# Patient Record
Sex: MALE | Race: WHITE | HISPANIC OR LATINO | Employment: OTHER | URBAN - METROPOLITAN AREA
[De-identification: names, ages, dates, MRNs, and addresses within clinical notes are randomized per-mention and may not be internally consistent; named-entity substitution may affect disease eponyms.]

---

## 2017-01-04 RX ORDER — ACETAMINOPHEN 500 MG
500 TABLET ORAL EVERY 6 HOURS PRN
COMMUNITY
End: 2017-01-07 | Stop reason: ALTCHOICE

## 2017-01-05 ENCOUNTER — APPOINTMENT (EMERGENCY)
Dept: RADIOLOGY | Facility: HOSPITAL | Age: 47
End: 2017-01-05
Payer: COMMERCIAL

## 2017-01-05 ENCOUNTER — HOSPITAL ENCOUNTER (EMERGENCY)
Facility: HOSPITAL | Age: 47
Discharge: HOME | End: 2017-01-05
Attending: EMERGENCY MEDICINE
Payer: COMMERCIAL

## 2017-01-05 VITALS
WEIGHT: 245 LBS | HEART RATE: 80 BPM | RESPIRATION RATE: 18 BRPM | DIASTOLIC BLOOD PRESSURE: 96 MMHG | OXYGEN SATURATION: 98 % | TEMPERATURE: 97.9 F | SYSTOLIC BLOOD PRESSURE: 138 MMHG

## 2017-01-05 DIAGNOSIS — M79.652 LEFT THIGH PAIN: Primary | ICD-10-CM

## 2017-01-05 DIAGNOSIS — R20.0 NUMBNESS OF LEFT ANTERIOR THIGH: ICD-10-CM

## 2017-01-05 DIAGNOSIS — R79.89 RAISED TSH LEVEL: ICD-10-CM

## 2017-01-05 LAB
ALBUMIN SERPL BCP-MCNC: 4.4 G/DL (ref 3.5–5)
ALP SERPL-CCNC: 83 U/L (ref 46–116)
ALT SERPL W P-5'-P-CCNC: 34 U/L (ref 12–78)
ANION GAP SERPL CALCULATED.3IONS-SCNC: 9 MMOL/L (ref 4–13)
AST SERPL W P-5'-P-CCNC: 24 U/L (ref 5–45)
BASOPHILS # BLD AUTO: 0.03 THOUSANDS/ΜL (ref 0–0.1)
BASOPHILS NFR BLD AUTO: 0 % (ref 0–1)
BILIRUB SERPL-MCNC: 0.3 MG/DL (ref 0.2–1)
BUN SERPL-MCNC: 17 MG/DL (ref 5–25)
CALCIUM SERPL-MCNC: 9.1 MG/DL (ref 8.3–10.1)
CHLORIDE SERPL-SCNC: 105 MMOL/L (ref 100–108)
CK MB SERPL-MCNC: 2.2 NG/ML (ref 0–5)
CK MB SERPL-MCNC: <1 % (ref 0–2.5)
CK SERPL-CCNC: 327 U/L (ref 39–308)
CO2 SERPL-SCNC: 28 MMOL/L (ref 21–32)
CREAT SERPL-MCNC: 0.97 MG/DL (ref 0.6–1.3)
DEPRECATED D DIMER PPP: 404 NG/ML (FEU) (ref 0–424)
EOSINOPHIL # BLD AUTO: 0.28 THOUSAND/ΜL (ref 0–0.61)
EOSINOPHIL NFR BLD AUTO: 3 % (ref 0–6)
ERYTHROCYTE [DISTWIDTH] IN BLOOD BY AUTOMATED COUNT: 13.6 % (ref 11.6–15.1)
GFR SERPL CREATININE-BSD FRML MDRD: >60 ML/MIN/1.73SQ M
GLUCOSE SERPL-MCNC: 93 MG/DL (ref 65–140)
HCT VFR BLD AUTO: 42.4 % (ref 36.5–49.3)
HGB BLD-MCNC: 14.2 G/DL (ref 12–17)
LYMPHOCYTES # BLD AUTO: 3.2 THOUSANDS/ΜL (ref 0.6–4.47)
LYMPHOCYTES NFR BLD AUTO: 30 % (ref 14–44)
MCH RBC QN AUTO: 30.9 PG (ref 26.8–34.3)
MCHC RBC AUTO-ENTMCNC: 33.5 G/DL (ref 31.4–37.4)
MCV RBC AUTO: 92 FL (ref 82–98)
MONOCYTES # BLD AUTO: 0.95 THOUSAND/ΜL (ref 0.17–1.22)
MONOCYTES NFR BLD AUTO: 9 % (ref 4–12)
NEUTROPHILS # BLD AUTO: 6.34 THOUSANDS/ΜL (ref 1.85–7.62)
NEUTS SEG NFR BLD AUTO: 58 % (ref 43–75)
PLATELET # BLD AUTO: 267 THOUSANDS/UL (ref 149–390)
PMV BLD AUTO: 10.3 FL (ref 8.9–12.7)
POTASSIUM SERPL-SCNC: 3.3 MMOL/L (ref 3.5–5.3)
PROT SERPL-MCNC: 8.1 G/DL (ref 6.4–8.2)
RBC # BLD AUTO: 4.6 MILLION/UL (ref 3.88–5.62)
SODIUM SERPL-SCNC: 142 MMOL/L (ref 136–145)
TSH SERPL DL<=0.05 MIU/L-ACNC: 7.14 UIU/ML (ref 0.36–3.74)
WBC # BLD AUTO: 10.8 THOUSAND/UL (ref 4.31–10.16)

## 2017-01-05 PROCEDURE — 36415 COLL VENOUS BLD VENIPUNCTURE: CPT | Performed by: EMERGENCY MEDICINE

## 2017-01-05 PROCEDURE — 85379 FIBRIN DEGRADATION QUANT: CPT | Performed by: EMERGENCY MEDICINE

## 2017-01-05 PROCEDURE — 82550 ASSAY OF CK (CPK): CPT | Performed by: EMERGENCY MEDICINE

## 2017-01-05 PROCEDURE — 84443 ASSAY THYROID STIM HORMONE: CPT | Performed by: EMERGENCY MEDICINE

## 2017-01-05 PROCEDURE — 80053 COMPREHEN METABOLIC PANEL: CPT | Performed by: EMERGENCY MEDICINE

## 2017-01-05 PROCEDURE — 73552 X-RAY EXAM OF FEMUR 2/>: CPT

## 2017-01-05 PROCEDURE — 82553 CREATINE MB FRACTION: CPT | Performed by: EMERGENCY MEDICINE

## 2017-01-05 PROCEDURE — 85025 COMPLETE CBC W/AUTO DIFF WBC: CPT | Performed by: EMERGENCY MEDICINE

## 2017-01-05 PROCEDURE — 99283 EMERGENCY DEPT VISIT LOW MDM: CPT

## 2017-01-05 RX ORDER — OXYCODONE HYDROCHLORIDE AND ACETAMINOPHEN 5; 325 MG/1; MG/1
1 TABLET ORAL EVERY 4 HOURS PRN
Qty: 20 TABLET | Refills: 0 | Status: SHIPPED | OUTPATIENT
Start: 2017-01-05 | End: 2017-01-15

## 2017-01-05 RX ORDER — POTASSIUM CHLORIDE 20 MEQ/1
20 TABLET, EXTENDED RELEASE ORAL ONCE
Status: COMPLETED | OUTPATIENT
Start: 2017-01-05 | End: 2017-01-05

## 2017-01-05 RX ORDER — ONDANSETRON 4 MG/1
4 TABLET, ORALLY DISINTEGRATING ORAL ONCE
Status: COMPLETED | OUTPATIENT
Start: 2017-01-05 | End: 2017-01-05

## 2017-01-05 RX ORDER — OXYCODONE HYDROCHLORIDE AND ACETAMINOPHEN 5; 325 MG/1; MG/1
1 TABLET ORAL ONCE
Status: COMPLETED | OUTPATIENT
Start: 2017-01-05 | End: 2017-01-05

## 2017-01-05 RX ADMIN — ONDANSETRON 4 MG: 4 TABLET, ORALLY DISINTEGRATING ORAL at 00:35

## 2017-01-05 RX ADMIN — POTASSIUM CHLORIDE 20 MEQ: 1500 TABLET, EXTENDED RELEASE ORAL at 02:19

## 2017-01-05 RX ADMIN — OXYCODONE HYDROCHLORIDE AND ACETAMINOPHEN 1 TABLET: 5; 325 TABLET ORAL at 00:35

## 2017-01-07 ENCOUNTER — APPOINTMENT (EMERGENCY)
Dept: ULTRASOUND IMAGING | Facility: HOSPITAL | Age: 47
End: 2017-01-07
Payer: COMMERCIAL

## 2017-01-07 ENCOUNTER — HOSPITAL ENCOUNTER (EMERGENCY)
Facility: HOSPITAL | Age: 47
Discharge: HOME | End: 2017-01-07
Attending: EMERGENCY MEDICINE
Payer: COMMERCIAL

## 2017-01-07 VITALS
RESPIRATION RATE: 20 BRPM | OXYGEN SATURATION: 96 % | TEMPERATURE: 98 F | SYSTOLIC BLOOD PRESSURE: 170 MMHG | HEART RATE: 80 BPM | DIASTOLIC BLOOD PRESSURE: 88 MMHG | WEIGHT: 245 LBS

## 2017-01-07 DIAGNOSIS — M79.606 LEG PAIN: Primary | ICD-10-CM

## 2017-01-07 PROCEDURE — 99284 EMERGENCY DEPT VISIT MOD MDM: CPT

## 2017-01-07 PROCEDURE — 93971 EXTREMITY STUDY: CPT

## 2017-01-07 PROCEDURE — 96372 THER/PROPH/DIAG INJ SC/IM: CPT

## 2017-01-07 RX ORDER — NAPROXEN 500 MG/1
500 TABLET ORAL 2 TIMES DAILY WITH MEALS
Qty: 14 TABLET | Refills: 0 | Status: SHIPPED | OUTPATIENT
Start: 2017-01-07 | End: 2017-05-15

## 2017-01-07 RX ORDER — FUROSEMIDE 40 MG/1
40 TABLET ORAL DAILY
COMMUNITY
End: 2018-03-16 | Stop reason: ALTCHOICE

## 2017-01-07 RX ORDER — KETOROLAC TROMETHAMINE 30 MG/ML
15 INJECTION, SOLUTION INTRAMUSCULAR; INTRAVENOUS ONCE
Status: COMPLETED | OUTPATIENT
Start: 2017-01-07 | End: 2017-01-07

## 2017-01-07 RX ADMIN — KETOROLAC TROMETHAMINE 15 MG: 30 INJECTION, SOLUTION INTRAMUSCULAR at 16:47

## 2017-01-09 ENCOUNTER — ALLSCRIPTS OFFICE VISIT (OUTPATIENT)
Dept: OTHER | Facility: OTHER | Age: 47
End: 2017-01-09

## 2017-01-09 DIAGNOSIS — R14.0 ABDOMINAL DISTENSION (GASEOUS): ICD-10-CM

## 2017-01-09 DIAGNOSIS — E78.1 PURE HYPERGLYCERIDEMIA: ICD-10-CM

## 2017-01-16 ENCOUNTER — APPOINTMENT (OUTPATIENT)
Dept: LAB | Facility: CLINIC | Age: 47
End: 2017-01-16
Payer: COMMERCIAL

## 2017-01-16 ENCOUNTER — HOSPITAL ENCOUNTER (OUTPATIENT)
Dept: ULTRASOUND IMAGING | Facility: HOSPITAL | Age: 47
Discharge: HOME | End: 2017-01-16
Payer: COMMERCIAL

## 2017-01-16 DIAGNOSIS — E78.1 PURE HYPERGLYCERIDEMIA: ICD-10-CM

## 2017-01-16 DIAGNOSIS — R14.0 ABDOMINAL DISTENSION (GASEOUS): ICD-10-CM

## 2017-01-16 LAB
CHOLEST SERPL-MCNC: 202 MG/DL (ref 50–200)
HDLC SERPL-MCNC: 47 MG/DL (ref 40–60)
LDLC SERPL CALC-MCNC: 135 MG/DL (ref 0–100)
TRIGL SERPL-MCNC: 99 MG/DL

## 2017-01-16 PROCEDURE — 36415 COLL VENOUS BLD VENIPUNCTURE: CPT

## 2017-01-16 PROCEDURE — 76700 US EXAM ABDOM COMPLETE: CPT

## 2017-01-16 PROCEDURE — 80061 LIPID PANEL: CPT

## 2017-02-20 ENCOUNTER — HOSPITAL ENCOUNTER (EMERGENCY)
Facility: HOSPITAL | Age: 47
Discharge: HOME | End: 2017-02-20
Attending: EMERGENCY MEDICINE | Admitting: EMERGENCY MEDICINE
Payer: COMMERCIAL

## 2017-02-20 VITALS
DIASTOLIC BLOOD PRESSURE: 85 MMHG | SYSTOLIC BLOOD PRESSURE: 136 MMHG | TEMPERATURE: 97.8 F | HEART RATE: 88 BPM | RESPIRATION RATE: 18 BRPM | WEIGHT: 245 LBS | OXYGEN SATURATION: 95 %

## 2017-02-20 DIAGNOSIS — J06.9 UPPER RESPIRATORY INFECTION: ICD-10-CM

## 2017-02-20 DIAGNOSIS — H10.9 CONJUNCTIVITIS OF BOTH EYES: Primary | ICD-10-CM

## 2017-02-20 PROCEDURE — 99282 EMERGENCY DEPT VISIT SF MDM: CPT

## 2017-02-20 RX ORDER — FLUTICASONE PROPIONATE 50 MCG
1 SPRAY, SUSPENSION (ML) NASAL DAILY
Qty: 1 BOTTLE | Refills: 0 | Status: SHIPPED | OUTPATIENT
Start: 2017-02-20 | End: 2017-05-15

## 2017-02-20 RX ORDER — GENTAMICIN SULFATE 3 MG/ML
1 SOLUTION/ DROPS OPHTHALMIC 4 TIMES DAILY
Qty: 5 ML | Refills: 0 | Status: SHIPPED | OUTPATIENT
Start: 2017-02-20 | End: 2017-02-25

## 2017-03-01 ENCOUNTER — ALLSCRIPTS OFFICE VISIT (OUTPATIENT)
Dept: OTHER | Facility: OTHER | Age: 47
End: 2017-03-01

## 2017-03-01 DIAGNOSIS — E29.1 TESTICULAR HYPOFUNCTION: ICD-10-CM

## 2017-03-01 DIAGNOSIS — N52.9 MALE ERECTILE DYSFUNCTION: ICD-10-CM

## 2017-03-01 DIAGNOSIS — E03.9 HYPOTHYROIDISM: ICD-10-CM

## 2017-03-01 DIAGNOSIS — F31.9 BIPOLAR DISORDER (HCC): ICD-10-CM

## 2017-03-02 ENCOUNTER — APPOINTMENT (OUTPATIENT)
Dept: LAB | Facility: CLINIC | Age: 47
End: 2017-03-02
Payer: COMMERCIAL

## 2017-03-02 ENCOUNTER — TRANSCRIBE ORDERS (OUTPATIENT)
Dept: ADMINISTRATIVE | Facility: HOSPITAL | Age: 47
End: 2017-03-02

## 2017-03-02 DIAGNOSIS — F31.9 BIPOLAR DISORDER (HCC): ICD-10-CM

## 2017-03-02 DIAGNOSIS — E03.9 HYPOTHYROIDISM: ICD-10-CM

## 2017-03-02 DIAGNOSIS — I20.8 ANGINAL CHEST PAIN AT REST (HCC): Primary | ICD-10-CM

## 2017-03-02 DIAGNOSIS — N52.9 MALE ERECTILE DYSFUNCTION: ICD-10-CM

## 2017-03-02 LAB
LITHIUM SERPL-SCNC: 0.3 MMOL/L (ref 0.5–1)
T4 FREE SERPL-MCNC: 0.85 NG/DL (ref 0.76–1.46)
TESTOST SERPL-MCNC: 141.2 NG/DL (ref 241–827)
TSH SERPL DL<=0.05 MIU/L-ACNC: 2.3 UIU/ML (ref 0.36–3.74)

## 2017-03-02 PROCEDURE — 84403 ASSAY OF TOTAL TESTOSTERONE: CPT

## 2017-03-02 PROCEDURE — 84439 ASSAY OF FREE THYROXINE: CPT

## 2017-03-02 PROCEDURE — 80178 ASSAY OF LITHIUM: CPT

## 2017-03-02 PROCEDURE — 84443 ASSAY THYROID STIM HORMONE: CPT

## 2017-03-02 PROCEDURE — 36415 COLL VENOUS BLD VENIPUNCTURE: CPT

## 2017-03-07 ENCOUNTER — GENERIC CONVERSION - ENCOUNTER (OUTPATIENT)
Dept: OTHER | Facility: OTHER | Age: 47
End: 2017-03-07

## 2017-03-08 ENCOUNTER — GENERIC CONVERSION - ENCOUNTER (OUTPATIENT)
Dept: OTHER | Facility: OTHER | Age: 47
End: 2017-03-08

## 2017-03-08 ENCOUNTER — APPOINTMENT (OUTPATIENT)
Dept: LAB | Facility: CLINIC | Age: 47
End: 2017-03-08
Payer: COMMERCIAL

## 2017-03-08 DIAGNOSIS — N52.9 MALE ERECTILE DYSFUNCTION: ICD-10-CM

## 2017-03-08 LAB
FSH SERPL-ACNC: 2.9 MIU/ML (ref 0.7–10.8)
LH SERPL-ACNC: 3.5 MIU/ML (ref 1.2–10.6)
PROLACTIN SERPL-MCNC: 11.6 NG/ML (ref 2.5–17.4)
TESTOST SERPL-MCNC: 222 NG/DL (ref 241–827)

## 2017-03-08 PROCEDURE — 83001 ASSAY OF GONADOTROPIN (FSH): CPT

## 2017-03-08 PROCEDURE — 83002 ASSAY OF GONADOTROPIN (LH): CPT

## 2017-03-08 PROCEDURE — 84146 ASSAY OF PROLACTIN: CPT

## 2017-03-08 PROCEDURE — 84403 ASSAY OF TOTAL TESTOSTERONE: CPT

## 2017-03-08 PROCEDURE — 36415 COLL VENOUS BLD VENIPUNCTURE: CPT

## 2017-03-15 ENCOUNTER — GENERIC CONVERSION - ENCOUNTER (OUTPATIENT)
Dept: OTHER | Facility: OTHER | Age: 47
End: 2017-03-15

## 2017-03-16 ENCOUNTER — APPOINTMENT (OUTPATIENT)
Dept: LAB | Facility: CLINIC | Age: 47
End: 2017-03-16
Payer: COMMERCIAL

## 2017-03-16 ENCOUNTER — HOSPITAL ENCOUNTER (OUTPATIENT)
Dept: NON INVASIVE DIAGNOSTICS | Facility: CLINIC | Age: 47
Discharge: HOME | End: 2017-03-16
Payer: COMMERCIAL

## 2017-03-16 ENCOUNTER — TRANSCRIBE ORDERS (OUTPATIENT)
Dept: ADMINISTRATIVE | Facility: HOSPITAL | Age: 47
End: 2017-03-16

## 2017-03-16 DIAGNOSIS — E29.1 TESTICULAR HYPOFUNCTION: ICD-10-CM

## 2017-03-16 DIAGNOSIS — E29.1 3-OXO-5 ALPHA-STEROID DELTA 4-DEHYDROGENASE DEFICIENCY: Primary | ICD-10-CM

## 2017-03-16 DIAGNOSIS — I20.8 ANGINAL CHEST PAIN AT REST (HCC): ICD-10-CM

## 2017-03-16 LAB
FERRITIN SERPL-MCNC: 38 NG/ML (ref 8–388)
IRON SATN MFR SERPL: 21 %
IRON SERPL-MCNC: 90 UG/DL (ref 65–175)
TIBC SERPL-MCNC: 430 UG/DL (ref 250–450)
TRANSFERRIN SERPL-MCNC: 354 MG/DL (ref 200–400)

## 2017-03-16 PROCEDURE — 82728 ASSAY OF FERRITIN: CPT

## 2017-03-16 PROCEDURE — 83540 ASSAY OF IRON: CPT

## 2017-03-16 PROCEDURE — 83550 IRON BINDING TEST: CPT

## 2017-03-16 PROCEDURE — 84466 ASSAY OF TRANSFERRIN: CPT

## 2017-03-16 PROCEDURE — 93306 TTE W/DOPPLER COMPLETE: CPT

## 2017-03-16 PROCEDURE — 84402 ASSAY OF FREE TESTOSTERONE: CPT

## 2017-03-16 PROCEDURE — 36415 COLL VENOUS BLD VENIPUNCTURE: CPT

## 2017-03-16 PROCEDURE — 82533 TOTAL CORTISOL: CPT

## 2017-03-16 PROCEDURE — 84403 ASSAY OF TOTAL TESTOSTERONE: CPT

## 2017-03-17 ENCOUNTER — GENERIC CONVERSION - ENCOUNTER (OUTPATIENT)
Dept: OTHER | Facility: OTHER | Age: 47
End: 2017-03-17

## 2017-03-17 LAB
CORTIS AM PEAK SERPL-MCNC: 11.6 UG/ML (ref 4.2–22.4)
TESTOST FREE SERPL-MCNC: 6.4 PG/ML (ref 6.8–21.5)
TESTOST SERPL-MCNC: 161 NG/DL (ref 348–1197)
TESTOSTERONE COMMENT: ABNORMAL

## 2017-04-24 ENCOUNTER — ALLSCRIPTS OFFICE VISIT (OUTPATIENT)
Dept: OTHER | Facility: OTHER | Age: 47
End: 2017-04-24

## 2017-04-24 ENCOUNTER — LAB (OUTPATIENT)
Dept: LAB | Facility: CLINIC | Age: 47
End: 2017-04-24
Payer: COMMERCIAL

## 2017-04-24 DIAGNOSIS — F31.9 BIPOLAR DISORDER, UNSPECIFIED (HCC): Primary | ICD-10-CM

## 2017-04-24 DIAGNOSIS — E23.0 HYPOPITUITARISM (HCC): ICD-10-CM

## 2017-04-24 DIAGNOSIS — R18.8 OTHER ASCITES: ICD-10-CM

## 2017-04-24 LAB
ALBUMIN SERPL BCP-MCNC: 3.7 G/DL (ref 3.5–5)
ALP SERPL-CCNC: 96 U/L (ref 46–116)
ALT SERPL W P-5'-P-CCNC: 27 U/L (ref 12–78)
AST SERPL W P-5'-P-CCNC: 18 U/L (ref 5–45)
BASOPHILS # BLD AUTO: 0.02 THOUSANDS/ΜL (ref 0–0.1)
BASOPHILS NFR BLD AUTO: 0 % (ref 0–1)
BILIRUB DIRECT SERPL-MCNC: 0.15 MG/DL (ref 0–0.2)
BILIRUB SERPL-MCNC: 0.65 MG/DL (ref 0.2–1)
CREAT SERPL-MCNC: 0.94 MG/DL (ref 0.6–1.3)
EOSINOPHIL # BLD AUTO: 0.18 THOUSAND/ΜL (ref 0–0.61)
EOSINOPHIL NFR BLD AUTO: 2 % (ref 0–6)
ERYTHROCYTE [DISTWIDTH] IN BLOOD BY AUTOMATED COUNT: 13.4 % (ref 11.6–15.1)
GFR SERPL CREATININE-BSD FRML MDRD: >60 ML/MIN/1.73SQ M
HCT VFR BLD AUTO: 48 % (ref 36.5–49.3)
HGB BLD-MCNC: 15.9 G/DL (ref 12–17)
LITHIUM SERPL-SCNC: <0.2 MMOL/L (ref 0.5–1)
LYMPHOCYTES # BLD AUTO: 2 THOUSANDS/ΜL (ref 0.6–4.47)
LYMPHOCYTES NFR BLD AUTO: 26 % (ref 14–44)
MCH RBC QN AUTO: 31.2 PG (ref 26.8–34.3)
MCHC RBC AUTO-ENTMCNC: 33.1 G/DL (ref 31.4–37.4)
MCV RBC AUTO: 94 FL (ref 82–98)
MONOCYTES # BLD AUTO: 0.49 THOUSAND/ΜL (ref 0.17–1.22)
MONOCYTES NFR BLD AUTO: 6 % (ref 4–12)
NEUTROPHILS # BLD AUTO: 5.13 THOUSANDS/ΜL (ref 1.85–7.62)
NEUTS SEG NFR BLD AUTO: 66 % (ref 43–75)
NRBC BLD AUTO-RTO: 0 /100 WBCS
PLATELET # BLD AUTO: 220 THOUSANDS/UL (ref 149–390)
PMV BLD AUTO: 11.4 FL (ref 8.9–12.7)
PROT SERPL-MCNC: 7.3 G/DL (ref 6.4–8.2)
RBC # BLD AUTO: 5.09 MILLION/UL (ref 3.88–5.62)
T3 SERPL-MCNC: 1 NG/ML (ref 0.6–1.8)
T4 SERPL-MCNC: 9 UG/DL (ref 4.7–13.3)
TSH SERPL DL<=0.05 MIU/L-ACNC: 1.26 UIU/ML (ref 0.36–3.74)
WBC # BLD AUTO: 7.84 THOUSAND/UL (ref 4.31–10.16)

## 2017-04-24 PROCEDURE — 80178 ASSAY OF LITHIUM: CPT

## 2017-04-24 PROCEDURE — 36415 COLL VENOUS BLD VENIPUNCTURE: CPT

## 2017-04-24 PROCEDURE — 85025 COMPLETE CBC W/AUTO DIFF WBC: CPT

## 2017-04-24 PROCEDURE — 84443 ASSAY THYROID STIM HORMONE: CPT

## 2017-04-24 PROCEDURE — 84480 ASSAY TRIIODOTHYRONINE (T3): CPT

## 2017-04-24 PROCEDURE — 84436 ASSAY OF TOTAL THYROXINE: CPT

## 2017-04-24 PROCEDURE — 82565 ASSAY OF CREATININE: CPT

## 2017-04-24 PROCEDURE — 80076 HEPATIC FUNCTION PANEL: CPT

## 2017-04-28 ENCOUNTER — APPOINTMENT (OUTPATIENT)
Dept: LAB | Facility: CLINIC | Age: 47
End: 2017-04-28
Payer: COMMERCIAL

## 2017-04-28 DIAGNOSIS — R18.8 OTHER ASCITES: ICD-10-CM

## 2017-04-28 LAB
ALBUMIN SERPL BCP-MCNC: 4 G/DL (ref 3.5–5)
ALP SERPL-CCNC: 108 U/L (ref 46–116)
ALT SERPL W P-5'-P-CCNC: 28 U/L (ref 12–78)
ANION GAP SERPL CALCULATED.3IONS-SCNC: 7 MMOL/L (ref 4–13)
AST SERPL W P-5'-P-CCNC: 17 U/L (ref 5–45)
BASOPHILS # BLD AUTO: 0.04 THOUSANDS/ΜL (ref 0–0.1)
BASOPHILS NFR BLD AUTO: 0 % (ref 0–1)
BILIRUB SERPL-MCNC: 0.67 MG/DL (ref 0.2–1)
BUN SERPL-MCNC: 12 MG/DL (ref 5–25)
CALCIUM SERPL-MCNC: 9.5 MG/DL (ref 8.3–10.1)
CHLORIDE SERPL-SCNC: 105 MMOL/L (ref 100–108)
CO2 SERPL-SCNC: 29 MMOL/L (ref 21–32)
CREAT SERPL-MCNC: 1.11 MG/DL (ref 0.6–1.3)
EOSINOPHIL # BLD AUTO: 0.25 THOUSAND/ΜL (ref 0–0.61)
EOSINOPHIL NFR BLD AUTO: 2 % (ref 0–6)
ERYTHROCYTE [DISTWIDTH] IN BLOOD BY AUTOMATED COUNT: 13.2 % (ref 11.6–15.1)
GFR SERPL CREATININE-BSD FRML MDRD: >60 ML/MIN/1.73SQ M
GLUCOSE P FAST SERPL-MCNC: 113 MG/DL (ref 65–99)
HCT VFR BLD AUTO: 50 % (ref 36.5–49.3)
HGB BLD-MCNC: 16.7 G/DL (ref 12–17)
LYMPHOCYTES # BLD AUTO: 3.19 THOUSANDS/ΜL (ref 0.6–4.47)
LYMPHOCYTES NFR BLD AUTO: 26 % (ref 14–44)
MCH RBC QN AUTO: 31.2 PG (ref 26.8–34.3)
MCHC RBC AUTO-ENTMCNC: 33.4 G/DL (ref 31.4–37.4)
MCV RBC AUTO: 94 FL (ref 82–98)
MONOCYTES # BLD AUTO: 0.99 THOUSAND/ΜL (ref 0.17–1.22)
MONOCYTES NFR BLD AUTO: 8 % (ref 4–12)
NEUTROPHILS # BLD AUTO: 7.89 THOUSANDS/ΜL (ref 1.85–7.62)
NEUTS SEG NFR BLD AUTO: 64 % (ref 43–75)
NRBC BLD AUTO-RTO: 0 /100 WBCS
PLATELET # BLD AUTO: 258 THOUSANDS/UL (ref 149–390)
PMV BLD AUTO: 11.6 FL (ref 8.9–12.7)
POTASSIUM SERPL-SCNC: 4 MMOL/L (ref 3.5–5.3)
PROT SERPL-MCNC: 8.1 G/DL (ref 6.4–8.2)
RBC # BLD AUTO: 5.35 MILLION/UL (ref 3.88–5.62)
SODIUM SERPL-SCNC: 141 MMOL/L (ref 136–145)
WBC # BLD AUTO: 12.41 THOUSAND/UL (ref 4.31–10.16)

## 2017-04-28 PROCEDURE — 36415 COLL VENOUS BLD VENIPUNCTURE: CPT

## 2017-04-28 PROCEDURE — 85025 COMPLETE CBC W/AUTO DIFF WBC: CPT

## 2017-04-28 PROCEDURE — 80053 COMPREHEN METABOLIC PANEL: CPT

## 2017-05-15 ENCOUNTER — HOSPITAL ENCOUNTER (EMERGENCY)
Facility: HOSPITAL | Age: 47
Discharge: HOME | End: 2017-05-15
Admitting: EMERGENCY MEDICINE
Payer: COMMERCIAL

## 2017-05-15 VITALS
HEART RATE: 66 BPM | OXYGEN SATURATION: 93 % | TEMPERATURE: 98.1 F | RESPIRATION RATE: 18 BRPM | SYSTOLIC BLOOD PRESSURE: 130 MMHG | DIASTOLIC BLOOD PRESSURE: 74 MMHG

## 2017-05-15 DIAGNOSIS — S61.219A LACERATION OF FINGER, INITIAL ENCOUNTER: Primary | ICD-10-CM

## 2017-05-15 PROCEDURE — 90715 TDAP VACCINE 7 YRS/> IM: CPT | Performed by: PHYSICIAN ASSISTANT

## 2017-05-15 PROCEDURE — 90471 IMMUNIZATION ADMIN: CPT

## 2017-05-15 PROCEDURE — 99282 EMERGENCY DEPT VISIT SF MDM: CPT

## 2017-05-15 RX ORDER — GINSENG 100 MG
CAPSULE ORAL
Status: COMPLETED
Start: 2017-05-15 | End: 2017-05-15

## 2017-05-15 RX ORDER — LIDOCAINE HYDROCHLORIDE 20 MG/ML
5 INJECTION, SOLUTION EPIDURAL; INFILTRATION; INTRACAUDAL; PERINEURAL ONCE
Status: COMPLETED | OUTPATIENT
Start: 2017-05-15 | End: 2017-05-15

## 2017-05-15 RX ADMIN — TETANUS TOXOID, REDUCED DIPHTHERIA TOXOID AND ACELLULAR PERTUSSIS VACCINE, ADSORBED 0.5 ML: 5; 2.5; 8; 8; 2.5 SUSPENSION INTRAMUSCULAR at 13:20

## 2017-05-15 RX ADMIN — BACITRACIN ZINC 1 APPLICATION: 500 OINTMENT TOPICAL at 14:22

## 2017-05-15 RX ADMIN — LIDOCAINE HYDROCHLORIDE 5 ML: 20 INJECTION, SOLUTION EPIDURAL; INFILTRATION; INTRACAUDAL; PERINEURAL at 13:20

## 2017-05-16 ENCOUNTER — HOSPITAL ENCOUNTER (OUTPATIENT)
Dept: ULTRASOUND IMAGING | Facility: HOSPITAL | Age: 47
Discharge: HOME | End: 2017-05-16
Payer: COMMERCIAL

## 2017-05-16 DIAGNOSIS — R18.8 OTHER ASCITES: ICD-10-CM

## 2017-05-16 PROCEDURE — 76700 US EXAM ABDOM COMPLETE: CPT

## 2017-05-17 ENCOUNTER — HOSPITAL ENCOUNTER (OUTPATIENT)
Dept: MRI IMAGING | Facility: HOSPITAL | Age: 47
Discharge: HOME | End: 2017-05-17
Payer: COMMERCIAL

## 2017-05-17 ENCOUNTER — HOSPITAL ENCOUNTER (OUTPATIENT)
Dept: RADIOLOGY | Age: 47
Discharge: HOME | End: 2017-05-17
Payer: COMMERCIAL

## 2017-05-17 DIAGNOSIS — E23.0 HYPOPITUITARISM (HCC): ICD-10-CM

## 2017-05-17 DIAGNOSIS — E29.1 TESTICULAR HYPOFUNCTION: ICD-10-CM

## 2017-05-17 PROCEDURE — A9585 GADOBUTROL INJECTION: HCPCS | Performed by: INTERNAL MEDICINE

## 2017-05-17 PROCEDURE — 77080 DXA BONE DENSITY AXIAL: CPT

## 2017-05-17 PROCEDURE — 70553 MRI BRAIN STEM W/O & W/DYE: CPT

## 2017-05-17 RX ADMIN — GADOBUTROL 11 ML: 604.72 INJECTION INTRAVENOUS at 14:26

## 2017-05-28 ENCOUNTER — HOSPITAL ENCOUNTER (EMERGENCY)
Facility: HOSPITAL | Age: 47
Discharge: HOME | End: 2017-05-28
Attending: EMERGENCY MEDICINE | Admitting: EMERGENCY MEDICINE
Payer: COMMERCIAL

## 2017-05-28 VITALS
WEIGHT: 247.5 LBS | RESPIRATION RATE: 18 BRPM | HEART RATE: 77 BPM | DIASTOLIC BLOOD PRESSURE: 69 MMHG | BODY MASS INDEX: 31.76 KG/M2 | OXYGEN SATURATION: 95 % | SYSTOLIC BLOOD PRESSURE: 117 MMHG | TEMPERATURE: 98 F | HEIGHT: 74 IN

## 2017-05-28 DIAGNOSIS — Z48.02 VISIT FOR SUTURE REMOVAL: Primary | ICD-10-CM

## 2017-05-28 PROCEDURE — 99281 EMR DPT VST MAYX REQ PHY/QHP: CPT

## 2017-05-28 RX ORDER — LITHIUM CARBONATE 300 MG/1
300 CAPSULE ORAL 2 TIMES DAILY
Status: ON HOLD | COMMUNITY
Start: 2017-01-09 | End: 2017-07-27

## 2017-05-28 RX ORDER — ASPIRIN 81 MG
1 TABLET,CHEWABLE ORAL DAILY
Status: ON HOLD | COMMUNITY
Start: 2017-01-09 | End: 2017-07-27

## 2017-05-28 RX ORDER — ASPIRIN 81 MG/1
81 TABLET ORAL DAILY
COMMUNITY
Start: 2017-03-01 | End: 2018-03-03 | Stop reason: SDUPTHER

## 2017-05-28 RX ORDER — NICOTINE POLACRILEX 4 MG/1
20 GUM, CHEWING ORAL DAILY
COMMUNITY
Start: 2017-01-09 | End: 2018-07-11

## 2017-05-28 RX ORDER — ATENOLOL 25 MG/1
25 TABLET ORAL DAILY
COMMUNITY
Start: 2017-03-28 | End: 2018-07-11

## 2017-05-28 RX ORDER — GABAPENTIN 100 MG/1
400 CAPSULE ORAL DAILY
COMMUNITY
Start: 2017-01-09 | End: 2018-07-11

## 2017-05-28 RX ORDER — ATORVASTATIN CALCIUM 10 MG/1
10 TABLET, FILM COATED ORAL
COMMUNITY
Start: 2017-03-01 | End: 2018-02-25 | Stop reason: SDUPTHER

## 2017-05-28 RX ORDER — SERTRALINE HYDROCHLORIDE 100 MG/1
100 TABLET, FILM COATED ORAL DAILY
COMMUNITY
Start: 2017-01-09 | End: 2018-07-11

## 2017-05-28 RX ORDER — OLANZAPINE 10 MG/1
12.5 TABLET ORAL
COMMUNITY
Start: 2017-01-09 | End: 2018-09-05

## 2017-05-28 RX ORDER — POLYETHYLENE GLYCOL 3350 17 G/17G
3350 POWDER, FOR SOLUTION ORAL DAILY PRN
Status: ON HOLD | COMMUNITY
Start: 2017-05-22 | End: 2017-08-29

## 2017-05-28 RX ORDER — CITALOPRAM 20 MG/1
80 TABLET ORAL
COMMUNITY
Start: 2017-01-09 | End: 2018-07-11

## 2017-05-28 RX ORDER — TADALAFIL 5 MG/1
5 TABLET ORAL DAILY PRN
COMMUNITY
Start: 2017-03-08 | End: 2018-06-01 | Stop reason: SDUPTHER

## 2017-07-10 ENCOUNTER — ALLSCRIPTS OFFICE VISIT (OUTPATIENT)
Dept: OTHER | Facility: OTHER | Age: 47
End: 2017-07-10

## 2017-07-27 ENCOUNTER — ANESTHESIA EVENT (OUTPATIENT)
Dept: GASTROENTEROLOGY | Facility: HOSPITAL | Age: 47
End: 2017-07-27
Payer: COMMERCIAL

## 2017-07-27 ENCOUNTER — ANESTHESIA (OUTPATIENT)
Dept: GASTROENTEROLOGY | Facility: HOSPITAL | Age: 47
End: 2017-07-27
Payer: COMMERCIAL

## 2017-07-27 ENCOUNTER — HOSPITAL ENCOUNTER (OUTPATIENT)
Facility: HOSPITAL | Age: 47
Setting detail: OUTPATIENT SURGERY
Discharge: HOME | End: 2017-07-27
Attending: INTERNAL MEDICINE | Admitting: INTERNAL MEDICINE
Payer: COMMERCIAL

## 2017-07-27 ENCOUNTER — GENERIC CONVERSION - ENCOUNTER (OUTPATIENT)
Dept: OTHER | Facility: OTHER | Age: 47
End: 2017-07-27

## 2017-07-27 VITALS
HEART RATE: 62 BPM | TEMPERATURE: 97.6 F | DIASTOLIC BLOOD PRESSURE: 80 MMHG | OXYGEN SATURATION: 100 % | RESPIRATION RATE: 16 BRPM | HEIGHT: 76 IN | WEIGHT: 245 LBS | SYSTOLIC BLOOD PRESSURE: 115 MMHG | BODY MASS INDEX: 29.83 KG/M2

## 2017-07-27 DIAGNOSIS — R14.0 ABDOMINAL DISTENSION (GASEOUS): ICD-10-CM

## 2017-07-27 PROCEDURE — 88305 TISSUE EXAM BY PATHOLOGIST: CPT | Performed by: INTERNAL MEDICINE

## 2017-07-27 RX ORDER — SODIUM CHLORIDE 9 MG/ML
INJECTION, SOLUTION INTRAVENOUS CONTINUOUS PRN
Status: DISCONTINUED | OUTPATIENT
Start: 2017-07-27 | End: 2017-07-27 | Stop reason: SURG

## 2017-07-27 RX ORDER — SODIUM CHLORIDE 9 MG/ML
125 INJECTION, SOLUTION INTRAVENOUS CONTINUOUS
Status: DISCONTINUED | OUTPATIENT
Start: 2017-07-27 | End: 2017-07-27 | Stop reason: HOSPADM

## 2017-07-27 RX ORDER — PROPOFOL 10 MG/ML
INJECTION, EMULSION INTRAVENOUS CONTINUOUS PRN
Status: DISCONTINUED | OUTPATIENT
Start: 2017-07-27 | End: 2017-07-27 | Stop reason: SURG

## 2017-07-27 RX ORDER — PROPOFOL 10 MG/ML
INJECTION, EMULSION INTRAVENOUS AS NEEDED
Status: DISCONTINUED | OUTPATIENT
Start: 2017-07-27 | End: 2017-07-27 | Stop reason: SURG

## 2017-07-27 RX ADMIN — PROPOFOL 30 MG: 10 INJECTION, EMULSION INTRAVENOUS at 12:51

## 2017-07-27 RX ADMIN — PROPOFOL 120 MG: 10 INJECTION, EMULSION INTRAVENOUS at 12:40

## 2017-07-27 RX ADMIN — PROPOFOL 20 MG: 10 INJECTION, EMULSION INTRAVENOUS at 12:45

## 2017-07-27 RX ADMIN — PROPOFOL 60 MG: 10 INJECTION, EMULSION INTRAVENOUS at 12:48

## 2017-07-27 RX ADMIN — PROPOFOL 30 MG: 10 INJECTION, EMULSION INTRAVENOUS at 12:55

## 2017-07-27 RX ADMIN — SODIUM CHLORIDE: 0.9 INJECTION, SOLUTION INTRAVENOUS at 12:36

## 2017-07-27 RX ADMIN — PROPOFOL 100 MCG/KG/MIN: 10 INJECTION, EMULSION INTRAVENOUS at 12:40

## 2017-08-07 ENCOUNTER — GENERIC CONVERSION - ENCOUNTER (OUTPATIENT)
Dept: OTHER | Facility: OTHER | Age: 47
End: 2017-08-07

## 2017-08-07 ENCOUNTER — ALLSCRIPTS OFFICE VISIT (OUTPATIENT)
Dept: OTHER | Facility: OTHER | Age: 47
End: 2017-08-07

## 2017-08-07 DIAGNOSIS — E23.0 HYPOPITUITARISM (HCC): ICD-10-CM

## 2017-08-07 DIAGNOSIS — Z12.5 ENCOUNTER FOR SCREENING FOR MALIGNANT NEOPLASM OF PROSTATE: ICD-10-CM

## 2017-08-10 ENCOUNTER — GENERIC CONVERSION - ENCOUNTER (OUTPATIENT)
Dept: OTHER | Facility: OTHER | Age: 47
End: 2017-08-10

## 2017-08-23 ENCOUNTER — APPOINTMENT (OUTPATIENT)
Dept: LAB | Facility: CLINIC | Age: 47
End: 2017-08-23
Payer: COMMERCIAL

## 2017-08-23 DIAGNOSIS — Z12.5 ENCOUNTER FOR SCREENING FOR MALIGNANT NEOPLASM OF PROSTATE: ICD-10-CM

## 2017-08-23 DIAGNOSIS — E23.0 HYPOPITUITARISM (HCC): ICD-10-CM

## 2017-08-23 LAB
ERYTHROCYTE [DISTWIDTH] IN BLOOD BY AUTOMATED COUNT: 14 % (ref 11.6–15.1)
HCT VFR BLD AUTO: 47.9 % (ref 36.5–49.3)
HGB BLD-MCNC: 15.4 G/DL (ref 12–17)
MCH RBC QN AUTO: 31 PG (ref 26.8–34.3)
MCHC RBC AUTO-ENTMCNC: 32.2 G/DL (ref 31.4–37.4)
MCV RBC AUTO: 97 FL (ref 82–98)
PLATELET # BLD AUTO: 235 THOUSANDS/UL (ref 149–390)
PMV BLD AUTO: 12.3 FL (ref 8.9–12.7)
PSA SERPL-MCNC: 0.3 NG/ML (ref 0–4)
RBC # BLD AUTO: 4.96 MILLION/UL (ref 3.88–5.62)
WBC # BLD AUTO: 11.49 THOUSAND/UL (ref 4.31–10.16)

## 2017-08-23 PROCEDURE — G0103 PSA SCREENING: HCPCS

## 2017-08-23 PROCEDURE — 85027 COMPLETE CBC AUTOMATED: CPT

## 2017-08-23 PROCEDURE — 36415 COLL VENOUS BLD VENIPUNCTURE: CPT

## 2017-08-28 ENCOUNTER — ANESTHESIA EVENT (OUTPATIENT)
Dept: GASTROENTEROLOGY | Facility: HOSPITAL | Age: 47
End: 2017-08-28
Payer: COMMERCIAL

## 2017-08-29 ENCOUNTER — GENERIC CONVERSION - ENCOUNTER (OUTPATIENT)
Dept: OTHER | Facility: OTHER | Age: 47
End: 2017-08-29

## 2017-08-29 ENCOUNTER — HOSPITAL ENCOUNTER (OUTPATIENT)
Facility: HOSPITAL | Age: 47
Setting detail: OUTPATIENT SURGERY
Discharge: HOME | End: 2017-08-29
Attending: INTERNAL MEDICINE | Admitting: INTERNAL MEDICINE
Payer: COMMERCIAL

## 2017-08-29 ENCOUNTER — ANESTHESIA (OUTPATIENT)
Dept: GASTROENTEROLOGY | Facility: HOSPITAL | Age: 47
End: 2017-08-29
Payer: COMMERCIAL

## 2017-08-29 VITALS
WEIGHT: 245 LBS | RESPIRATION RATE: 16 BRPM | HEIGHT: 76 IN | TEMPERATURE: 97.6 F | OXYGEN SATURATION: 95 % | DIASTOLIC BLOOD PRESSURE: 72 MMHG | HEART RATE: 64 BPM | SYSTOLIC BLOOD PRESSURE: 123 MMHG | BODY MASS INDEX: 29.83 KG/M2

## 2017-08-29 DIAGNOSIS — D12.4 ADENOMATOUS POLYP OF DESCENDING COLON: Primary | ICD-10-CM

## 2017-08-29 RX ORDER — LIDOCAINE HYDROCHLORIDE 10 MG/ML
INJECTION, SOLUTION INFILTRATION; PERINEURAL AS NEEDED
Status: DISCONTINUED | OUTPATIENT
Start: 2017-08-29 | End: 2017-08-29 | Stop reason: SURG

## 2017-08-29 RX ORDER — SODIUM CHLORIDE 9 MG/ML
50 INJECTION, SOLUTION INTRAVENOUS CONTINUOUS
Status: DISCONTINUED | OUTPATIENT
Start: 2017-08-29 | End: 2017-08-29 | Stop reason: HOSPADM

## 2017-08-29 RX ORDER — PROPOFOL 10 MG/ML
INJECTION, EMULSION INTRAVENOUS CONTINUOUS PRN
Status: DISCONTINUED | OUTPATIENT
Start: 2017-08-29 | End: 2017-08-29 | Stop reason: SURG

## 2017-08-29 RX ORDER — PROPOFOL 10 MG/ML
INJECTION, EMULSION INTRAVENOUS AS NEEDED
Status: DISCONTINUED | OUTPATIENT
Start: 2017-08-29 | End: 2017-08-29 | Stop reason: SURG

## 2017-08-29 RX ADMIN — PROPOFOL 130 MCG/KG/MIN: 10 INJECTION, EMULSION INTRAVENOUS at 15:19

## 2017-08-29 RX ADMIN — LIDOCAINE HYDROCHLORIDE 50 MG: 10 INJECTION, SOLUTION INFILTRATION; PERINEURAL at 15:19

## 2017-08-29 RX ADMIN — PROPOFOL 150 MG: 10 INJECTION, EMULSION INTRAVENOUS at 15:19

## 2017-08-29 RX ADMIN — PROPOFOL 50 MG: 10 INJECTION, EMULSION INTRAVENOUS at 15:20

## 2017-08-29 RX ADMIN — SODIUM CHLORIDE: 0.9 INJECTION, SOLUTION INTRAVENOUS at 15:09

## 2017-08-29 NOTE — OP NOTE
**** GI/ENDOSCOPY REPORT ****     PATIENT NAME: Livier Hardy ------ VISIT ID:  Patient ID:   [id number] YOB: 1970     INTRODUCTION: Colonoscopy - A 52 male patient presents for an outpatient   Colonoscopy at [address] [place] [address]  PREVIOUS COLONOSCOPY:     INDICATIONS: Surveillance  CONSENT:  The benefits, risks, and alternatives to the procedure were   discussed and informed consent was obtained from the patient  PREPARATION: EKG, pulse, pulse oximetry and blood pressure were monitored   throughout the procedure  The patient was identified by myself both   verbally and by visual inspection of ID band  Airway Assessment   Classification: Airway class 2 - Visualization of the soft palate, fauces   and uvula  ASA Classification: Class 2 - Patient has mild to moderate   systemic disturbance that may or may not be related to the disorder   requiring surgery  MEDICATIONS: Anesthesia-check records MAC anesthesia  PROCEDURE:  The endoscope was passed with difficulty through the anus   under direct visualization to ascending colon  The procedure was aborted   due to poor prep  The quality of the preparation was poor  Cecal   Intubation Time: Minute(s) Scope Withdrawal Time: Minute(s)     RECTAL EXAM: Normal rectal exam      FINDINGS:  Large amount of solid stool in the colon  Extremely poor prep  Previous pedunculated polyp stump seen  no residual adenomatous tissue  COMPLICATIONS: There were no complications  IMPRESSIONS:     RECOMMENDATIONS: Colonoscopy recommended in 3 months  ESTIMATED BLOOD LOSS:     PATHOLOGY SPECIMENS:     PROCEDURE CODES:     ICD-9 Codes:     ICD-10 Codes:     PERFORMED BY: MOSHE Hoover  on 08/29/2017  Version 1, electronically signed by MOSHE Irizarry  on 08/29/2017 at   15:36

## 2017-08-31 ENCOUNTER — APPOINTMENT (OUTPATIENT)
Dept: LAB | Facility: CLINIC | Age: 47
End: 2017-08-31
Payer: COMMERCIAL

## 2017-08-31 DIAGNOSIS — F25.0 SCHIZOAFFECTIVE DISORDER, BIPOLAR TYPE (HCC): ICD-10-CM

## 2017-08-31 DIAGNOSIS — E78.5 OTHER AND UNSPECIFIED HYPERLIPIDEMIA: ICD-10-CM

## 2017-08-31 DIAGNOSIS — E34.9 TESTOSTERONE DEFICIENCY: Primary | ICD-10-CM

## 2017-08-31 LAB
ALBUMIN SERPL BCP-MCNC: 3.9 G/DL (ref 3.5–5)
ALP SERPL-CCNC: 88 U/L (ref 46–116)
ALT SERPL W P-5'-P-CCNC: 20 U/L (ref 12–78)
ANION GAP SERPL CALCULATED.3IONS-SCNC: 8 MMOL/L (ref 4–13)
AST SERPL W P-5'-P-CCNC: 18 U/L (ref 5–45)
BASOPHILS # BLD AUTO: 0.02 THOUSANDS/ΜL (ref 0–0.1)
BASOPHILS NFR BLD AUTO: 0 % (ref 0–1)
BILIRUB SERPL-MCNC: 0.51 MG/DL (ref 0.2–1)
BUN SERPL-MCNC: 12 MG/DL (ref 5–25)
CALCIUM SERPL-MCNC: 9.3 MG/DL (ref 8.3–10.1)
CHLORIDE SERPL-SCNC: 104 MMOL/L (ref 100–108)
CHOLEST SERPL-MCNC: 164 MG/DL (ref 50–200)
CO2 SERPL-SCNC: 27 MMOL/L (ref 21–32)
CREAT SERPL-MCNC: 1.01 MG/DL (ref 0.6–1.3)
EOSINOPHIL # BLD AUTO: 0.2 THOUSAND/ΜL (ref 0–0.61)
EOSINOPHIL NFR BLD AUTO: 2 % (ref 0–6)
ERYTHROCYTE [DISTWIDTH] IN BLOOD BY AUTOMATED COUNT: 13.9 % (ref 11.6–15.1)
GFR SERPL CREATININE-BSD FRML MDRD: 88 ML/MIN/1.73SQ M
GLUCOSE P FAST SERPL-MCNC: 75 MG/DL (ref 65–99)
HCT VFR BLD AUTO: 48.2 % (ref 36.5–49.3)
HDLC SERPL-MCNC: 32 MG/DL (ref 40–60)
HGB BLD-MCNC: 15.9 G/DL (ref 12–17)
LDLC SERPL CALC-MCNC: 96 MG/DL (ref 0–100)
LITHIUM SERPL-SCNC: 0.3 MMOL/L (ref 0.5–1)
LYMPHOCYTES # BLD AUTO: 2.27 THOUSANDS/ΜL (ref 0.6–4.47)
LYMPHOCYTES NFR BLD AUTO: 24 % (ref 14–44)
MCH RBC QN AUTO: 31.2 PG (ref 26.8–34.3)
MCHC RBC AUTO-ENTMCNC: 33 G/DL (ref 31.4–37.4)
MCV RBC AUTO: 95 FL (ref 82–98)
MONOCYTES # BLD AUTO: 0.68 THOUSAND/ΜL (ref 0.17–1.22)
MONOCYTES NFR BLD AUTO: 7 % (ref 4–12)
NEUTROPHILS # BLD AUTO: 6.12 THOUSANDS/ΜL (ref 1.85–7.62)
NEUTS SEG NFR BLD AUTO: 67 % (ref 43–75)
NRBC BLD AUTO-RTO: 0 /100 WBCS
PLATELET # BLD AUTO: 221 THOUSANDS/UL (ref 149–390)
PMV BLD AUTO: 11.7 FL (ref 8.9–12.7)
POTASSIUM SERPL-SCNC: 3.9 MMOL/L (ref 3.5–5.3)
PROT SERPL-MCNC: 7.4 G/DL (ref 6.4–8.2)
RBC # BLD AUTO: 5.1 MILLION/UL (ref 3.88–5.62)
SODIUM SERPL-SCNC: 139 MMOL/L (ref 136–145)
TRIGL SERPL-MCNC: 178 MG/DL
TSH SERPL DL<=0.05 MIU/L-ACNC: 1.57 UIU/ML (ref 0.36–3.74)
WBC # BLD AUTO: 9.31 THOUSAND/UL (ref 4.31–10.16)

## 2017-08-31 PROCEDURE — 85025 COMPLETE CBC W/AUTO DIFF WBC: CPT

## 2017-08-31 PROCEDURE — 80061 LIPID PANEL: CPT

## 2017-08-31 PROCEDURE — 84403 ASSAY OF TOTAL TESTOSTERONE: CPT

## 2017-08-31 PROCEDURE — 84443 ASSAY THYROID STIM HORMONE: CPT

## 2017-08-31 PROCEDURE — 36415 COLL VENOUS BLD VENIPUNCTURE: CPT

## 2017-08-31 PROCEDURE — 80053 COMPREHEN METABOLIC PANEL: CPT

## 2017-08-31 PROCEDURE — 84402 ASSAY OF FREE TESTOSTERONE: CPT

## 2017-08-31 PROCEDURE — 80178 ASSAY OF LITHIUM: CPT

## 2017-09-01 LAB
TESTOST FREE SERPL-MCNC: 7.3 PG/ML (ref 6.8–21.5)
TESTOST SERPL-MCNC: 234 NG/DL (ref 264–916)

## 2017-09-28 ENCOUNTER — APPOINTMENT (OUTPATIENT)
Dept: LAB | Facility: CLINIC | Age: 47
End: 2017-09-28
Payer: COMMERCIAL

## 2017-09-28 DIAGNOSIS — E23.0 HYPOPITUITARISM (HCC): ICD-10-CM

## 2017-09-28 PROCEDURE — 84403 ASSAY OF TOTAL TESTOSTERONE: CPT

## 2017-09-28 PROCEDURE — 36415 COLL VENOUS BLD VENIPUNCTURE: CPT

## 2017-09-28 PROCEDURE — 84402 ASSAY OF FREE TESTOSTERONE: CPT

## 2017-09-29 ENCOUNTER — GENERIC CONVERSION - ENCOUNTER (OUTPATIENT)
Dept: OTHER | Facility: OTHER | Age: 47
End: 2017-09-29

## 2017-09-29 LAB
TESTOST FREE SERPL-MCNC: 8 PG/ML (ref 6.8–21.5)
TESTOST SERPL-MCNC: 250 NG/DL (ref 264–916)

## 2017-10-02 ENCOUNTER — ALLSCRIPTS OFFICE VISIT (OUTPATIENT)
Dept: OTHER | Facility: OTHER | Age: 47
End: 2017-10-02

## 2017-10-03 NOTE — PROGRESS NOTES
Assessment  1  Wheezing (786 07) (R06 2)   2  Chest congestion (786 9) (R09 89)   3  Allergic sinusitis (477 9) (J30 9)   4  Cough (786 2) (R05)    Plan  Allergic sinusitis    · Fluticasone Propionate 50 MCG/ACT Nasal Suspension; use 1 spray in each  nostril twice daily   · Loratadine 10 MG Oral Tablet; Take one tablet daily as needed  Chest congestion    · Mucinex Fast-Max Cold & Sinus 10-5-325 MG Oral Capsule; Take 1 capsule twice  daily  Cough    · Benzonatate 100 MG Oral Capsule (Tessalon Perles); TAKE 1 CAPSULE 3 TIMES  DAILY  Need for immunization against influenza    · Flulaval Quadrivalent Intramuscular Suspension; INJECT 0 5  ML  Intramuscular; To Be Done: [id number]  Wheezing    · Ventolin  (90 Base) MCG/ACT Inhalation Aerosol Solution; inhale 1-2 puffs  every 4-6 hours only for wheezing and severe shortness of breath not for daily use    Discussion/Summary    *URI (Cough, chest congestion, allergic rhinitis and wheezing)likely having bronchitis with an allergy component  He is given meds to help with his symptoms  Should improve with time  If other symptoms like fever and chest pain develop, please RTC for further evaluation or go to the ED  The patient was counseled regarding diagnostic results,-instructions for management,-risk factor reductions,-risks and benefits of treatment options,-importance of compliance with treatment  Possible side effects of new medications were reviewed with the patient/guardian today  The treatment plan was reviewed with the patient/guardian  The patient/guardian understands and agrees with the treatment plan   Educational resources provided:      Chief Complaint  Presents to the clinic for chest congestion and nose congestion x 1 week      History of Present Illness  HPI: as Mimi Record has been coughing and sneezing for the past week  Soon after coughing, he started wheezing, coughing up mucus, with some sneezing and congested nose   Had diarrhea several days ago, one time  Denies fever, abd pain, C P , back pain, current N/V/D, sore throat, change in vision or headaches  Says his problem is more so the feeling of being tired from coughing so much  No previous Hx of asthma or bronchitis  Smoker, about 1/2 pack a day  Hospital Based Practices Required Assessment:   Pain Assessment   the patient states they do not have pain  (on a scale of 0 to 10, the patient rates the pain at 0 )    Prefered Language is  [place]  Primary Language is  English  Education Completed: disease/condition,-medications-and-treatment/procedure   Teaching Method: verbal   Person Taught: patient   Evaluation Of Learning: verbalized/demonstrated understanding      Review of Systems    Constitutional: no fever or chills, feels well, no tiredness, no recent weight loss or weight gain  ENT: nasal discharge, but-as noted in HPI,-no earache,-no nosebleeds,-no sore throat-and-no hearing loss  Cardiovascular: no complaints of slow or fast heart rate, no chest pain, no palpitations, no leg claudication or lower extremity edema  Respiratory: cough-and-wheezing, but-as noted in HPI,-no shortness of breath-and-no shortness of breath during exertion  Gastrointestinal: no complaints of abdominal pain, no constipation, no nausea or vomiting, no diarrhea or bloody stools  ROS reviewed  Active Problems  1  Abdominal bloating (787 3) (R14 0)   2  Ascites (789 59) (R18 8)   3  Bipolar disorder (296 80) (F31 9)   4  Central obesity (278 1) (E65)   5  Chronic stable angina (413 9) (I20 8)   6  Colon polyps (211 3) (K63 5)   7  Encounter for completion of form with patient (V68 89) (Z02 89)   8  Encounter for prostate cancer screening (V76 44) (Z12 5)   9  Encounter for smoking cessation counseling (V65 42,305 1) (Z71 6,Z72 0)   10  Erectile dysfunction (607 84) (N52 9)   11  Essential hypertriglyceridemia (272 1) (E78 1)   12  GERD (gastroesophageal reflux disease) (530 81) (K21 9)   13   History of palpitations (V12 59) (Z87 898)   14  Hyperlipidemia (272 4) (E78 5)   15  Hypertension (401 9) (I10)   16  Hypothalamic hypogonadism (253 4) (E23 0)   17  Meralgia paraesthetica, left (355 1) (G57 12)   18  Need for prophylactic vaccination and inoculation against influenza (V04 81) (Z23)   19  Nicotine dependence (305 1) (F17 200)   20  Non-alcoholic fatty liver disease (571 8) (K76 0)   21  Subclinical hypothyroidism (244 8) (E03 9)    Past Medical History  Active Problems And Past Medical History Reviewed: The active problems and past medical history were reviewed and updated today  Surgical History  Surgical History Reviewed: The surgical history was reviewed and updated today  Social History   · Current smoker (305 1) (F17 200)  The social history was reviewed and updated today  The social history was reviewed and is unchanged  Family History  Family History Reviewed: The family history was reviewed and updated today  Current Meds   1  Aspirin EC 81 MG Oral Tablet Delayed Release; TAKE 1 TABLET DAILY; Therapy: 08UCT6642 to (Jerica Lehman)  Requested for: 31Alr3728; Last   Rx:07Sep2017 Ordered   2  Atenolol 25 MG Oral Tablet; TAKE 1 TABLET DAILY AS DIRECTED; Therapy: 98VRN7394 to (Evaluate:91Zec0132)  Requested for: 98Fsx0190; Last   Rx:07Sep2017 Ordered   3  Atorvastatin Calcium 10 MG Oral Tablet; TAKE 1 TABLET DAILY AS DIRECTED; Therapy: 83SEQ6100 to (Jerica Lehman)  Requested for: 90Lov1469; Last   Rx:07Sep2017 Ordered   4  Capsaicin 0 1 % External Cream; APPLY GENTLY TO AFFECTED AREA 3-4 TIMES DAILY; Therapy: 67PFN1249 to (Last Rx:01Mar2017)  Requested for: 94RYL1816 Ordered   5  Cialis 5 MG Oral Tablet; TAKE ONE TABLET ONE HOUR BEFORE NEEDED; Therapy: 62CQU6968 to (Last Rx:08Mar2017)  Requested for: 34OQE7581 Ordered   6  Citalopram Hydrobromide 20 MG Oral Tablet; TAKE 1 TABLET DAILY; Therapy: 74GOA3114 to (Jerica Lehman) Recorded   7   Dulcolax 5 MG Oral Tablet Delayed Release; Take 2 tablets; Therapy: 09KZG1620 to (Last Rx:36Ylw8019)  Requested for: 42FCO5884 Ordered   8  Dulcolax 5 MG Oral Tablet Delayed Release; take two 5 mg dulcolax tablets at 5 pm on   the day prior to colonoscopy MDD:2;   Therapy: 65QNZ5620 to (Evaluate:33Xnd1953)  Requested for: 67Yer9886; Last   Rx:03Jjk1112 Ordered   9  Gabapentin 100 MG Oral Capsule; TAKE 1 CAPSULE 3 TIMES DAILY; Therapy: 35QRP8014 to (Siobhan Madrigal)  Requested for: 30Fib0641; Last   Rx:42Kgt7208 Ordered   10  Golytely 227 1 GM Oral Solution Reconstituted; TAKE AS DIRECTED  1 gallon golytely    split prep for colonoscopy; Therapy: 65SBB6455 to (Evaluate:71Gob1096)  Requested for: 89Ztf8294; Last    Rx:04Uyw1419 Ordered   11  Lithium Carbonate 300 MG Oral Capsule; 1 tid; Therapy: 57TTI5620 to Recorded   12  Nicotine 14 MG/24HR Transdermal Patch 24 Hour; APPLY 1 PATCH DAILY AS    DIRECTED; Therapy: 51Vcu7151 to (Evaluate:01Krh6257)  Requested for: 63Fhv8004; Last    Rx:89Amf7243 Ordered   13  Nicotine 21 MG/24HR Transdermal Patch 24 Hour; APPLY 1 PATCH DAILY AS    DIRECTED; Therapy: 32Npx2327 to (Evaluate:94Wpg5431)  Requested for: 83Rac7338; Last    Rx:40Iij2440 Ordered   14  Nicotine 7 MG/24HR Transdermal Patch 24 Hour; APPLY 1 PATCH DAILY AS DIRECTED; Therapy: 11Xzk5225 to (Evaluate:90Gvk1615)  Requested for: 77Nfo8421; Last    Rx:28Afn0821 Ordered   15  Nicotine Polacrilex 2 MG Mouth/Throat Gum; CHEW 1 PIECE SLOWLY AND    INTERMITTENTLY FOR 30 MINUTES  REPEAT EVERY 1-2 HOURS; MAXIMUM OF 24    PIECES/DAY; Therapy: 76Bqm5304 to (Evaluate:33Jet3194)  Requested for: 62Yxu0979; Last    Rx:51Nno7818 Ordered   16  OLANZapine 10 MG Oral Tablet; TAKE 1 TABLET AT BEDTIME in addition to a 2 5mg    tablet; Therapy: 40WEI8178 to Recorded   17  Omeprazole 20 MG Oral Tablet Delayed Release; Take 1 tablet daily;     Therapy: 07XWZ9916 to (Siobhan Madrigal)  Requested for: 32Sfc5431; Last Rx: 31NWH5838 Ordered   18  Polyethylene Glycol 3350 Oral Powder; Use as directed for colonoscopy prep; Therapy: 79HYY7601 to (Last Rx:30Lok4748)  Requested for: 30IGA4108 Ordered   19  Testosterone 25 MG/2 5GM (1%) Transdermal Gel; Apply 4  packets once a day; Therapy: 81Lkv3493 to Recorded   20  Zoloft 100 MG Oral Tablet; TAKE 1 TABLET DAILY AS DIRECTED; Therapy: 28WNM9466 to (Evaluate:58Vbt2185) Recorded    The medication list was reviewed and updated today  Allergies  1  morphine   2  Penicillins    Vitals   Recorded: 92UDC8812 12:43PM   Temperature 98 1 F   Heart Rate 72   Systolic 483   Diastolic 90   Height 6 ft 2 in   Weight 240 lb 11 86 oz   BMI Calculated 30 91   BSA Calculated 2 35     Physical Exam    Constitutional   General appearance: Abnormal   well developed,-acutely ill,-normal body odor,-does not smell of feces,-does not smell of urine,-well nourished,-obese,-clothing appropriate-and-well groomed  Eyes   Conjunctiva and lids: No swelling, erythema, or discharge  Pupils and irises: Equal, round and reactive to light  Ears, Nose, Mouth, and Throat   External inspection of ears and nose: Normal     Otoscopic examination: Tympanic membrance translucent with normal light reflex  Canals patent without erythema  Nasal mucosa, septum, and turbinates: Abnormal  -left nares has a 3+ turbinate with moderate to large amount of nasal discharge  Right nares has a 2+ edema with a mild amount of nasal discharge  Oropharynx: Normal with no erythema, edema, exudate or lesions  -no post nasal drip  Pulmonary   Respiratory effort: No increased work of breathing or signs of respiratory distress  Auscultation of lungs: Abnormal  -mild congestion in the B/L lower lobes  Cardiovascular   Auscultation of heart: Normal rate and rhythm, normal S1 and S2, without murmurs      Psychiatric   Orientation to person, place and time: Normal     Mood and affect: Normal          Attending Note  Collaborating Physician Note: Collaborating Physician: I supervised the Advanced Practitioner-and-I agree with the Advanced Practitioner note        Future Appointments    Date/Time Provider Specialty Site   12/18/2017 12:50 PM Sandeep Maza DO Internal Medicine [address] PCP   12/01/2017 09:00 AM Aminata Jasso MD Gastroenterology Adult [address] [facility] [place]   Electronically signed by : TRACIE Armstrong; Oct  2 2017  1:26PM EST                       (Author)    Electronically signed by : Keegan Quispe DO; Oct  2 2017  3:34PM EST                       (Review)

## 2017-10-20 ENCOUNTER — HOSPITAL ENCOUNTER (EMERGENCY)
Facility: HOSPITAL | Age: 47
Discharge: HOME | End: 2017-10-20
Attending: EMERGENCY MEDICINE | Admitting: EMERGENCY MEDICINE
Payer: COMMERCIAL

## 2017-10-20 VITALS
WEIGHT: 241 LBS | DIASTOLIC BLOOD PRESSURE: 86 MMHG | OXYGEN SATURATION: 97 % | HEART RATE: 95 BPM | SYSTOLIC BLOOD PRESSURE: 138 MMHG | BODY MASS INDEX: 29.34 KG/M2 | RESPIRATION RATE: 18 BRPM | TEMPERATURE: 97.9 F

## 2017-10-20 DIAGNOSIS — T78.40XA ALLERGIC REACTION, INITIAL ENCOUNTER: ICD-10-CM

## 2017-10-20 DIAGNOSIS — T63.441A BEE STING: Primary | ICD-10-CM

## 2017-10-20 PROCEDURE — 99283 EMERGENCY DEPT VISIT LOW MDM: CPT

## 2017-10-20 PROCEDURE — 96372 THER/PROPH/DIAG INJ SC/IM: CPT

## 2017-10-20 RX ORDER — DEXAMETHASONE SODIUM PHOSPHATE 10 MG/ML
10 INJECTION, SOLUTION INTRAMUSCULAR; INTRAVENOUS ONCE
Status: COMPLETED | OUTPATIENT
Start: 2017-10-20 | End: 2017-10-20

## 2017-10-20 RX ORDER — FAMOTIDINE 20 MG/1
20 TABLET, FILM COATED ORAL ONCE
Status: DISCONTINUED | OUTPATIENT
Start: 2017-10-20 | End: 2017-10-20

## 2017-10-20 RX ORDER — HYDROXYZINE HYDROCHLORIDE 25 MG/1
50 TABLET, FILM COATED ORAL ONCE
Status: COMPLETED | OUTPATIENT
Start: 2017-10-20 | End: 2017-10-20

## 2017-10-20 RX ORDER — FAMOTIDINE 20 MG/1
20 TABLET, FILM COATED ORAL 2 TIMES DAILY
Qty: 30 TABLET | Refills: 0 | Status: SHIPPED | OUTPATIENT
Start: 2017-10-20 | End: 2018-07-11

## 2017-10-20 RX ORDER — HYDROXYZINE HYDROCHLORIDE 25 MG/1
25 TABLET, FILM COATED ORAL EVERY 6 HOURS
Qty: 12 TABLET | Refills: 0 | Status: SHIPPED | OUTPATIENT
Start: 2017-10-20 | End: 2018-07-11

## 2017-10-20 RX ORDER — EPINEPHRINE 0.3 MG/.3ML
0.3 INJECTION SUBCUTANEOUS ONCE
Qty: 1 EACH | Refills: 0 | Status: SHIPPED | OUTPATIENT
Start: 2017-10-20 | End: 2018-07-11

## 2017-10-20 RX ORDER — FAMOTIDINE 20 MG/1
20 TABLET, FILM COATED ORAL ONCE
Status: COMPLETED | OUTPATIENT
Start: 2017-10-20 | End: 2017-10-20

## 2017-10-20 RX ADMIN — HYDROXYZINE HYDROCHLORIDE 50 MG: 25 TABLET, FILM COATED ORAL at 20:30

## 2017-10-20 RX ADMIN — FAMOTIDINE 20 MG: 20 TABLET, FILM COATED ORAL at 20:30

## 2017-10-20 RX ADMIN — DEXAMETHASONE SODIUM PHOSPHATE 10 MG: 10 INJECTION, SOLUTION INTRAMUSCULAR; INTRAVENOUS at 20:30

## 2017-10-20 NOTE — ED NOTES
Pt changed to allergic reaction as now c/o some SOB and wheezing     Alicia Dumont, ADDISON  10/20/17 6633

## 2017-10-21 NOTE — DISCHARGE INSTRUCTIONS
General Allergic Reaction   WHAT YOU NEED TO KNOW:   An allergic reaction is your body's response to an allergen  Allergens include medicines, food, insect stings, animal dander, mold, latex, chemicals, and dust mites  Pollen from trees, grass, and weeds can also cause an allergic reaction  DISCHARGE INSTRUCTIONS:   Return to the emergency department if:   · You have a skin rash, hives, swelling, or itching that gets worse  · You have trouble breathing, shortness of breath, wheezing, or coughing  · Your throat tightens, or your lips or tongue swell  · You have trouble swallowing or speaking  · You have dizziness, lightheadedness, fainting, or confusion  · You have nausea, vomiting, diarrhea, or abdominal cramps  · You have chest pain or tightness  Contact your healthcare provider if:   · You have questions or concerns about your condition or care  Medicines:   · Medicines  may be given to relieve certain allergy symptoms such as itching, sneezing, and swelling  You may take them as a pill or use drops in your nose or eyes  Topical treatments may be given to put directly on your skin to help decrease itching or swelling  · Take your medicine as directed  Contact your healthcare provider if you think your medicine is not helping or if you have side effects  Tell him of her if you are allergic to any medicine  Keep a list of the medicines, vitamins, and herbs you take  Include the amounts, and when and why you take them  Bring the list or the pill bottles to follow-up visits  Carry your medicine list with you in case of an emergency  Follow up with your healthcare provider as directed:  Write down your questions so you remember to ask them during your visits  Self-care:   · Avoid the allergen  that you think may have caused your allergic reaction  · Use cold compresses  on your skin or eyes if they were affected by the allergic reaction   Cold compresses may help to soothe your skin or eyes     · Rinse your nasal passages  with a saline solution  Daily rinsing may help clear your nose of allergens  · Do not smoke  Your allergy symptoms may decrease if you are not around smoke  Nicotine and other chemicals in cigarettes and cigars can also cause lung damage  Ask your healthcare provider for information if you currently smoke and need help to quit  E-cigarettes or smokeless tobacco still contain nicotine  Talk to your healthcare provider before you use these products  © 2017 [facility] Information is for End User's use only and may not be sold, redistributed or otherwise used for commercial purposes  All illustrations and images included in CareNotes® are the copyrighted property of A D A M , Inc  or Eleazar Patterson  The above information is an  only  It is not intended as medical advice for individual conditions or treatments  Talk to your doctor, nurse or pharmacist before following any medical regimen to see if it is safe and effective for you

## 2017-10-21 NOTE — ED NOTES
Pt stable, no distress noted, pt amb from ER without difficulty     Rashid Cutler, RN  10/20/17 9372

## 2017-10-21 NOTE — ED PROVIDER NOTES
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 but not as an adult  Patient has no signs or symptoms of anaphylaxis today Brendalyn Ek area of swelling and redness around the actual sting site  History provided by:  Patient   used: No    Allergic Reaction   Presenting symptoms: itching, rash and swelling    Presenting symptoms: no difficulty breathing and no wheezing    Itching:     Location:  Hand and arm    Severity:  Moderate    Onset quality:  Sudden    Duration:  4 hours    Timing:  Constant    Progression:  Worsening  Rash:     Location:  Hand and arm    Quality: itchiness and redness      Onset quality:  Sudden    Duration:  4 hours    Timing:  Constant    Progression:  Worsening  Swelling:     Location:  Hand    Onset quality:  Gradual    Duration:  4 hours    Timing:  Constant    Progression:  Worsening    Chronicity:  Recurrent  Severity:  Moderate  Duration:  4 hours  Prior allergic episodes:  Insect allergies  Context: insect bite/sting    Ineffective treatments:  None tried      Prior to Admission Medications   Prescriptions Last Dose Informant Patient Reported? Taking?    OLANZapine (ZyPREXA) 10 mg tablet   Yes No   Sig: Take 12 5 mg by mouth daily at bedtime   Omeprazole 20 MG TBEC   Yes No   Sig: Take 20 mg by mouth daily   aspirin (ECOTRIN LOW STRENGTH) 81 mg EC tablet   Yes No   Sig: Take 81 mg by mouth daily   atenolol (TENORMIN) 25 mg tablet   Yes No   Sig: Take 25 mg by mouth daily   atorvastatin (LIPITOR) 10 mg tablet   Yes No   Sig: Take 10 mg by mouth daily at bedtime   bisacodyl (DULCOLAX) 5 mg EC tablet   Yes No   Sig: Take 5 mg by mouth daily   citalopram (CeleXA) 20 mg tablet   No No   Sig: Take 1 tablet by mouth daily for 30 days Q am   citalopram (CeleXA) 20 mg tablet   Yes No   Sig: Take 80 mg by mouth daily at bedtime     furosemide (LASIX) 40 mg tablet   Yes No   Sig: Take 40 mg by mouth daily   gabapentin (NEURONTIN) 100 mg capsule   Yes No   Sig: Take 400 mg by mouth daily     lithium carbonate 300 mg capsule   No No   Si tab PO BID   Patient taking differently: Take 600 mg by mouth daily 1 tab PO BID    sertraline (ZOLOFT) 100 mg tablet   No No   Sig: Take 1 tablet by mouth daily for 30 days   sertraline (ZOLOFT) 100 mg tablet   Yes No   Sig: Take 100 mg by mouth daily   tadalafil (CIALIS) 5 MG tablet   Yes No   Sig: Take 5 mg by mouth daily as needed      Facility-Administered Medications: None       Past Medical History:   Diagnosis Date    Angina pectoris (HCC)     stable    Ascites     told he had a "wave stomach"    B[facility] 1 disorder ([facility] [address] [facility]     [facility]congestive heart failure) ([facility] )     Coronary artery disease     Depression     Family history of colon cancer requiring screening colonoscopy     brother    Fatty liver     History of colon polyps     Hyperlipidemia     Hypertension     Hypothalamic hypogonadism ([facility] [address] )     Liver disease     fatty liver    MI (myocardial infarction)         [facility]omyelitis [facility])        Past Surgical History:   Procedure Laterality Date    COLONOSCOPY      FRACTURE SURGERY      jaw    [facility] COLONOSCOPY FLX DX W/COLLJ SPEC WHEN PFRMD N/A 2017    Procedure: COLONOSCOPY;  Surgeon: Javier Schaefer MD;  Location: [facility]; Service: Gastroenterology    [facility] COLONOSCOPY FLX DX W/COLLJ [facility] WHEN PFRMD N/A 2017    Procedure: COLONOSCOPY;  Surgeon: Javier Schaefer MD;  Location: [facility]; Service: Gastroenterology       No family history on file  I have reviewed and agree with the history as documented      Social History   Substance Use Topics    Smoking status: Current Every Day Smoker     Packs/day: 0 50     Types: Cigarettes    Smokeless tobacco: Never Used    Alcohol use No        Review of Systems   Constitutional: Negative for activity change and appetite change  HENT: Negative for congestion and facial swelling  Eyes: Negative for discharge and redness  Respiratory: Negative for cough and wheezing  Cardiovascular: Negative for chest pain and leg swelling  Gastrointestinal: Negative for abdominal distention, abdominal pain and blood in stool  Endocrine: Negative for cold intolerance and polydipsia  Genitourinary: Negative for difficulty urinating and hematuria  Musculoskeletal: Negative for arthralgias and gait problem  Skin: Positive for itching and rash  Negative for color change  Allergic/Immunologic: Negative for food allergies and immunocompromised state  Neurological: Negative for dizziness, seizures and headaches  Hematological: Negative for adenopathy  Does not bruise/bleed easily  Psychiatric/Behavioral: Negative for agitation, confusion and decreased concentration  All other systems reviewed and are negative  Physical Exam  ED Triage Vitals [10/20/17 1908]   Temperature Pulse Respirations Blood Pressure SpO2   97 9 °F (36 6 °C) 95 18 138/86 97 %      Temp Source Heart Rate Source Patient Position - Orthostatic VS BP Location FiO2 (%)   Oral Monitor Sitting Right arm --      Pain Score       8           Physical Exam   Constitutional: He is oriented to person, place, and time  He appears well-developed and well-nourished  Non-toxic appearance  HENT:   Head: Normocephalic and atraumatic  Right Ear: Tympanic membrane normal    Left Ear: Tympanic membrane normal    Nose: Nose normal    Mouth/Throat: Oropharynx is clear and moist    Eyes: Conjunctivae, EOM and lids are normal  Pupils are equal, round, and reactive to light  Neck: Trachea normal and normal range of motion  Neck supple  No JVD present  Carotid bruit is not present  Cardiovascular: Normal rate, regular rhythm, normal heart sounds and intact distal pulses  No extrasystoles are present  Pulmonary/Chest: Effort normal  He has no decreased breath sounds  He has no wheezes  He has no rhonchi  He has no rales  He exhibits no tenderness and no deformity  Abdominal: Soft  Bowel sounds are normal  There is no tenderness  There is no rebound, no guarding and no CVA tenderness  Musculoskeletal:        Right shoulder: He exhibits normal range of motion, no tenderness, no swelling and no deformity  Cervical back: Normal  He exhibits normal range of motion, no tenderness, no bony tenderness and no deformity  Hands:  Lymphadenopathy:     He has no cervical adenopathy  He has no axillary adenopathy  Neurological: He is alert and oriented to person, place, and time  He has normal strength and normal reflexes  No cranial nerve deficit or sensory deficit  He displays a negative Romberg sign  Skin: Skin is warm and dry  Psychiatric: He has a normal mood and affect  His speech is normal and behavior is normal  Judgment and thought content normal  Cognition and memory are normal    Nursing note and vitals reviewed        ED Medications  Medications   hydrOXYzine HCL (ATARAX) tablet 50 mg (50 mg Oral Given 10/20/17 2030)   famotidine (PEPCID) tablet 20 mg (20 mg Oral Given 10/20/17 2030)   dexamethasone (PF) (DECADRON) injection 10 mg (10 mg Intramuscular Given 10/20/17 2030)       Diagnostic Studies  Labs Reviewed - No data to display    No orders to display       Procedures  Procedures      Phone Contacts  ED Phone Contact    ED Course  ED Course                                MDM  Number of Diagnoses or Management Options  Allergic reaction, initial encounter: new and requires workup  Bee sting: new and requires workup     Amount and/or Complexity of Data Reviewed  Clinical lab tests: ordered and reviewed  Tests in the radiology section of CPT®: ordered and reviewed  Tests in the medicine section of CPT®: ordered and reviewed    Patient Progress  Patient progress: improved    CritCare Time    Disposition  Final diagnoses:   Bee sting - local reaction left hand   Allergic reaction, initial encounter     ED Disposition     ED Disposition Condition Comment    Discharge  Modesto Lott discharge to home/self care      Condition at discharge: Good        Follow-up Information     Follow up With Specialties Details Why Contact Info    your doctor  Schedule an appointment as soon as possible for a visit          Discharge Medication List as of 10/20/2017  8:19 PM      START taking these medications    Details   EPINEPHrine (EPIPEN) 0 3 mg/0 3 mL SOAJ Inject 0 3 mL into the shoulder, thigh, or buttocks once for 1 dose For severe allergic reaction, Starting Fri 10/20/2017, Print      famotidine (PEPCID) 20 mg tablet Take 1 tablet by mouth 2 (two) times a day, Starting Fri 10/20/2017, Print      hydrOXYzine HCL (ATARAX) 25 mg tablet Take 1 tablet by mouth every 6 (six) hours, Starting Fri 10/20/2017, Print         CONTINUE these medications which have NOT CHANGED    Details   aspirin (ECOTRIN LOW STRENGTH) 81 mg EC tablet Take 81 mg by mouth daily, Starting 3/1/2017, Until Discontinued, Historical Med      atenolol (TENORMIN) 25 mg tablet Take 25 mg by mouth daily, Starting 3/28/2017, Until Discontinued, Historical Med      atorvastatin (LIPITOR) 10 mg tablet Take 10 mg by mouth daily at bedtime, Starting 3/1/2017, Until Discontinued, Historical Med      bisacodyl (DULCOLAX) 5 mg EC tablet Take 5 mg by mouth daily, Starting 5/22/2017, Until Discontinued, Historical Med      citalopram (CeleXA) 20 mg tablet Take 80 mg by mouth daily at bedtime  , Starting Mon 1/9/2017, Historical Med      furosemide (LASIX) 40 mg tablet Take 40 mg by mouth daily, Until Discontinued, Historical Med      gabapentin (NEURONTIN) 100 mg capsule Take 400 mg by mouth daily  , Starting Mon 1/9/2017, Historical Med      lithium carbonate 300 mg capsule 1 tab PO BID, Print      OLANZapine (ZyPREXA) 10 mg tablet Take 12 5 mg by mouth daily at bedtime, Starting 1/9/2017, Until Discontinued, Historical Med      Omeprazole 20 MG TBEC Take 20 mg by mouth daily, Starting 1/9/2017, Until Discontinued, Historical Med      sertraline (ZOLOFT) 100 mg tablet Take 100 mg by mouth daily, Starting 1/9/2017, Until Discontinued, Historical Med      tadalafil (CIALIS) 5 MG tablet Take 5 mg by mouth daily as needed, Starting 3/8/2017, Until Discontinued, Historical Med           No discharge procedures on file      ED Provider  Electronically Signed by       Jyothi Nunez DO  10/21/17 0805

## 2017-10-31 DIAGNOSIS — Z51.81 ENCOUNTER FOR THERAPEUTIC DRUG LEVEL MONITORING: ICD-10-CM

## 2017-11-08 ENCOUNTER — GENERIC CONVERSION - ENCOUNTER (OUTPATIENT)
Dept: OTHER | Facility: OTHER | Age: 47
End: 2017-11-08

## 2017-11-30 ENCOUNTER — ANESTHESIA EVENT (OUTPATIENT)
Dept: GASTROENTEROLOGY | Facility: HOSPITAL | Age: 47
End: 2017-11-30
Payer: COMMERCIAL

## 2017-12-01 ENCOUNTER — GENERIC CONVERSION - ENCOUNTER (OUTPATIENT)
Dept: OTHER | Facility: OTHER | Age: 47
End: 2017-12-01

## 2017-12-01 ENCOUNTER — HOSPITAL ENCOUNTER (OUTPATIENT)
Facility: HOSPITAL | Age: 47
Setting detail: OUTPATIENT SURGERY
Discharge: HOME | End: 2017-12-01
Attending: INTERNAL MEDICINE | Admitting: INTERNAL MEDICINE
Payer: COMMERCIAL

## 2017-12-01 ENCOUNTER — ANESTHESIA (OUTPATIENT)
Dept: GASTROENTEROLOGY | Facility: HOSPITAL | Age: 47
End: 2017-12-01
Payer: COMMERCIAL

## 2017-12-01 VITALS
RESPIRATION RATE: 20 BRPM | WEIGHT: 240 LBS | HEIGHT: 76 IN | SYSTOLIC BLOOD PRESSURE: 111 MMHG | HEART RATE: 76 BPM | BODY MASS INDEX: 29.22 KG/M2 | TEMPERATURE: 97 F | DIASTOLIC BLOOD PRESSURE: 71 MMHG | OXYGEN SATURATION: 99 %

## 2017-12-01 DIAGNOSIS — R14.0 ABDOMINAL DISTENSION (GASEOUS): ICD-10-CM

## 2017-12-01 PROCEDURE — 88305 TISSUE EXAM BY PATHOLOGIST: CPT | Performed by: INTERNAL MEDICINE

## 2017-12-01 RX ORDER — PROPOFOL 10 MG/ML
INJECTION, EMULSION INTRAVENOUS CONTINUOUS PRN
Status: DISCONTINUED | OUTPATIENT
Start: 2017-12-01 | End: 2017-12-01 | Stop reason: SURG

## 2017-12-01 RX ORDER — PROPOFOL 10 MG/ML
INJECTION, EMULSION INTRAVENOUS AS NEEDED
Status: DISCONTINUED | OUTPATIENT
Start: 2017-12-01 | End: 2017-12-01 | Stop reason: SURG

## 2017-12-01 RX ORDER — SODIUM CHLORIDE 9 MG/ML
125 INJECTION, SOLUTION INTRAVENOUS CONTINUOUS
Status: DISCONTINUED | OUTPATIENT
Start: 2017-12-01 | End: 2017-12-01 | Stop reason: HOSPADM

## 2017-12-01 RX ADMIN — PROPOFOL 100 MCG/KG/MIN: 10 INJECTION, EMULSION INTRAVENOUS at 08:58

## 2017-12-01 RX ADMIN — PROPOFOL 80 MG: 10 INJECTION, EMULSION INTRAVENOUS at 08:58

## 2017-12-01 RX ADMIN — SODIUM CHLORIDE 125 ML/HR: 0.9 INJECTION, SOLUTION INTRAVENOUS at 08:54

## 2017-12-01 NOTE — ANESTHESIA PREPROCEDURE EVALUATION
Review of Systems/Medical History  Patient summary reviewed  Chart reviewed  No history of anesthetic complications     Cardiovascular  Hyperlipidemia, Hypertension , Past MI > 6 months, CAD, , Angina , CHF ,   Comment: Echo 55% EF, mild MR,  Pulmonary  Smoker ex-smoker , ,   Comment: Just quit smoking x5 days     GI/Hepatic    Liver disease, , Bowel prep  Comment: Fatty liver     Negative  ROS        Endo/Other  Negative endo/other ROS      GYN       Hematology  Negative hematology ROS      Musculoskeletal  Obesity ,        Neurology  Negative neurology ROS      Psychology   Psychiatric history, Depression ,   Comment: Bipolar         Physical Exam    Airway    Mallampati score: II  TM Distance: >3 FB  Neck ROM: full     Dental   No notable dental hx     Cardiovascular      Pulmonary      Other Findings        Anesthesia Plan  ASA Score- 3       Anesthesia Type- IV sedation with anesthesia with ASA Monitors  Additional Monitors:   Airway Plan:           Induction- intravenous  Informed Consent- Anesthetic plan and risks discussed with patient  I personally reviewed this patient with the CRNA  Discussed and agreed on the Anesthesia Plan with the CRNA  Deedee Jean

## 2017-12-01 NOTE — OP NOTE
**** GI/ENDOSCOPY REPORT ****     PATIENT NAME: Giuliana Keith ------ VISIT ID:  Patient ID:   [id number] YOB: 1970     INTRODUCTION: Colonoscopy - A 52 male patient presents for an outpatient   Colonoscopy at [address] [place] [address]  PREVIOUS COLONOSCOPY:     INDICATIONS: Surveillance  CONSENT:  The benefits, risks, and alternatives to the procedure were   discussed and informed consent was obtained from the patient  PREPARATION: EKG, pulse, pulse oximetry and blood pressure were monitored   throughout the procedure  The patient was identified by myself both   verbally and by visual inspection of ID band  Airway Assessment   Classification: Airway class 2 - Visualization of the soft palate, fauces   and uvula  ASA Classification: Class 2 - Patient has mild to moderate   systemic disturbance that may or may not be related to the disorder   requiring surgery  MEDICATIONS: Anesthesia-check records MAC anesthesia  PROCEDURE:  The endoscope was passed with difficulty through the anus   under direct visualization and advanced to the cecum, confirmed by   appendiceal orifice and ileocecal valve  The scope was withdrawn and the   mucosa was carefully examined  The quality of the preparation was good  Cecal Intubation Time: 13 Minute(s) Scope Withdrawal Time: 15 Minute(s)     RECTAL EXAM: Normal rectal exam      FINDINGS:  A single diminutive polyp was found in the sigmoid colon  The   polyp was removed by cold biopsy polypectomy  Otherwise, the colon   appeared to be normal      COMPLICATIONS: There were no complications  IMPRESSIONS: A single diminutive polyp found in the sigmoid colon; removed   by cold biopsy polypectomy  RECOMMENDATIONS: Colonoscopy recommended in 3 years  ESTIMATED BLOOD LOSS: Insignificant  PATHOLOGY SPECIMENS: Removed by cold biopsy polypectomy       PROCEDURE CODES:     ICD-9 Codes: 211 3 Benign neoplasm of colon ICD-10 Codes: K63 5 Polyp of colon     PERFORMED BY: MOSHE Almodovar  on 12/01/2017  Version 1, electronically signed by MOSHE Vargas  on 12/01/2017 at   09:37

## 2017-12-01 NOTE — ANESTHESIA POSTPROCEDURE EVALUATION
Post-Op Assessment Note      CV Status:  Stable    Mental Status:  Somnolent    Hydration Status:  Euvolemic    PONV Controlled:  Controlled    Airway Patency:  Patent    Post Op Vitals Reviewed: Yes          Staff: Anesthesiologist, CRNA           /71 (12/01/17 0937)    Temp     Pulse 77 (12/01/17 0937)   Resp 20 (12/01/17 0937)    SpO2 94 % (12/01/17 0937)

## 2017-12-18 ENCOUNTER — ALLSCRIPTS OFFICE VISIT (OUTPATIENT)
Dept: OTHER | Facility: OTHER | Age: 47
End: 2017-12-18

## 2017-12-18 ENCOUNTER — GENERIC CONVERSION - ENCOUNTER (OUTPATIENT)
Dept: OTHER | Facility: OTHER | Age: 47
End: 2017-12-18

## 2017-12-18 DIAGNOSIS — R06.2 WHEEZING: ICD-10-CM

## 2017-12-19 ENCOUNTER — GENERIC CONVERSION - ENCOUNTER (OUTPATIENT)
Dept: OTHER | Facility: OTHER | Age: 47
End: 2017-12-19

## 2017-12-19 NOTE — PROGRESS NOTES
Assessment  1  Hypothalamic hypogonadism (253 4) (E23 0)   2  Deviated septum (470) (J34 2)   3  Insomnia (780 52) (G47 00)   4  Wheezing (786 07) (R06 2)    Plan  Deviated septum    · RA Saline Nasal Spray 0 65 % Nasal Solution; USE 2 SPRAYS IN EACH NOSTRIL TWICE DAILY   · *1 - [facility]  *  Status: Hold For - Scheduling  Requestedfor: 91QRK1776  Care Summary provided  : Yes  Hypertension    · Atenolol 25 MG Oral Tablet; TAKE 1 TABLET DAILY AS DIRECTED  Hypothalamic hypogonadism    · Testosterone Cypionate 200 MG/ML Intramuscular Solution; inject 0 5ML IM every 2 weeks  Insomnia    · Melatonin 5 MG Oral Tablet; Take one tablet before bedtime  Meralgia paraesthetica, left    · Gabapentin 100 MG Oral Capsule; TAKE 1 CAPSULE 3 TIMES DAILY  Wheezing    · SPIROMETRY W/ BRONCHO- POC; Status:Active - Perform Order; Requested for:96Aib2984;     Discussion/Summary  Discussion Summary:   Hypogonadism - patient has had difficulty using the packets of gel  he would like to try the injection- will order injection of Testosterone Cypionate 200mg/mL for 0 5mL injected every 2 weeks- until this is approved will continue with current testosterone replacement therapy- check testosterone- check CBCDeviated Septum- ENT referral- continue with flonase- nasal saline spray PRNInsomnia- melatonin 5mg qHS PRNWheezing and cough- check POC spirometry  Counseling Documentation With Imm: The patient was counseled regarding diagnostic results,-- instructions for management,-- impressions  Medication SE Review and Pt Understands Tx: Possible side effects of new medications were reviewed with the patient/guardian today  The treatment plan was reviewed with the patient/guardian  The patient/guardian understands and agrees with the treatment plan      Chief Complaint  Chief Complaint Chronic Condition  Yumiko Heartrra: Patient is here today for follow up of chronic conditions described in HPI        History of Present Illness  HPI: Here for follow up of his chronic conditions  He has been having difficulty applying the 4 packets of testosterone gel daily  he is wondering if there is a different formulary  He also notes difficulty breathing due to multiple nasal fractures in the past  He has been trying the flonase spray with minimal relief  He also notes wheezing and cough that started when he had cut down on smoking  He also notes difficulty sleeping at night  He works late and then feels wired when he returns home and is unable to get to sleep  Hospital Based Practices Required Assessment:  Pain Assessment  the patient states they do not have pain  (on a scale of 0 to 10, the patient rates the pain at 0 )   Prefered Language is  english  Primary Language is  english  Review of Systems  Complete-Male:  Constitutional: no fever-- and-- no chills  Cardiovascular: no chest pain  Respiratory: cough,-- wheezing-- and-- shortness of breath during exertion  Gastrointestinal: no abdominal pain  Integumentary: no rashes  Endocrine: erectile dysfunction  ROS Reviewed:   ROS reviewed  Active Problems  1  Abdominal bloating (787 3) (R14 0)   2  Acute maxillary sinusitis (461 0) (J01 00)   3  Allergic sinusitis (477 9) (J30 9)   4  Ascites (789 59) (R18 8)   5  Bipolar disorder (296 80) (F31 9)   6  Central obesity (278 1) (E65)   7  Chest congestion (786 9) (R09 89)   8  Chronic stable angina (413 9) (I20 8)   9  Colon polyps (211 3) (K63 5)   10  Cough (786 2) (R05)   11  Encounter for completion of form with patient (V68 89) (Z02 89)   12  Encounter for monitoring testosterone replacement therapy (V58 83,V58 69) (Z51 81,Z79 899)   13  Encounter for prostate cancer screening (V76 44) (Z12 5)   14  Encounter for smoking cessation counseling (V65 42,305 1) (Z71 6,Z72 0)   15  Erectile dysfunction (607 84) (N52 9)   16  Essential hypertriglyceridemia (272 1) (E78 1)   17   GERD (gastroesophageal reflux disease) (530 81) (K21 9)   18  History of palpitations (V12 59) (Z87 898)   19  Hyperlipidemia (272 4) (E78 5)   20  Hypertension (401 9) (I10)   21  Hypothalamic hypogonadism (253 4) (E23 0)   22  Meralgia paraesthetica, left (355 1) (G57 12)   23  Need for immunization against influenza (V04 81) (Z23)   24  Need for prophylactic vaccination and inoculation against influenza (V04 81) (Z23)   25  Nicotine dependence (305 1) (F17 200)   26  Non-alcoholic fatty liver disease (571 8) (K76 0)   27  Subclinical hypothyroidism (244 8) (E03 9)   28  Wheezing (786 07) (R06 2)    Past Medical History  1  History of Bipolar disorder (296 80) (F31 9)  Active Problems And Past Medical History Reviewed: The active problems and past medical history were reviewed and updated today  Surgical History  Surgical History Reviewed: The surgical history was reviewed and updated today  Family History  Mother    1  Family history of malignant neoplasm of breast (V16 3) (Z80 3)   2  Family history of thyroid nodule (V18 19) (Z83 49)  Father    3  Family history of Colon cancer   4  Family history of congestive heart failure (V17 49) (Z82 49)  Brother    5  Family history of Colon cancer  Family History Reviewed: The family history was reviewed and updated today  Social History   · Current smoker (305 1) (F17 200)  Social History Reviewed: The social history was reviewed and updated today  The social history was reviewed and is unchanged  Current Meds   1  Aspirin EC 81 MG Oral Tablet Delayed Release; TAKE 1 TABLET DAILY; Therapy: 61MBZ3565 to (Batsheva Fleming)  Requested for: 92Vdo8875; Last Rx:50Ggh1537 Ordered   2  Atenolol 25 MG Oral Tablet; TAKE 1 TABLET DAILY AS DIRECTED; Therapy: 32EFK7240 to (Evaluate:24Xlz8397)  Requested for: 70Ryd3495; Last Rx:15Obl1840 Ordered   3  Atorvastatin Calcium 10 MG Oral Tablet; TAKE 1 TABLET DAILY AS DIRECTED;  Therapy: 12KAT3160 to (Batsheva Fleming)  Requested for: 63Slm0016; Last Rx: 68UXZ0413 Ordered   4  Benzonatate 100 MG Oral Capsule; TAKE 1 CAPSULE 3 TIMES DAILY; Therapy: 64FOS7930 to (Evaluate:12Oct2017)  Requested for: 02Oct2017; Last Rx:02Oct2017 Ordered   5  Benztropine Mesylate 0 5 MG Oral Tablet; Therapy: 18AUE8825 to Recorded   6  Capsaicin 0 1 % External Cream; APPLY GENTLY TO AFFECTED AREA 3-4 TIMES DAILY; Therapy: 47DQM9249 to (Last Rx:01Mar2017)  Requested for: 27CDP7384 Ordered   7  Cialis 5 MG Oral Tablet; TAKE ONE TABLET ONE HOUR BEFORE NEEDED; Therapy: 63PAV0918 to (Last Rx:08Mar2017)  Requested for: 89VSN6989 Ordered   8  Citalopram Hydrobromide 20 MG Oral Tablet; TAKE 1 TABLET DAILY; Therapy: 84YNY7671 to (Conchetta Pop) Recorded   9  Dulcolax 5 MG Oral Tablet Delayed Release; Take 2 tablets; Therapy: 82BVF4550 to (Last Rx:92Zxi0909)  Requested for: 94URF5514 Ordered   10  Dulcolax 5 MG Oral Tablet Delayed Release; take two 5 mg dulcolax tablets at 5 pm on the day prior  to colonoscopy MDD:2;  Therapy: 83XMR7711 to (Evaluate:08Sep2017)  Requested for: 66Ceq1111; Last Rx:07Sep2017  Ordered   11  Fluticasone Propionate 50 MCG/ACT Nasal Suspension; use 1 spray in each nostril twice daily; Therapy: 86FZG9216 to (Last Rx:02Oct2017)  Requested for: 31UEK5111 Ordered   12  Fluticasone Propionate 50 MCG/ACT Nasal Suspension; USE 1 SPRAY IN EACH NOSTRIL TWICE  DAILY; Therapy: 17ZBT0150 to (Last Rx:08Nov2017)  Requested for: 13PVI0019 Ordered   13  Gabapentin 100 MG Oral Capsule; TAKE 1 CAPSULE 3 TIMES DAILY; Therapy: 28XYH8351 to (Deimer Juneau)  Requested for: 26Jrw4818; Last Rx:77Lth4946  Ordered   14  Golytely 227 1 GM Oral Solution Reconstituted; TAKE AS DIRECTED  1 gallon golytely split prep for  colonoscopy; Therapy: 19UFR6286 to (Evaluate:28Ddt3677)  Requested for: 69Hao3727; Last Rx:96Uoz1120 Ordered   15  HydrOXYzine Pamoate 25 MG Oral Capsule; Therapy: 32UOQ2712 to Recorded   16  Lithium Carbonate 300 MG Oral Capsule; 1 tid;   Therapy: 09OYC6022 to Recorded   17  Loratadine 10 MG Oral Tablet; Take one tablet daily as needed; Therapy: 51TBW7469 to (Last Rx:02Oct2017)  Requested for: 03RAG8909; Status: ACTIVE - Renewal  Denied Ordered   18  Mucinex Fast-Max Cold & Sinus 10-5-325 MG Oral Capsule; Take 1 capsule twice daily; Therapy: 96HEY1772 to (Evaluate:10Oct2017)  Requested for: 02Oct2017; Last Rx:03Ppb9071  Ordered   19  Nicotine 14 MG/24HR Transdermal Patch 24 Hour; APPLY 1 PATCH DAILY AS DIRECTED; Therapy: 49Heq7809 to (Evaluate:72Zcv3291)  Requested for: 57Bbv5603; Last Rx:42Qzf4325  Ordered   20  Nicotine 21 MG/24HR Transdermal Patch 24 Hour; APPLY 1 PATCH DAILY AS DIRECTED; Therapy: 57Vct4206 to (Evaluate:82Hqd5307)  Requested for: 61Ffr6009; Last Rx:12Vdh3158  Ordered   21  Nicotine 7 MG/24HR Transdermal Patch 24 Hour; APPLY 1 PATCH DAILY AS DIRECTED; Therapy: 92Flf3103 to (Evaluate:92Owv7762)  Requested for: 47Htt7145; Last Rx:82Eso3026  Ordered   22  Nicotine Polacrilex 2 MG Mouth/Throat Gum; CHEW 1 PIECE SLOWLY AND INTERMITTENTLY FOR 30  MINUTES  REPEAT EVERY 1-2 HOURS; MAXIMUM OF 24 PIECES/DAY; Therapy: 63Xrx6651 to (Evaluate:17Psh9311)  Requested for: 32Liq9196; Last Rx:64Lqv2279  Ordered   23  OLANZapine 10 MG Oral Tablet; TAKE 1 TABLET AT BEDTIME in addition to a 2 5mg tablet; Therapy: 10AUA2338 to Recorded   24  Omeprazole 20 MG Oral Tablet Delayed Release; Take 1 tablet daily; Therapy: 33JJM5915 to (Bo Amin)  Requested for: 86Krl0213; Last Rx:00Nqt2162  Ordered   25  Polyethylene Glycol 3350 Oral Powder; Use as directed for colonoscopy prep; Therapy: 93MNU3133 to (Last Rx:29Hmd3813)  Requested for: 93HMK9802 Ordered   26  Sulfamethoxazole-Trimethoprim 800-160 MG Oral Tablet; TAKE 1 TABLET TWICE DAILY; Therapy: 18KKZ9160 to (Evaluate:18Nov2017)  Requested for: 64AJC4965; Last Rx:08Nov2017  Ordered   27  Testosterone 25 MG/2 5GM (1%) Transdermal Gel; Apply 4  packets once a day;   Therapy: 07Aug2017 to (Evaluate:30Dec2017); Last Rx:31Oct2017 Ordered   28  Ventolin  (90 Base) MCG/ACT Inhalation Aerosol Solution; inhale 1-2 puffs every 4-6 hours  only for wheezing and severe shortness of breath not for daily use; Therapy: 64IOE6024 to (Lee Ann Terrazas)  Requested for: 02Oct2017; Last Rx:02Oct2017  Ordered   34  Ziprasidone HCl - 40 MG Oral Capsule; Therapy: 46OPD6810 to Recorded   30  Ziprasidone HCl - 80 MG Oral Capsule; Therapy: 56RLC1138 to Recorded   31  Zoloft 100 MG Oral Tablet; TAKE 1 TABLET DAILY AS DIRECTED; Therapy: 58BCV8318 to (Evaluate:25Ozw0952) Recorded  Medication List Reviewed: The medication list was reviewed and updated today  Allergies  1  morphine   2  Penicillins  3  No Known Environmental Allergies   4  No Known Food Allergies    Vitals  Vital Signs    Recorded: 88QIP3087 12:58PM   Temperature 98 1 F   Heart Rate 76   Systolic 218   Diastolic 90   Height 6 ft 2 in   Weight 249 lb 8 94 oz   BMI Calculated 32 04   BSA Calculated 2 39     Physical Exam   Constitutional  General appearance: No acute distress, well appearing and well nourished  Eyes  Conjunctiva and lids: No swelling, erythema, or discharge  Ears, Nose, Mouth, and Throat  External inspection of ears and nose: Normal    Otoscopic examination: Tympanic membrance translucent with normal light reflex  Canals patent without erythema  Nasal mucosa, septum, and turbinates: Abnormal   examination of the septum showed deviation to the left  Pulmonary  Respiratory effort: No increased work of breathing or signs of respiratory distress  Auscultation of lungs: Clear to auscultation, equal breath sounds bilaterally, no wheezes, no rales, no rhonci  Cardiovascular  Auscultation of heart: Normal rate and rhythm, normal S1 and S2, without murmurs  Skin  Skin and subcutaneous tissue: Normal without rashes or lesions     Psychiatric  Orientation to person, place and time: Normal    Mood and affect: Normal          Health Management  Colon polyps   COLONOSCOPY (GI, SURG); every 2 months; Last 29Aug2017; Next Due: 97WMF0642; Overdue    Attending Note  Attending Note: Attending Note: I discussed the case with the Resident and reviewed the Resident's note,-- I supervised the Resident-- and-- I agree with the Resident management plan as it was presented to me  Level of Participation: I was present in clinic, but did not examine the patient  Comments/Additional Findings: finds the topical testosterone too cumbersome to use, reasonable to try injectable form  I agree with the Resident's note        Signatures   Electronically signed by : Jered Flynn DO; Dec 18 2017  1:52PM EST                       (Author)    Electronically signed by : Farideh Johnson DO; Dec 18 2017  2:06PM EST                       (Co-author)

## 2017-12-27 ENCOUNTER — ALLSCRIPTS OFFICE VISIT (OUTPATIENT)
Dept: OTHER | Facility: OTHER | Age: 47
End: 2017-12-27

## 2017-12-28 ENCOUNTER — HOSPITAL ENCOUNTER (OUTPATIENT)
Dept: RADIOLOGY | Facility: HOSPITAL | Age: 47
Discharge: HOME | End: 2017-12-28
Payer: COMMERCIAL

## 2017-12-28 ENCOUNTER — TRANSCRIBE ORDERS (OUTPATIENT)
Dept: RADIOLOGY | Facility: HOSPITAL | Age: 47
End: 2017-12-28

## 2017-12-28 ENCOUNTER — TRANSCRIBE ORDERS (OUTPATIENT)
Dept: ADMINISTRATIVE | Facility: HOSPITAL | Age: 47
End: 2017-12-28

## 2017-12-28 DIAGNOSIS — R06.2 WHEEZING: ICD-10-CM

## 2017-12-28 PROCEDURE — 71020 HB CHEST X-RAY 2VW FRONTAL&LATL: CPT

## 2018-01-12 VITALS
DIASTOLIC BLOOD PRESSURE: 90 MMHG | SYSTOLIC BLOOD PRESSURE: 122 MMHG | WEIGHT: 240.74 LBS | HEART RATE: 72 BPM | HEIGHT: 74 IN | BODY MASS INDEX: 30.9 KG/M2 | TEMPERATURE: 98.1 F

## 2018-01-12 VITALS
TEMPERATURE: 97.5 F | HEART RATE: 68 BPM | SYSTOLIC BLOOD PRESSURE: 118 MMHG | DIASTOLIC BLOOD PRESSURE: 68 MMHG | WEIGHT: 240.3 LBS | HEIGHT: 74 IN | BODY MASS INDEX: 30.84 KG/M2

## 2018-01-12 NOTE — MISCELLANEOUS
Message  Pt was called to discuss test results  He was informed that his Lithium level is low at 0 3 and 8AM testosterone was low  He was instructed to follow up with his psychiatrist for the low lithium level  He was informed that the testosterone should be rechecked  An order was put in for repeat Testosterone to be checked at 8AM  The patient is aware and will come to the clinic to  the lab slip  All questions were answered  Will call patient with any additional test results that come back        Signatures   Electronically signed by : Mckay Aguila DO; Mar  7 2017  2:37PM EST                       (Author)

## 2018-01-13 VITALS
HEIGHT: 74 IN | WEIGHT: 253.09 LBS | DIASTOLIC BLOOD PRESSURE: 78 MMHG | HEART RATE: 68 BPM | SYSTOLIC BLOOD PRESSURE: 104 MMHG | TEMPERATURE: 97.6 F | BODY MASS INDEX: 32.48 KG/M2

## 2018-01-13 VITALS
SYSTOLIC BLOOD PRESSURE: 130 MMHG | BODY MASS INDEX: 33.05 KG/M2 | HEIGHT: 74 IN | HEART RATE: 60 BPM | TEMPERATURE: 98.2 F | WEIGHT: 257.5 LBS | DIASTOLIC BLOOD PRESSURE: 88 MMHG

## 2018-01-13 NOTE — MISCELLANEOUS
Message  Pt was called to inform him of his most recent blood tests revealing low repeat testosterone and inappropriately low LH and FSH  A message was left on the patient's voicemail to inform him that more testing needs to be completed  This includes: Iron studies, 8AM cortisol, Free testosterone, MRI of the pituitary  I will call patient back to discuss with him and answer his questions  Plan  Low testosterone level in male    · (1) CORTISOL AM SPECIMEN; Status:Active; Requested for:15Mar2017;    · (1) FERRITIN; Status:Active; Requested for:15Mar2017;    · (1) IRON SATURATION %, TIBC; Status:Active; Requested for:15Mar2017;    · (1) TESTOSTERONE, FREE (DIRECT) AND TOTAL; Status:Active; Requested  for:15Mar2017;    · (1) TRANSFERRIN; Status:Active; Requested for:15Mar2017;    · * MRI BRAIN PITUITARY WO AND W CONTRAST; Status:Need Information - Financial  Authorization;  Requested for:15Mar2017;     Signatures   Electronically signed by : Dawood Ruffin DO; Mar 15 2017 12:37PM EST                       (Author)

## 2018-01-14 VITALS
WEIGHT: 241.62 LBS | HEART RATE: 76 BPM | HEIGHT: 74 IN | BODY MASS INDEX: 31.01 KG/M2 | SYSTOLIC BLOOD PRESSURE: 112 MMHG | DIASTOLIC BLOOD PRESSURE: 64 MMHG | TEMPERATURE: 98.4 F

## 2018-01-14 NOTE — RESULT NOTES
Verified Results  ECHO COMPLETE WITH CONTRAST IF INDICATED 06WUL2541 09:06AM Lynn Wiley     Test Name Result Flag Reference   ECHO COMPLETE WITH CONTRAST IF INDICATED (Report)     [facility], [address] [place] [address]   [phone]     Transthoracic Echocardiogram   2D, M-mode, Doppler, and Color Doppler     Study date: 16-Mar-2017     Patient: Eulalio Jones   MR number: [id number]   Account number: [de-identified]   : 1970   Age: 55 years   Gender: Male   Status: Outpatient   Location: [address] [place] [address]   Height: 74 in   Weight: 244 4 lb   BP: 102/ 64 mmHg     Indications: Chest pain     Diagnoses: R07 9 - Chest pain, unspecified     Sonographer: STIVEN Leo   Primary Physician: Mindy Liang   Referring Physician: Dejon Mullins: [facility]   Interpreting Physician: Omar Blair MD     SUMMARY     LEFT VENTRICLE:   Size was normal    Systolic function was normal  Ejection fraction was estimated to be 55 %  Wall thickness was normal    Left ventricular diastolic function parameters were normal      RIGHT VENTRICLE:   The size was normal    Systolic function was normal      MITRAL VALVE:   There was mild regurgitation  HISTORY: PRIOR HISTORY: Hypertension, hyperlipidemia, smoker, GERD  PROCEDURE: The study was performed in the [address] [place] [address]  This was a routine study  The transthoracic approach was used  The study included complete 2D imaging, M-mode, complete spectral Doppler, and color Doppler  The   heart rate was 68 bpm, at the start of the study  Images were obtained from the parasternal, apical, subcostal, and suprasternal notch acoustic windows  Image quality was adequate  LEFT VENTRICLE: Size was normal  Systolic function was normal  Ejection fraction was estimated to be 55 %  There were no regional wall motion abnormalities   Wall thickness was normal  DOPPLER: Left ventricular diastolic function parameters   were normal      RIGHT VENTRICLE: The size was normal  Systolic function was normal  Wall thickness was normal      LEFT ATRIUM: Size was normal      RIGHT ATRIUM: Size was normal      MITRAL VALVE: Valve structure was normal  There was normal leaflet separation  DOPPLER: The transmitral velocity was within the normal range  There was no evidence for stenosis  There was mild regurgitation  AORTIC VALVE: The valve was trileaflet  Leaflets exhibited normal thickness and normal cuspal separation  DOPPLER: Transaortic velocity was within the normal range  There was no evidence for stenosis  There was no regurgitation  TRICUSPID VALVE: The valve structure was normal  There was normal leaflet separation  DOPPLER: The transtricuspid velocity was within the normal range  There was no evidence for stenosis  There was no regurgitation  PULMONIC VALVE: Leaflets exhibited normal thickness, no calcification, and normal cuspal separation  DOPPLER: The transpulmonic velocity was within the normal range  There was trace regurgitation  PERICARDIUM: There was no pericardial effusion  The pericardium was normal in appearance  AORTA: The root exhibited normal size  SYSTEMIC VEINS: IVC: The inferior vena cava was not well visualized       SYSTEM MEASUREMENT TABLES     2D   %FS: 44 58 %   AV Diam: 3 05 cm   EDV(Teich): 114 41 ml   EF(Cube): 82 97 %   EF(Teich): 75 68 %   ESV(Cube): 20 4 ml   ESV(Teich): 27 82 ml   IVSd: 1 15 cm   LA Area: 18 49 cm2   LA Diam: 4 28 cm   LVEDV MOD A4C: 67 2 ml   LVEF MOD A4C: 61 46 %   LVESV MOD A4C: 25 9 ml   LVIDd: 4 93 cm   LVIDs: 2 73 cm   LVLd A4C: 7 41 cm   LVLs A4C: 5 83 cm   LVPWd: 1 05 cm   RA Area: 17 85 cm2   RV Diam: 3 26 cm   SI(Cube): 41 94 ml/m2   SI(Teich): 36 54 ml/m2   SV MOD A4C: 41 31 ml   SV(Cube): 99 41 ml   SV(Teich): 86 59 ml     MM   TAPSE: 2 1 cm     PW   MV A Artie: 0 43 m/s   MV Dec Door: 3 25 m/s2   MV DecT: 225 43 ms   MV E Artie: 0 73 m/s   MV E/A Ratio: 1 72     [facility]     Prepared and electronically signed by     Barbi Yepez MD   Signed 16-Mar-2017 17:19:24     (1) TRANSFERRIN 55FPE1017 08:11AM Karen Phillips    Order Number: [id number]     Test Name Result Flag Reference   TRANSFERRIN 354 mg/dL  200-400     (1) IRON SATURATION %, TIBC 84EFZ2612 08:11AM [facility]    Order Number: [id number]     Test Name Result Flag Reference   IRON SATURATION 21 %     TOTAL IRON BINDING CAPACITY 430 ug/dL  250-450   IRON 90 ug/dL     Patients treated with metal-binding drugs (ie  Deferoxamine) may have depressed iron values  (1) FERRITIN 33LNC4614 08:11AM Karen Phillips    Order Number: [id number]     Test Name Result Flag Reference   FERRITIN 38 ng/mL  8-388     (1) CORTISOL AM SPECIMEN 11MED7161 08:11AM blanca Jacqueline    Order Number: [id number]     Test Name Result Flag Reference   CORTISO AM SPEC 11 6 ug/mL  4 2-22 4   Reference ranges established for specimens drawn between 7 and 9 am  Results may be inaccurate if timing is not correct

## 2018-01-15 ENCOUNTER — ALLSCRIPTS OFFICE VISIT (OUTPATIENT)
Dept: OTHER | Facility: OTHER | Age: 48
End: 2018-01-15

## 2018-01-15 VITALS
HEART RATE: 76 BPM | WEIGHT: 263.67 LBS | BODY MASS INDEX: 33.84 KG/M2 | DIASTOLIC BLOOD PRESSURE: 64 MMHG | HEIGHT: 74 IN | SYSTOLIC BLOOD PRESSURE: 102 MMHG | TEMPERATURE: 97.7 F

## 2018-01-15 NOTE — MISCELLANEOUS
Message  I spoke with patient and discussed the results of his blood work as mentioned in previous telephone call note  He understands and will come to the clinic to  the slips for further blood work and MRI of the brain        Signatures   Electronically signed by : Deana Gayle DO; Mar 15 2017  1:14PM EST                       (Author)

## 2018-01-16 NOTE — MISCELLANEOUS
Message  GI Reminder Recall Nimco Kiran:   Date: 08/10/2017   Dear Chiquita Vogel:     Review of our records shows you are due for the following: Procedure results  Please call the following office to schedule your appointment:   [address], [address], [place], [place], [address] [facility] [phone]  We look forward to hearing from you!      Sincerely,     [facility]      Signatures   Electronically signed by : Santosh Kumar, ; Aug 10 2017  9:31AM EST                       (Author)

## 2018-01-17 NOTE — RESULT NOTES
Verified Results  (1) LITHI[facility] 88UXN4593 08:12AM [facility] Number: [id number]     Test Name Result Flag Reference   LITHIUM 0 3 mmol/L L 0 5-1 0     (1) TSH 23CHL1979 08:12AM [facility] Order Number: [id number]     Test Name Result Flag Reference   TSH 2 300 uIU/mL  0 358-3 740   - Patient Instructions: This bloodwork is non-fasting  Please drink two glasses of water morning of bloodwork  - Patient Instructions: This bloodwork is non-fasting  Please drink two glasses of water morning of bloodwork  Patients undergoing fluorescein dye angiography may retain small amounts of fluorescein in the body for 48-72 hours post procedure  Samples containing fluorescein can produce falsely depressed TSH values  If the patient had this procedure,a specimen should be resubmitted post fluorescein clearance  (1) T4, FREE 02Mar2017 08:12AM [facility] Order Number: [id number]     Test Name Result Flag Reference   T4,FREE 0 85 ng/dL  0 76-1 46   - Patient Instructions: This bloodwork is non-fasting  Please drink two glasses of water morning of bloodwork  (1) TESTOSTERONE 53DGN6753 08:12AM [facility] Order Number: [id number]     Test Name Result Flag Reference   TESTOSTERONE 141 2 ng/dL L 136-330   - Patient Instructions: Fasting preferred  Collections for men not undergoing treatment must be completed between 7am-9am ONLY  Collection time restrictions are not applicable to women or men already undergoing treatment  - Patient Instructions: Fasting preferred  Collections for men not undergoing treatment must be completed between 7am-9am ONLY  Collection time restrictions are not applicable to women or men already undergoing treatment

## 2018-01-17 NOTE — MISCELLANEOUS
Message  I spoke with patient over the telephone to inform him we will be adding more labs tests to investigate the low testosterone level that he should  the slip for including: repeat 8AM testosterone, LH, FSH and prolactin  I also spoke with the patient about an alternative therapy to viagra that would be less cost prohibitive  I placed an order for Cialis which may be more affordable  The patient will call the pharmacy to see how much the co-pay will be  The patient understood the alternative and agrees with the plan  Plan  Erectile dysfunction    · Viagra 25 MG Oral Tablet   · Cialis 5 MG Oral Tablet; TAKE ONE TABLET ONE HOUR BEFORE NEEDED   · (1) FSH; Status:Active; Requested for:08Mar2017;    · (1) LH (LEUTINIZING HORMONE); Status:Active; Requested for:08Mar2017;    · (1) PROLACTIN; Status:Active;  Requested UOB:12DGP0680;     Signatures   Electronically signed by : Su Huggins DO; Mar  8 2017  9:03AM EST                       (Author)

## 2018-01-17 NOTE — RESULT NOTES
Discussion/Summary   both of the polyps were precancerous  please make sure patient has appointment to repeat colonoscopy in 1-2 months  Verified Results  (1) TISSUE EXAM [id number] 12:43PM Hui Guadarrama     Test Name Result Flag Reference   LAB AP CASE REPORT (Report)     Surgical Pathology Report             Case: [id number]                   Authorizing Provider: Doc Sarah MD       Collected:      07/27/2017 1243        Ordering Location:   [address] [place] [address]   Received:      07/28/2017 [facility]                               Pathologist:      Matti Jernigan MD                             Specimens:  A) - Polyp, Colorectal, Hepatic flexure polyp cold biopsy                       B) - Large Intestine, Sigmoid Colon, Sigmoid polyp hot snare   LAB AP FINAL DIAGNOSIS (Report)     A  Colon, hepatic flexure polyp, biopsy:  - Tubular adenoma, negative for high-grade dysplasia  B  Colon, sigmoid polyp, biopsy:  - Tubulovillous adenoma, negative for high-grade dysplasia  Electronically signed by Matti Jernigan MD on 8/1/2017 at 12:31 PM   LAB AP SURGICAL ADDITIONAL INFORMATION (Report)     All controls performed with the immunohistochemical stains reported above   reacted appropriately  These tests were developed and their performance   characteristics determined by [facility] or   [facility]  They may not be cleared or approved by the [place]  Food and Drug Administration  The FDA has determined that such clearance   or approval is not necessary  These tests are used for clinical purposes  They should not be regarded as investigational or for research  This   laboratory has been approved by CLIA 88, designated as a high-complexity   laboratory and is qualified to perform these tests  Interpretation performed at [facility]   LAB AP GROSS DESCRIPTION (Report)     A   The specimen is received in formalin, labeled with the patient's name   and hospital number, and is designated hepatic flexure polyp  The   specimen consists of 2 tan-pink soft tissue fragments each measuring 0 2   cm in greatest dimension  Entirely submitted  One cassette  B  The specimen is received in formalin, labeled with the patient's name   and hospital number, and is designated Sigmoid colon polyp  The   specimen consists of one tan-pink polypoid soft tissue mass which measures   1 3 x 1 2 x 0 8 cm  The apparent margin of resection is inked blue  The   specimen is trisected revealing pink-red dense to friable cut surfaces  Also submitted in the container is an additional tan pink to red polypoid   soft tissue fragment measuring 0 9 x 0 8 x 0 5 cm  The margin of resection   is inked blue  The specimen is bisected revealing pink-red dense to   friable cut surfaces  Also submitted in the container are 2 additional   tan-pink soft tissue fragments each measuring 0 2 cm in greatest   dimension  Entirely submitted  Two cassettes, with largest polypoid mass   cassette B1  Note: The estimated total formalin fixation time based upon information   provided by the submitting clinician and the standard processing schedule   is 25 0 hours     [facility]

## 2018-01-22 VITALS
HEIGHT: 74 IN | WEIGHT: 240.74 LBS | HEART RATE: 70 BPM | BODY MASS INDEX: 30.9 KG/M2 | SYSTOLIC BLOOD PRESSURE: 130 MMHG | DIASTOLIC BLOOD PRESSURE: 92 MMHG | TEMPERATURE: 97.8 F

## 2018-01-23 VITALS
TEMPERATURE: 98.1 F | HEIGHT: 74 IN | DIASTOLIC BLOOD PRESSURE: 90 MMHG | HEART RATE: 76 BPM | SYSTOLIC BLOOD PRESSURE: 132 MMHG | BODY MASS INDEX: 32.03 KG/M2 | WEIGHT: 249.56 LBS

## 2018-01-23 NOTE — RESULT NOTES
Discussion/Summary   patient aware of the biospy result  enter reminder to repeat colonscopy in 3 years  thanks  Verified Results  (1) TISSUE EXAM [id number] 09:26AM Julita Can     Test Name Result Flag Reference   LAB AP CASE REPORT (Report)     Surgical Pathology Report             Case: [id number]                   Authorizing Provider: Jelly Valencia MD       Collected:      12/01/2017 1413        Ordering Location:   [address] [place] [address]   Received:      12/01/2017 [address] [facility]                               Pathologist:      Kenia Banda MD                              Specimens:  A) - Polyp, Colorectal, Sigmoid colon polyp - cold bx                         B) - Polyp, Colorectal, recto-sigmoid colon polyp - cold bx   LAB AP FINAL DIAGNOSIS (Report)     A  Colon, sigmoid, polypectomy:        - Hyperplastic polyp         - No dysplasia or malignancy is identified  B  Colon, rectosigmoid, polypectomy:        - Tubular adenoma  - No high-grade dysplasia or malignancy is identified  Interpretation performed at , [address]   Electronically signed by Kenia Banda MD on 12/5/2017 at 11:19 AM   LAB AP SURGICAL ADDITIONAL INFORMATION (Report)     All controls performed with the immunohistochemical stains reported above   reacted appropriately  These tests were developed and their performance   characteristics determined by [facility] or   [facility]  They may not be cleared or approved by the [place]  Food and Drug Administration  The FDA has determined that such clearance   or approval is not necessary  These tests are used for clinical purposes  They should not be regarded as investigational or for research  This   laboratory has been approved by CLIA 88, designated as a high-complexity   laboratory and is qualified to perform these tests  LAB AP GROSS DESCRIPTION (Report)     A   The specimen is received in formalin, labeled with the patient's name   and hospital number, and is designated Sigmoid colon polyp  The   specimen consists of 3 tan soft tissue fragments each measuring 0 3 cm  Entirely submitted  One cassette  B  The specimen is received in formalin, labeled with the patient's name   and hospital number, and is designated Rectosigmoid colon polyp  The   specimen consists of a tan soft tissue fragment measuring 0 2 cm  Entirely   submitted  One cassette  Note: The estimated total formalin fixation time based upon information   provided by the submitting clinician and the standard processing schedule   is less than 72 hours      [facility]

## 2018-01-23 NOTE — PROGRESS NOTES
Chief Complaint  PT  CAME INTO THE OFFICE TODAY FOR A NURSE VISIT FOR A TESTOSTERONE INJECTION ORDERED BY DR Windy Solano ON 12-18-17  PT  BROUGHT HIS OWN SERUM AND HE INJECTED HIMSELF WITH 0 5 ML IN THE RIGHT THIGH  PT  WOULD LIKE TO DO THIS AT HOME DUE TO A BUSY WORK SCHEDULE  I REVIEWED WITH HIM HOW TO DRAW UP, CHANGE NEEDLE AND ADMINISTER THE SHOT  I HAD PT  DO ALL OF THAT IN FRONT OF ME AND THEN HE INJECTED HIMSELF HERE IN THE OFFICE  HE UNDERSTANDS ALL DIRECTIONS AND SAID HE FEELS COMFORTABLE ADMINISTERING IT ON HIS OWN AT HOME  I ADVISED HIM OF THE IMPORTANCE OF HIM DOING IT ON THE SAME DAY EVERY TWO WEEKS  (PT  CANCELLED HIS APPT  LAST WEEK SO HE IS OFF BY A WEEK)  I GAVE PT  HIS VIAL OF TESTOSTERONE AND ADVISED THAT I WILL HAVE DOCTOR SEND ALL NEEDED SUPPLIES TO THE PHARMACY      Active Problems    1  Abdominal bloating (787 3) (R14 0)   2  Acute maxillary sinusitis (461 0) (J01 00)   3  Allergic sinusitis (477 9) (J30 9)   4  Ascites (789 59) (R18 8)   5  Bipolar disorder (296 80) (F31 9)   6  Central obesity (278 1) (E65)   7  Chest congestion (786 9) (R09 89)   8  Chronic stable angina (413 9) (I20 8)   9  Colon polyps (211 3) (K63 5)   10  Cough (786 2) (R05)   11  Deviated septum (470) (J34 2)   12  Encounter for completion of form with patient (V68 89) (Z02 89)   13  Encounter for monitoring testosterone replacement therapy (V58 83,V58 69)    (Z51 81,Z79 899)   14  Encounter for prostate cancer screening (V76 44) (Z12 5)   15  Encounter for smoking cessation counseling (V65 42,305 1) (Z71 6,Z72 0)   16  Erectile dysfunction (607 84) (N52 9)   17  Essential hypertriglyceridemia (272 1) (E78 1)   18  GERD (gastroesophageal reflux disease) (530 81) (K21 9)   19  History of palpitations (V12 59) (Z87 898)   20  Hyperlipidemia (272 4) (E78 5)   21  Hypertension (401 9) (I10)   22  Hypothalamic hypogonadism (253 4) (E23 0)   23  Insomnia (780 52) (G47 00)   24   Meralgia paraesthetica, left (355 1) (P32 12) 25  Need for immunization against influenza (V04 81) (Z23)   26  Need for prophylactic vaccination and inoculation against influenza (V04 81) (Z23)   27  Nicotine dependence (305 1) (F17 200)   28  Non-alcoholic fatty liver disease (571 8) (K76 0)   29  Subclinical hypothyroidism (244 8) (E03 9)   30  Wheezing (786 07) (R06 2)   31  Wheezing (786 07) (R06 2)    Current Meds   1  Aspirin EC 81 MG Oral Tablet Delayed Release; TAKE 1 TABLET DAILY; Therapy: 68WUD1861 to ([facility])  Requested for: 90Qop2487; Last   Rx:86Fgc8372 Ordered   2  Atenolol 25 MG Oral Tablet; TAKE 1 TABLET DAILY AS DIRECTED; Therapy: 24UQB9996 to (Evaluate:18Mar2018)  Requested for: 48KAD5783; Last   Rx:78Yzk6520 Ordered   3  Atorvastatin Calcium 10 MG Oral Tablet; TAKE 1 TABLET DAILY AS DIRECTED; Therapy: 78EFT1608 to ([facility])  Requested for: 80Klc3971; Last   Rx:07Sep2017 Ordered   4  Benzonatate 100 MG Oral Capsule; TAKE 1 CAPSULE 3 TIMES DAILY; Therapy: 48GIK0511 to (Evaluate:12Oct2017)  Requested for: 02Oct2017; Last   Rx:02Oct2017 Ordered   5  Benztropine Mesylate 0 5 MG Oral Tablet; Therapy: 66HCQ1869 to Recorded   6  Capsaicin 0 1 % External Cream; APPLY GENTLY TO AFFECTED AREA 3-4 TIMES   DAILY; Therapy: 55ULS2463 to (Last Rx:01Mar2017)  Requested for: 03PPD3060 Ordered   7  Citalopram Hydrobromide 20 MG Oral Tablet; TAKE 1 TABLET DAILY; Therapy: 98OKM1630 to ([facility]) Recorded   8  Dulcolax 5 MG Oral Tablet Delayed Release; Take 2 tablets; Therapy: 64QJU7198 to (Last Rx:19Ebd4621)  Requested for: 11PUW3237 Ordered   9  Dulcolax 5 MG Oral Tablet Delayed Release; take two 5 mg dulcolax tablets at 5 pm on   the day prior to colonoscopy MDD:2;   Therapy: 89PNL4618 to (Evaluate:08Sep2017)  Requested for: 07Sep2017; Last   Rx:07Sep2017 Ordered   10  Fluticasone Propionate 50 MCG/ACT Nasal Suspension; USE 1 SPRAY IN EACH    NOSTRIL TWICE DAILY;     Therapy: 62RMA1634 to (Last CF:81ZDJ6315) Requested for: 42ARE9993 Ordered   11  Gabapentin 100 MG Oral Capsule; TAKE 1 CAPSULE 3 TIMES DAILY; Therapy: 11FIF0610 to (Evaluate:33Ian8108)  Requested for: 03YUJ5545; Last    Rx:29Ohl4760 Ordered   12  Golytely 227 1 GM Oral Solution Reconstituted; TAKE AS DIRECTED  1 gallon golytely    split prep for colonoscopy; Therapy: 56JJZ0455 to (Evaluate:32Tqk2700)  Requested for: 82Ekr2841; Last    Rx:60Qkq1864 Ordered   13  HydrOXYzine Pamoate 25 MG Oral Capsule; Therapy: 12GPN2734 to Recorded   14  Lithium Carbonate 300 MG Oral Capsule; 1 tid; Therapy: 88EGI8640 to Recorded   15  Loratadine 10 MG Oral Tablet; Take one tablet daily as needed; Therapy: 32RWT9560 to (Last Rx:02Oct2017)  Requested for: 94KCM6427; Status: ACTIVE    - Renewal Denied Ordered   16  OLANZapine 10 MG Oral Tablet; TAKE 1 TABLET AT BEDTIME in addition to a 2 5mg    tablet; Therapy: 79AZA9261 to Recorded   17  Omeprazole 20 MG Oral Tablet Delayed Release; Take 1 tablet daily; Therapy: 50SKI0703 to (Hayes Gutierres)  Requested for: 58Sld5201; Last    Rx:29Gdg6195 Ordered   18  Polyethylene Glycol 3350 Oral Powder; Use as directed for colonoscopy prep; Therapy: 54SJQ2293 to (Last Rx:73Mcs9295)  Requested for: 23ODK8524 Ordered   19  RA Saline Nasal Spray 0 65 % Nasal Solution; USE 2 SPRAYS IN EACH NOSTRIL    TWICE DAILY; Therapy: 16ZXJ5301 to (Last Rx:23Vyl5515)  Requested for: 66OKT0151 Ordered   20  Rozerem 8 MG Oral Tablet; TAKE 1/2 TO 1 TABLET AT BEDTIME AS NEEDED; Therapy: 60Wrl8330 to (Evaluate:19Jan2018)  Requested for: 72Ruy5745; Last    Rx:78Ilf0109 Ordered   21  Testosterone 25 MG/2 5GM (1%) Transdermal Gel; Apply 4  packets once a day; Therapy: 61Exf7845 to (Evaluate:95Tpf8969); Last Rx:31Oct2017 Ordered   22  Testosterone Cypionate 200 MG/ML Intramuscular Solution; inject 0 5ML IM every 2    weeks; Therapy: 97ROS3957 to (Last Rx:89Glw2256) Ordered   23   Ventolin  (90 Base) MCG/ACT Inhalation Aerosol Solution; inhale 1-2 puffs every    4-6 hours only for wheezing and severe shortness of breath not for daily use; Therapy: 75XSW1767 to (Yesenia Coats)  Requested for: 02Oct2017; Last    Rx:02Oct2017 Ordered   24  Ziprasidone HCl - 40 MG Oral Capsule; Therapy: 56OSI5845 to Recorded   25  Ziprasidone HCl - 80 MG Oral Capsule; Therapy: 15CQC9059 to Recorded    Allergies    1  morphine   2  Penicillins    3  No Known Environmental Allergies   4   No Known Food Allergies    Plan  Hypothalamic hypogonadism    · Testosterone Cypionate 200 MG/ML Intramuscular Solution    Signatures   Electronically signed by : Gibson Bartholomew, ; Jones 15 2018  3:15PM EST                       (Author)    Electronically signed by : Alana Burton DO; Jones 15 2018  3:17PM EST                       (Review)    Electronically signed by : Garrett Salgado DO; Jones 15 2018  3:42PM EST                       (Review)

## 2018-01-23 NOTE — PROGRESS NOTES
Chief Complaint  PT  Andreas Nunn INTO THE OFFICE TODAY FOR A NURSE VISIT FOR HIS 1ST TESTOSTERONE INJECTION ORDERED BY DR Sajan Adrian ON 12-18-17  PT  BROUGHT HIS OWN SERUM AND 0 5 ML WAS GIVEN IN LEFT DELTOID PER PTS  REQUEST  PT  ADVISED TO RETURN IN 2 WEEKS FOR NEXT INJECTION      Active Problems    1  Abdominal bloating (787 3) (R14 0)   2  Acute maxillary sinusitis (461 0) (J01 00)   3  Allergic sinusitis (477 9) (J30 9)   4  Ascites (789 59) (R18 8)   5  Bipolar disorder (296 80) (F31 9)   6  Central obesity (278 1) (E65)   7  Chest congestion (786 9) (R09 89)   8  Chronic stable angina (413 9) (I20 8)   9  Colon polyps (211 3) (K63 5)   10  Cough (786 2) (R05)   11  Deviated septum (470) (J34 2)   12  Encounter for completion of form with patient (V68 89) (Z02 89)   13  Encounter for monitoring testosterone replacement therapy (V58 83,V58 69)    (Z51 81,Z79 899)   14  Encounter for prostate cancer screening (V76 44) (Z12 5)   15  Encounter for smoking cessation counseling (V65 42,305 1) (Z71 6,Z72 0)   16  Erectile dysfunction (607 84) (N52 9)   17  Essential hypertriglyceridemia (272 1) (E78 1)   18  GERD (gastroesophageal reflux disease) (530 81) (K21 9)   19  History of palpitations (V12 59) (Z87 898)   20  Hyperlipidemia (272 4) (E78 5)   21  Hypertension (401 9) (I10)   22  Hypothalamic hypogonadism (253 4) (E23 0)   23  Insomnia (780 52) (G47 00)   24  Meralgia paraesthetica, left (355 1) (G57 12)   25  Need for immunization against influenza (V04 81) (Z23)   26  Need for prophylactic vaccination and inoculation against influenza (V04 81) (Z23)   27  Nicotine dependence (305 1) (F17 200)   28  Non-alcoholic fatty liver disease (571 8) (K76 0)   29  Subclinical hypothyroidism (244 8) (E03 9)   30  Wheezing (786 07) (R06 2)   31  Wheezing (786 07) (R06 2)    Current Meds   1  Aspirin EC 81 MG Oral Tablet Delayed Release; TAKE 1 TABLET DAILY;    Therapy: 34VMY9158 to (Deya Olivarez)  Requested for: 51Wru6002; Last   Rx:07Sep2017 Ordered   2  Atenolol 25 MG Oral Tablet; TAKE 1 TABLET DAILY AS DIRECTED; Therapy: 34DKG1723 to (Evaluate:18Mar2018)  Requested for: 18ZJW3488; Last   Rx:69Wso4776 Ordered   3  Atorvastatin Calcium 10 MG Oral Tablet; TAKE 1 TABLET DAILY AS DIRECTED; Therapy: 09EXJ9623 to (Gio Munguia)  Requested for: 07Sep2017; Last   Rx:07Sep2017 Ordered   4  Benzonatate 100 MG Oral Capsule; TAKE 1 CAPSULE 3 TIMES DAILY; Therapy: 76DSW3105 to (Evaluate:12Oct2017)  Requested for: 02Oct2017; Last   Rx:02Oct2017 Ordered   5  Benztropine Mesylate 0 5 MG Oral Tablet; Therapy: 75MGA0443 to Recorded   6  Capsaicin 0 1 % External Cream; APPLY GENTLY TO AFFECTED AREA 3-4 TIMES   DAILY; Therapy: 77OXD3246 to (Last Rx:01Mar2017)  Requested for: 33WOH0222 Ordered   7  Citalopram Hydrobromide 20 MG Oral Tablet; TAKE 1 TABLET DAILY; Therapy: 17BMI6301 to (Feliciano Rodríguez) Recorded   8  Dulcolax 5 MG Oral Tablet Delayed Release; Take 2 tablets; Therapy: 84JVP3922 to (Last Rx:22May2017)  Requested for: 05LUU6695 Ordered   9  Dulcolax 5 MG Oral Tablet Delayed Release; take two 5 mg dulcolax tablets at 5 pm on   the day prior to colonoscopy MDD:2;   Therapy: 85QVJ2582 to (Evaluate:08Sep2017)  Requested for: 07Sep2017; Last   Rx:07Sep2017 Ordered   10  Fluticasone Propionate 50 MCG/ACT Nasal Suspension; USE 1 SPRAY IN EACH    NOSTRIL TWICE DAILY; Therapy: 64EHJ2027 to (Last Rx:08Nov2017)  Requested for: 64JMI4199 Ordered   11  Gabapentin 100 MG Oral Capsule; TAKE 1 CAPSULE 3 TIMES DAILY; Therapy: 20ZSR6537 to (Evaluate:50Mjv6189)  Requested for: 74HQZ5131; Last    Rx:60Xyv7993 Ordered   12  Golytely 227 1 GM Oral Solution Reconstituted; TAKE AS DIRECTED  1 gallon golytely    split prep for colonoscopy; Therapy: 21SAH4482 to (Evaluate:97Vum6680)  Requested for: 27Jul2017; Last    Rx:27Jul2017 Ordered   13  HydrOXYzine Pamoate 25 MG Oral Capsule; Therapy: 57QYB9573 to Recorded   14   Lithium Carbonate 300 MG Oral Capsule; 1 tid; Therapy: 99NCP3097 to Recorded   15  Loratadine 10 MG Oral Tablet; Take one tablet daily as needed; Therapy: 50LFY4508 to (Last Rx:02Oct2017)  Requested for: 12CRT6047; Status: ACTIVE    - Renewal Denied Ordered   16  OLANZapine 10 MG Oral Tablet; TAKE 1 TABLET AT BEDTIME in addition to a 2 5mg    tablet; Therapy: 50YWU5999 to Recorded   17  Omeprazole 20 MG Oral Tablet Delayed Release; Take 1 tablet daily; Therapy: 22IHH4086 to (Alma Merl)  Requested for: 83Obq9647; Last    Rx:64Aqe8823 Ordered   18  Polyethylene Glycol 3350 Oral Powder; Use as directed for colonoscopy prep; Therapy: 34OTF5958 to (Last Rx:19Mzg0863)  Requested for: 98JIP7764 Ordered   19  RA Saline Nasal Spray 0 65 % Nasal Solution; USE 2 SPRAYS IN EACH NOSTRIL    TWICE DAILY; Therapy: 14ASK3038 to (Last Rx:24Gju9519)  Requested for: 00ZSE6865 Ordered   20  Rozerem 8 MG Oral Tablet; TAKE 1/2 TO 1 TABLET AT BEDTIME AS NEEDED; Therapy: 48Frb8773 to (Evaluate:19Jan2018)  Requested for: 31Noe8952; Last    Rx:19Lfc3640 Ordered   21  Testosterone 25 MG/2 5GM (1%) Transdermal Gel; Apply 4  packets once a day; Therapy: 09Hwn0725 to (Evaluate:10Uzt7677); Last Rx:31Oct2017 Ordered   22  Testosterone Cypionate 200 MG/ML Intramuscular Solution; inject 0 5ML IM every 2    weeks; Therapy: 85DHR5371 to (Last Rx:72Fxw5627) Ordered   23  Ventolin  (90 Base) MCG/ACT Inhalation Aerosol Solution; inhale 1-2 puffs every    4-6 hours only for wheezing and severe shortness of breath not for daily use; Therapy: 78URQ6285 to (Ketty Montano)  Requested for: 02Oct2017; Last    Rx:02Oct2017 Ordered   24  Ziprasidone HCl - 40 MG Oral Capsule; Therapy: 69WGB6216 to Recorded   25  Ziprasidone HCl - 80 MG Oral Capsule; Therapy: 43VFE4131 to Recorded    Allergies    1  morphine   2  Penicillins    3  No Known Environmental Allergies   4   No Known Food Allergies    Plan  Erectile dysfunction, Hypothalamic hypogonadism    · Testosterone Cypionate 200 MG/ML Intramuscular Solution    Future Appointments    Date/Time Provider Specialty Site   01/04/2018 02:00 PM [place], PCP Nurse Schedule   [address] PCP   01/10/2018 02:00 PM [place], PCP Nurse Schedule   [address] PCP     Signatures   Electronically signed by : Ranjit Green, ; Dec 27 2017  2:38PM EST                       (Author)    Electronically signed by : Sherly Khalil DO; Dec 27 2017  3:01PM EST                       (Review)    Electronically signed by : Rose Mccollum DO; Dec 27 2017  3:17PM EST                       (Review)

## 2018-01-24 ENCOUNTER — TELEPHONE (OUTPATIENT)
Dept: INTERNAL MEDICINE CLINIC | Facility: CLINIC | Age: 48
End: 2018-01-24

## 2018-01-29 ENCOUNTER — APPOINTMENT (OUTPATIENT)
Dept: LAB | Facility: CLINIC | Age: 48
End: 2018-01-29
Payer: COMMERCIAL

## 2018-01-29 DIAGNOSIS — Z51.81 ENCOUNTER FOR THERAPEUTIC DRUG LEVEL MONITORING: ICD-10-CM

## 2018-01-29 LAB
ERYTHROCYTE [DISTWIDTH] IN BLOOD BY AUTOMATED COUNT: 12.9 % (ref 11.6–15.1)
HCT VFR BLD AUTO: 48.8 % (ref 36.5–49.3)
HGB BLD-MCNC: 16.3 G/DL (ref 12–17)
MCH RBC QN AUTO: 31.8 PG (ref 26.8–34.3)
MCHC RBC AUTO-ENTMCNC: 33.4 G/DL (ref 31.4–37.4)
MCV RBC AUTO: 95 FL (ref 82–98)
PLATELET # BLD AUTO: 225 THOUSANDS/UL (ref 149–390)
PMV BLD AUTO: 11.5 FL (ref 8.9–12.7)
RBC # BLD AUTO: 5.12 MILLION/UL (ref 3.88–5.62)
WBC # BLD AUTO: 7.69 THOUSAND/UL (ref 4.31–10.16)

## 2018-01-29 PROCEDURE — 84402 ASSAY OF FREE TESTOSTERONE: CPT

## 2018-01-29 PROCEDURE — 84403 ASSAY OF TOTAL TESTOSTERONE: CPT

## 2018-01-29 PROCEDURE — 36415 COLL VENOUS BLD VENIPUNCTURE: CPT

## 2018-01-29 PROCEDURE — 85027 COMPLETE CBC AUTOMATED: CPT

## 2018-01-29 NOTE — TELEPHONE ENCOUNTER
Pt came into the office today  He still has not heard back from you about the results of his chest xray  He states he has been coughing up blood  Please call him back ASAP  It has been 5 days since the original message has been sent

## 2018-01-30 LAB
TESTOST FREE SERPL-MCNC: 8.5 PG/ML (ref 6.8–21.5)
TESTOST SERPL-MCNC: 282 NG/DL (ref 264–916)

## 2018-01-31 ENCOUNTER — HOSPITAL ENCOUNTER (EMERGENCY)
Facility: HOSPITAL | Age: 48
Discharge: HOME | End: 2018-01-31
Attending: EMERGENCY MEDICINE | Admitting: EMERGENCY MEDICINE
Payer: COMMERCIAL

## 2018-01-31 ENCOUNTER — APPOINTMENT (EMERGENCY)
Dept: RADIOLOGY | Facility: HOSPITAL | Age: 48
End: 2018-01-31
Payer: COMMERCIAL

## 2018-01-31 ENCOUNTER — APPOINTMENT (EMERGENCY)
Dept: CT IMAGING | Facility: HOSPITAL | Age: 48
End: 2018-01-31
Payer: COMMERCIAL

## 2018-01-31 VITALS
TEMPERATURE: 98.1 F | DIASTOLIC BLOOD PRESSURE: 102 MMHG | HEART RATE: 84 BPM | RESPIRATION RATE: 18 BRPM | SYSTOLIC BLOOD PRESSURE: 159 MMHG | WEIGHT: 250 LBS | BODY MASS INDEX: 32.1 KG/M2 | OXYGEN SATURATION: 95 %

## 2018-01-31 DIAGNOSIS — S20.219A CHEST WALL CONTUSION: ICD-10-CM

## 2018-01-31 DIAGNOSIS — S16.1XXA ACUTE STRAIN OF NECK MUSCLE, INITIAL ENCOUNTER: ICD-10-CM
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
## 2018-01-31 DIAGNOSIS — S63.501A SPRAIN OF RIGHT WRIST, INITIAL ENCOUNTER: ICD-10-CM

## 2018-01-31 DIAGNOSIS — S29.012A STRAIN OF MID-BACK, INITIAL ENCOUNTER: ICD-10-CM

## 2018-01-31 PROCEDURE — 73110 X-RAY EXAM OF WRIST: CPT

## 2018-01-31 PROCEDURE — 99284 EMERGENCY DEPT VISIT MOD MDM: CPT

## 2018-01-31 PROCEDURE — 71046 X-RAY EXAM CHEST 2 VIEWS: CPT

## 2018-01-31 PROCEDURE — 70450 CT HEAD/BRAIN W/O DYE: CPT

## 2018-01-31 PROCEDURE — 72125 CT NECK SPINE W/O DYE: CPT

## 2018-01-31 PROCEDURE — 72070 X-RAY EXAM THORAC SPINE 2VWS: CPT

## 2018-01-31 RX ORDER — HYDROCODONE BITARTRATE AND ACETAMINOPHEN 5; 325 MG/1; MG/1
1 TABLET ORAL EVERY 6 HOURS PRN
Qty: 5 TABLET | Refills: 0 | Status: SHIPPED | OUTPATIENT
Start: 2018-01-31 | End: 2018-07-11

## 2018-01-31 RX ORDER — HYDROCODONE BITARTRATE AND ACETAMINOPHEN 5; 325 MG/1; MG/1
1 TABLET ORAL EVERY 6 HOURS PRN
Status: DISCONTINUED | OUTPATIENT
Start: 2018-01-31 | End: 2018-02-01 | Stop reason: HOSPADM

## 2018-01-31 RX ADMIN — HYDROCODONE BITARTRATE AND ACETAMINOPHEN 1 TABLET: 5; 325 TABLET ORAL at 20:12
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
## 2018-02-01 NOTE — ED NOTES
Pt left prior to D/C instructions  Pt understands he may not drive  Spouse at bedside to drive pt home       Jose Guerra RN  01/31/18 1874

## 2018-02-07 ENCOUNTER — TRANSCRIBE ORDERS (OUTPATIENT)
Dept: INTERNAL MEDICINE CLINIC | Facility: CLINIC | Age: 48
End: 2018-02-07

## 2018-02-07 ENCOUNTER — OFFICE VISIT (OUTPATIENT)
Dept: MULTI SPECIALTY CLINIC | Facility: CLINIC | Age: 48
End: 2018-02-07
Payer: COMMERCIAL

## 2018-02-07 VITALS
TEMPERATURE: 97.9 F | HEART RATE: 88 BPM | HEIGHT: 74 IN | BODY MASS INDEX: 32.93 KG/M2 | SYSTOLIC BLOOD PRESSURE: 134 MMHG | WEIGHT: 256.62 LBS | DIASTOLIC BLOOD PRESSURE: 80 MMHG

## 2018-02-07 DIAGNOSIS — E23.0 HYPOTHALAMIC HYPOGONADISM (HCC): ICD-10-CM

## 2018-02-07 DIAGNOSIS — E23.0 PANHYPOPITUITARISM (HCC): Primary | ICD-10-CM

## 2018-02-07 DIAGNOSIS — J34.2 DEVIATED NASAL SEPTUM: ICD-10-CM

## 2018-02-07 DIAGNOSIS — I10 ESSENTIAL HYPERTENSION: ICD-10-CM

## 2018-02-07 DIAGNOSIS — E78.5 HYPERLIPIDEMIA, UNSPECIFIED HYPERLIPIDEMIA TYPE: ICD-10-CM

## 2018-02-07 DIAGNOSIS — K76.0 FATTY METAMORPHOSIS OF LIVER: ICD-10-CM

## 2018-02-07 DIAGNOSIS — E29.1 HYPOGONADISM IN MALE: Primary | ICD-10-CM

## 2018-02-07 PROCEDURE — 99213 OFFICE O/P EST LOW 20 MIN: CPT | Performed by: INTERNAL MEDICINE

## 2018-02-07 RX ORDER — TESTOSTERONE CYPIONATE 200 MG/ML
100 INJECTION INTRAMUSCULAR WEEKLY
Qty: 10 ML | Refills: 1 | Status: SHIPPED | OUTPATIENT
Start: 2018-02-07 | End: 2018-09-05

## 2018-02-07 RX ORDER — TESTOSTERONE CYPIONATE 200 MG/ML
0.5 INJECTION INTRAMUSCULAR WEEKLY
COMMUNITY
Start: 2017-12-18 | End: 2018-02-07 | Stop reason: SDUPTHER

## 2018-02-07 NOTE — PROGRESS NOTES
Follow Up - Scotty Rubin 52 y o  male MRN: [id number] @ Encounter: [id number]      Assessment/Plan     Assessment: This is a 52y o -year-old male with hypogonadism  Plan:  1  Hypogonadism:  Discussed the benefits, risks and side effects of testosterone replacement  I have asked him to increase the frequency of his 0 5 mL testosterone cypionate injections from biweekly to weekly  He will inject on Sundays  Check testosterone free and total, CBC and SH BG in 6 weeks on Wednesday for a midcycle sample  I have also asked him to obtain a sleep study  2   Hypertension:  He is normotensive in the office today  Continue atenolol  3   Hyperlipidemia:  Continue atorvastatin  CC:  Hypogonadism Follow Up    History of Present Illness     HPI: 52 y o  male presents in the office today for follow up of hypogonadism  His most recent free testosterone level from January 29, 2018 was  8 5 with a total testosterone of 282  He was previously treated with Androgel by his primary care provider prior to being switched to injections  Currently, injecting 0 5ml of Testosterone cypionate on Sundays bi-weekly  He reports resolution of his symptoms and feeling better after his injection but symptoms resume after 2 weeks without an injection  He denies dysuria, claudication, hypersexuality or anger issues  His most recent hemoglobin and hematocrit from January 29, 2018 were normal     For his hypertension, he is taking atenolol 25 mg daily  His hyperlipidemia is treated with atorvastatin 10 mg daily  Consults    Review of Systems   Constitutional: Positive for appetite change and fatigue (Generalized)  HENT: Negative  Respiratory: Negative for cough and shortness of breath  Cardiovascular: Negative for chest pain and palpitations  Gastrointestinal: Positive for abdominal distention (acities) and nausea (at times)  Negative for abdominal pain, constipation, diarrhea and vomiting     Endocrine: Positive for polyuria (at times)  Negative for cold intolerance, heat intolerance, polydipsia and polyphagia  Genitourinary: Negative  Difficulty initiating and maintaining erections  Musculoskeletal: Negative  Skin: Positive for rash (dry skin)  Allergic/Immunologic: Negative  Neurological: Negative for syncope, light-headedness and headaches  Hematological: Negative  Psychiatric/Behavioral: Negative  Low libido   All other systems reviewed and are negative  Historical Information   Past Medical History:   Diagnosis Date    Angina pectoris ([place] [address] )     stable    Ascites     told he had a "wave stomach"    Bipolar 1 disorder ([place] [address] )     CHF (congestive heart failure) (HCC)     Coronary artery disease     Depression     Family history of colon cancer requiring screening colonoscopy     brother    Fatty liver     History of colon polyps     Hyperlipidemia     Hypertension     Hypothalamic hypogonadism ([place] [address] )     Liver disease     fatty liver    MI (myocardial infarction)     2012    Osteomyelitis [facility])      Past Surgical History:   Procedure Laterality Date    COLONOSCOPY      FRACTURE SURGERY      jaw    CT COLONOSCOPY FLX DX W/COLLJ SPEC WHEN PFRMD N/A 7/27/2017    Procedure: COLONOSCOPY;  Surgeon: Lilia Barber MD;  [facility]; Service: Gastroenterology    CT COLONOSCOPY FLX DX W/[facility] INPATIENT REHABILITATION WHEN PFRMD N/A 8/29/2017    Procedure: COLONOSCOPY;  Surgeon: Lilia Barber MD;  [facility]; Service: Gastroenterology    CT COLONOSCOPY FLX DX W/[facility] INPATIENT REHABILITATION WHEN PFRMD N/A 12/1/2017    Procedure: COLONOSCOPY;  Surgeon: Lilia Barber MD;  [facility];   Service: Gastroenterology     Social History   History   Alcohol Use No     History   Drug Use No     Comment: stopped in 2007     History   Smoking Status    Former Smoker    Packs/day: 0 50    Types: Cigarettes   Smokeless Tobacco    Never Used     Comment: quit a few days ago      Family History:   Family History   Problem Relation Age of Onset    Breast cancer Mother     Thyroid nodules Mother     Colon cancer Father      around age 45    Heart failure Father      congestive    No Known Problems Sister     Colon cancer Brother      around age 45    No Known Problems Maternal Grandmother     No Known Problems Maternal Grandfather     No Known Problems Paternal Grandmother     No Known Problems Paternal Grandfather        Meds/Allergies   Current Outpatient Prescriptions   Medication Sig Dispense Refill    aspirin (ECOTRIN LOW STRENGTH) 81 mg EC tablet Take 81 mg by mouth daily      atenolol (TENORMIN) 25 mg tablet Take 25 mg by mouth daily      atorvastatin (LIPITOR) 10 mg tablet Take 10 mg by mouth daily at bedtime      bisacodyl (DULCOLAX) 5 mg EC tablet Take 5 mg by mouth daily      citalopram (CeleXA) 20 mg tablet Take 1 tablet by mouth daily for 30 days Q am 30 tablet 1    citalopram (CeleXA) 20 mg tablet Take 80 mg by mouth daily at bedtime        EPINEPHrine (EPIPEN) 0 3 mg/0 3 mL SOAJ Inject 0 3 mL into the shoulder, thigh, or buttocks once for 1 dose For severe allergic reaction 1 each 0    famotidine (PEPCID) 20 mg tablet Take 1 tablet by mouth 2 (two) times a day 30 tablet 0    furosemide (LASIX) 40 mg tablet Take 40 mg by mouth daily      gabapentin (NEURONTIN) 100 mg capsule Take 400 mg by mouth daily        HYDROcodone-acetaminophen (NORCO) 5-325 mg per tablet Take 1 tablet by mouth every 6 (six) hours as needed for pain for up to 5 doses Max Daily Amount: 4 tablets 5 tablet 0    hydrOXYzine HCL (ATARAX) 25 mg tablet Take 1 tablet by mouth every 6 (six) hours 12 tablet 0    lithium carbonate 300 mg capsule 1 tab PO BID (Patient taking differently: Take 600 mg by mouth daily 1 tab PO BID ) 60 capsule 1    Misc Natural Products (NF FORMULAS TESTOSTERONE PO) Take by mouth      OLANZapine (ZyPREXA) 10 mg tablet Take 12 5 mg by mouth daily at bedtime      Omeprazole 20 MG TBEC Take 20 mg by mouth daily      sertraline (ZOLOFT) 100 mg tablet Take 1 tablet by mouth daily for 30 days 30 tablet 1    sertraline (ZOLOFT) 100 mg tablet Take 100 mg by mouth daily      tadalafil (CIALIS) 5 MG tablet Take 5 mg by mouth daily as needed       No current facility-administered medications for this visit  Allergies   Allergen Reactions    Clarithromycin     Morphine     Penicillins        Objective   Vitals: Blood pressure 134/80, pulse 88, temperature 97 9 °F (36 6 °C), temperature source Oral, height 6' 2" (1 88 m), weight 116 kg (256 lb 9 9 oz)  Physical Exam   Constitutional: He is oriented to person, place, and time  He appears well-developed and well-nourished  HENT:   Head: Normocephalic and atraumatic  Nose: Nose normal    Mouth/Throat: Oropharynx is clear and moist    Eyes: Conjunctivae and EOM are normal  Pupils are equal, round, and reactive to light  Neck: Normal range of motion  Neck supple  Cardiovascular: Normal rate, regular rhythm, normal heart sounds and intact distal pulses  Pulmonary/Chest: Effort normal and breath sounds normal    Abdominal: Soft  Bowel sounds are normal  He exhibits distension (acities)  Musculoskeletal: Normal range of motion  Neurological: He is alert and oriented to person, place, and time  Skin: Skin is warm and dry  Dry skin   Psychiatric: He has a normal mood and affect  His behavior is normal  Judgment and thought content normal    Vitals reviewed      Lab Results:       Lab Results   Component Value Date    WBC 7 69 01/29/2018    HGB 16 3 01/29/2018    HCT 48 8 01/29/2018    MCV 95 01/29/2018     01/29/2018     Lab Results   Component Value Date/Time    BUN 12 08/31/2017 08:12 AM     08/31/2017 08:12 AM    K 3 9 08/31/2017 08:12 AM     08/31/2017 08:12 AM    CO2 27 08/31/2017 08:12 AM    CREATININE 1 01 08/31/2017 08:12 AM    AST 18 08/31/2017 08:12 AM    ALT 20 08/31/2017 08:12 AM    ALB 3 9 08/31/2017 08:12 AM No results for input(s): CHOL, HDL, LDL, TRIG, VLDL in the last 72 hours  No results found for: Svitlana Blinks  No results found for: POCGLU        Portions of the record may have been created with voice recognition software

## 2018-02-07 NOTE — PATIENT INSTRUCTIONS
Start Testosterone Cypionate 0 5ml injected IM weekly on Sundays  Please completed lab work in 6 weeks midcycle (wednesday)  Obtain sleep study as directed

## 2018-02-07 NOTE — PROGRESS NOTES
I have reviewed the notes, assessments, and/or procedures performed by NP, I concur with her/his documentation of Modesto Lott

## 2018-02-16 DIAGNOSIS — J30.89 CHRONIC NON-SEASONAL ALLERGIC RHINITIS, UNSPECIFIED TRIGGER: Primary | ICD-10-CM

## 2018-02-17 RX ORDER — FLUTICASONE PROPIONATE 50 MCG
SPRAY, SUSPENSION (ML) NASAL
Qty: 1 BOTTLE | Refills: 2 | Status: SHIPPED | OUTPATIENT
Start: 2018-02-17 | End: 2018-05-23 | Stop reason: SDUPTHER

## 2018-02-25 DIAGNOSIS — E78.5 DYSLIPIDEMIA: Primary | ICD-10-CM

## 2018-02-26 RX ORDER — ATORVASTATIN CALCIUM 10 MG/1
TABLET, FILM COATED ORAL
Qty: 90 TABLET | Refills: 1 | Status: SHIPPED | OUTPATIENT
Start: 2018-02-26 | End: 2018-07-11

## 2018-03-03 DIAGNOSIS — I10 ESSENTIAL HYPERTENSION: Primary | ICD-10-CM

## 2018-03-03 DIAGNOSIS — E78.5 HYPERLIPIDEMIA, UNSPECIFIED HYPERLIPIDEMIA TYPE: ICD-10-CM

## 2018-03-04 RX ORDER — ASPIRIN 81 MG/1
TABLET ORAL
Qty: 90 TABLET | Refills: 1 | Status: SHIPPED | OUTPATIENT
Start: 2018-03-04 | End: 2018-08-30 | Stop reason: SDUPTHER

## 2018-03-12 ENCOUNTER — TELEPHONE (OUTPATIENT)
Dept: INTERNAL MEDICINE CLINIC | Facility: CLINIC | Age: 48
End: 2018-03-12

## 2018-03-12 NOTE — TELEPHONE ENCOUNTER
Form Physical Residual Functional Capacity Questionnaire place in [facility] [employer] to be completed ,when form is complete place the form in [id number] [employer] [employer] to be fax and scan  Thank you

## 2018-03-13 NOTE — TELEPHONE ENCOUNTER
The form has very specific questions that I am unable to complete without a face to face with the patient  Please schedule an appointment for the patient to have this paperwork filled out

## 2018-03-16 RX ORDER — FUROSEMIDE 40 MG/1
TABLET ORAL
Qty: 30 TABLET | Refills: 1 | OUTPATIENT
Start: 2018-03-16

## 2018-03-16 NOTE — TELEPHONE ENCOUNTER
Please ask pt if he has any leg swelling  If not, we should probably stop this   Thanks    Dr John Rivas

## 2018-03-16 NOTE — TELEPHONE ENCOUNTER
Spoke with patient and made him aware , also advised him if he does begin to have any swelling he can contact us

## 2018-03-16 NOTE — TELEPHONE ENCOUNTER
If he is having leg edema rarely, then we can go ahead and stop the furosemide at this time  We never actually showed that he ever had ascites (fluid in his abdomen)  Thanks      Dr Leticia Chan

## 2018-03-16 NOTE — TELEPHONE ENCOUNTER
Patient returned phone call and informed me that he has leg swelling "sometimes" but rarely does it occur     What would you suggest ?

## 2018-04-02 NOTE — PROGRESS NOTES
RCV'D OVERDUE RESULT REMINDER FOR LABS ORDERED IN FEB  BY JONAS MARTIN FOR Pt  TO HAVE LABS DONE BEFORE NEXT OFFICE VISIT

## 2018-04-17 ENCOUNTER — OFFICE VISIT (OUTPATIENT)
Dept: SLEEP CENTER | Facility: CLINIC | Age: 48
End: 2018-04-17
Payer: COMMERCIAL

## 2018-04-17 VITALS
SYSTOLIC BLOOD PRESSURE: 122 MMHG | BODY MASS INDEX: 29.83 KG/M2 | WEIGHT: 245 LBS | HEIGHT: 76 IN | DIASTOLIC BLOOD PRESSURE: 84 MMHG | HEART RATE: 80 BPM

## 2018-04-17 DIAGNOSIS — G47.33 OSA (OBSTRUCTIVE SLEEP APNEA): Primary | ICD-10-CM

## 2018-04-17 DIAGNOSIS — E29.1 HYPOGONADISM IN MALE: ICD-10-CM

## 2018-04-17 DIAGNOSIS — F51.01 PRIMARY INSOMNIA: ICD-10-CM

## 2018-04-17 DIAGNOSIS — G25.81 RESTLESS LEG SYNDROME: ICD-10-CM

## 2018-04-17 PROCEDURE — 99244 OFF/OP CNSLTJ NEW/EST MOD 40: CPT | Performed by: INTERNAL MEDICINE

## 2018-04-17 RX ORDER — ZOLPIDEM TARTRATE 10 MG/1
10 TABLET ORAL
Qty: 30 TABLET | Refills: 0 | Status: SHIPPED | OUTPATIENT
Start: 2018-04-17 | End: 2018-07-11

## 2018-04-17 NOTE — PROGRESS NOTES
Consultation - [address] [place] [address] : 1970  MRN: [id number]      Assessment:  The patient has sleep initiation and sleep maintenance insomnia which may be multifactorial   He also has significant daytime sleepiness  There exists a possibility that he has obstructive sleep apnea or periodic limb movement syndrome, which are contributing to his symptoms  Plan:  Diagnostic polysomnography  Follow up: After testing    History of Present Illness:   52 y o male with a history of psychiatric illness, including vivid hallucinations, who complains of excessive daytime sleepiness  He denies symptoms consistent with cataplexy  He reports daytime sleepiness and poor sleep quality since childhood  He has loud snoring and witnessed apneas  He is currently taking Atarax for insomnia  His sleep schedule is irregular  He reports restlessness, including symptoms consistent with restless legs  He reports frequent nocturnal urination  He reports occasional lightheadedness and palpitations  He reports gastroesophageal reflux symptoms  He complains of headaches, poor concentration and forgetfulness  He has been treated for bipolar disorder with possible schizophrenia  The patient has history of prior alcohol consumption, but denies illicit drugs  Patient awakens with a choking sensation and feels short of breath during the night  He has occasional chest tightness and wheezing  He complains of chronic muscle tenderness an aching  He has chronic back pain      Review of Systems:      Genitourinary frequent urination and frequent urination at night   Cardiology palpitations/fluttering feeling in your chest and lightheadedness   Gastrointestinal heartburn/acid reflux   Neurology headaches, muscle weakness, forgetfulness and decreased concentration   Constitutional weight change of 10 or more pounds in the past year gain of 20 pounds   Integumentary none   Psychiatry anxiety, depression, irritability, aggressiveness and personality change   Musculoskeletal muscle tenderness/aching and back pain   Pulmonary awakening with choking sensation, shortness of breath, chest tightness and wheezing   ENT nasal or sinus congestion, throat clearing, ringing in ears and trouble swallowing   Endocrine excessive thirst   Hematological none           Historical Information    Past Medical History:  Hypogonadism, depression/bipolar disorder, insomnia    Past Surgical History:  None reported    Social History  History   Alcohol use: Not on file     History   Drug Use Not on file     History   Smoking Status    Not on file   Smokeless Tobacco    Not on file     Family History: non-contributory     Sleep History: See above     Sleep Schedule: The patient has varied hours of sleep  He is unemployed and his day is unstructured  Snoring/Apnea:  Yes to both    Medications/Allergies:    Current Outpatient Prescriptions:     albuterol (VENTOLIN HFA) 90 mcg/act inhaler, Inhale 1-2 puffs, Disp: , Rfl:     Alcohol Swabs ([employer] ALCOHOL PREP PADS) 70 % PADS, Use as directed, Disp: , Rfl: 0    Alcohol Swabs (SURE COMFORT ALCOHOL PREP) 70 % PADS, by Does not apply route, Disp: , Rfl:     aspirin (ECOTRIN LOW STRENGTH) 81 mg EC tablet, TAKE 1 TABLET DAILY  , Disp: 90 tablet, Rfl: 1    aspirin (ECOTRIN LOW STRENGTH) 81 mg EC tablet, Take 1 tablet by mouth daily, Disp: , Rfl:     atenolol (TENORMIN) 25 mg tablet, Take 25 mg by mouth daily, Disp: , Rfl:     atorvastatin (LIPITOR) 10 mg tablet, TAKE 1 TABLET DAILY AS DIRECTED , Disp: 90 tablet, Rfl: 1    B-D 3CC LUER-FARTUN SYR 33KX2-5/2 18G X 1-1/2" 3 ML MISC, Use as directed, Disp: , Rfl: 0    benzonatate (TESSALON PERLES) 100 mg capsule, Take 1 capsule by mouth 3 (three) times a day, Disp: , Rfl:     benztropine (COGENTIN) 0 5 mg tablet, Take by mouth, Disp: , Rfl:     bisacodyl (DULCOLAX) 5 mg EC tablet, Take 5 mg by mouth daily, Disp: , Rfl:     Capsaicin 0 1 % CREA, Apply topically, Disp: , Rfl:     citalopram (CeleXA) 20 mg tablet, Take 80 mg by mouth daily at bedtime  , Disp: , Rfl:     [employer]S SALINE NOSE SPRAY 0 65 % nasal spray, 2 sprays 2 (two) times a day, Disp: , Rfl: 5    DM-Phenylephrine-Acetaminophen 10-5-325 MG CAPS, Take 1 capsule by mouth 2 (two) times a day, Disp: , Rfl:     famotidine (PEPCID) 20 mg tablet, Take 1 tablet by mouth 2 (two) times a day, Disp: 30 tablet, Rfl: 0    fluticasone (FLONASE) 50 mcg/act nasal spray, USE 1 SPRAY IN EACH NOSTRIL TWICE DAILY  , Disp: 1 Bottle, Rfl: 2    gabapentin (NEURONTIN) 100 mg capsule, Take 400 mg by mouth daily  , Disp: , Rfl:     gabapentin (NEURONTIN) 300 mg capsule, Take 300 mg by mouth 2 (two) times a day, Disp: , Rfl: 0    HYDROcodone-acetaminophen (NORCO) 5-325 mg per tablet, Take 1 tablet by mouth every 6 (six) hours as needed for pain for up to 5 doses Max Daily Amount: 4 tablets, Disp: 5 tablet, Rfl: 0    hydrOXYzine HCL (ATARAX) 25 mg tablet, Take 1 tablet by mouth every 6 (six) hours, Disp: 12 tablet, Rfl: 0    hydrOXYzine pamoate (VISTARIL) 25 mg capsule, Take by mouth, Disp: , Rfl:     hydrOXYzine pamoate (VISTARIL) 50 mg capsule, Take by mouth, Disp: , Rfl:     lithium carbonate 300 mg capsule, 1 tab PO BID (Patient taking differently: Take 600 mg by mouth daily 1 tab PO BID ), Disp: 60 capsule, Rfl: 1    loratadine (CLARITIN) 10 mg tablet, Take 1 tablet by mouth daily as needed, Disp: , Rfl:     Misc Natural Products (NF FORMULAS TESTOSTERONE PO), Take by mouth, Disp: , Rfl:     NEEDLE, DISP, 18 G (BD DISP NEEDLES) 18G X 1-1/2" MISC, by Does not apply route, Disp: , Rfl:     nicotine (NICODERM CQ) 14 mg/24hr TD 24 hr patch, Place 1 patch on the skin daily, Disp: , Rfl:     nicotine (NICODERM CQ) 21 mg/24 hr TD 24 hr patch, Place 1 patch on the skin daily, Disp: , Rfl:     nicotine (NICODERM CQ) 7 mg/24hr TD 24 hr patch, Place 1 patch on the skin daily, Disp: , Rfl:     nicotine polacrilex (NICORETTE) 2 mg gum, Chew, Disp: , Rfl:     OLANZapine (ZyPREXA) 10 mg tablet, Take 12 5 mg by mouth daily at bedtime, Disp: , Rfl:     omeprazole (PriLOSEC) 20 mg delayed release capsule, Take 20 mg by mouth daily, Disp: , Rfl: 1    Omeprazole 20 MG TBEC, Take 20 mg by mouth daily, Disp: , Rfl:     PEG 3350-KCl-NaBcb-NaCl-NaSulf (GOLYTELY) 227 1 g PACK, Take by mouth, Disp: , Rfl:     polyethylene glycol (GLYCOLAX) powder, Take by mouth, Disp: , Rfl:     ramelteon (ROZEREM) 8 mg tablet, Take 0 5-1 tablets by mouth, Disp: , Rfl:     sertraline (ZOLOFT) 100 mg tablet, Take 100 mg by mouth daily, Disp: , Rfl:     sodium chloride (RA SALINE NASAL SPRAY) 0 65 % nasal spray, 2 sprays into each nostril 2 (two) times a day, Disp: , Rfl:     sulfamethoxazole-trimethoprim (BACTRIM DS) 800-160 mg per tablet, Take 1 tablet by mouth 2 (two) times a day, Disp: , Rfl:     Syringe, Disposable, (2-3CC SYRINGE) 3 ML MISC, by Does not apply route, Disp: , Rfl:     tadalafil (CIALIS) 5 MG tablet, Take 5 mg by mouth daily as needed, Disp: , Rfl:     testosterone (ANDROGEL) 25 MG/2 5GM (1%) GEL, Place on the skin, Disp: , Rfl:     testosterone cypionate (DEPO-TESTOSTERONE) 200 mg/mL SOLN, Inject 0 5 mL (100 mg total) into the shoulder, thigh, or buttocks once a week, Disp: 10 mL, Rfl: 1    ziprasidone (GEODON) 40 mg capsule, TAKE 1 CAPSULE AT 3 PM WITH FOOD, Disp: , Rfl: 0    ziprasidone (GEODON) 80 mg capsule, 80 mg 2 (two) times a day With food, Disp: , Rfl: 0    citalopram (CeleXA) 20 mg tablet, Take 1 tablet by mouth daily for 30 days Q am, Disp: 30 tablet, Rfl: 1    EPINEPHrine (EPIPEN) 0 3 mg/0 3 mL SOAJ, Inject 0 3 mL into the shoulder, thigh, or buttocks once for 1 dose For severe allergic reaction, Disp: 1 each, Rfl: 0    sertraline (ZOLOFT) 100 mg tablet, Take 1 tablet by mouth daily for 30 days, Disp: 30 tablet, Rfl: 1    zolpidem (AMBIEN) 10 mg tablet, Take 1 tablet (10 mg total) by mouth daily at bedtime as needed for sleep, Disp: 30 tablet, Rfl: 0        No notes on file                  Objective:    Vital Signs:   Vitals:    04/17/18 1000   BP: 122/84   Pulse: 80   Weight: 111 kg (245 lb)   Height: 6' 4" (1 93 m)     Neck Circumference: 40      [place] Sleepiness Scale: Total score: 16    Physical Exam:    General: Alert, appropriate, cooperative, normal build    Head: NC/AT, mild retrognathia    Nose: No septal deviation, nares partially obstructed, mucosa normal    Throat: Airway lumen narrowed, tongue base thickened, no tonsils visualized    Neck: Normal, no thyromegaly or lymphadenopathy, no JVD    Heart: RR, normal S1 and S2, no murmurs    Chest: Clear bilaterally    Extremity: No clubbing, cyanosis, no edema    Skin: Warm, dry    Neuro: No motor abnormalities, cranial nerves appear intact      Counseling / Coordination of Care  Total clinic time spent today 35 minutes  Greater than 50% of total time was spent with the patient and / or family counseling and / or coordination of care  A description of the counseling / coordination of care: We discussed the pathophysiology of obstructive sleep apnea as well as the possible treatment options  We also discussed the rationale for positive airway pressure therapy  MOSHE Medina    Board Certified Sleep Specialist

## 2018-05-11 ENCOUNTER — HOSPITAL ENCOUNTER (EMERGENCY)
Facility: HOSPITAL | Age: 48
Discharge: HOME | End: 2018-05-11
Attending: EMERGENCY MEDICINE
Payer: COMMERCIAL

## 2018-05-11 VITALS
HEART RATE: 102 BPM | DIASTOLIC BLOOD PRESSURE: 67 MMHG | SYSTOLIC BLOOD PRESSURE: 138 MMHG | TEMPERATURE: 98.2 F | RESPIRATION RATE: 18 BRPM | OXYGEN SATURATION: 97 %

## 2018-05-11 DIAGNOSIS — L02.91 ABSCESS: Primary | ICD-10-CM

## 2018-05-11 PROCEDURE — 99282 EMERGENCY DEPT VISIT SF MDM: CPT

## 2018-05-11 RX ORDER — CLINDAMYCIN HYDROCHLORIDE 150 MG/1
300 CAPSULE ORAL EVERY 6 HOURS SCHEDULED
Qty: 56 CAPSULE | Refills: 0 | Status: SHIPPED | OUTPATIENT
Start: 2018-05-11 | End: 2018-05-18

## 2018-05-11 RX ORDER — CLINDAMYCIN HYDROCHLORIDE 150 MG/1
300 CAPSULE ORAL ONCE
Status: COMPLETED | OUTPATIENT
Start: 2018-05-11 | End: 2018-05-11

## 2018-05-11 RX ADMIN — CLINDAMYCIN HYDROCHLORIDE 300 MG: 150 CAPSULE ORAL at 18:16

## 2018-05-11 NOTE — ED PROVIDER NOTES
History  Chief Complaint   Patient presents with    Abscess     Pt presents to the ED with abscess formation x 3 days over right upper cheek  27-year-old male comes in for abscess over his right cheek bone  Patient states approximately 2-3 days ago for he had a pimple that he popped  Since then it has gotten enlarged heart and painful  States that he took a sewing needle and poked it and got pus drainage from it however he now thinks the inflammation is worse so came in for evaluation  States that tetanus is up-to-date  History provided by:  Patient   used: No    Abscess   Location:  Face  Facial abscess location:  R cheek  Size:  3  Abscess quality: draining, painful, redness and warmth    Red streaking: no    Duration:  3 days  Progression:  Worsening  Pain details:     Quality:  Pressure and throbbing    Severity:  Moderate    Duration:  3 days    Timing:  Constant    Progression:  Worsening  Chronicity:  New  Context: skin injury    Ineffective treatments:  Draining/squeezing, warm compresses and cold compresses  Associated symptoms: no anorexia, no fever and no headaches    Risk factors: no prior abscess        Prior to Admission Medications   Prescriptions Last Dose Informant Patient Reported? Taking?    Alcohol Swabs (CVS ALCOHOL PREP PADS) 70 % PADS   Yes No   Sig: Use as directed   Alcohol Swabs (SURE COMFORT ALCOHOL PREP) 70 % PADS   Yes No   Sig: by Does not apply route   B-D 3CC LUER-FARTUN SYR 42LC7-4/2 18G X 1-1/2" 3 ML MISC   Yes No   Sig: Use as directed   CVS SALINE NOSE SPRAY 0 65 % nasal spray   Yes No   Si sprays 2 (two) times a day   Capsaicin 0 1 % CREA   Yes No   Sig: Apply topically   DM-Phenylephrine-Acetaminophen 10-5-325 MG CAPS   Yes No   Sig: Take 1 capsule by mouth 2 (two) times a day   EPINEPHrine (EPIPEN) 0 3 mg/0 3 mL SOAJ   No No   Sig: Inject 0 3 mL into the shoulder, thigh, or buttocks once for 1 dose For severe allergic reaction HYDROcodone-acetaminophen (NORCO) 5-325 mg per tablet   No No   Sig: Take 1 tablet by mouth every 6 (six) hours as needed for pain for up to 5 doses Max Daily Amount: 4 tablets   Misc Natural Products (NF FORMULAS TESTOSTERONE PO)   Yes No   Sig: Take by mouth   NEEDLE, DISP, 18 G (BD DISP NEEDLES) 18G X 1-1/2" MISC   Yes No   Sig: by Does not apply route   OLANZapine (ZyPREXA) 10 mg tablet   Yes No   Sig: Take 12 5 mg by mouth daily at bedtime   Omeprazole 20 MG TBEC   Yes No   Sig: Take 20 mg by mouth daily   PEG 3350-KCl-NaBcb-NaCl-NaSulf (GOLYTELY) 227 1 g PACK   Yes No   Sig: Take by mouth   Syringe, Disposable, (2-3CC SYRINGE) 3 ML MISC   Yes No   Sig: by Does not apply route   albuterol (VENTOLIN HFA) 90 mcg/act inhaler   Yes No   Sig: Inhale 1-2 puffs   aspirin (ECOTRIN LOW STRENGTH) 81 mg EC tablet   No No   Sig: TAKE 1 TABLET DAILY  aspirin (ECOTRIN LOW STRENGTH) 81 mg EC tablet   Yes No   Sig: Take 1 tablet by mouth daily   atenolol (TENORMIN) 25 mg tablet   Yes No   Sig: Take 25 mg by mouth daily   atorvastatin (LIPITOR) 10 mg tablet   No No   Sig: TAKE 1 TABLET DAILY AS DIRECTED  benzonatate (TESSALON PERLES) 100 mg capsule   Yes No   Sig: Take 1 capsule by mouth 3 (three) times a day   benztropine (COGENTIN) 0 5 mg tablet   Yes No   Sig: Take by mouth   bisacodyl (DULCOLAX) 5 mg EC tablet   Yes No   Sig: Take 5 mg by mouth daily   citalopram (CeleXA) 20 mg tablet   No No   Sig: Take 1 tablet by mouth daily for 30 days Q am   citalopram (CeleXA) 20 mg tablet   Yes No   Sig: Take 80 mg by mouth daily at bedtime     famotidine (PEPCID) 20 mg tablet   No No   Sig: Take 1 tablet by mouth 2 (two) times a day   fluticasone (FLONASE) 50 mcg/act nasal spray   No No   Sig: USE 1 SPRAY IN EACH NOSTRIL TWICE DAILY     gabapentin (NEURONTIN) 100 mg capsule   Yes No   Sig: Take 400 mg by mouth daily     gabapentin (NEURONTIN) 300 mg capsule   Yes No   Sig: Take 300 mg by mouth 2 (two) times a day   hydrOXYzine HCL (ATARAX) 25 mg tablet   No No   Sig: Take 1 tablet by mouth every 6 (six) hours   hydrOXYzine pamoate (VISTARIL) 25 mg capsule   Yes No   Sig: Take by mouth   hydrOXYzine pamoate (VISTARIL) 50 mg capsule   Yes No   Sig: Take by mouth   lithium carbonate 300 mg capsule   No No   Si tab PO BID   Patient taking differently: Take 600 mg by mouth daily 1 tab PO BID    loratadine (CLARITIN) 10 mg tablet   Yes No   Sig: Take 1 tablet by mouth daily as needed   nicotine (NICODERM CQ) 14 mg/24hr TD 24 hr patch   Yes No   Sig: Place 1 patch on the skin daily   nicotine (NICODERM CQ) 21 mg/24 hr TD 24 hr patch   Yes No   Sig: Place 1 patch on the skin daily   nicotine (NICODERM CQ) 7 mg/24hr TD 24 hr patch   Yes No   Sig: Place 1 patch on the skin daily   nicotine polacrilex (NICORETTE) 2 mg gum   Yes No   Sig: Chew   omeprazole (PriLOSEC) 20 mg delayed release capsule   Yes No   Sig: Take 20 mg by mouth daily   polyethylene glycol (GLYCOLAX) powder   Yes No   Sig: Take by mouth   ramelteon (ROZEREM) 8 mg tablet   Yes No   Sig: Take 0 5-1 tablets by mouth   sertraline (ZOLOFT) 100 mg tablet   No No   Sig: Take 1 tablet by mouth daily for 30 days   sertraline (ZOLOFT) 100 mg tablet   Yes No   Sig: Take 100 mg by mouth daily   sodium chloride (RA SALINE NASAL SPRAY) 0 65 % nasal spray   Yes No   Si sprays into each nostril 2 (two) times a day   sulfamethoxazole-trimethoprim (BACTRIM DS) 800-160 mg per tablet   Yes No   Sig: Take 1 tablet by mouth 2 (two) times a day   tadalafil (CIALIS) 5 MG tablet   Yes No   Sig: Take 5 mg by mouth daily as needed   testosterone (ANDROGEL) 25 MG/2 5GM (1%) GEL   Yes No   Sig: Place on the skin   testosterone cypionate (DEPO-TESTOSTERONE) 200 mg/mL SOLN   No No   Sig: Inject 0 5 mL (100 mg total) into the shoulder, thigh, or buttocks once a week   ziprasidone (GEODON) 40 mg capsule   Yes No   Sig: TAKE 1 CAPSULE AT 3 PM WITH FOOD   ziprasidone (GEODON) 80 mg capsule   Yes No   Sig: 80 mg 2 (two) times a day With food   zolpidem (AMBIEN) 10 mg tablet   No No   Sig: Take 1 tablet (10 mg total) by mouth daily at bedtime as needed for sleep      Facility-Administered Medications: None       Past Medical History:   Diagnosis Date    Angina pectoris (HCC)     stable    Ascites     told he had a "wave stomach"    Bipolar 1 disorder ([place] [address] )     CHF (congestive heart failure) ([place] [address] )     Coronary artery disease     Depression     Family history of colon cancer requiring screening colonoscopy     brother    Fatty liver     History of colon polyps     Hyperlipidemia     Hypertension     Hypothalamic hypogonadism ([place] [address] )     Liver disease     fatty liver    MI (myocardial infarction) ([place] [address] )     2012    Osteomyelitis ([place] [address] )        Past Surgical History:   Procedure Laterality Date    COLONOSCOPY      FRACTURE SURGERY      jaw    [place] COLONOSCOPY FLX DX W/COLLJ SPEC WHEN PFRMD N/A 7/27/2017    Procedure: COLONOSCOPY;  Surgeon: Amy Hodgson MD;  Location: [facility]; Service: Gastroenterology    [place] COLONOSCOPY FLX DX W/[facility] REHABILITATION WHEN PFRMD N/A 8/29/2017    Procedure: COLONOSCOPY;  Surgeon: Amy Hodgson MD;  [facility]; Service: Gastroenterology    [place] COLONOSCOPY FLX DX W/[facility] INPATIENT REHABILITATION WHEN PFRMD N/A 12/1/2017    Procedure: COLONOSCOPY;  Surgeon: Amy Hodgson MD;  [facility]; Service: Gastroenterology       Family History   Problem Relation Age of Onset    Breast cancer Mother     Thyroid nodules Mother     Colon cancer Father      around age 45    Heart failure Father      congestive    No Known Problems Sister     Colon cancer Brother      around age 45    No Known Problems Maternal Grandmother     No Known Problems Maternal Grandfather     No Known Problems Paternal Grandmother     No Known Problems Paternal Grandfather      I have reviewed and agree with the history as documented      Social History   Substance Use Topics    Smoking status: Current Every Day Smoker Packs/day: 0 50     Types: Cigarettes    Smokeless tobacco: Never Used      Comment: quit a few days ago     Alcohol use Yes      Comment: rare        Review of Systems   Constitutional: Negative for activity change, appetite change and fever  HENT: Negative for congestion and facial swelling  Eyes: Negative for discharge and redness  Respiratory: Negative for cough and wheezing  Cardiovascular: Negative for chest pain and leg swelling  Gastrointestinal: Negative for abdominal distention, abdominal pain, anorexia and blood in stool  Endocrine: Negative for cold intolerance and polydipsia  Genitourinary: Negative for difficulty urinating and hematuria  Musculoskeletal: Negative for arthralgias and gait problem  Skin: Negative for color change and rash  Allergic/Immunologic: Negative for food allergies and immunocompromised state  Neurological: Negative for dizziness, seizures and headaches  Hematological: Negative for adenopathy  Does not bruise/bleed easily  Psychiatric/Behavioral: Negative for agitation, confusion and decreased concentration  All other systems reviewed and are negative  Physical Exam  ED Triage Vitals [05/11/18 1646]   Temperature Pulse Respirations Blood Pressure SpO2   98 2 °F (36 8 °C) 102 18 138/67 97 %      Temp Source Heart Rate Source Patient Position - Orthostatic VS BP Location FiO2 (%)   Oral Monitor Sitting Right arm --      Pain Score       6           Orthostatic Vital Signs  Vitals:    05/11/18 1646   BP: 138/67   Pulse: 102   Patient Position - Orthostatic VS: Sitting       Physical Exam   Constitutional: He is oriented to person, place, and time  He appears well-developed and well-nourished  Non-toxic appearance  HENT:   Head: Normocephalic and atraumatic         Right Ear: Tympanic membrane normal    Left Ear: Tympanic membrane normal    Nose: Nose normal    Mouth/Throat: Oropharynx is clear and moist    Eyes: Conjunctivae, EOM and lids are normal  Pupils are equal, round, and reactive to light  Neck: Trachea normal and normal range of motion  Neck supple  No JVD present  Carotid bruit is not present  Cardiovascular: Normal rate, regular rhythm, normal heart sounds and intact distal pulses  No extrasystoles are present  Pulmonary/Chest: Effort normal  He has no decreased breath sounds  He has no wheezes  He has no rhonchi  He has no rales  He exhibits no tenderness and no deformity  Abdominal: Soft  Bowel sounds are normal  There is no tenderness  There is no rebound, no guarding and no CVA tenderness  Musculoskeletal:        Right shoulder: He exhibits normal range of motion, no tenderness, no swelling and no deformity  Cervical back: Normal  He exhibits normal range of motion, no tenderness, no bony tenderness and no deformity  Lymphadenopathy:     He has no cervical adenopathy  He has no axillary adenopathy  Neurological: He is alert and oriented to person, place, and time  He has normal strength and normal reflexes  No cranial nerve deficit or sensory deficit  He displays a negative Romberg sign  Skin: Skin is warm and dry  Psychiatric: He has a normal mood and affect  His speech is normal and behavior is normal  Judgment and thought content normal  Cognition and memory are normal    Nursing note and vitals reviewed        ED Medications  Medications   clindamycin (CLEOCIN) capsule 300 mg (300 mg Oral Given 5/11/18 1816)       Diagnostic Studies  Results Reviewed     None                 No orders to display              Procedures  Procedures       Phone Contacts  ED Phone Contact    ED Course                               MDM  Number of Diagnoses or Management Options  Abscess: new and does not require workup  Patient Progress  Patient progress: stable    CritCare Time    Disposition  Final diagnoses:   Abscess     Time reflects when diagnosis was documented in both MDM as applicable and the Disposition within this note     Time User Action Codes Description Comment    5/11/2018  6:00 PM Nereida Paige PAUL Add [L02 91] Abscess       ED Disposition     ED Disposition Condition Comment    Discharge  [place] Lott discharge to home/self care  Condition at discharge: Good        Follow-up Information     Follow up With Specialties Details Why Contact Info    your doctor  Schedule an appointment as soon as possible for a visit          Discharge Medication List as of 5/11/2018  6:01 PM      START taking these medications    Details   clindamycin (CLEOCIN) 150 mg capsule Take 2 capsules (300 mg total) by mouth every 6 (six) hours for 7 days, Starting Fri 5/11/2018, Until Fri 5/18/2018, Print         CONTINUE these medications which have NOT CHANGED    Details   albuterol (VENTOLIN HFA) 90 mcg/act inhaler Inhale 1-2 puffs, Starting Mon 10/2/2017, Historical Med      !! Alcohol Swabs (CVS ALCOHOL PREP PADS) 70 % PADS Use as directed, Starting Wed 1/17/2018, Historical Med      !!  Alcohol Swabs (SURE COMFORT ALCOHOL PREP) 70 % PADS by Does not apply route, Starting Wed 1/17/2018, Historical Med      !! aspirin (ECOTRIN LOW STRENGTH) 81 mg EC tablet TAKE 1 TABLET DAILY , Normal      !! aspirin (ECOTRIN LOW STRENGTH) 81 mg EC tablet Take 1 tablet by mouth daily, Starting Wed 3/1/2017, Historical Med      atenolol (TENORMIN) 25 mg tablet Take 25 mg by mouth daily, Starting 3/28/2017, Until Discontinued, Historical Med      atorvastatin (LIPITOR) 10 mg tablet TAKE 1 TABLET DAILY AS DIRECTED , Normal      B-D 3CC LUER-FARTUN SYR 09TO2-3/2 18G X 1-1/2" 3 ML MISC Use as directed, Starting Wed 1/17/2018, Historical Med      benzonatate (TESSALON PERLES) 100 mg capsule Take 1 capsule by mouth 3 (three) times a day, Starting Mon 10/2/2017, Historical Med      benztropine (COGENTIN) 0 5 mg tablet Take by mouth, Starting Thu 9/21/2017, Historical Med      bisacodyl (DULCOLAX) 5 mg EC tablet Take 5 mg by mouth daily, Starting 5/22/2017, Until Discontinued, Historical Med      Capsaicin 0 1 % CREA Apply topically, Starting Mon 1/9/2017, Historical Med      citalopram (CeleXA) 20 mg tablet Take 80 mg by mouth daily at bedtime  , Starting Mon 1/9/2017, Historical Med      !! CVS SALINE NOSE SPRAY 0 65 % nasal spray 2 sprays 2 (two) times a day, Starting Sun 3/11/2018, Historical Med      DM-Phenylephrine-Acetaminophen 10-5-325 MG CAPS Take 1 capsule by mouth 2 (two) times a day, Starting Mon 10/2/2017, Historical Med      EPINEPHrine (EPIPEN) 0 3 mg/0 3 mL SOAJ Inject 0 3 mL into the shoulder, thigh, or buttocks once for 1 dose For severe allergic reaction, Starting Fri 10/20/2017, Print      famotidine (PEPCID) 20 mg tablet Take 1 tablet by mouth 2 (two) times a day, Starting Fri 10/20/2017, Print      fluticasone (FLONASE) 50 mcg/act nasal spray USE 1 SPRAY IN EACH NOSTRIL TWICE DAILY , Normal      !! gabapentin (NEURONTIN) 100 mg capsule Take 400 mg by mouth daily  , Starting Mon 1/9/2017, Historical Med      !! gabapentin (NEURONTIN) 300 mg capsule Take 300 mg by mouth 2 (two) times a day, Starting Thu 2/1/2018, Historical Med      HYDROcodone-acetaminophen ([address]) 5-325 mg per tablet Take 1 tablet by mouth every 6 (six) hours as needed for pain for up to 5 doses Max Daily Amount: 4 tablets, Starting Wed 1/31/2018, Print      hydrOXYzine HCL (ATARAX) 25 mg tablet Take 1 tablet by mouth every 6 (six) hours, Starting Fri 10/20/2017, Print      !! hydrOXYzine pamoate (VISTARIL) 25 mg capsule Take by mouth, Starting Thu 9/21/2017, Historical Med      !! hydrOXYzine pamoate (VISTARIL) 50 mg capsule Take by mouth, Starting Tue 11/28/2017, Historical Med      lithium carbonate 300 mg capsule 1 tab PO BID, Print      loratadine (CLARITIN) 10 mg tablet Take 1 tablet by mouth daily as needed, Starting Mon 10/2/2017, Historical Med      Misc Natural Products (NF FORMULAS TESTOSTERONE PO) Take by mouth, Historical Med      NEEDLE, DISP, 18 G (BD DISP NEEDLES) 18G X 1-1/2" MISC by Does not apply route, Starting Wed 1/17/2018, Historical Med      nicotine (NICODERM CQ) 14 mg/24hr TD 24 hr patch Place 1 patch on the skin daily, Starting Mon 8/7/2017, Historical Med      nicotine (NICODERM CQ) 21 mg/24 hr TD 24 hr patch Place 1 patch on the skin daily, Starting Mon 8/7/2017, Historical Med      nicotine (NICODERM CQ) 7 mg/24hr TD 24 hr patch Place 1 patch on the skin daily, Starting Mon 8/7/2017, Historical Med      nicotine polacrilex (NICORETTE) 2 mg gum Chew, Starting Mon 8/7/2017, Historical Med      OLANZapine (ZyPREXA) 10 mg tablet Take 12 5 mg by mouth daily at bedtime, Starting 1/9/2017, Until Discontinued, Historical Med      omeprazole (PriLOSEC) 20 mg delayed release capsule Take 20 mg by mouth daily, Starting Fri 1/26/2018, Historical Med      Omeprazole 20 MG TBEC Take 20 mg by mouth daily, Starting 1/9/2017, Until Discontinued, Historical Med      PEG 3350-KCl-NaBcb-NaCl-NaSulf (GOLYTELY) 227 1 g PACK Take by mouth, Starting Thu 7/27/2017, Historical Med      polyethylene glycol (GLYCOLAX) powder Take by mouth, Starting Mon 5/22/2017, Historical Med      ramelteon (ROZEREM) 8 mg tablet Take 0 5-1 tablets by mouth, Starting Wed 12/20/2017, Historical Med      sertraline (ZOLOFT) 100 mg tablet Take 100 mg by mouth daily, Starting 1/9/2017, Until Discontinued, Historical Med      !! sodium chloride (RA SALINE NASAL SPRAY) 0 65 % nasal spray 2 sprays into each nostril 2 (two) times a day, Starting Mon 12/18/2017, Historical Med      sulfamethoxazole-trimethoprim (BACTRIM DS) 800-160 mg per tablet Take 1 tablet by mouth 2 (two) times a day, Starting Wed 11/8/2017, Historical Med      Syringe, Disposable, (2-3CC SYRINGE) 3 ML MISC by Does not apply route, Starting Wed 1/17/2018, Historical Med      tadalafil (CIALIS) 5 MG tablet Take 5 mg by mouth daily as needed, Starting 3/8/2017, Until Discontinued, Historical Med      testosterone (ANDROGEL) 25 MG/2 5GM (1%) GEL Place on the skin, Starting Mon 8/7/2017, Historical Med      testosterone cypionate (DEPO-TESTOSTERONE) 200 mg/mL SOLN Inject 0 5 mL (100 mg total) into the shoulder, thigh, or buttocks once a week, Starting Wed 2/7/2018, Print      !! ziprasidone (GEODON) 40 mg capsule TAKE 1 CAPSULE AT 3 PM WITH FOOD, Historical Med      !! ziprasidone (GEODON) 80 mg capsule 80 mg 2 (two) times a day With food, Starting Tue 2/13/2018, Historical Med      zolpidem (AMBIEN) 10 mg tablet Take 1 tablet (10 mg total) by mouth daily at bedtime as needed for sleep, Starting Tue 4/17/2018, Normal       !! - Potential duplicate medications found  Please discuss with provider  No discharge procedures on file      ED Provider  Electronically Signed by           Deana Jeffrey DO  05/12/18 9037

## 2018-05-11 NOTE — DISCHARGE INSTRUCTIONS
Abscess   WHAT YOU NEED TO KNOW:   A warm compress may help your abscess drain  Your healthcare provider may make a cut in the abscess so it can drain  You may need surgery to remove an abscess that is on your hands or buttocks  DISCHARGE INSTRUCTIONS:   Return to the emergency department if:   · The area around your abscess becomes very painful, warm, or has red streaks  · You have a fever and chills  · Your heart is beating faster than usual      · You feel faint or confused  Contact your healthcare provider if:   · Your abscess gets bigger or does not get better  · Your abscess returns  · You have questions or concerns about your condition or care  Medicines: You may  need any of the following:  · Antibiotics  help treat a bacterial infection  · Acetaminophen  decreases pain and fever  It is available without a doctor's order  Ask how much to take and how often to take it  Follow directions  Acetaminophen can cause liver damage if not taken correctly  · NSAIDs , such as ibuprofen, help decrease swelling, pain, and fever  This medicine is available with or without a doctor's order  NSAIDs can cause stomach bleeding or kidney problems in certain people  If you take blood thinner medicine, always ask your healthcare provider if NSAIDs are safe for you  Always read the medicine label and follow directions  · Take your medicine as directed  Contact your healthcare provider if you think your medicine is not helping or if you have side effects  Tell him or her if you are allergic to any medicine  Keep a list of the medicines, vitamins, and herbs you take  Include the amounts, and when and why you take them  Bring the list or the pill bottles to follow-up visits  Carry your medicine list with you in case of an emergency  Self-care:   · Apply a warm compress to your abscess  This will help it open and drain  Wet a washcloth in warm, but not hot, water  Apply the compress for 10 minutes  Repeat this 4 times each day  Do not  press on an abscess or try to open it with a needle  You may push the bacteria deeper or into your blood  · Do not share your clothes, towels, or sheets with anyone  This can spread the infection to others  · Wash your hands often  This can help prevent the spread of germs  Use soap and water or an alcohol-based hand rub  Care for your wound after it is drained:   · Care for your wound as directed  If your healthcare provider says it is okay, carefully remove the bandage and gauze packing  You may need to soak the gauze to get it out of your wound  Clean your wound and the area around it as directed  Dry the area and put on new, clean bandages  Change your bandages when they get wet or dirty  · Ask your healthcare provider how to change the gauze in your wound  Keep track of how many pieces of gauze are placed inside the wound  Do not put too much packing in the wound  Do not pack the gauze too tightly in your wound  Follow up with your healthcare provider in 1 to 3 days: You may need to have your packing removed or your bandage changed  Write down your questions so you remember to ask them during your visits  © 2017 [address]  Information is for End User's use only and may not be sold, redistributed or otherwise used for commercial purposes  All illustrations and images included in CareNotes® are the copyrighted property of [employer]  or Eleazar Patterson  The above information is an  only  It is not intended as medical advice for individual conditions or treatments  Talk to your doctor, nurse or pharmacist before following any medical regimen to see if it is safe and effective for you

## 2018-05-16 ENCOUNTER — TELEPHONE (OUTPATIENT)
Dept: SLEEP CENTER | Facility: CLINIC | Age: 48
End: 2018-05-16

## 2018-05-16 DIAGNOSIS — F51.01 PRIMARY INSOMNIA: Primary | ICD-10-CM

## 2018-05-16 RX ORDER — ZOLPIDEM TARTRATE 10 MG/1
TABLET ORAL
Qty: 2 TABLET | Refills: 0 | Status: SHIPPED | OUTPATIENT
Start: 2018-05-16 | End: 2018-07-11

## 2018-05-23 DIAGNOSIS — J30.89 CHRONIC NON-SEASONAL ALLERGIC RHINITIS: ICD-10-CM

## 2018-05-23 RX ORDER — FLUTICASONE PROPIONATE 50 MCG
SPRAY, SUSPENSION (ML) NASAL
Qty: 1 BOTTLE | Refills: 2 | Status: SHIPPED | OUTPATIENT
Start: 2018-05-23 | End: 2018-07-11

## 2018-06-01 ENCOUNTER — TELEPHONE (OUTPATIENT)
Dept: INTERNAL MEDICINE CLINIC | Facility: CLINIC | Age: 48
End: 2018-06-01

## 2018-06-01 DIAGNOSIS — N52.9 ERECTILE DYSFUNCTION, UNSPECIFIED ERECTILE DYSFUNCTION TYPE: Primary | ICD-10-CM

## 2018-06-01 PROBLEM — E78.1 ESSENTIAL HYPERTRIGLYCERIDEMIA: Status: ACTIVE | Noted: 2017-01-09

## 2018-06-01 PROBLEM — K63.5 COLON POLYPS: Status: ACTIVE | Noted: 2017-07-27

## 2018-06-01 PROBLEM — F17.200 NICOTINE DEPENDENCE: Status: ACTIVE | Noted: 2017-03-01

## 2018-06-01 PROBLEM — K21.9 GERD (GASTROESOPHAGEAL REFLUX DISEASE): Status: ACTIVE | Noted: 2017-01-09

## 2018-06-01 PROBLEM — E65 CENTRAL OBESITY: Status: ACTIVE | Noted: 2017-08-07

## 2018-06-01 PROBLEM — E03.8 SUBCLINICAL HYPOTHYROIDISM: Status: ACTIVE | Noted: 2017-03-01

## 2018-06-01 PROBLEM — G47.00 INSOMNIA: Status: ACTIVE | Noted: 2017-12-18

## 2018-06-01 PROBLEM — K76.0 NON-ALCOHOLIC FATTY LIVER DISEASE: Status: ACTIVE | Noted: 2017-03-01

## 2018-06-01 PROBLEM — I20.89 CHRONIC STABLE ANGINA: Status: ACTIVE | Noted: 2017-03-01

## 2018-06-01 PROBLEM — G57.12 MERALGIA PARAESTHETICA, LEFT: Status: ACTIVE | Noted: 2017-01-09

## 2018-06-01 PROBLEM — I20.8 CHRONIC STABLE ANGINA (HCC): Status: ACTIVE | Noted: 2017-03-01

## 2018-06-01 PROBLEM — F31.9 BIPOLAR DISORDER (HCC): Status: ACTIVE | Noted: 2017-01-09

## 2018-06-01 PROBLEM — J34.2 DEVIATED SEPTUM: Status: ACTIVE | Noted: 2017-12-18

## 2018-06-01 RX ORDER — TADALAFIL 5 MG/1
5 TABLET ORAL DAILY PRN
Qty: 30 TABLET | Refills: 1 | Status: SHIPPED | OUTPATIENT
Start: 2018-06-01 | End: 2018-07-11

## 2018-06-01 NOTE — TELEPHONE ENCOUNTER
AS PER PATIENT YOU PREVIOUSLY ORDERED CIALIS BUT AT THE MOMENT HE COULD NOT AFFORD IT , HE NOW IS ABLE TO PURCHASE IT BUT THE SCRIPT WAS  AT THE PHARMACY, CAN YOU PLEASE REORDER MED ? THANK YOU

## 2018-06-05 ENCOUNTER — OFFICE VISIT (OUTPATIENT)
Dept: INTERNAL MEDICINE CLINIC | Facility: CLINIC | Age: 48
End: 2018-06-05
Payer: COMMERCIAL

## 2018-06-05 VITALS
HEART RATE: 80 BPM | SYSTOLIC BLOOD PRESSURE: 110 MMHG | DIASTOLIC BLOOD PRESSURE: 80 MMHG | TEMPERATURE: 98 F | BODY MASS INDEX: 28.35 KG/M2 | HEIGHT: 76 IN | WEIGHT: 232.81 LBS

## 2018-06-05 DIAGNOSIS — J34.89 NOSE PAIN: Primary | ICD-10-CM

## 2018-06-05 PROCEDURE — 99213 OFFICE O/P EST LOW 20 MIN: CPT | Performed by: INTERNAL MEDICINE

## 2018-06-05 RX ORDER — LIDOCAINE 50 MG/G
OINTMENT TOPICAL AS NEEDED
Qty: 35.44 G | Refills: 0 | Status: SHIPPED | OUTPATIENT
Start: 2018-06-05 | End: 2018-09-05

## 2018-06-05 RX ORDER — SENNOSIDES 8.6 MG
650 CAPSULE ORAL EVERY 8 HOURS PRN
Qty: 30 TABLET | Refills: 2 | Status: SHIPPED | OUTPATIENT
Start: 2018-06-05 | End: 2018-08-21 | Stop reason: ALTCHOICE

## 2018-06-05 NOTE — ASSESSMENT & PLAN NOTE
Afebrile, stable, non toxic appearing  Cavitary like lesions in the right nasal cavity  No abscesses noted  No Vesicular lesion or other rash  Will refer for ENT for further evaluation  Topical Lidocaine and tylenol for pain relief

## 2018-06-05 NOTE — PROGRESS NOTES
ASSESSMENT/PLAN:  Problem List Items Addressed This Visit        Other    Nose pain - Primary     Afebrile, stable, non toxic appearing  Cavitary like lesions in the right nasal cavity  No abscesses noted  No Vesicular lesion or other rash  Will refer for ENT for further evaluation  Topical Lidocaine and tylenol for pain relief  Relevant Medications    lidocaine (XYLOCAINE) 5 % ointment    acetaminophen (TYLENOL) 650 mg CR tablet    Other Relevant Orders    Ambulatory Referral to Otolaryngology            CHIEF COMPLAINT: Nose pain    HISTORY OF PRESENT ILLNESS:  52year old male with pmhx of HTN, deviated sputum, presented for evaluation of nose pain  Nose pain started 3 days ago, abrupt in onset  Its located inside the right nare  Patient states that there's no abnormal discharge however he feels something bit him inside  He denies any URI symptoms, fatigue, myalgia, fevers or chills  Patient also denies any ear pain but does have left ear fullness  He denies any headache or sinus pressure  Of note, patient had I&D of the abscess located on the right face last month, however that has since resolved  Hes up to date on tetanus shot and symptoms improved with antibiotics  Currently patient does not have any bleeding or nasal discharge just severe pain in the interior of the right nare, excluding the nasal septum  Patient does admit to 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 but no current use  All other ROS was negative  Review of Systems   Constitutional: Negative for activity change, chills and fever  HENT: Positive for congestion and facial swelling  Negative for dental problem, ear discharge, hearing loss, mouth sores, nosebleeds, postnasal drip, rhinorrhea, sinus pain, sinus pressure, sneezing, sore throat, tinnitus and trouble swallowing  Nose pain  Eyes: Negative for photophobia, pain, discharge, itching and visual disturbance     Respiratory: Negative for cough, chest tightness and shortness of breath  Cardiovascular: Negative for chest pain and leg swelling  Gastrointestinal: Negative for abdominal pain, constipation, diarrhea, nausea and vomiting  Endocrine: Negative for cold intolerance and heat intolerance  Genitourinary: Negative for difficulty urinating  Musculoskeletal: Negative for gait problem  Skin: Negative for rash  Allergic/Immunologic: Negative  Neurological: Negative  Negative for dizziness, weakness, light-headedness and headaches  Hematological: Negative  Psychiatric/Behavioral: Negative  All other systems reviewed and are negative  OBJECTIVE:  Vitals:    06/05/18 1627   BP: 110/80   BP Location: Right arm   Patient Position: Sitting   Cuff Size: Standard   Pulse: 80   Temp: 98 °F (36 7 °C)   TempSrc: Oral   Weight: 106 kg (232 lb 12 9 oz)   Height: 6' 4" (1 93 m)     Physical Exam   Constitutional: He appears well-nourished  No distress  HENT:   Head: Normocephalic and atraumatic  Right Ear: External ear normal    Mouth/Throat: Oropharynx is clear and moist  No oropharyngeal exudate  Small left ear effusion  Nasal septum deviated  Right nasal cavity seemed to have a cavitary lesion, with granulating tissue and scant amount of blood  Left nasal turbinates erythematous  No sinus tenderness  No vesicular or papular lesions noted anywhere in ENT exam    Eyes: Conjunctivae and EOM are normal  Pupils are equal, round, and reactive to light  Right eye exhibits no discharge  Neck: Normal range of motion  Lymphadenopathy:     He has cervical adenopathy (anterior cervical)           Current Outpatient Prescriptions:     acetaminophen (TYLENOL) 650 mg CR tablet, Take 1 tablet (650 mg total) by mouth every 8 (eight) hours as needed for mild pain, Disp: 30 tablet, Rfl: 2    albuterol (VENTOLIN HFA) 90 mcg/act inhaler, Inhale 1-2 puffs, Disp: , Rfl:     Alcohol Swabs (CVS ALCOHOL PREP PADS) 70 % PADS, Use as directed, Disp: , Rfl: 0    Alcohol Swabs (SURE COMFORT ALCOHOL PREP) 70 % PADS, by Does not apply route, Disp: , Rfl:     aspirin (ECOTRIN LOW STRENGTH) 81 mg EC tablet, TAKE 1 TABLET DAILY  , Disp: 90 tablet, Rfl: 1    aspirin (ECOTRIN LOW STRENGTH) 81 mg EC tablet, Take 1 tablet by mouth daily, Disp: , Rfl:     atenolol (TENORMIN) 25 mg tablet, Take 25 mg by mouth daily, Disp: , Rfl:     atorvastatin (LIPITOR) 10 mg tablet, TAKE 1 TABLET DAILY AS DIRECTED , Disp: 90 tablet, Rfl: 1    B-D 3CC LUER-FARTUN SYR 52HR4-5/2 18G X 1-1/2" 3 ML MISC, Use as directed, Disp: , Rfl: 0    benzonatate (TESSALON PERLES) 100 mg capsule, Take 1 capsule by mouth 3 (three) times a day, Disp: , Rfl:     benztropine (COGENTIN) 0 5 mg tablet, Take by mouth, Disp: , Rfl:     bisacodyl (DULCOLAX) 5 mg EC tablet, Take 5 mg by mouth daily, Disp: , Rfl:     Capsaicin 0 1 % CREA, Apply topically, Disp: , Rfl:     citalopram (CeleXA) 20 mg tablet, Take 1 tablet by mouth daily for 30 days Q am, Disp: 30 tablet, Rfl: 1    citalopram (CeleXA) 20 mg tablet, Take 80 mg by mouth daily at bedtime  , Disp: , Rfl:     CVS SALINE NOSE SPRAY 0 65 % nasal spray, 2 sprays 2 (two) times a day, Disp: , Rfl: 5    DM-Phenylephrine-Acetaminophen 10-5-325 MG CAPS, Take 1 capsule by mouth 2 (two) times a day, Disp: , Rfl:     EPINEPHrine (EPIPEN) 0 3 mg/0 3 mL SOAJ, Inject 0 3 mL into the shoulder, thigh, or buttocks once for 1 dose For severe allergic reaction, Disp: 1 each, Rfl: 0    famotidine (PEPCID) 20 mg tablet, Take 1 tablet by mouth 2 (two) times a day, Disp: 30 tablet, Rfl: 0    fluticasone (FLONASE) 50 mcg/act nasal spray, USE 1 SPRAY IN EACH NOSTRIL TWICE DAILY  , Disp: 1 Bottle, Rfl: 2    gabapentin (NEURONTIN) 100 mg capsule, Take 400 mg by mouth daily  , Disp: , Rfl:     gabapentin (NEURONTIN) 300 mg capsule, Take 300 mg by mouth 2 (two) times a day, Disp: , Rfl: 0    HYDROcodone-acetaminophen (NORCO) 5-325 mg per tablet, Take 1 tablet by mouth every 6 (six) hours as needed for pain for up to 5 doses Max Daily Amount: 4 tablets, Disp: 5 tablet, Rfl: 0    hydrOXYzine HCL (ATARAX) 25 mg tablet, Take 1 tablet by mouth every 6 (six) hours, Disp: 12 tablet, Rfl: 0    hydrOXYzine pamoate (VISTARIL) 25 mg capsule, Take by mouth, Disp: , Rfl:     hydrOXYzine pamoate (VISTARIL) 50 mg capsule, Take by mouth, Disp: , Rfl:     lidocaine (XYLOCAINE) 5 % ointment, Apply topically as needed for mild pain, Disp: 35 44 g, Rfl: 0    lithium carbonate 300 mg capsule, 1 tab PO BID (Patient taking differently: Take 600 mg by mouth daily 1 tab PO BID ), Disp: 60 capsule, Rfl: 1    loratadine (CLARITIN) 10 mg tablet, Take 1 tablet by mouth daily as needed, Disp: , Rfl:     Misc Natural Products (NF FORMULAS TESTOSTERONE PO), Take by mouth, Disp: , Rfl:     NEEDLE, DISP, 18 G (BD DISP NEEDLES) 18G X 1-1/2" MISC, by Does not apply route, Disp: , Rfl:     nicotine (NICODERM CQ) 14 mg/24hr TD 24 hr patch, Place 1 patch on the skin daily, Disp: , Rfl:     nicotine (NICODERM CQ) 21 mg/24 hr TD 24 hr patch, Place 1 patch on the skin daily, Disp: , Rfl:     nicotine (NICODERM CQ) 7 mg/24hr TD 24 hr patch, Place 1 patch on the skin daily, Disp: , Rfl:     nicotine polacrilex (NICORETTE) 2 mg gum, Chew, Disp: , Rfl:     OLANZapine (ZyPREXA) 10 mg tablet, Take 12 5 mg by mouth daily at bedtime, Disp: , Rfl:     omeprazole (PriLOSEC) 20 mg delayed release capsule, Take 20 mg by mouth daily, Disp: , Rfl: 1    Omeprazole 20 MG TBEC, Take 20 mg by mouth daily, Disp: , Rfl:     PEG 3350-KCl-NaBcb-NaCl-NaSulf (GOLYTELY) 227 1 g PACK, Take by mouth, Disp: , Rfl:     polyethylene glycol (GLYCOLAX) powder, Take by mouth, Disp: , Rfl:     ramelteon (ROZEREM) 8 mg tablet, Take 0 5-1 tablets by mouth, Disp: , Rfl:     sertraline (ZOLOFT) 100 mg tablet, Take 100 mg by mouth daily, Disp: , Rfl:     sodium chloride (RA SALINE NASAL SPRAY) 0 65 % nasal spray, 2 sprays into each nostril 2 (two) times a day, Disp: , Rfl:     sulfamethoxazole-trimethoprim (BACTRIM DS) 800-160 mg per tablet, Take 1 tablet by mouth 2 (two) times a day, Disp: , Rfl:     Syringe, Disposable, (2-3CC SYRINGE) 3 ML MISC, by Does not apply route, Disp: , Rfl:     tadalafil (CIALIS) 5 MG tablet, Take 1 tablet (5 mg total) by mouth daily as needed for erectile dysfunction, Disp: 30 tablet, Rfl: 1    testosterone (ANDROGEL) 25 MG/2 5GM (1%) GEL, Place on the skin, Disp: , Rfl:     testosterone cypionate (DEPO-TESTOSTERONE) 200 mg/mL SOLN, Inject 0 5 mL (100 mg total) into the shoulder, thigh, or buttocks once a week, Disp: 10 mL, Rfl: 1    ziprasidone (GEODON) 40 mg capsule, TAKE 1 CAPSULE AT 3 PM WITH FOOD, Disp: , Rfl: 0    ziprasidone (GEODON) 80 mg capsule, 80 mg 2 (two) times a day With food, Disp: , Rfl: 0    zolpidem (AMBIEN) 10 mg tablet, Take 1 tablet (10 mg total) by mouth daily at bedtime as needed for sleep, Disp: 30 tablet, Rfl: 0    zolpidem (AMBIEN) 10 mg tablet, Take 1/2 to 1 tablet at bedtime for sleep study  Try 1 tablet weekend prior to study to make sure it is tolerated  , Disp: 2 tablet, Rfl: 0    Past Medical History:   Diagnosis Date    Angina pectoris ([facility] [address] )     stable    Ascites     told he had a "wave stomach"    Bipolar 1 disorder ([facility] [address] )     CHF (congestive heart failure) (HCC)     Coronary artery disease     Depression     Family history of colon cancer requiring screening colonoscopy     brother    Fatty liver     History of colon polyps     Hyperlipidemia     Hypertension     Hypothalamic hypogonadism ([facility] [address] )     Liver disease     fatty liver    MI (myocardial infarction) ([facility] [address] )     2012    Osteomyelitis ([facility] [address] )      Past Surgical History:   Procedure Laterality Date    COLONOSCOPY      FRACTURE SURGERY      jaw    OH COLONOSCOPY FLX DX W/COLLJ SPEC WHEN PFRMD N/A 7/27/2017    Procedure: COLONOSCOPY;  Surgeon: Doc Sarah MD;  [facility];   Service: Gastroenterology    OH COLONOSCOPY FLX DX W/DAWN SPEC WHEN PFRMD N/A 8/29/2017    Procedure: COLONOSCOPY;  Surgeon: Caren Nicole MD;  [facility]; Service: Gastroenterology    OH COLONOSCOPY FLX DX W/DAWN [facility] WHEN PFRMD N/A 12/1/2017    Procedure: COLONOSCOPY;  Surgeon: Caren Nicole MD;  [facility]; Service: Gastroenterology     Social History     Social History    Marital status:      Spouse name: N/A    Number of children: N/A    Years of education: N/A     Occupational History    Not on file  Social History Main Topics    Smoking status: Current Every Day Smoker     Packs/day: 0 50     Types: Cigarettes    Smokeless tobacco: Never Used      Comment: quit a few days ago     Alcohol use Yes      Comment: rare    Drug use: No      Comment: stopped in 2007    Sexual activity: Not on file     Other Topics Concern    Not on file     Social History Narrative    No narrative on file     Family History   Problem Relation Age of Onset    Breast cancer Mother     Thyroid nodules Mother     Colon cancer Father      around age 45    Heart failure Father      congestive    No Known Problems Sister     Colon cancer Brother      around age 45    No Known Problems Maternal Grandmother     No Known Problems Maternal Grandfather     No Known Problems Paternal Grandmother     No Known Problems Paternal MOSHE Chinchilla [address] Internal Medicine PGY-1  [facility]  [address] [facility]  [address] [facility],[address]  [address]

## 2018-06-06 ENCOUNTER — TELEPHONE (OUTPATIENT)
Dept: INTERNAL MEDICINE CLINIC | Facility: CLINIC | Age: 48
End: 2018-06-06

## 2018-06-06 NOTE — TELEPHONE ENCOUNTER
Ok thanks for letting me know  Patient has tylenol he can take for pain and he has an appointment with ENT for this week for further evaluation

## 2018-06-07 ENCOUNTER — TELEPHONE (OUTPATIENT)
Dept: INTERNAL MEDICINE CLINIC | Facility: CLINIC | Age: 48
End: 2018-06-07

## 2018-06-07 NOTE — TELEPHONE ENCOUNTER
Patient was ordered Cialis 5 mg by Dr Etelvina Abad and it need prior auth   Please fill out prior auth form in your orange bin and give to Zaid once completed thank you

## 2018-07-03 ENCOUNTER — HOSPITAL ENCOUNTER (EMERGENCY)
Facility: HOSPITAL | Age: 48
Discharge: HOME | End: 2018-07-03
Attending: EMERGENCY MEDICINE
Payer: COMMERCIAL

## 2018-07-03 VITALS
OXYGEN SATURATION: 96 % | DIASTOLIC BLOOD PRESSURE: 97 MMHG | HEART RATE: 90 BPM | TEMPERATURE: 98.3 F | BODY MASS INDEX: 28.02 KG/M2 | WEIGHT: 230.16 LBS | SYSTOLIC BLOOD PRESSURE: 144 MMHG | RESPIRATION RATE: 17 BRPM

## 2018-07-03 DIAGNOSIS — H66.91 RIGHT OTITIS MEDIA: Primary | ICD-10-CM

## 2018-07-03 DIAGNOSIS — J34.0 CELLULITIS OF MUCOUS MEMBRANE OF NOSE: ICD-10-CM

## 2018-07-03 PROCEDURE — 99283 EMERGENCY DEPT VISIT LOW MDM: CPT

## 2018-07-03 PROCEDURE — 90471 IMMUNIZATION ADMIN: CPT

## 2018-07-03 PROCEDURE — 90715 TDAP VACCINE 7 YRS/> IM: CPT | Performed by: EMERGENCY MEDICINE

## 2018-07-03 RX ORDER — CLINDAMYCIN HYDROCHLORIDE 150 MG/1
300 CAPSULE ORAL ONCE
Status: COMPLETED | OUTPATIENT
Start: 2018-07-03 | End: 2018-07-03

## 2018-07-03 RX ORDER — CLINDAMYCIN HYDROCHLORIDE 150 MG/1
300 CAPSULE ORAL 4 TIMES DAILY
Qty: 56 CAPSULE | Refills: 0 | Status: SHIPPED | OUTPATIENT
Start: 2018-07-03 | End: 2018-07-11

## 2018-07-03 RX ORDER — HYDROCODONE BITARTRATE AND ACETAMINOPHEN 5; 325 MG/1; MG/1
1 TABLET ORAL EVERY 6 HOURS PRN
Qty: 20 TABLET | Refills: 0 | Status: SHIPPED | OUTPATIENT
Start: 2018-07-03 | End: 2018-07-08

## 2018-07-03 RX ADMIN — TETANUS TOXOID, REDUCED DIPHTHERIA TOXOID AND ACELLULAR PERTUSSIS VACCINE, ADSORBED 0.5 ML: 5; 2.5; 8; 8; 2.5 SUSPENSION INTRAMUSCULAR at 03:54

## 2018-07-03 RX ADMIN — CLINDAMYCIN HYDROCHLORIDE 300 MG: 150 CAPSULE ORAL at 03:53

## 2018-07-03 NOTE — DISCHARGE INSTRUCTIONS
Otitis Media   WHAT YOU NEED TO KNOW:   Otitis media is an ear infection  DISCHARGE INSTRUCTIONS:   Medicines:  · Ibuprofen or acetaminophen  helps decrease your pain and fever  They are available without a doctor's order  Ask your healthcare provider which medicine is right for you  Ask how much to take and how often to take it  These medicines can cause stomach bleeding if not taken correctly  Ibuprofen can cause kidney damage  Do not take ibuprofen if you have kidney disease, an ulcer, or allergies to aspirin  Acetaminophen can cause liver damage  Do not drink alcohol if you take acetaminophen  · Ear drops  help treat your ear pain  · Antibiotics  help treat a bacterial infection that caused your ear infection  · Take your medicine as directed  Contact your healthcare provider if you think your medicine is not helping or if you have side effects  Tell him or her if you are allergic to any medicine  Keep a list of the medicines, vitamins, and herbs you take  Include the amounts, and when and why you take them  Bring the list or the pill bottles to follow-up visits  Carry your medicine list with you in case of an emergency  Heat or ice:   · Heat  may be used to decrease your pain  Place a warm, moist washcloth on your ear  Apply for 15 to 20 minutes, 3 to 4 times a day    · Ice  helps decrease swelling and pain  Use an ice pack or put crushed ice in a plastic bag  Cover the ice pack with a towel and place it on your ear for 15 to 20 minutes, 3 to 4 times a day for 2 days  Prevent otitis media:   · Wash your hands often  Use soap and water  Wash your hands after you use the bathroom, change a child's diapers, or sneeze  Wash your hands before you prepare or eat food  · Stay away from people who are ill  Some germs are easily and quickly spread through contact  Return to work or school: You may return to work or school when your fever is gone     Follow up with your healthcare provider as directed:  Write down your questions so you remember to ask them during your visits  Contact your healthcare provider if:   · Your ear pain gets worse or does not go away, even after treatment  · The outside of your ear is red or swollen  · You have vomiting or diarrhea  · You have fluid coming from your ear  · You have questions or concerns about your condition or care  Return to the emergency department if:   · You have a seizure  · You have a fever and a stiff neck  © 2017 [address]  Information is for End User's use only and may not be sold, redistributed or otherwise used for commercial purposes  All illustrations and images included in CareNotes® are the copyrighted property of [employer]  or Ele[employer] Patterson  The above information is an  only  It is not intended as medical advice for individual conditions or treatments  Talk to your doctor, nurse or pharmacist before following any medical regimen to see if it is safe and effective for you  Cellulitis   WHAT YOU NEED TO KNOW:   Cellulitis is a skin infection caused by bacteria  Cellulitis may go away on its own or you may need treatment  Your healthcare provider may draw a [place] around the outside edges of your cellulitis  If your cellulitis spreads, your healthcare provider will see it outside of the [place]  DISCHARGE INSTRUCTIONS:   Call 911 if:   · You have sudden trouble breathing or chest pain  Return to the emergency department if:   · Your wound gets larger and more painful  · You feel a crackling under your skin when you touch it  · You have purple dots or bumps on your skin, or you see bleeding under your skin  · You have new swelling and pain in your legs  · The red, warm, swollen area gets larger  · You see red streaks coming from the infected area  Contact your healthcare provider if:   · You have a fever      · Your fever or pain does not go away or gets worse     · The area does not get smaller after 2 days of antibiotics  · Your skin is flaking or peeling off  · You have questions or concerns about your condition or care  Medicines:   · Antibiotics  help treat the bacterial infection  · NSAIDs , such as ibuprofen, help decrease swelling, pain, and fever  NSAIDs can cause stomach bleeding or kidney problems in certain people  If you take blood thinner medicine, always ask if NSAIDs are safe for you  Always read the medicine label and follow directions  Do not give these medicines to children under 10months of age without direction from your child's healthcare provider  · Acetaminophen  decreases pain and fever  It is available without a doctor's order  Ask how much to take and how often to take it  Follow directions  Read the labels of all other medicines you are using to see if they also contain acetaminophen, or ask your doctor or pharmacist  Acetaminophen can cause liver damage if not taken correctly  Do not use more than 4 grams (4,000 milligrams) total of acetaminophen in one day  · Take your medicine as directed  Contact your healthcare provider if you think your medicine is not helping or if you have side effects  Tell him or her if you are allergic to any medicine  Keep a list of the medicines, vitamins, and herbs you take  Include the amounts, and when and why you take them  Bring the list or the pill bottles to follow-up visits  Carry your medicine list with you in case of an emergency  Self-care:   · Elevate the area above the level of your heart  as often as you can  This will help decrease swelling and pain  Prop the area on pillows or blankets to keep it elevated comfortably  · Clean the area daily until the wound scabs over  Gently wash the area with soap and water  Pat dry  Use dressings as directed  · Place cool or warm, wet cloths on the area as directed  Use clean cloths and clean water   Leave it on the area until the cloth is room temperature  Pat the area dry with a clean, dry cloth  The cloths may help decrease pain  Prevent cellulitis:   · Do not scratch bug bites or areas of injury  You increase your risk for cellulitis by scratching these areas  · Do not share personal items, such as towels, clothing, and razors  · Clean exercise equipment  with germ-killing  before and after you use it  · Wash your hands often  Use soap and water  Wash your hands after you use the bathroom, change a child's diapers, or sneeze  Wash your hands before you prepare or eat food  Use lotion to prevent dry, cracked skin  · Wear pressure stockings as directed  You may be told to wear the stockings if you have peripheral edema  The stockings improve blood flow and decrease swelling  · Treat athlete's foot  This can help prevent the spread of a bacterial skin infection  Follow up with your healthcare provider within 3 days, or as directed: Your healthcare provider will check if your cellulitis is getting better  You may need different medicine  Write down your questions so you remember to ask them during your visits  © 2017 [facility] [facility] Information is for End User's use only and may not be sold, redistributed or otherwise used for commercial purposes  All illustrations and images included in CareNotes® are the copyrighted property of [employer]  or Eleazar Patterson  The above information is an  only  It is not intended as medical advice for individual conditions or treatments  Talk to your doctor, nurse or pharmacist before following any medical regimen to see if it is safe and effective for you

## 2018-07-03 NOTE — ED PROVIDER NOTES
History  Chief Complaint   Patient presents with    Nasal Swelling     Patient seen by ENT 2 weeks ago for infection of right nare  States, "I think the cartilge is broken in there  Or it was a spider bite "  Prescribed ibuprofen, mupriocin, and lidocaine topical ointment with no relief  No fever  C/O headache and earache  Denies injury  Patient is a 52year old male with increased pain of R nose and R earache and headache as well  ?last Td  No fever  No N/V  No sob  Saw ENT 2 weeks ago and has been on topical lidocaine and bactroban without relief  Has a h/o MRSA  States he drove here  Was last seen in this ED on 18 for abscess  [place] [facility] website checked on this patient and last Rx filled was on 18 for ambien for 2 day supply  History provided by:  Patient   used: No        Prior to Admission Medications   Prescriptions Last Dose Informant Patient Reported? Taking?    Alcohol Swabs ([employer] ALCOHOL PREP PADS) 70 % PADS   Yes No   Sig: Use as directed   Alcohol Swabs (SURE COMFORT ALCOHOL PREP) 70 % PADS   Yes No   Sig: by Does not apply route   B-D 3CC LUER-FARTUN SYR 52KT0-9/2 18G X 1-1/2" 3 ML MISC   Yes No   Sig: Use as directed   [employer]S SALINE NOSE SPRAY 0 65 % nasal spray   Yes No   Si sprays 2 (two) times a day   Capsaicin 0 1 % CREA   Yes No   Sig: Apply topically   DM-Phenylephrine-Acetaminophen 10-5-325 MG CAPS   Yes No   Sig: Take 1 capsule by mouth 2 (two) times a day   EPINEPHrine (EPIPEN) 0 3 mg/0 3 mL SOAJ   No No   Sig: Inject 0 3 mL into the shoulder, thigh, or buttocks once for 1 dose For severe allergic reaction   HYDROcodone-acetaminophen (NORCO) 5-325 mg per tablet   No No   Sig: Take 1 tablet by mouth every 6 (six) hours as needed for pain for up to 5 doses Max Daily Amount: 4 tablets   Misc Natural Products (NF FORMULAS TESTOSTERONE PO)   Yes No   Sig: Take by mouth   NEEDLE, DISP, 18 G (BD DISP NEEDLES) 18G X 1-1/2" MISC   Yes No   Sig: by Does not apply route   OLANZapine (ZyPREXA) 10 mg tablet   Yes No   Sig: Take 12 5 mg by mouth daily at bedtime   Omeprazole 20 MG TBEC   Yes No   Sig: Take 20 mg by mouth daily   PEG 3350-KCl-NaBcb-NaCl-NaSulf (GOLYTELY) 227 1 g PACK   Yes No   Sig: Take by mouth   Syringe, Disposable, (2-3CC SYRINGE) 3 ML MISC   Yes No   Sig: by Does not apply route   acetaminophen (TYLENOL) 650 mg CR tablet   No No   Sig: Take 1 tablet (650 mg total) by mouth every 8 (eight) hours as needed for mild pain   albuterol (VENTOLIN HFA) 90 mcg/act inhaler   Yes No   Sig: Inhale 1-2 puffs   aspirin (ECOTRIN LOW STRENGTH) 81 mg EC tablet   No No   Sig: TAKE 1 TABLET DAILY  aspirin (ECOTRIN LOW STRENGTH) 81 mg EC tablet   Yes No   Sig: Take 1 tablet by mouth daily   atenolol (TENORMIN) 25 mg tablet   Yes No   Sig: Take 25 mg by mouth daily   atorvastatin (LIPITOR) 10 mg tablet   No No   Sig: TAKE 1 TABLET DAILY AS DIRECTED  benzonatate (TESSALON PERLES) 100 mg capsule   Yes No   Sig: Take 1 capsule by mouth 3 (three) times a day   benztropine (COGENTIN) 0 5 mg tablet   Yes No   Sig: Take by mouth   bisacodyl (DULCOLAX) 5 mg EC tablet   Yes No   Sig: Take 5 mg by mouth daily   citalopram (CeleXA) 20 mg tablet   No No   Sig: Take 1 tablet by mouth daily for 30 days Q am   citalopram (CeleXA) 20 mg tablet   Yes No   Sig: Take 80 mg by mouth daily at bedtime     famotidine (PEPCID) 20 mg tablet   No No   Sig: Take 1 tablet by mouth 2 (two) times a day   fluticasone (FLONASE) 50 mcg/act nasal spray   No No   Sig: USE 1 SPRAY IN EACH NOSTRIL TWICE DAILY     gabapentin (NEURONTIN) 100 mg capsule   Yes No   Sig: Take 400 mg by mouth daily     gabapentin (NEURONTIN) 300 mg capsule   Yes No   Sig: Take 300 mg by mouth 2 (two) times a day   hydrOXYzine HCL (ATARAX) 25 mg tablet   No No   Sig: Take 1 tablet by mouth every 6 (six) hours   hydrOXYzine pamoate (VISTARIL) 25 mg capsule   Yes No   Sig: Take by mouth   hydrOXYzine pamoate (VISTARIL) 50 mg capsule   Yes No   Sig: Take by mouth   lidocaine (XYLOCAINE) 5 % ointment   No No   Sig: Apply topically as needed for mild pain   lithium carbonate 300 mg capsule   No No   Si tab PO BID   Patient taking differently: Take 600 mg by mouth daily 1 tab PO BID    loratadine (CLARITIN) 10 mg tablet   Yes No   Sig: Take 1 tablet by mouth daily as needed   nicotine (NICODERM CQ) 14 mg/24hr TD 24 hr patch   Yes No   Sig: Place 1 patch on the skin daily   nicotine (NICODERM CQ) 21 mg/24 hr TD 24 hr patch   Yes No   Sig: Place 1 patch on the skin daily   nicotine (NICODERM CQ) 7 mg/24hr TD 24 hr patch   Yes No   Sig: Place 1 patch on the skin daily   nicotine polacrilex (NICORETTE) 2 mg gum   Yes No   Sig: Chew   omeprazole (PriLOSEC) 20 mg delayed release capsule   Yes No   Sig: Take 20 mg by mouth daily   polyethylene glycol (GLYCOLAX) powder   Yes No   Sig: Take by mouth   ramelteon (ROZEREM) 8 mg tablet   Yes No   Sig: Take 0 5-1 tablets by mouth   sertraline (ZOLOFT) 100 mg tablet   Yes No   Sig: Take 100 mg by mouth daily   sodium chloride (RA SALINE NASAL SPRAY) 0 65 % nasal spray   Yes No   Si sprays into each nostril 2 (two) times a day   sulfamethoxazole-trimethoprim (BACTRIM DS) 800-160 mg per tablet   Yes No   Sig: Take 1 tablet by mouth 2 (two) times a day   tadalafil (CIALIS) 5 MG tablet   No No   Sig: Take 1 tablet (5 mg total) by mouth daily as needed for erectile dysfunction   testosterone (ANDROGEL) 25 MG/2 5GM (1%) GEL   Yes No   Sig: Place on the skin   testosterone cypionate (DEPO-TESTOSTERONE) 200 mg/mL SOLN   No No   Sig: Inject 0 5 mL (100 mg total) into the shoulder, thigh, or buttocks once a week   ziprasidone (GEODON) 40 mg capsule   Yes No   Sig: TAKE 1 CAPSULE AT 3 PM WITH FOOD   ziprasidone (GEODON) 80 mg capsule   Yes No   Si mg 2 (two) times a day With food   zolpidem (AMBIEN) 10 mg tablet   No No   Sig: Take 1 tablet (10 mg total) by mouth daily at bedtime as needed for sleep zolpidem (AMBIEN) 10 mg tablet   No No   Sig: Take 1/2 to 1 tablet at bedtime for sleep study  Try 1 tablet weekend prior to study to make sure it is tolerated  Facility-Administered Medications: None       Past Medical History:   Diagnosis Date    Angina pectoris ([place] [address] )     stable    Ascites     told he had a "wave stomach"    Bipolar 1 disorder ([place] [address] )     CHF (congestive heart failure) (HCC)     Coronary artery disease     Depression     Family history of colon cancer requiring screening colonoscopy     brother    Fatty liver     History of colon polyps     Hyperlipidemia     Hypertension     Hypothalamic hypogonadism ([place] [address] )     Liver disease     fatty liver    MI (myocardial infarction) ([place] [address] )     2012    Osteomyelitis ([place] [address] )        Past Surgical History:   Procedure Laterality Date    COLONOSCOPY      FRACTURE SURGERY      jaw    MA COLONOSCOPY FLX DX W/COLLJ SPEC WHEN PFRMD N/A 7/27/2017    Procedure: COLONOSCOPY;  Surgeon: Sylvester Sorenson MD;  [facility]; Service: Gastroenterology    MA COLONOSCOPY FLX DX W/[facility] INPATIENT REHABILITATION WHEN PFRMD N/A 8/29/2017    Procedure: COLONOSCOPY;  Surgeon: Sylvester Sorenson MD;  [facility]; Service: Gastroenterology    MA COLONOSCOPY FLX DX W/[facility] INPATIENT REHABILITATION WHEN PFRMD N/A 12/1/2017    Procedure: COLONOSCOPY;  Surgeon: Sylvester Sorenson MD;  [facility]; Service: Gastroenterology       Family History   Problem Relation Age of Onset    Breast cancer Mother     Thyroid nodules Mother     Colon cancer Father         around age 45    Heart failure Father         congestive    No Known Problems Sister     Colon cancer Brother         around age 45    No Known Problems Maternal Grandmother     No Known Problems Maternal Grandfather     No Known Problems Paternal Grandmother     No Known Problems Paternal Grandfather      I have reviewed and agree with the history as documented      Social History   Substance Use Topics    Smoking status: Current Every Day Smoker     Packs/day: 0 50     Types: Cigarettes    Smokeless tobacco: Never Used      Comment: quit a few days ago     Alcohol use Yes      Comment: rare        Review of Systems   Constitutional: Negative for fever  HENT: Positive for ear pain  Nasal pain, swelling     Respiratory: Negative for shortness of breath  Gastrointestinal: Negative for nausea and vomiting  Neurological: Positive for headaches  Physical Exam  Physical Exam   Constitutional: He appears well-developed and well-nourished  He appears distressed (mild)  HENT:   Head: Normocephalic and atraumatic  Left Ear: External ear normal    Mouth/Throat: Oropharynx is clear and moist  No oropharyngeal exudate  R TM erythematous  No epistaxis noted  (+) erythematous R nares mucous membranes  Eyes: No scleral icterus  Neck: Normal range of motion  Neck supple  Pulmonary/Chest: Effort normal  No stridor  No respiratory distress  Neurological: He is alert  Skin: Skin is warm and dry  No rash noted  No erythema  Nursing note and vitals reviewed        Vital Signs  ED Triage Vitals [07/03/18 0331]   Temperature Pulse Respirations Blood Pressure SpO2   98 3 °F (36 8 °C) 90 17 144/97 96 %      Temp Source Heart Rate Source Patient Position - Orthostatic VS BP Location FiO2 (%)   Oral Monitor Lying Right arm --      Pain Score       --           Vitals:    07/03/18 0331   BP: 144/97   Pulse: 90   Patient Position - Orthostatic VS: Lying       Visual Acuity      ED Medications  Medications   clindamycin (CLEOCIN) capsule 300 mg (not administered)   tetanus-diphtheria-acellular pertussis (BOOSTRIX) IM injection 0 5 mL (not administered)       Diagnostic Studies  Results Reviewed     None                 No orders to display              Procedures  Procedures       Phone Contacts  ED Phone Contact    ED Course                               MDM  Number of Diagnoses or Management Options  Cellulitis of mucous membrane of nose:   Right otitis media:   Diagnosis management comments: DDx including but not limited to: cellulitis, OM, MRSA, OE, doubt meningitis or abscess  Amount and/or Complexity of Data Reviewed  Decide to obtain previous medical records or to obtain history from someone other than the patient: yes  Review and summarize past medical records: yes      CritCare Time    Disposition  Final diagnoses:   Right otitis media   Cellulitis of mucous membrane of nose     Time reflects when diagnosis was documented in both MDM as applicable and the Disposition within this note     Time User Action Codes Description Comment    7/3/2018  3:43 AM Chalo Flanagan Add [H66 91] Right otitis media     7/3/2018  3:44 AM Chalo Flanagan Add [J34 [id number]] Cellulitis of mucous membrane of nose       ED Disposition     ED Disposition Condition Comment    Discharge  Modesto Lott discharge to home/self care  Condition at discharge: Stable        Follow-up Information     Follow up With Specialties Details Why Boris Wiggins MD Otolaryngology, Plastic Surgery Call in 1 day Return sooner if increased pain, fever, vomiting, swelling, redness, difficulty breathing  No driving with norco  Do not use acetaminophen with norco   [facility] [address]  [phone]            Patient's Medications   Discharge Prescriptions    CLINDAMYCIN (CLEOCIN) 150 MG CAPSULE    Take 2 capsules (300 mg total) by mouth 4 (four) times a day for 7 days       Start Date: 7/3/2018  End Date: 7/10/2018       Order Dose: 300 mg       Quantity: 56 capsule    Refills: 0    HYDROCODONE-ACETAMINOPHEN (NORCO) 5-325 MG PER TABLET    Take 1 tablet by mouth every 6 (six) hours as needed for pain for up to 5 days Max Daily Amount: 4 tablets       Start Date: 7/3/2018  End Date: 7/8/2018       Order Dose: 1 tablet       Quantity: 20 tablet    Refills: 0     No discharge procedures on file      ED Provider  Electronically Signed by           Mamie Schaumann, MD  07/03/18 6555

## 2018-07-09 ENCOUNTER — TRANSCRIBE ORDERS (OUTPATIENT)
Dept: ADMINISTRATIVE | Facility: HOSPITAL | Age: 48
End: 2018-07-09

## 2018-07-09 DIAGNOSIS — J34.89 OVERDEVELOPMENT OF NASAL BONES: Primary | ICD-10-CM

## 2018-07-09 DIAGNOSIS — J34.0 NASAL ULCER: ICD-10-CM

## 2018-07-09 PROCEDURE — 88312 SPECIAL STAINS GROUP 1: CPT | Performed by: PATHOLOGY

## 2018-07-09 PROCEDURE — 88305 TISSUE EXAM BY PATHOLOGIST: CPT | Performed by: PATHOLOGY

## 2018-07-10 ENCOUNTER — LAB REQUISITION (OUTPATIENT)
Dept: LAB | Facility: HOSPITAL | Age: 48
End: 2018-07-10
Payer: COMMERCIAL

## 2018-07-10 ENCOUNTER — HOSPITAL ENCOUNTER (EMERGENCY)
Facility: HOSPITAL | Age: 48
Discharge: LEFT BEFORE DISCHARGE | End: 2018-07-10
Attending: EMERGENCY MEDICINE
Payer: COMMERCIAL

## 2018-07-10 ENCOUNTER — APPOINTMENT (EMERGENCY)
Dept: RADIOLOGY | Facility: HOSPITAL | Age: 48
End: 2018-07-10
Payer: COMMERCIAL

## 2018-07-10 VITALS
TEMPERATURE: 98 F | HEART RATE: 84 BPM | OXYGEN SATURATION: 98 % | DIASTOLIC BLOOD PRESSURE: 87 MMHG | RESPIRATION RATE: 16 BRPM | SYSTOLIC BLOOD PRESSURE: 119 MMHG

## 2018-07-10 DIAGNOSIS — S16.1XXA STRAIN OF NECK MUSCLE, INITIAL ENCOUNTER: ICD-10-CM
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
## 2018-07-10 DIAGNOSIS — S09.90XA MINOR HEAD INJURY, INITIAL ENCOUNTER: Primary | ICD-10-CM

## 2018-07-10 DIAGNOSIS — J34.89 OTHER SPECIFIED DISORDERS OF NOSE AND NASAL SINUSES: ICD-10-CM

## 2018-07-10 PROCEDURE — 99284 EMERGENCY DEPT VISIT MOD MDM: CPT

## 2018-07-10 RX ORDER — ACETAMINOPHEN 325 MG/1
650 TABLET ORAL ONCE
Status: COMPLETED | OUTPATIENT
Start: 2018-07-10 | End: 2018-07-10

## 2018-07-10 RX ADMIN — ACETAMINOPHEN 650 MG: 325 TABLET ORAL at 19:23

## 2018-07-10 NOTE — ED NOTES
Dr Romo Fees at bedside  Pt requesting to sign out AMA  Patient also removed cervical collar at this time       Chaitanya Mix RN  07/10/18 3689
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
## 2018-07-11 ENCOUNTER — HOSPITAL ENCOUNTER (EMERGENCY)
Facility: HOSPITAL | Age: 48
Discharge: HOME | End: 2018-07-11
Attending: EMERGENCY MEDICINE | Admitting: EMERGENCY MEDICINE
Payer: COMMERCIAL

## 2018-07-11 ENCOUNTER — APPOINTMENT (EMERGENCY)
Dept: CT IMAGING | Facility: HOSPITAL | Age: 48
End: 2018-07-11
Payer: COMMERCIAL

## 2018-07-11 VITALS
RESPIRATION RATE: 16 BRPM | BODY MASS INDEX: 27.61 KG/M2 | DIASTOLIC BLOOD PRESSURE: 91 MMHG | TEMPERATURE: 98.3 F | HEART RATE: 89 BPM | WEIGHT: 226.85 LBS | SYSTOLIC BLOOD PRESSURE: 166 MMHG | OXYGEN SATURATION: 97 %

## 2018-07-11 DIAGNOSIS — G44.309 HEADACHE, POST-TRAUMATIC: Primary | ICD-10-CM
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
## 2018-07-11 DIAGNOSIS — S16.1XXA CERVICAL STRAIN: ICD-10-CM

## 2018-07-11 DIAGNOSIS — S06.0X9A CONCUSSION: ICD-10-CM

## 2018-07-11 PROCEDURE — 72125 CT NECK SPINE W/O DYE: CPT

## 2018-07-11 PROCEDURE — 99284 EMERGENCY DEPT VISIT MOD MDM: CPT

## 2018-07-11 PROCEDURE — 70450 CT HEAD/BRAIN W/O DYE: CPT

## 2018-07-11 RX ORDER — ACETAMINOPHEN 325 MG/1
650 TABLET ORAL ONCE
Status: COMPLETED | OUTPATIENT
Start: 2018-07-11 | End: 2018-07-11

## 2018-07-11 RX ORDER — NAPROXEN 500 MG/1
500 TABLET ORAL 2 TIMES DAILY WITH MEALS
Qty: 14 TABLET | Refills: 0 | Status: SHIPPED | OUTPATIENT
Start: 2018-07-11 | End: 2018-07-20 | Stop reason: SDUPTHER

## 2018-07-11 RX ORDER — ONDANSETRON 4 MG/1
4 TABLET, ORALLY DISINTEGRATING ORAL ONCE
Status: COMPLETED | OUTPATIENT
Start: 2018-07-11 | End: 2018-07-11

## 2018-07-11 RX ORDER — DIAZEPAM 5 MG/1
5 TABLET ORAL EVERY 8 HOURS PRN
Qty: 12 TABLET | Refills: 0 | Status: SHIPPED | OUTPATIENT
Start: 2018-07-11 | End: 2018-07-27 | Stop reason: ALTCHOICE

## 2018-07-11 RX ORDER — ONDANSETRON 4 MG/1
4 TABLET, ORALLY DISINTEGRATING ORAL EVERY 6 HOURS PRN
Qty: 10 TABLET | Refills: 0 | Status: SHIPPED | OUTPATIENT
Start: 2018-07-11 | End: 2018-09-05

## 2018-07-11 RX ADMIN — ONDANSETRON 4 MG: 4 TABLET, ORALLY DISINTEGRATING ORAL at 14:02

## 2018-07-11 RX ADMIN — ACETAMINOPHEN 650 MG: 325 TABLET, FILM COATED ORAL at 13:03

## 2018-07-11 NOTE — DISCHARGE INSTRUCTIONS
As discussed, you have been diagnosed with concussion  Be sure to drink plenty of fluids and get plenty of rest   Minimize eye strain-reading& use of electronic devices  You will need to follow up with the primary care physician for re-evaluation in the next week  Do not drive, operate machinery or make important decisions until cleared in follow-up  You may take acetaminophen as directed on over-the-counter packaging in addition to naproxen and muscle relaxer as needed for discomfort  You may take ondansetron as needed for nausea  Cervical Strain   WHAT YOU NEED TO KNOW:   A cervical strain is a stretched or torn muscle or tendon in your neck  Tendons are strong tissues that connect muscles to bones  Common causes of cervical strains include a car accident, a fall, or a sports injury  DISCHARGE INSTRUCTIONS:   Seek care immediately if:   · You have pain or numbness from your shoulder down to your hand  · You have problems with your vision, hearing, or balance  · You feel confused or cannot concentrate  · You have problems with movement and strength  Contact your healthcare provider if:   · You have increased swelling or pain in your neck  · You have questions or concerns about your condition or care  Medicines: You may need any of the following:  · Acetaminophen  decreases pain and fever  It is available without a doctor's order  Ask how much to take and how often to take it  Follow directions  Read the labels of all other medicines you are using to see if they also contain acetaminophen, or ask your doctor or pharmacist  Acetaminophen can cause liver damage if not taken correctly  Do not use more than 4 grams (4,000 milligrams) total of acetaminophen in one day  · NSAIDs , such as ibuprofen, help decrease swelling, pain, and fever  This medicine is available with or without a doctor's order  NSAIDs can cause stomach bleeding or kidney problems in certain people   If you take blood thinner medicine, always ask your healthcare provider if NSAIDs are safe for you  Always read the medicine label and follow directions  · Muscle relaxers  help decrease pain and muscle spasms  · Prescription pain medicine  may be given  Ask your healthcare provider how to take this medicine safely  Some prescription pain medicines contain acetaminophen  Do not take other medicines that contain acetaminophen without talking to your healthcare provider  Too much acetaminophen may cause liver damage  Prescription pain medicine may cause constipation  Ask your healthcare provider how to prevent or treat constipation  · Take your medicine as directed  Contact your healthcare provider if you think your medicine is not helping or if you have side effects  Tell him or her if you are allergic to any medicine  Keep a list of the medicines, vitamins, and herbs you take  Include the amounts, and when and why you take them  Bring the list or the pill bottles to follow-up visits  Carry your medicine list with you in case of an emergency  Manage your symptoms:   · Apply heat  on your neck for 15 to 20 minutes, 4 to 6 times a day or as directed  Heat helps decrease pain, stiffness, and muscle spasms  · Begin gentle neck exercises  as soon as you can move your neck without pain  Exercises will help decrease stiffness and improve the strength and movement of your neck  Ask your healthcare provider what kind of exercises you should do  · Gradually return to your usual activities as directed  Stop if you have pain  Avoid activities that can cause more damage to your neck, such as heavy lifting or strenuous exercise  · Sleep without a pillow  to help decrease pain  Instead, roll a small towel tightly and place it under your neck  · Go to physical therapy as directed  A physical therapist teaches you exercises to help improve movement and strength, and to decrease pain  Prevent neck injury:   · Drive safely  Make sure everyone in your car wears a seatbelt  A seatbelt can save your life if you are in an accident  Do not use your cell phone when you are driving  This could distract you and cause an accident  Pull over if you need to make a call or send a text message  · Wear helmets, lifejackets, and protective gear  Always wear a helmet when you ride a bike or motorcycle, go skiing, or play sports that could cause a head injury  Wear protective equipment when you play sports  Wear a lifejacket when you are on a boat or doing water sports  Follow up with your healthcare provider as directed: You may be referred to an orthopedist or physical therapies  Write down your questions so you remember to ask them during your visits  © 2017 [employer] Information is for End User's use only and may not be sold, redistributed or otherwise used for commercial purposes  All illustrations and images included in CareNotes® are the copyrighted property of [employer]  or Eleazar Patterson  The above information is an  only  It is not intended as medical advice for individual conditions or treatments  Talk to your doctor, nurse or pharmacist before following any medical regimen to see if it is safe and effective for you  Motor Vehicle Accident   WHAT YOU NEED TO KNOW:   A motor vehicle accident (MVA) can cause injury from the impact or from being thrown around inside the car  You may have a bruise on your abdomen, chest, or neck from the seatbelt  You may also have pain in your face, neck, or back  You may have pain in your knee, hip, or thigh if your body hits the dash or the steering wheel  Muscle pain is commonly worse 1 to 2 days after an MVA  DISCHARGE INSTRUCTIONS:   Call 911 if:   · You have new or worsening chest pain or shortness of breath  Return to the emergency department if:   · You have new or worsening pain in your abdomen       · You have nausea and vomiting that does not get better  · You have a severe headache  · You have weakness, tingling, or numbness in your arms or legs  · You have new or worsening pain that makes it hard for you to move  Contact your healthcare provider if:   · You have pain that develops 2 to 3 days after the MVA  · You have questions or concerns about your condition or care  Medicines:   · Pain medicine: You may be given medicine to take away or decrease pain  Do not wait until the pain is severe before you take your medicine  · NSAIDs , such as ibuprofen, help decrease swelling, pain, and fever  This medicine is available with or without a doctor's order  NSAIDs can cause stomach bleeding or kidney problems in certain people  If you take blood thinner medicine, always ask if NSAIDs are safe for you  Always read the medicine label and follow directions  Do not give these medicines to children under 10months of age without direction from your child's healthcare provider  · Take your medicine as directed  Contact your healthcare provider if you think your medicine is not helping or if you have side effects  Tell him of her if you are allergic to any medicine  Keep a list of the medicines, vitamins, and herbs you take  Include the amounts, and when and why you take them  Bring the list or the pill bottles to follow-up visits  Carry your medicine list with you in case of an emergency  Follow up with your healthcare provider as directed:  Write down your questions so you remember to ask them during your visits  Safety tips:   · Always wear your seatbelt  This will help reduce serious injury from an MVA  · Use child safety seats  Your child needs to ride in a child safety seat made for his age, height, and weight  Ask your healthcare provider for more information about child safety seats  · Decrease speed  Drive the speed limit to reduce your risk for an MVA  · Do not drive if you are tired    You will react more slowly when you are tired  The slowed reaction time will increase your risk for an MVA  · Do not talk or text on your cell phone while you drive  You cannot respond fast enough in an emergency if you are distracted by texts or conversations  · Do not drink and drive  Use a designated   Call a taxi or get a ride home with someone if you have been drinking  Do not let your friends drive if they have been drinking alcohol  · Do not use illegal drugs and drive  You may be more tired or take risks that you normally would not take  Do not drive after you take prescription medicines that make you sleepy  Self-care:   · Use ice and heat  Ice helps decrease swelling and pain  Ice may also help prevent tissue damage  Use an ice pack, or put crushed ice in a plastic bag  Cover it with a towel and apply to your injured area for 15 to 20 minutes every hour, or as directed  After 2 days, use a heating pad on your injured area  Use heat as directed  · Gently stretch  Use gentle exercises to stretch your muscles after an MVA  Ask your healthcare provider for exercises you can do  © 2017 [facility] [facility] Information is for End User's use only and may not be sold, redistributed or otherwise used for commercial purposes  All illustrations and images included in CareNotes® are the copyrighted property of [employer]  or Eleazar Patterson  The above information is an  only  It is not intended as medical advice for individual conditions or treatments  Talk to your doctor, nurse or pharmacist before following any medical regimen to see if it is safe and effective for you  Concussion   WHAT YOU NEED TO KNOW:   A concussion is a mild brain injury  It is usually caused by a bump or blow to the head from a fall, a motor vehicle crash, or a sports injury  Sometimes being shaken forcefully may cause a concussion     DISCHARGE INSTRUCTIONS:   Have someone else call 911 for the following:   · Someone tries to wake you and cannot do so  · You have a seizure, increasing confusion, or a change in personality  · Your speech becomes slurred, or you have new vision problems  Seek care immediately if:   · You have a severe headache that does not go away  · You have arm or leg weakness, numbness, or new problems with coordination  · You have blood or clear fluid coming out of the ears or nose  Contact your healthcare provider if:   · You have nausea or are vomiting  · You feel more sleepy than usual     · Your symptoms get worse  · Your symptoms last longer than 6 weeks after the injury  · You have questions or concerns about your condition or care  Medicines:   · Acetaminophen  helps to decrease pain  It is available without a doctor's order  Ask how much to take and how often to take it  Follow directions  Acetaminophen can cause liver damage if not taken correctly  · NSAIDs , such as ibuprofen, help decrease swelling and pain  NSAIDs can cause stomach bleeding or kidney problems in certain people  If you take blood thinner medicine, always ask your healthcare provider if NSAIDs are safe for you  Always read the medicine label and follow directions  · Take your medicine as directed  Contact your healthcare provider if you think your medicine is not helping or if you have side effects  Tell him or her if you are allergic to any medicine  Keep a list of the medicines, vitamins, and herbs you take  Include the amounts, and when and why you take them  Bring the list or the pill bottles to follow-up visits  Carry your medicine list with you in case of an emergency  Follow up with your healthcare provider as directed:  Write down your questions so you remember to ask them during your visits  Self-care:   · Rest  from physical and mental activities as directed  Mental activities are those that require thinking, concentration, and attention   You will need to rest until your symptoms are gone  Rest will allow you to recover from your concussion  Ask your healthcare provider when you can return to work and other daily activities  · Have someone stay with you for the first 24 hours after your injury  Your healthcare provider should be contacted if your symptoms get worse, or you develop new symptoms  · Do not participate in sports and physical activities until your healthcare provider says it is okay  They could make your symptoms worse or lead to another concussion  Your healthcare provider will tell you when it is okay for you to return to sports or physical activities  Prevent another concussion:   · Wear protective sports equipment that fit properly  Helmets help decrease your risk of a serious brain injury  Talk to your healthcare provider about ways that can decrease your risk for a concussion if you play sports  · Wear your seat belt  every time you travel  This helps to decrease your risk of a head injury if you are in a car accident  © 2017 [employer] [employer]  Information is for End User's use only and may not be sold, redistributed or otherwise used for commercial purposes  All illustrations and images included in CareNotes® are the copyrighted property of [employer]  or Eleazar Patterson  The above information is an  only  It is not intended as medical advice for individual conditions or treatments  Talk to your doctor, nurse or pharmacist before following any medical regimen to see if it is safe and effective for you
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
## 2018-07-11 NOTE — ED NOTES
PT returned from CT feeling "like he was going to throw up" upon leaving room PT began to wretch  Made Dr Fauzia Meyer aware         April Ya Gonzalez RN  07/11/18 9065

## 2018-07-11 NOTE — ED NOTES
PT awake and alert, no distress noted  No other questions upon d/c       April Kaor Hedrick RN  07/11/18 2425

## 2018-07-19 ENCOUNTER — HOSPITAL ENCOUNTER (OUTPATIENT)
Dept: SLEEP CENTER | Facility: CLINIC | Age: 48
Discharge: HOME | End: 2018-07-19
Payer: COMMERCIAL

## 2018-07-19 DIAGNOSIS — G47.33 OSA (OBSTRUCTIVE SLEEP APNEA): ICD-10-CM

## 2018-07-19 PROCEDURE — 95810 POLYSOM 6/> YRS 4/> PARAM: CPT

## 2018-07-20 ENCOUNTER — OFFICE VISIT (OUTPATIENT)
Dept: INTERNAL MEDICINE CLINIC | Facility: CLINIC | Age: 48
End: 2018-07-20
Payer: COMMERCIAL

## 2018-07-20 VITALS
DIASTOLIC BLOOD PRESSURE: 84 MMHG | SYSTOLIC BLOOD PRESSURE: 112 MMHG | WEIGHT: 224.87 LBS | TEMPERATURE: 98.6 F | BODY MASS INDEX: 27.37 KG/M2 | HEART RATE: 92 BPM

## 2018-07-20 DIAGNOSIS — M54.2 CERVICALGIA: ICD-10-CM
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
## 2018-07-20 PROCEDURE — 99214 OFFICE O/P EST MOD 30 MIN: CPT | Performed by: NURSE PRACTITIONER

## 2018-07-20 RX ORDER — TRIAMCINOLONE ACETONIDE 1 MG/G
CREAM TOPICAL
Refills: 0 | COMMUNITY
Start: 2018-07-09 | End: 2018-08-21 | Stop reason: ALTCHOICE

## 2018-07-20 RX ORDER — METHOCARBAMOL 500 MG/1
500 TABLET, FILM COATED ORAL 3 TIMES DAILY PRN
Qty: 30 TABLET | Refills: 0 | Status: SHIPPED | OUTPATIENT
Start: 2018-07-20 | End: 2018-08-21 | Stop reason: ALTCHOICE

## 2018-07-20 RX ORDER — NAPROXEN 500 MG/1
250 TABLET ORAL 2 TIMES DAILY WITH MEALS
Qty: 60 TABLET | Refills: 0 | Status: SHIPPED | OUTPATIENT
Start: 2018-07-20 | End: 2018-08-13 | Stop reason: SDUPTHER

## 2018-07-20 RX ORDER — OXYCODONE HYDROCHLORIDE 5 MG/1
5 TABLET ORAL EVERY 4 HOURS PRN
Refills: 0 | COMMUNITY
Start: 2018-07-17 | End: 2018-07-27 | Stop reason: ALTCHOICE

## 2018-07-20 RX ORDER — IBUPROFEN 800 MG/1
TABLET ORAL
Refills: 0 | COMMUNITY
Start: 2018-07-09 | End: 2018-08-21 | Stop reason: ALTCHOICE

## 2018-07-20 RX ORDER — NAPROXEN 500 MG/1
500 TABLET ORAL 2 TIMES DAILY WITH MEALS
Qty: 14 TABLET | Refills: 0 | Status: SHIPPED | OUTPATIENT
Start: 2018-07-20 | End: 2018-09-05

## 2018-07-20 NOTE — PROGRESS NOTES
Sleep Study Documentation    Pre-Sleep Study       Sleep testing procedure explained to patient:YES    Patient napped prior to study:NO    Caffeine:Dayshift worker after 12PM   Caffeine use:YES- coffee  18 ounces or more and soda  12 ounces    Alcohol:Dayshift workers after 5PM: Alcohol use:NO    Typical day for patient:YES       Study Documentation    Diagnostic   Snore:Mild to Moderate    Supplemental O2: no       Minimum SaO2:    78%  Baseline SaO2:     95%    EKG abnormalities: no     EEG abnormalities: no    Study Terminated:no    Patient classification: employed       Post-Sleep Study    Medication used at bedtime or during sleep study:NO    Patient reports time it took to fall asleep:less than 20 minutes    Patient reports waking up during study:1 to 2 times  Patient reports returning to sleep without difficulty  Patient reports sleeping 2 to 4 hours without dreaming  Patient reports sleep during study:typical    Patient rated sleepiness: Very sleepy or tired    PAP treatment:no
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
## 2018-07-26 ENCOUNTER — TELEPHONE (OUTPATIENT)
Dept: SLEEP CENTER | Facility: CLINIC | Age: 48
End: 2018-07-26

## 2018-07-27 ENCOUNTER — OFFICE VISIT (OUTPATIENT)
Dept: OBGYN CLINIC | Facility: OTHER | Age: 48
End: 2018-07-27
Payer: COMMERCIAL

## 2018-07-27 VITALS
HEART RATE: 93 BPM | SYSTOLIC BLOOD PRESSURE: 108 MMHG | DIASTOLIC BLOOD PRESSURE: 71 MMHG | WEIGHT: 220 LBS | BODY MASS INDEX: 26.78 KG/M2

## 2018-07-27 DIAGNOSIS — M54.2 NECK PAIN: Primary | ICD-10-CM

## 2018-07-27 DIAGNOSIS — M50.30 DDD (DEGENERATIVE DISC DISEASE), CERVICAL: ICD-10-CM

## 2018-07-27 DIAGNOSIS — M62.830 MUSCLE SPASM OF BACK: ICD-10-CM

## 2018-07-27 DIAGNOSIS — M54.50 ACUTE BILATERAL LOW BACK PAIN WITHOUT SCIATICA: ICD-10-CM

## 2018-07-27 DIAGNOSIS — S13.4XXA WHIPLASH INJURY TO NECK, INITIAL ENCOUNTER: ICD-10-CM

## 2018-07-27 PROCEDURE — 99203 OFFICE O/P NEW LOW 30 MIN: CPT | Performed by: INTERNAL MEDICINE

## 2018-07-27 RX ORDER — OLANZAPINE 15 MG/1
15 TABLET ORAL
Refills: 1 | COMMUNITY
Start: 2018-07-20 | End: 2018-08-13 | Stop reason: SDUPTHER

## 2018-07-27 RX ORDER — PREDNISONE 20 MG/1
20 TABLET ORAL DAILY
Qty: 6 TABLET | Refills: 0 | Status: SHIPPED | OUTPATIENT
Start: 2018-07-27 | End: 2018-08-21 | Stop reason: ALTCHOICE

## 2018-07-27 RX ORDER — CYCLOBENZAPRINE HCL 10 MG
10 TABLET ORAL 3 TIMES DAILY PRN
Qty: 30 TABLET | Refills: 0 | Status: SHIPPED | OUTPATIENT
Start: 2018-07-27 | End: 2018-08-10 | Stop reason: SDUPTHER
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
## 2018-07-30 ENCOUNTER — TELEPHONE (OUTPATIENT)
Dept: INTERNAL MEDICINE CLINIC | Facility: CLINIC | Age: 48
End: 2018-07-30

## 2018-07-30 ENCOUNTER — HOSPITAL ENCOUNTER (OUTPATIENT)
Dept: CT IMAGING | Facility: HOSPITAL | Age: 48
Discharge: HOME | End: 2018-07-30
Attending: OTOLARYNGOLOGY
Payer: COMMERCIAL

## 2018-07-30 DIAGNOSIS — G47.33 OSA (OBSTRUCTIVE SLEEP APNEA): Primary | ICD-10-CM

## 2018-07-30 DIAGNOSIS — J34.89 OVERDEVELOPMENT OF NASAL BONES: ICD-10-CM

## 2018-07-30 PROCEDURE — 70491 CT SOFT TISSUE NECK W/DYE: CPT

## 2018-07-30 RX ADMIN — IOHEXOL 85 ML: 350 INJECTION, SOLUTION INTRAVENOUS at 13:36

## 2018-07-30 NOTE — TELEPHONE ENCOUNTER
Pt is scheduled for a f/u with sleep medicine on 8/1/18   Dx g 47 33    Can you please put in an order?

## 2018-08-01 ENCOUNTER — TELEPHONE (OUTPATIENT)
Dept: INTERNAL MEDICINE CLINIC | Facility: CLINIC | Age: 48
End: 2018-08-01

## 2018-08-01 NOTE — TELEPHONE ENCOUNTER
I am not currently in the clinic and will not be until August 13th  If this requires timely intervention, please forward to the appropriate SOD resident currently in the clinic

## 2018-08-01 NOTE — TELEPHONE ENCOUNTER
Please review ([facility]) form placed in the (red) team folder in the provider flow station  ([facility],-[phone]) when form is complete  If the form requires a signature by a MD or DO, please review it with them and sign the form  Please place the form in the (red) team folder in the Clinical flow station at the [address] [facility] and reply to this task to the Clinical Pool       APPT ON 08/13/2018

## 2018-08-02 ENCOUNTER — TELEPHONE (OUTPATIENT)
Dept: PREADMISSION TESTING | Facility: HOSPITAL | Age: 48
End: 2018-08-02

## 2018-08-02 NOTE — TELEPHONE ENCOUNTER
PT  IS SCHEDULED WITH DR Jesus Iverson ON 8/13/18 FOR PRE-OP CLEARANCE  FORM WILL BE IN RED PROVIDER FOLDER UNTIL THAT APPT

## 2018-08-10 DIAGNOSIS — M62.830 MUSCLE SPASM OF BACK: ICD-10-CM

## 2018-08-10 RX ORDER — CYCLOBENZAPRINE HCL 10 MG
10 TABLET ORAL 3 TIMES DAILY PRN
Qty: 30 TABLET | Refills: 0 | Status: CANCELLED | OUTPATIENT
Start: 2018-08-10

## 2018-08-10 RX ORDER — CYCLOBENZAPRINE HCL 10 MG
10 TABLET ORAL 2 TIMES DAILY PRN
Qty: 60 TABLET | Refills: 0 | Status: SHIPPED | OUTPATIENT
Start: 2018-08-10 | End: 2018-09-05

## 2018-08-10 NOTE — TELEPHONE ENCOUNTER
This patient called today stating that he is still having the pain in his neck on the right side, he has an appointment for next Friday with physical therapy but he is requesting a refill on this medication in the meantime for the pain please advise  Please call the patient to let him know if it will be refilled or not   Thank you

## 2018-08-13 ENCOUNTER — OFFICE VISIT (OUTPATIENT)
Dept: INTERNAL MEDICINE CLINIC | Facility: CLINIC | Age: 48
End: 2018-08-13
Payer: COMMERCIAL

## 2018-08-13 ENCOUNTER — TELEPHONE (OUTPATIENT)
Dept: OTHER | Facility: HOSPITAL | Age: 48
End: 2018-08-13

## 2018-08-13 VITALS
TEMPERATURE: 98.1 F | DIASTOLIC BLOOD PRESSURE: 98 MMHG | BODY MASS INDEX: 28.78 KG/M2 | WEIGHT: 231.48 LBS | HEIGHT: 75 IN | SYSTOLIC BLOOD PRESSURE: 150 MMHG | HEART RATE: 92 BPM

## 2018-08-13 DIAGNOSIS — J34.2 DEVIATED SEPTUM: ICD-10-CM

## 2018-08-13 DIAGNOSIS — Z01.818 PREOPERATIVE CLEARANCE: Primary | ICD-10-CM

## 2018-08-13 DIAGNOSIS — I10 ESSENTIAL HYPERTENSION: ICD-10-CM

## 2018-08-13 PROCEDURE — 99213 OFFICE O/P EST LOW 20 MIN: CPT | Performed by: INTERNAL MEDICINE

## 2018-08-13 RX ORDER — FLUTICASONE PROPIONATE 50 MCG
SPRAY, SUSPENSION (ML) NASAL
Refills: 2 | COMMUNITY
Start: 2018-07-29 | End: 2018-08-24 | Stop reason: HOSPADM

## 2018-08-13 NOTE — ASSESSMENT & PLAN NOTE
· Pt with RCRI score of 0, thus represents a low risk of cardiac event  · Pt with good constitutional status  · No contra-indications to performing surgery on 8/24     · No additional lab workup at this point unless otherwise dictated by surgeon

## 2018-08-13 NOTE — PROGRESS NOTES
Assessment/Plan:       Problem List Items Addressed This Visit        Respiratory    Deviated septum     · See plan for preop clearance            Cardiovascular and Mediastinum    Hypertension     · Blood pressure 150/98 in office today  · Should not hold up surgery, however recommend addressing at future follow-up appointment             Other    Preoperative clearance - Primary     · Pt with RCRI score of 0, thus represents a low risk of cardiac event  · Pt with good constitutional status  · No contra-indications to performing surgery on 8/24  · No additional lab workup at this point unless otherwise dictated by surgeon                 Subjective:      Patient ID: Zeke Daugherty is a 50 y o  male  Pt here for preop clearance for nasal septal repair on 8/24  He broke his nose years ago and also had a brown recluse spider bite about 3 months ago  Denies hx of heart attack or stroke  Good constitutional status  Able to walk up 2 flights of stairs and 4 city blocks without stopping  No chest pain or shortness of breath  No hx of diabetes or kidney disease  The following portions of the patient's history were reviewed and updated as appropriate: past medical history and problem list     Review of Systems   Constitutional: Negative for chills and fever  Respiratory: Negative for shortness of breath and wheezing  Cardiovascular: Negative for chest pain  Gastrointestinal: Negative for abdominal pain, diarrhea, nausea and vomiting  Neurological: Negative for dizziness and light-headedness  Objective:    /98 (BP Location: Right arm, Patient Position: Sitting, Cuff Size: Adult)   Pulse 92   Temp 98 1 °F (36 7 °C) (Oral)   Ht 6' 3" (1 905 m)   Wt 105 kg (231 lb 7 7 oz)   BMI 28 93 kg/m²      Physical Exam   Constitutional: He is oriented to person, place, and time  He appears well-developed and well-nourished  No distress  HENT:   Head: Normocephalic and atraumatic     Nose: Nasal deformity and septal deviation present  Eyes: Conjunctivae are normal  Pupils are equal, round, and reactive to light  Cardiovascular: Normal rate, regular rhythm, normal heart sounds and intact distal pulses  Pulmonary/Chest: Effort normal and breath sounds normal    Abdominal: Soft  Normal appearance  There is no tenderness  Musculoskeletal: Normal range of motion  He exhibits no edema  Neurological: He is alert and oriented to person, place, and time  No cranial nerve deficit  Skin: Skin is warm and dry  He is not diaphoretic  Psychiatric: He has a normal mood and affect  Vitals reviewed

## 2018-08-13 NOTE — TELEPHONE ENCOUNTER
Please call patient to schedule an appointment for next week for a BLOOD PRESSURE CHECK  Patient was seen by me today in the clinic and was noted to have a systolic blood pressure of 150, which is higher than his baseline  We would like to recheck his blood pressure before he undergoes his surgery on 8/24  Thank you!

## 2018-08-13 NOTE — ASSESSMENT & PLAN NOTE
· Blood pressure 150/98 in office today  · Should not hold up surgery, however recommend addressing at future follow-up appointment

## 2018-08-19 DIAGNOSIS — E78.5 DYSLIPIDEMIA: ICD-10-CM

## 2018-08-19 RX ORDER — ATORVASTATIN CALCIUM 10 MG/1
TABLET, FILM COATED ORAL
Qty: 90 TABLET | Refills: 1 | Status: SHIPPED | OUTPATIENT
Start: 2018-08-19 | End: 2018-08-21 | Stop reason: ALTCHOICE

## 2018-08-20 ENCOUNTER — EVALUATION (OUTPATIENT)
Dept: PHYSICAL THERAPY | Facility: CLINIC | Age: 48
End: 2018-08-20
Payer: COMMERCIAL

## 2018-08-20 DIAGNOSIS — S13.4XXD WHIPLASH INJURY TO NECK, SUBSEQUENT ENCOUNTER: ICD-10-CM

## 2018-08-20 DIAGNOSIS — M54.2 NECK PAIN: ICD-10-CM

## 2018-08-20 DIAGNOSIS — M50.30 DDD (DEGENERATIVE DISC DISEASE), CERVICAL: ICD-10-CM

## 2018-08-20 DIAGNOSIS — M62.830 MUSCLE SPASM OF BACK: ICD-10-CM

## 2018-08-20 PROCEDURE — G8991 OTHER PT/OT GOAL STATUS: HCPCS | Performed by: PHYSICAL THERAPIST

## 2018-08-20 PROCEDURE — 97110 THERAPEUTIC EXERCISES: CPT | Performed by: PHYSICAL THERAPIST

## 2018-08-20 PROCEDURE — G8990 OTHER PT/OT CURRENT STATUS: HCPCS | Performed by: PHYSICAL THERAPIST
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
## 2018-08-21 ENCOUNTER — TRANSCRIBE ORDERS (OUTPATIENT)
Dept: LAB | Facility: CLINIC | Age: 48
End: 2018-08-21

## 2018-08-21 ENCOUNTER — APPOINTMENT (OUTPATIENT)
Dept: LAB | Facility: CLINIC | Age: 48
End: 2018-08-21
Payer: COMMERCIAL

## 2018-08-21 ENCOUNTER — LAB (OUTPATIENT)
Dept: LAB | Facility: CLINIC | Age: 48
End: 2018-08-21
Payer: COMMERCIAL

## 2018-08-21 DIAGNOSIS — R09.81 NASAL CONGESTION: Primary | ICD-10-CM

## 2018-08-21 DIAGNOSIS — R09.81 NASAL CONGESTION: ICD-10-CM

## 2018-08-21 LAB
ANION GAP SERPL CALCULATED.3IONS-SCNC: 9 MMOL/L (ref 4–13)
BASOPHILS # BLD AUTO: 0.04 THOUSANDS/ΜL (ref 0–0.1)
BASOPHILS NFR BLD AUTO: 1 % (ref 0–1)
BUN SERPL-MCNC: 12 MG/DL (ref 5–25)
CALCIUM SERPL-MCNC: 9.2 MG/DL (ref 8.3–10.1)
CHLORIDE SERPL-SCNC: 102 MMOL/L (ref 100–108)
CO2 SERPL-SCNC: 29 MMOL/L (ref 21–32)
CREAT SERPL-MCNC: 1.02 MG/DL (ref 0.6–1.3)
EOSINOPHIL # BLD AUTO: 0.29 THOUSAND/ΜL (ref 0–0.61)
EOSINOPHIL NFR BLD AUTO: 3 % (ref 0–6)
ERYTHROCYTE [DISTWIDTH] IN BLOOD BY AUTOMATED COUNT: 12.7 % (ref 11.6–15.1)
GFR SERPL CREATININE-BSD FRML MDRD: 87 ML/MIN/1.73SQ M
GLUCOSE SERPL-MCNC: 100 MG/DL (ref 65–140)
HCT VFR BLD AUTO: 49.4 % (ref 36.5–49.3)
HGB BLD-MCNC: 16.3 G/DL (ref 12–17)
IMM GRANULOCYTES # BLD AUTO: 0.03 THOUSAND/UL (ref 0–0.2)
IMM GRANULOCYTES NFR BLD AUTO: 0 % (ref 0–2)
LYMPHOCYTES # BLD AUTO: 2.31 THOUSANDS/ΜL (ref 0.6–4.47)
LYMPHOCYTES NFR BLD AUTO: 27 % (ref 14–44)
MCH RBC QN AUTO: 31.2 PG (ref 26.8–34.3)
MCHC RBC AUTO-ENTMCNC: 33 G/DL (ref 31.4–37.4)
MCV RBC AUTO: 95 FL (ref 82–98)
MONOCYTES # BLD AUTO: 0.62 THOUSAND/ΜL (ref 0.17–1.22)
MONOCYTES NFR BLD AUTO: 7 % (ref 4–12)
NEUTROPHILS # BLD AUTO: 5.27 THOUSANDS/ΜL (ref 1.85–7.62)
NEUTS SEG NFR BLD AUTO: 62 % (ref 43–75)
NRBC BLD AUTO-RTO: 0 /100 WBCS
PLATELET # BLD AUTO: 218 THOUSANDS/UL (ref 149–390)
PMV BLD AUTO: 9.8 FL (ref 8.9–12.7)
POTASSIUM SERPL-SCNC: 3.6 MMOL/L (ref 3.5–5.3)
RBC # BLD AUTO: 5.23 MILLION/UL (ref 3.88–5.62)
SODIUM SERPL-SCNC: 140 MMOL/L (ref 136–145)
WBC # BLD AUTO: 8.56 THOUSAND/UL (ref 4.31–10.16)

## 2018-08-21 PROCEDURE — 36415 COLL VENOUS BLD VENIPUNCTURE: CPT

## 2018-08-21 PROCEDURE — 80048 BASIC METABOLIC PNL TOTAL CA: CPT

## 2018-08-21 PROCEDURE — 85025 COMPLETE CBC W/AUTO DIFF WBC: CPT

## 2018-08-21 PROCEDURE — 93005 ELECTROCARDIOGRAM TRACING: CPT

## 2018-08-21 PROCEDURE — 97161 PT EVAL LOW COMPLEX 20 MIN: CPT | Performed by: PHYSICAL THERAPIST

## 2018-08-21 NOTE — PRE-PROCEDURE INSTRUCTIONS
Pre-Surgery Instructions:   Medication Instructions    aspirin (ECOTRIN LOW STRENGTH) 81 mg EC tablet Instructed patient per Anesthesia Guidelines  stop  Now 8/21/18    cyclobenzaprine (FLEXERIL) 10 mg tablet Instructed patient per Anesthesia Guidelines  last dose 8/23/18    fluticasone (FLONASE) 50 mcg/act nasal spray Instructed patient per Anesthesia Guidelines  dos    lidocaine (XYLOCAINE) 5 % ointment Instructed patient per Anesthesia Guidelines  dos    mupirocin (BACTROBAN) 2 % ointment Instructed patient per Anesthesia Guidelines   naproxen (NAPROSYN) 500 mg tablet Instructed patient per Anesthesia Guidelines  stop now 8/21//18    OLANZapine (ZyPREXA) 10 mg tablet Instructed patient per Anesthesia Guidelines  dos    ondansetron (ZOFRAN-ODT) 4 mg disintegrating tablet Instructed patient per Anesthesia Guidelines   testosterone cypionate (DEPO-TESTOSTERONE) 200 mg/mL SOLN Instructed patient per Anesthesia Guidelines  5HT3 Antagonists Med Class     Continue to take this medication on your normal schedule  If this is an oral medication and you take it in the morning, then you may take this medicine with a sip of water  Antipsychotic Med Class     Continue to take this medication on your normal schedule  If this is an oral medication and you take it in the morning, then you may take this medicine with a sip of water  ASA Med Class: Aspirin     Should be discontinued at least one week prior to planned operation, unless specifically stated otherwise by surgical service  Your Surgeon may have patient stop taking aspirin up to a week before surgery if having intracranial, middle ear, posterior eye, spine surgery or prostate surgery  [Patients taking aspirin for coronary stents should be reviewed by an anesthesiologist in the optimization clinic    Please do not discontinue aspirin in patients with coronary stents unless given specific permission to do so by the cardiologist who prescribed medication ] If your surgeon approves please continue to take this medication on your normal schedule  You may take this medication on the morning of your surgery with a sip of water  NSAID Med Class     Stop taking this medication at least 3 days prior to surgery/procedure

## 2018-08-22 ENCOUNTER — OFFICE VISIT (OUTPATIENT)
Dept: PHYSICAL THERAPY | Facility: CLINIC | Age: 48
End: 2018-08-22
Payer: COMMERCIAL

## 2018-08-22 ENCOUNTER — TELEPHONE (OUTPATIENT)
Dept: OTHER | Facility: HOSPITAL | Age: 48
End: 2018-08-22

## 2018-08-22 ENCOUNTER — CLINICAL SUPPORT (OUTPATIENT)
Dept: INTERNAL MEDICINE CLINIC | Facility: CLINIC | Age: 48
End: 2018-08-22
Payer: COMMERCIAL

## 2018-08-22 VITALS — DIASTOLIC BLOOD PRESSURE: 64 MMHG | SYSTOLIC BLOOD PRESSURE: 120 MMHG | HEART RATE: 74 BPM

## 2018-08-22 DIAGNOSIS — I10 ESSENTIAL HYPERTENSION: Primary | ICD-10-CM

## 2018-08-22 DIAGNOSIS — M50.30 DDD (DEGENERATIVE DISC DISEASE), CERVICAL: ICD-10-CM

## 2018-08-22 DIAGNOSIS — S13.4XXD WHIPLASH INJURY TO NECK, SUBSEQUENT ENCOUNTER: ICD-10-CM

## 2018-08-22 DIAGNOSIS — M54.2 NECK PAIN: Primary | ICD-10-CM

## 2018-08-22 LAB
ATRIAL RATE: 96 BPM
P AXIS: -14 DEGREES
PR INTERVAL: 122 MS
QRS AXIS: 38 DEGREES
QRSD INTERVAL: 94 MS
QT INTERVAL: 342 MS
QTC INTERVAL: 432 MS
T WAVE AXIS: 54 DEGREES
VENTRICULAR RATE: 96 BPM

## 2018-08-22 PROCEDURE — 93010 ELECTROCARDIOGRAM REPORT: CPT | Performed by: INTERNAL MEDICINE

## 2018-08-22 PROCEDURE — 99211 OFF/OP EST MAY X REQ PHY/QHP: CPT

## 2018-08-22 PROCEDURE — 97110 THERAPEUTIC EXERCISES: CPT | Performed by: PHYSICAL THERAPIST
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
## 2018-08-22 NOTE — TELEPHONE ENCOUNTER
Please call patient and ask if he can come back to the clinic ASAP to recheck his blood pressure, preferably tomorrow  He was supposed to come back for a repeat BP check, but he did not  If he does not follow up, this could potentially delay his surgery  Thank you

## 2018-08-22 NOTE — PROGRESS NOTES
PT  CAME INTO THE OFFICE TODAY FOR A NURSE VISIT FOR A BP CHECK ORDERED BY DR Hannah Miranda  PT  IS SCHEDULED FOR NASAL SURGERY ON 8/24/18 AND BP WAS ELEVATED AT HIS LAST OFFICE VISIT HERE ON 8/13/18  BP TODAY IN THE OFFICE /64 ON LEFT ARM WITH PULSE OF 74  I ASKED PT  IF HE HAD ANY PREOP FORMS THAT HE NEEDED FILLED OUT AND HE SAID NO BUT WE CALLED ENT OFFICE TO VERIFY AND THEY ALSO SAID NO FORMS NEEDED  PT  ALSO STATED HE FORGOT TO TELL YOU AT HIS VISIT THAT HE STOPPED GIVING HIMSELF TESTOSTERONE INJECTIONS AT HOME   HE SAID HE JUST DOESN'T WANT TO DO IT

## 2018-08-22 NOTE — TELEPHONE ENCOUNTER
Thank you! Patient's BP today was 120/64 in office  This should not hold up his surgery  Patient has a preop clearance form from ENT which was already signed by me; it should be in the RED resident bin  Please fax form to ENT office  Please let me know if any other questions

## 2018-08-24 ENCOUNTER — ANESTHESIA EVENT (OUTPATIENT)
Dept: PERIOP | Facility: HOSPITAL | Age: 48
End: 2018-08-24
Payer: COMMERCIAL

## 2018-08-24 ENCOUNTER — HOSPITAL ENCOUNTER (EMERGENCY)
Facility: HOSPITAL | Age: 48
Discharge: HOME | End: 2018-08-25
Attending: EMERGENCY MEDICINE | Admitting: EMERGENCY MEDICINE
Payer: COMMERCIAL

## 2018-08-24 ENCOUNTER — ANESTHESIA (OUTPATIENT)
Dept: PERIOP | Facility: HOSPITAL | Age: 48
End: 2018-08-24
Payer: COMMERCIAL

## 2018-08-24 ENCOUNTER — HOSPITAL ENCOUNTER (OUTPATIENT)
Facility: HOSPITAL | Age: 48
Setting detail: OUTPATIENT SURGERY
Discharge: HOME | End: 2018-08-24
Attending: OTOLARYNGOLOGY | Admitting: OTOLARYNGOLOGY
Payer: COMMERCIAL

## 2018-08-24 VITALS
OXYGEN SATURATION: 97 % | WEIGHT: 245 LBS | BODY MASS INDEX: 29.83 KG/M2 | RESPIRATION RATE: 20 BRPM | HEART RATE: 78 BPM | SYSTOLIC BLOOD PRESSURE: 131 MMHG | HEIGHT: 76 IN | DIASTOLIC BLOOD PRESSURE: 80 MMHG | TEMPERATURE: 99 F

## 2018-08-24 DIAGNOSIS — Z51.89 VISIT FOR WOUND CHECK: Primary | ICD-10-CM

## 2018-08-24 DIAGNOSIS — J34.89 OTHER SPECIFIED DISORDERS OF NOSE AND NASAL SINUSES: ICD-10-CM

## 2018-08-24 DIAGNOSIS — J34.2 DEVIATED NASAL SEPTUM: ICD-10-CM

## 2018-08-24 DIAGNOSIS — R09.81 NASAL CONGESTION: ICD-10-CM

## 2018-08-24 PROCEDURE — 88305 TISSUE EXAM BY PATHOLOGIST: CPT | Performed by: PATHOLOGY

## 2018-08-24 RX ORDER — GLYCOPYRROLATE 0.2 MG/ML
INJECTION INTRAMUSCULAR; INTRAVENOUS AS NEEDED
Status: DISCONTINUED | OUTPATIENT
Start: 2018-08-24 | End: 2018-08-24 | Stop reason: SURG

## 2018-08-24 RX ORDER — FENTANYL CITRATE 50 UG/ML
INJECTION, SOLUTION INTRAMUSCULAR; INTRAVENOUS AS NEEDED
Status: DISCONTINUED | OUTPATIENT
Start: 2018-08-24 | End: 2018-08-24 | Stop reason: SURG

## 2018-08-24 RX ORDER — PROPOFOL 10 MG/ML
INJECTION, EMULSION INTRAVENOUS AS NEEDED
Status: DISCONTINUED | OUTPATIENT
Start: 2018-08-24 | End: 2018-08-24 | Stop reason: SURG

## 2018-08-24 RX ORDER — SODIUM CHLORIDE/ALOE VERA
GEL (GRAM) NASAL AS NEEDED
Status: DISCONTINUED | OUTPATIENT
Start: 2018-08-24 | End: 2018-08-24 | Stop reason: HOSPADM

## 2018-08-24 RX ORDER — LIDOCAINE HYDROCHLORIDE AND EPINEPHRINE 10; 10 MG/ML; UG/ML
INJECTION, SOLUTION INFILTRATION; PERINEURAL AS NEEDED
Status: DISCONTINUED | OUTPATIENT
Start: 2018-08-24 | End: 2018-08-24 | Stop reason: HOSPADM

## 2018-08-24 RX ORDER — ONDANSETRON 2 MG/ML
4 INJECTION INTRAMUSCULAR; INTRAVENOUS EVERY 4 HOURS PRN
Status: COMPLETED | OUTPATIENT
Start: 2018-08-24 | End: 2018-08-24

## 2018-08-24 RX ORDER — SODIUM CHLORIDE, SODIUM LACTATE, POTASSIUM CHLORIDE, CALCIUM CHLORIDE 600; 310; 30; 20 MG/100ML; MG/100ML; MG/100ML; MG/100ML
125 INJECTION, SOLUTION INTRAVENOUS CONTINUOUS
Status: DISCONTINUED | OUTPATIENT
Start: 2018-08-24 | End: 2018-08-24 | Stop reason: HOSPADM

## 2018-08-24 RX ORDER — CEFAZOLIN SODIUM 1 G/3ML
INJECTION, POWDER, FOR SOLUTION INTRAMUSCULAR; INTRAVENOUS AS NEEDED
Status: DISCONTINUED | OUTPATIENT
Start: 2018-08-24 | End: 2018-08-24 | Stop reason: SURG

## 2018-08-24 RX ORDER — SODIUM CHLORIDE, SODIUM LACTATE, POTASSIUM CHLORIDE, CALCIUM CHLORIDE 600; 310; 30; 20 MG/100ML; MG/100ML; MG/100ML; MG/100ML
100 INJECTION, SOLUTION INTRAVENOUS CONTINUOUS
Status: DISCONTINUED | OUTPATIENT
Start: 2018-08-24 | End: 2018-08-24 | Stop reason: HOSPADM

## 2018-08-24 RX ORDER — SODIUM CHLORIDE, SODIUM LACTATE, POTASSIUM CHLORIDE, CALCIUM CHLORIDE 600; 310; 30; 20 MG/100ML; MG/100ML; MG/100ML; MG/100ML
20 INJECTION, SOLUTION INTRAVENOUS CONTINUOUS
Status: DISCONTINUED | OUTPATIENT
Start: 2018-08-24 | End: 2018-08-24 | Stop reason: HOSPADM

## 2018-08-24 RX ORDER — HYDROCODONE BITARTRATE AND ACETAMINOPHEN 5; 325 MG/1; MG/1
1 TABLET ORAL EVERY 4 HOURS PRN
Status: DISCONTINUED | OUTPATIENT
Start: 2018-08-24 | End: 2018-08-24 | Stop reason: HOSPADM

## 2018-08-24 RX ORDER — FENTANYL CITRATE/PF 50 MCG/ML
25 SYRINGE (ML) INJECTION
Status: DISCONTINUED | OUTPATIENT
Start: 2018-08-24 | End: 2018-08-24 | Stop reason: HOSPADM

## 2018-08-24 RX ORDER — CEPHALEXIN 500 MG/1
500 CAPSULE ORAL 4 TIMES DAILY
Qty: 40 CAPSULE | Refills: 0 | Status: SHIPPED | OUTPATIENT
Start: 2018-08-24 | End: 2018-09-03

## 2018-08-24 RX ORDER — ONDANSETRON 2 MG/ML
INJECTION INTRAMUSCULAR; INTRAVENOUS AS NEEDED
Status: DISCONTINUED | OUTPATIENT
Start: 2018-08-24 | End: 2018-08-24 | Stop reason: SURG

## 2018-08-24 RX ORDER — MIDAZOLAM HYDROCHLORIDE 1 MG/ML
INJECTION INTRAMUSCULAR; INTRAVENOUS AS NEEDED
Status: DISCONTINUED | OUTPATIENT
Start: 2018-08-24 | End: 2018-08-24 | Stop reason: SURG

## 2018-08-24 RX ORDER — OXYMETAZOLINE HYDROCHLORIDE 0.05 G/100ML
SPRAY NASAL AS NEEDED
Status: DISCONTINUED | OUTPATIENT
Start: 2018-08-24 | End: 2018-08-24 | Stop reason: HOSPADM

## 2018-08-24 RX ORDER — ONDANSETRON 2 MG/ML
4 INJECTION INTRAMUSCULAR; INTRAVENOUS EVERY 6 HOURS PRN
Status: DISCONTINUED | OUTPATIENT
Start: 2018-08-24 | End: 2018-08-24 | Stop reason: HOSPADM

## 2018-08-24 RX ORDER — METOCLOPRAMIDE HYDROCHLORIDE 5 MG/ML
10 INJECTION INTRAMUSCULAR; INTRAVENOUS ONCE
Status: COMPLETED | OUTPATIENT
Start: 2018-08-24 | End: 2018-08-24

## 2018-08-24 RX ORDER — MAGNESIUM HYDROXIDE 1200 MG/15ML
LIQUID ORAL AS NEEDED
Status: DISCONTINUED | OUTPATIENT
Start: 2018-08-24 | End: 2018-08-24 | Stop reason: HOSPADM

## 2018-08-24 RX ORDER — HYDROCODONE BITARTRATE AND ACETAMINOPHEN 5; 325 MG/1; MG/1
1 TABLET ORAL
Qty: 10 TABLET | Refills: 0 | Status: SHIPPED | OUTPATIENT
Start: 2018-08-24 | End: 2018-09-03

## 2018-08-24 RX ORDER — DEXMEDETOMIDINE HYDROCHLORIDE 100 UG/ML
INJECTION, SOLUTION INTRAVENOUS AS NEEDED
Status: DISCONTINUED | OUTPATIENT
Start: 2018-08-24 | End: 2018-08-24 | Stop reason: SURG

## 2018-08-24 RX ORDER — ROCURONIUM BROMIDE 10 MG/ML
INJECTION, SOLUTION INTRAVENOUS AS NEEDED
Status: DISCONTINUED | OUTPATIENT
Start: 2018-08-24 | End: 2018-08-24 | Stop reason: SURG

## 2018-08-24 RX ADMIN — CEFAZOLIN 500 MG: 1 INJECTION, POWDER, FOR SOLUTION INTRAVENOUS at 09:51

## 2018-08-24 RX ADMIN — GLYCOPYRROLATE 0.4 MG: 0.2 INJECTION, SOLUTION INTRAMUSCULAR; INTRAVENOUS at 12:51

## 2018-08-24 RX ADMIN — PROPOFOL 200 MG: 10 INJECTION, EMULSION INTRAVENOUS at 09:54

## 2018-08-24 RX ADMIN — SODIUM CHLORIDE, SODIUM LACTATE, POTASSIUM CHLORIDE, AND CALCIUM CHLORIDE: .6; .31; .03; .02 INJECTION, SOLUTION INTRAVENOUS at 12:04

## 2018-08-24 RX ADMIN — DEXMEDETOMIDINE HYDROCHLORIDE 20 MCG: 100 INJECTION, SOLUTION INTRAVENOUS at 12:54

## 2018-08-24 RX ADMIN — ONDANSETRON 4 MG: 2 INJECTION INTRAMUSCULAR; INTRAVENOUS at 12:47

## 2018-08-24 RX ADMIN — DEXMEDETOMIDINE HYDROCHLORIDE 20 MCG: 100 INJECTION, SOLUTION INTRAVENOUS at 11:52

## 2018-08-24 RX ADMIN — SODIUM CHLORIDE, SODIUM LACTATE, POTASSIUM CHLORIDE, AND CALCIUM CHLORIDE 20 ML/HR: .6; .31; .03; .02 INJECTION, SOLUTION INTRAVENOUS at 08:39

## 2018-08-24 RX ADMIN — ONDANSETRON 4 MG: 2 INJECTION, SOLUTION INTRAMUSCULAR; INTRAVENOUS at 13:16

## 2018-08-24 RX ADMIN — MIDAZOLAM 2 MG: 1 INJECTION INTRAMUSCULAR; INTRAVENOUS at 09:46

## 2018-08-24 RX ADMIN — FENTANYL CITRATE 25 MCG: 50 INJECTION INTRAMUSCULAR; INTRAVENOUS at 13:47

## 2018-08-24 RX ADMIN — FENTANYL CITRATE 25 MCG: 50 INJECTION INTRAMUSCULAR; INTRAVENOUS at 13:52

## 2018-08-24 RX ADMIN — NEOSTIGMINE METHYLSULFATE 3 MG: 1 INJECTION, SOLUTION INTRAMUSCULAR; INTRAVENOUS; SUBCUTANEOUS at 12:51

## 2018-08-24 RX ADMIN — DEXAMETHASONE SODIUM PHOSPHATE 10 MG: 10 INJECTION INTRAMUSCULAR; INTRAVENOUS at 10:09

## 2018-08-24 RX ADMIN — FENTANYL CITRATE 25 MCG: 50 INJECTION INTRAMUSCULAR; INTRAVENOUS at 14:02

## 2018-08-24 RX ADMIN — FENTANYL CITRATE 100 MCG: 50 INJECTION, SOLUTION INTRAMUSCULAR; INTRAVENOUS at 11:38

## 2018-08-24 RX ADMIN — FENTANYL CITRATE 25 MCG: 50 INJECTION INTRAMUSCULAR; INTRAVENOUS at 13:57

## 2018-08-24 RX ADMIN — ROCURONIUM BROMIDE 50 MG: 10 INJECTION INTRAVENOUS at 09:54

## 2018-08-24 RX ADMIN — CEFAZOLIN 1500 MG: 1 INJECTION, POWDER, FOR SOLUTION INTRAVENOUS at 10:10

## 2018-08-24 RX ADMIN — GLYCOPYRROLATE 0.2 MG: 0.2 INJECTION, SOLUTION INTRAMUSCULAR; INTRAVENOUS at 09:46

## 2018-08-24 RX ADMIN — FENTANYL CITRATE 100 MCG: 50 INJECTION, SOLUTION INTRAMUSCULAR; INTRAVENOUS at 09:54

## 2018-08-24 RX ADMIN — METOCLOPRAMIDE 10 MG: 5 INJECTION, SOLUTION INTRAMUSCULAR; INTRAVENOUS at 13:44

## 2018-08-24 NOTE — DISCHARGE INSTRUCTIONS
Cough and sneeze with mouth open  Minimize bending lifting and standing  No nose blowing  Place and change moustache dressing as needed      Functional Endoscopic Sinus Surgery for Rhinosinusitis   WHAT YOU NEED TO KNOW:   Functional endoscopic sinus surgery (FESS) removes tissue that blocks your sinus openings  DISCHARGE INSTRUCTIONS:   Medicines:   · Medicines  can help decrease pain or prevent bacterial infections  Ask your healthcare provider how to take prescription pain medicine safely  · Take your medicine as directed  Contact your healthcare provider if you think your medicine is not helping or if you have side effects  Tell him or her if you are allergic to any medicine  Keep a list of the medicines, vitamins, and herbs you take  Include the amounts, and when and why you take them  Bring the list or the pill bottles to follow-up visits  Carry your medicine list with you in case of an emergency  Follow up with your healthcare provider as directed:  Write down your questions so you remember to ask them during your visits  Home care:   · Avoid blowing your nose      · Drink plenty of liquids  Ask your healthcare provider how much liquid to drink each day and which liquids are best for you  Limit the amount of caffeine you drink  · Rinse your nose with salt water  This may help loosen your thick mucous so that it comes out more easily when you blow your nose  Ask your healthcare provider for more information on how to prepare and do nasal washings  · Use a cool-mist vaporizer or humidifier  to add moisture to the air  This helps thin your nasal discharge and prevent colds  Wash the humidifier each day with soap and warm water to keep it free of germs  · Wash your hands frequently  Wash your hands after you come into contact with a person who has a cold  Germ-killing hand lotion or gel may be used to clean your hands when there is no water available    Nasal packing:  Ask your healthcare provider how long the nasal packing will stay inside your nose  If it needs to be removed and replaced at home, ask your healthcare provider how to properly do this  Contact your healthcare provider if:   · You have a fever  · You have chills, a cough, or feel weak and achy  · You have bruises or swelling around your nose or eyes  · You have nausea or vomiting  · Blood soaks through your nasal packing  · Your skin is itchy, swollen, or has a rash  · You have questions or concerns about your condition or care  Seek care immediately or call 911 if:   · You have a stiff neck or eye pain, especially when you look directly at light  · You have a severe headache that does not go away even after you take pain medicine  · You have clear fluid coming from your nose  · You have pus or a foul-smelling odor coming from your nose  · You have trouble breathing, seeing, talking, or thinking clearly  · Your face is getting numb  · Your symptoms come back or become worse  © 2017 [address] [facility] Information is for End User's use only and may not be sold, redistributed or otherwise used for commercial purposes  All illustrations and images included in CareNotes® are the copyrighted property of A D A M [employer] Inc  or Eleazar Patterson  The above information is an  only  It is not intended as medical advice for individual conditions or treatments  Talk to your doctor, nurse or pharmacist before following any medical regimen to see if it is safe and effective for you

## 2018-08-24 NOTE — ANESTHESIA POSTPROCEDURE EVALUATION
Post-Op Assessment Note      CV Status:  Stable    Mental Status:  Lethargic    Hydration Status:  Stable    PONV Controlled:  None    Airway Patency:  Patent    Post Op Vitals Reviewed: Yes          Staff: CRNA       Comments: hr 73, O2 sat 97%, bp 119/78, rr 14, temp 97          BP      Temp (!) (P) 97 °F (36 1 °C) (08/24/18 1303)    Pulse (P) 72 (08/24/18 1303)   Resp (P) 12 (08/24/18 1303)    SpO2 (P) 98 % (08/24/18 1303)

## 2018-08-24 NOTE — ANESTHESIA PREPROCEDURE EVALUATION
Review of Systems/Medical History  Patient summary reviewed  Chart reviewed      Cardiovascular  Hyperlipidemia, Hypertension , Past MI , CAD , CHF ,    Pulmonary       GI/Hepatic    GERD well controlled, Liver disease ,             Endo/Other     GYN       Hematology   Musculoskeletal       Neurology   Psychology         Patient denies MI, CAD or CHF  States he was over worked in the [employer]  Physical Exam    Airway    Mallampati score: III  TM Distance: <3 FB  Neck ROM: limited     Dental       Cardiovascular      Pulmonary      Other Findings        Anesthesia Plan  ASA Score- 2     Anesthesia Type- general with ASA Monitors  Additional Monitors:   Airway Plan:         Plan Factors-    Induction- intravenous  Postoperative Plan-     Informed Consent- Anesthetic plan and risks discussed with patient  I personally reviewed this patient with the CRNA  Discussed and agreed on the Anesthesia Plan with the CRNA  Marilyn Olivo

## 2018-08-24 NOTE — OP NOTE
OPERATIVE REPORT  PATIENT NAME: Kody Lacey    :  1970  MRN: [id number]  Pt Location: [facility] OR ROOM 05    SURGERY DATE: 2018    Surgeon(s) and Role:     Gigi Syed MD - Primary    Preop Diagnosis:  Nasal congestion [R09 81]  Deviated nasal septum [J34 2]  Other specified disorders of nose and nasal sinuses [J34 89] nasal ulcer  Left mildred bullosa  Bilateral nasal valve collapse    Post-Op Diagnosis Codes:   Nasal congestion [R09 81]  Deviated nasal septum [J34 2]  Other specified disorders of nose and nasal sinuses [J34 89] nasal ulcer  Left mildred bullosa  Bilateral nasal valve collapse    Procedure(s) (LRB):  FUNCTIONAL ENDOSCOPIC SINUS SURGERY (FESS) LEFT MILDRED;  BILATERAL  GRAFTS; EXCISION OF NASAL MUCOSAL ULCER; CLOSURE WITH ENDONASAL FLAPS (N/A)  SEPTOPLASTY (N/A)  TURBINOPLASTY (N/A)    Specimen(s):  ID Type Source Tests Collected by Time Destination   1 : right mucosa ulcerate Tissue Soft Tissue, Other TISSUE EXAM Margi Stratton MD 2018 [id number]        Estimated Blood Loss:   Minimal    Drains:       Anesthesia Type:   General    Operative Indications:  Nasal congestion [R09 81]  Deviated nasal septum [J34 2]  Other specified disorders of nose and nasal sinuses [J34 89] nasal ulcer  Left mildred bullosa  Bilateral nasal valve collapse    Operative Findings:  Severe left deviation due to oblique cartilage fracture of the nasal septum with contact to the lateral wall, ulceration of septal mucosa on the right nasal cavity with the presence of granulation type tissue  The ulcer is deep and there is involvement of the cartilage at the center of the ulcer  Very narrow nasal valves with collapse, large left middle turbinates that is a mildred bullosa  Hypertrophy of inferior turbinates more notorious on the right side      Complications:   None    Procedure and Technique:  Patient was met in holding area, positively identified and risks, benefits and alternatives discussed, Patient confirmed informed consent  Oyxmetazoline nasal spray was applied to bilateral nasal cavities  The patient was transferred onto the operating table in the supine position  Appropriate monitoring devices were put in place, general anesthesia was administered by anesthesiologist and patient intubated without complications, tube taped in midline  The patient was prepped and draped in the usual clean fashion  Before proceeding further, a time-out was taken during which the patients identification and planned surgical procedure were confirmed  Examination with a speculum confirmed severe obstruction bilaterally,aswell as a large ulcer in nasal septal mucosa right side  Endoscopic exam is carried out with a 0 degree endoscope  The severity of the deviation precludes endoscopic exam in left side  Topical oxymetazoline applied with cotton pledgets and 1% lidocaine with 1/100,000 epinephrine was injected to the septum on  right caudal edge  Septoplasty hemitransfixion  incision along the caudal margin of the septum on the right  side was performed with the 15 blade  Mucoperichondrial and mucoperiosteal flaps were elevated with the iris scissors and freer elevator bilaterally, noticing a very angled oblique fracture of the septal cartilage, and a cartilaginous defect underlying the area of ulceration  The dissection was extended posteriorly and inferiorly,  mucoperiosteal  flap was easily elevated posteriorly with freer, basal horizontal segment of septal cartilage was resected with freer  The bony spur posteriorly was resected by cutting above and below with septal Freeman scissors and removing septal posterior wedge with Blaesly forceps  The inferior deviated edge of perpendicular plate of ethmoid at chondroethmoidal junction was resected with Jantzen-Kael rongeurs  Deviated nasal crest was medialized using a straight Amish forceps with a greenstick type fracture   Caudal edge of septum excised as needed to correct the caudal luxation  Inferior turbinates were reduced by coblation needle and outfracturing them  Bilateral nasal cavities were then re-examined, and the longest nasal speculum could be passed back along the septum on both sides without meeting any resistance  The surgery continued with 0 degree scope, 1% lidocaine with epinephrine 1:100 000 was injected to head and body of left middle turbinate  The same solution was injected to posterior lateral wall with a spinal needle  After allowing time for anesthetic and vasoconstrictive effect,  turbinate was contoured  excising the lateral hypertrophic portion with microdebrider, and opening the cell within the turbinates  Attention was then directed to the area of the ulcerated mucosa, an ellipse excision around the involved ulcer was done cutting through mucosa and perichondrium with a 15 blade and subsequently extending the resection posteriorly with curved iris scissors  The involved mucosa was then excised and handed to the scrub nurse to be sent to pathology  Mucoperiosteal flaps were then completed to the level of the floor of the nasal cavity on the right side and to the nasal vault under the nasal bones  This allowed to close the defect primarily using 4 0 chromic gut stitches  Septal excised cartilage harvested was carved for  grafts that were inset between septal cartilage and upper lateral cartilages and fixed with 4/0 plain gut transfixion stitches,  reinforcing and widening nasal valve bilaterally  The septoplasty incision was closed using  4-0 chromic stitches  The mucoperichondral flaps were sutured with mattress type 4/0 plain gut with small Thiago needle  Middle turbinates were also medialized with a 4/0 plain gut stitch,  Silicone septal splints were then placed and sutured with 3/0 Prolene  The nasopharynx was then suctioned free of blood and secretions    Patient was handed to the anesthesiologist who weaned from anesthesia, and extubated patient  All counts were correct  Patient was awakened, and taken to the recovery room in stable condition  All counts were correct at the end of the case, and no complications were encountered     I was present for the entire procedure    Patient Disposition:  PACU     SIGNATURE: Isaac Villanueva MD  DATE: August 24, 2018  TIME: 1:47 PM

## 2018-08-25 VITALS
SYSTOLIC BLOOD PRESSURE: 158 MMHG | HEART RATE: 121 BPM | RESPIRATION RATE: 20 BRPM | TEMPERATURE: 98.2 F | BODY MASS INDEX: 28.77 KG/M2 | DIASTOLIC BLOOD PRESSURE: 102 MMHG | OXYGEN SATURATION: 97 % | WEIGHT: 236.33 LBS

## 2018-08-25 PROCEDURE — 99283 EMERGENCY DEPT VISIT LOW MDM: CPT

## 2018-08-25 NOTE — ED PROVIDER NOTES
History  Chief Complaint   Patient presents with    Post-op Problem     pt reports having [facility] [place] [facility] his nose, pt states he woke up abot 45 minutes ago with bandages drenched and increased pain on the right side  Bleeding controlled at this time       History provided by:  Patient   used: No     45-year-old male presented for evaluation of epistaxis after having nasal surgery earlier today  He stated that he had been doing well throughout the day, had gone to sleep and woke up and then had some bleeding mostly from the left nostril  Had resolved spontaneously prior to arrival   Noted it was mostly darker red and brownish blood  Stated that he had been put his finger into the right nostril and felt something poking and thought maybe there was a bone sticking out and presented for evaluation  On exam here there is some nylon suture within the septum on the right side which is consistent with what he felt  No active epistaxis  He is breathing comfortably  Will discharge home and have him follow up with ENT as scheduled  Prior to Admission Medications   Prescriptions Last Dose Informant Patient Reported? Taking? HYDROcodone-acetaminophen (NORCO) 5-325 mg per tablet   No No   Sig: Take 1 tablet by mouth every 4 (four) hours while awake for 10 days Max Daily Amount: 5 tablets   OLANZapine (ZyPREXA) 10 mg tablet  Self Yes No   Sig: Take 12 5 mg by mouth daily at bedtime   aspirin (ECOTRIN LOW STRENGTH) 81 mg EC tablet  Self No No   Sig: TAKE 1 TABLET DAILY     cephalexin (KEFLEX) 500 mg capsule   No No   Sig: Take 1 capsule (500 mg total) by mouth 4 (four) times a day for 10 days   cyclobenzaprine (FLEXERIL) 10 mg tablet  Self No No   Sig: Take 1 tablet (10 mg total) by mouth 2 (two) times a day as needed for muscle spasms   lidocaine (XYLOCAINE) 5 % ointment  Self No No   Sig: Apply topically as needed for mild pain   naproxen (NAPROSYN) 500 mg tablet  Self No No   Sig: Take 1 tablet (500 mg total) by mouth 2 (two) times a day with meals   ondansetron (ZOFRAN-ODT) 4 mg disintegrating tablet  Self No No   Sig: Take 1 tablet (4 mg total) by mouth every 6 (six) hours as needed for nausea or vomiting   sodium chloride (OCEAN) 0 65 % nasal spray   No No   Si spray into each nostril every 2 (two) hours while awake for 30 days   testosterone cypionate (DEPO-TESTOSTERONE) 200 mg/mL SOLN  Self No No   Sig: Inject 0 5 mL (100 mg total) into the shoulder, thigh, or buttocks once a week      Facility-Administered Medications: None       Past Medical History:   Diagnosis Date    [facility] p[facility]is [facility] )     stable-pt denies     Ascites     told he had a "wave stomach"    Bipolar 1 disorder ([facility] [address] )     [facility]     pt denies     Coronary artery disease     Depression     Family history of colon cancer requiring screening colonoscopy     brother    Fatty liver     History of colon polyps     Hyperlipidemia     Hypertension     [facility]ad[facility]     Liver disease     fatty liver    MI (myocardial infarction) ([facility] )     2012-pt denies     [facility]el[facility]        Past Surgical History:   Procedure Laterality Date    COLONOSCOPY      FRACTURE SURGERY      jaw    MN COLONOSCOPY FLX DX W/COLLJ SPEC WHEN PFRMD N/A 2017    Procedure: COLONOSCOPY;  Surgeon: Sylvester Sorenson MD;  [facility]; Service: Gastroenterology    MN COLONOSCOPY FLX DX W/COLLJ [facility] INPATIENT REHABILITATION WHEN PFRMD N/A 2017    Procedure: COLONOSCOPY;  Surgeon: Sylvester Sorenson MD;  [facility]; Service: Gastroenterology    [facility] WHEN PFRMD N/A 2017    Procedure: COLONOSCOPY;  Surgeon: Sylvester Sorenson MD;  [facility];   Service: Gastroenterology       Family History   Problem Relation Age of Onset    Breast cancer Mother     Thyroid nodules Mother     Colon cancer Father         around age 45    Heart failure Father         congestive    No Known Problems Sister     Colon cancer Brother         around age 45    No Known Problems Maternal Grandmother     No Known Problems Maternal Grandfather     No Known Problems Paternal Grandmother     No Known Problems Paternal Grandfather      I have reviewed and agree with the history as documented  Social History   Substance Use Topics    Smoking status: Current Every Day Smoker     Packs/day: 0 50     Types: Cigarettes    Smokeless tobacco: Never Used    Alcohol use Yes      Comment: rare        Review of Systems   Constitutional: Negative for activity change and appetite change  HENT: Positive for nosebleeds  Neurological: Negative for dizziness, weakness, light-headedness and headaches  All other systems reviewed and are negative  Physical Exam  Physical Exam   Constitutional: He appears well-developed and well-nourished  No distress  HENT:   Head: Normocephalic and atraumatic  No active epistaxis  Some dried blood in the nostrils  Suture along the right septum  Cardiovascular: Normal rate and regular rhythm  Pulmonary/Chest: Effort normal  No respiratory distress  Skin: Skin is warm and dry  No pallor  Psychiatric: He has a normal mood and affect  His behavior is normal    Nursing note and vitals reviewed        Vital Signs  ED Triage Vitals [08/24/18 2351]   Temperature Pulse Respirations Blood Pressure SpO2   98 2 °F (36 8 °C) (!) 121 20 (!) 171/105 97 %      Temp Source Heart Rate Source Patient Position - Orthostatic VS BP Location FiO2 (%)   Oral Monitor Sitting Right arm --      Pain Score       9           Vitals:    08/24/18 2351 08/25/18 0000   BP: (!) 171/105 (!) 158/102   Pulse: (!) 121    Patient Position - Orthostatic VS: Sitting Sitting       Visual Acuity      ED Medications  Medications - No data to display    Diagnostic Studies  Results Reviewed     None                 No orders to display              Procedures  Procedures       Phone Contacts  ED Phone Contact    ED Course                               MDM  Number of Diagnoses or Management Options  Visit for wound check:   Diagnosis management comments: 51-year-old male presented for evaluation of epistaxis after nasal surgery today  No active bleeding  Sutures in place  Stable for discharge  Patient Progress  Patient progress: improved    CritCare Time    Disposition  Final diagnoses:   Visit for wound check     Time reflects when diagnosis was documented in both MDM as applicable and the Disposition within this note     Time User Action Codes Description Comment    8/24/2018 11:59 PM Candy Field Add [Z51 89] Visit for wound check       ED Disposition     ED Disposition Condition Comment    Discharge  Modesto Goetzjames discharge to home/self care      Condition at discharge: Good        Follow-up Information     Follow up With Specialties Details Why Contact Kezia De Anda MD Otolaryngology  As needed [address] [facility]  [phone]            Discharge Medication List as of 8/25/2018 12:00 AM      CONTINUE these medications which have NOT CHANGED    Details   aspirin (ECOTRIN LOW STRENGTH) 81 mg EC tablet TAKE 1 TABLET DAILY , Normal      cephalexin (KEFLEX) 500 mg capsule Take 1 capsule (500 mg total) by mouth 4 (four) times a day for 10 days, Starting Fri 8/24/2018, Until Mon 9/3/2018, Print      cyclobenzaprine (FLEXERIL) 10 mg tablet Take 1 tablet (10 mg total) by mouth 2 (two) times a day as needed for muscle spasms, Starting Fri 8/10/2018, Normal      HYDROcodone-acetaminophen (NORCO) 5-325 mg per tablet Take 1 tablet by mouth every 4 (four) hours while awake for 10 days Max Daily Amount: 5 tablets, Starting Fri 8/24/2018, Until Mon 9/3/2018, Print      lidocaine (XYLOCAINE) 5 % ointment Apply topically as needed for mild pain, Starting Tue 6/5/2018, Normal      naproxen (NAPROSYN) 500 mg tablet Take 1 tablet (500 mg total) by mouth 2 (two) times a day with meals, Starting Fri 7/20/2018, Print      OLANZapine (ZyPREXA) 10 mg tablet Take 12 5 mg by mouth daily at bedtime, Starting Mon 1/9/2017, Historical Med      ondansetron (ZOFRAN-ODT) 4 mg disintegrating tablet Take 1 tablet (4 mg total) by mouth every 6 (six) hours as needed for nausea or vomiting, Starting Wed 7/11/2018, Print      sodium chloride (OCEAN) 0 65 % nasal spray 1 spray into each nostril every 2 (two) hours while awake for 30 days, Starting Fri 8/24/2018, Until Sun 9/23/2018, Print      testosterone cypionate (DEPO-TESTOSTERONE) 200 mg/mL SOLN Inject 0 5 mL (100 mg total) into the shoulder, thigh, or buttocks once a week, Starting Wed 2/7/2018, Print           No discharge procedures on file      ED Provider  Electronically Signed by           Mey Hernandez MD  08/25/18 9280

## 2018-08-30 DIAGNOSIS — I10 ESSENTIAL HYPERTENSION: ICD-10-CM

## 2018-08-30 DIAGNOSIS — E78.5 HYPERLIPIDEMIA, UNSPECIFIED HYPERLIPIDEMIA TYPE: ICD-10-CM

## 2018-08-30 RX ORDER — ASPIRIN 81 MG/1
TABLET ORAL
Qty: 90 TABLET | Refills: 1 | Status: SHIPPED | OUTPATIENT
Start: 2018-08-30 | End: 2018-09-05

## 2018-09-04 ENCOUNTER — OFFICE VISIT (OUTPATIENT)
Dept: SLEEP CENTER | Facility: CLINIC | Age: 48
End: 2018-09-04
Payer: COMMERCIAL

## 2018-09-04 VITALS
SYSTOLIC BLOOD PRESSURE: 140 MMHG | DIASTOLIC BLOOD PRESSURE: 88 MMHG | WEIGHT: 228 LBS | BODY MASS INDEX: 27.76 KG/M2 | HEIGHT: 76 IN | HEART RATE: 68 BPM

## 2018-09-04 DIAGNOSIS — G47.33 OSA (OBSTRUCTIVE SLEEP APNEA): ICD-10-CM

## 2018-09-04 PROCEDURE — 99214 OFFICE O/P EST MOD 30 MIN: CPT | Performed by: INTERNAL MEDICINE

## 2018-09-04 NOTE — PROGRESS NOTES
Follow-Up Note - Sleep Center   Modesto Lott  50 y o  male  : 1970  MRN: [id number]    Follow Up after sleep testing: I saw this patient in clinic today  He is here to review results and  initiate therapy  The Diagnostic study demostrated an AHI of 5 7 /hour  Minimum oxygen saturation was 78 %  He spent 9 4 % of the time during the study with saturations less than 90%  HPI: Pt reported that he recently had a nose surgery for deviated septum, he currently has stents in and will have them removed gila  He reports that he is having difficulty sleeping  He reports that he wakes up quite often at night time usually twice  He gets 5-6 hours of interrupted sleep  Denied nightmares  He does report snoring  Reports day time drowsiness which is affecting his life  Pt drives vehicles for work  Review of Systems      Genitourinary none   Cardiology none   Gastrointestinal none   Neurology forgetfulness and difficulty with memory   Constitutional fatigue   Integumentary none   Psychiatry anxiety, depression and mood change   Musculoskeletal none   Pulmonary shortness of breath with activity, frequent cough and snoring   ENT throat clearing   Endocrine none   Hematological none     Physical Exam   Constitutional: He is oriented to person, place, and time  He appears well-developed and well-nourished  No distress  HENT:   Head: Normocephalic and atraumatic  Eyes: Conjunctivae are normal  Right eye exhibits no discharge  Left eye exhibits no discharge  Neck: Neck supple  No JVD present  Cardiovascular: Normal rate and regular rhythm  No murmur heard  Pulmonary/Chest: Breath sounds normal  No respiratory distress  He has no wheezes  Abdominal: Soft  He exhibits no distension  There is no tenderness  There is no rebound and no guarding  Musculoskeletal: Normal range of motion  He exhibits no edema  Neurological: He is alert and oriented to person, place, and time  No cranial nerve deficit  Skin: Skin is warm and dry  No rash noted  He is not diaphoretic  No erythema  Impression :   1  Obstructive sleep apnea mild     Plan:   1  Results of studies were reviewed with Eugenia Brian  2  I discussed treatment options with the risks and benefits  3  He elected to try Positive airway pressure therapy  A prescription was provided to initiate therapy at the above described setting  4  I also advised on weight reduction  Follow-up is advised in 1 year to monitor progress & to adjust therapy or sooner as necessary    Thank you for allowing me to participate in the care of this patient  I will keep you apprised of developments    Frederick Hamilton   PGY 3

## 2018-09-05 ENCOUNTER — HOSPITAL ENCOUNTER (EMERGENCY)
Facility: HOSPITAL | Age: 48
Discharge: HOME | End: 2018-09-05
Attending: EMERGENCY MEDICINE
Payer: COMMERCIAL

## 2018-09-05 VITALS
BODY MASS INDEX: 27.59 KG/M2 | RESPIRATION RATE: 18 BRPM | TEMPERATURE: 98.4 F | HEART RATE: 104 BPM | WEIGHT: 226.63 LBS | DIASTOLIC BLOOD PRESSURE: 111 MMHG | OXYGEN SATURATION: 98 % | SYSTOLIC BLOOD PRESSURE: 179 MMHG

## 2018-09-05 DIAGNOSIS — R04.0 EPISTAXIS: Primary | ICD-10-CM

## 2018-09-05 DIAGNOSIS — M62.830 MUSCLE SPASM OF BACK: ICD-10-CM

## 2018-09-05 DIAGNOSIS — M54.2 CERVICALGIA: ICD-10-CM

## 2018-09-05 PROCEDURE — 99283 EMERGENCY DEPT VISIT LOW MDM: CPT

## 2018-09-05 RX ORDER — CYCLOBENZAPRINE HCL 10 MG
10 TABLET ORAL 2 TIMES DAILY PRN
Qty: 60 TABLET | Refills: 0 | OUTPATIENT
Start: 2018-09-05

## 2018-09-05 RX ORDER — NAPROXEN 500 MG/1
250 TABLET ORAL 2 TIMES DAILY WITH MEALS
Qty: 60 TABLET | Refills: 0 | OUTPATIENT
Start: 2018-09-05

## 2018-09-05 NOTE — ED PROVIDER NOTES
History  Chief Complaint   Patient presents with    Nasal Injury     per pt "he tripped over his cat and fell hitting his face on the tip of his stairs, pt states the stair is low, pt had stents placed in nose and it is due to be taken out tomorrow, pt not on blood thinners "     Patient presents to the emergency department for evaluation of right-sided epistaxis that occurred prior to arrival after he tripped at home striking his nose on some furniture  Of significance is the fact that he has had recent septal surgery performed by ENT who is scheduled to see tomorrow  His right-sided epistaxis has resolved and he just wanted to be checked for completion and reassurance  He does not think he struck his nose hard enough to break and denies new pain compared to the pain that he has had from the prior surgery  He is not on blood thinners  He denies bleeding from the left side  He denies any other injury at this time he denies dental head or neck pain  None       Past Medical History:   Diagnosis Date    [facility] p[facility]is [facility] [facility] )     stable-pt denies     Ascites     told he had a "wave stomach"    Bipolar 1 disorder ([facility] )     CHF (congestive heart failure) ([facility] )     pt denies     Coronary artery disease     Depression     Family history of colon cancer requiring screening colonoscopy     brother    Fatty liver     History of colon polyps     Hyperlipidemia     Hypertension     Hypothalamic hypogonadism ([address]     Liver disease     fatty liver    MI (myocardial infarction) ([address] )     2012-pt denies     Osteomyelitis ([facility] [facility] )        Past Surgical History:   Procedure Laterality Date    COLONOSCOPY      FRACTURE SURGERY      jaw    MO COLONOSCOPY FLX DX W/COLLJ SPEC WHEN PFRMD N/A 7/27/2017    Procedure: COLONOSCOPY;  Surgeon: Caren Nicole MD;  [facility];   Service: Gastroenterology    MO COLONOSCOPY FLX DX W/COLLJ [facility]IL[facility] WHEN PFRMD N/A 8/29/2017    Procedure: COLONOSCOPY;  Surgeon: Camila Brannon Ivy Card MD;  [facility]; Service: Gastroenterology    IN COLONOSCOPY FLX DX W/COLLJ [facility] INPATIENT REHABILITATION WHEN PFRMD N/A 12/1/2017    Procedure: COLONOSCOPY;  Surgeon: Imani Garcia MD;  [facility]; Service: Gastroenterology    IN NASAL/SINUS ENDOSCOPY,RMV MILRDED BULL N/A 8/24/2018    Procedure: FUNCTIONAL ENDOSCOPIC SINUS SURGERY (FESS) LEFT MILDRED;  BILATERAL  GRAFTS; EXCISION OF NASAL MUCOSAL ULCER; CLOSURE WITH ENDONASAL FLAPS;  Surgeon: Elmo Cota MD;  [facility];  Service: ENT    IN REPAIR OF NASAL SEPTUM N/A 8/24/2018    Procedure: SEPTOPLASTY;  Surgeon: Elmo Cota MD;  [facility];  Service: ENT    TURBINOPLASTY N/A 8/24/2018    Procedure: Exalexander Riff;  Surgeon: Elmo Cota MD;  [facility];  Service: ENT       Family History   Problem Relation Age of Onset    Breast cancer Mother     Thyroid nodules Mother     Colon cancer Father         around age 45    Heart failure Father         congestive    No Known Problems Sister     Colon cancer Brother         around age 45   Valerie Wood No Known Problems Maternal Grandmother     No Known Problems Maternal Grandfather     No Known Problems Paternal Grandmother     No Known Problems Paternal Grandfather      I have reviewed and agree with the history as documented  Social History   Substance Use Topics    Smoking status: Current Every Day Smoker     Packs/day: 0 50     Types: Cigarettes    Smokeless tobacco: Never Used    Alcohol use Yes      Comment: rare        Review of Systems   Constitutional: Negative  HENT: Positive for nosebleeds  Eyes: Negative  Negative for photophobia and visual disturbance  Respiratory: Negative  Negative for shortness of breath  Cardiovascular: Negative  Negative for chest pain  Gastrointestinal: Negative  Negative for nausea and vomiting  Endocrine: Negative  Genitourinary: Negative  Musculoskeletal: Negative  Negative for back pain  Skin: Negative    Negative for wound  Allergic/Immunologic: Negative  Neurological: Negative  Negative for dizziness, seizures, syncope, light-headedness and headaches  Hematological: Negative  Psychiatric/Behavioral: Negative  Negative for confusion  Physical Exam  Physical Exam   Constitutional: He is oriented to person, place, and time  He appears well-developed and well-nourished  No distress  Nontoxic appearance  No respiratory distress  Patient is able to engage in normal conversation speak in full sentences  HENT:   Head: Normocephalic and atraumatic  Dried epistaxis on the right side  Visible suture within the septum  No bony deformity to the nasal bridge  Normal air flow in each nostril  Eyes: EOM are normal  Pupils are equal, round, and reactive to light  Neck: Normal range of motion  Neck supple  Musculoskeletal: Normal range of motion  He exhibits no edema, tenderness or deformity  Neurological: He is alert and oriented to person, place, and time  He displays normal reflexes  No cranial nerve deficit or sensory deficit  He exhibits normal muscle tone  Coordination normal    Skin: Skin is warm and dry  He is not diaphoretic  Psychiatric: He has a normal mood and affect  His behavior is normal  Judgment and thought content normal    Nursing note and vitals reviewed        Vital Signs  ED Triage Vitals   Temperature Pulse Respirations Blood Pressure SpO2   09/05/18 0051 09/05/18 0051 09/05/18 0051 09/05/18 0059 09/05/18 0051   98 4 °F (36 9 °C) (!) 112 18 (!) 179/111 97 %      Temp Source Heart Rate Source Patient Position - Orthostatic VS BP Location FiO2 (%)   09/05/18 0051 09/05/18 0051 09/05/18 0051 09/05/18 0051 --   Oral Monitor Sitting Right arm       Pain Score       09/05/18 0051       Worst Possible Pain           Vitals:    09/05/18 0051 09/05/18 0059 09/05/18 0100   BP:  (!) 179/111 (!) 179/111   Pulse: (!) 112  (!) 108   Patient Position - Orthostatic VS: Sitting         Visual Acuity      ED Medications  Medications - No data to display    Diagnostic Studies  Results Reviewed     None                 No orders to display              Procedures  Procedures       Phone Contacts  ED Phone Contact    ED Course  ED Course as of Sep 05 0139   Wed Sep 05, 2018   0127 Patient is stable for discharge  He refused analgesics  He did not want a nasal bone x-ray although I did not think broke his nose  He will keep is 10:30 a m  ENT appointment already scheduled for tomorrow  [facility]  CritCare Time    Disposition  Final diagnoses:   Epistaxis     Time reflects when diagnosis was documented in both MDM as applicable and the Disposition within this note     Time User Action Codes Description Comment    9/5/2018  1:39 AM Sandy Chen Add [R04 0] Epistaxis       ED Disposition     ED Disposition Condition Comment    Discharge  Modesto Oren discharge to home/self care  Condition at discharge: Stable        Follow-up Information     Follow up With Specialties Details Why [address] [facility]   Keep already scheduled appointment for tomorrow at 10:30 a m  with ENT           Patient's Medications   Discharge Prescriptions    No medications on file     No discharge procedures on file      ED Provider  Electronically Signed by           Lisa Madrigal MD  09/05/18 Teresita Gudino MD  09/05/18 [id number]

## 2018-09-05 NOTE — DISCHARGE INSTRUCTIONS
Nosebleed   WHAT YOU NEED TO KNOW:   What do I need to know about a nosebleed? A nosebleed, or epistaxis, occurs when one or more of the blood vessels in your nose break  You may have dark or bright red blood from one or both nostrils  A nosebleed can be caused by any of the following:  · Cold, dry air    · Trauma from picking your nose or a direct blow to your nose    · Abnormal nose structure, such as a deviated septum    · Irritation or inflammation from a cold, respiratory infection, or allergies    · An object stuck in your nose    · Certain medicines, such as blood thinners  How is a nosebleed diagnosed? · A nasal exam  may show blood clots or swelling  Your healthcare provider will use an instrument called a speculum to check the inside of your nose  This gently opens your nostrils so your healthcare provider can see what part of your nose is bleeding  · A nasal endoscopy  is a deeper exam of the inside of your nose  Your healthcare provider uses a scope (thin, flexible tube with a light and camera on the end) to see further into your nose  What first aid should I do for a nosebleed? · Sit up and lean forward  This will help prevent you from swallowing blood  Spit blood and saliva into a bowl  · Apply pressure to your nose  Use 2 fingers to pinch your nose shut for 10 to 15 minutes  This will help stop the bleeding  · Apply ice  on the bridge of your nose to decrease swelling and bleeding  Use a cold pack or put crushed ice in a plastic bag  Cover it with a towel to protect your skin  · Pack your nose  with a cotton ball, tissue, tampon, or gauze bandage to stop the bleeding  How is a severe nosebleed treated? You may need any of the following if the bleeding does not stop after first aid is done:  · Medicines  may be applied to a small piece of cotton and placed in your nose  Medicine may also be sprayed in or applied directly to your nose   You may need medicine to prevent an infection  If bleeding is severe, medicine may be injected into a blood vessel in your nose  · Cautery  is when a chemical or electric device is used to seal the blood vessels  This may be done to stop bleeding or prevent more bleeding  Local anesthesia may be used  · Nasal packing  is when layers of gauze are placed in your nose to provide pressure and stop the bleeding  Local anesthesia may be used  Nasal packing is usually removed in 2 to 3 days  · Embolization  is a procedure used to stop the bleeding from inside your nose  Medical glue or a small balloon device may be used to clog the blood vessels in your nose  · Surgery  may be needed if your nosebleed returns over and over long-term  An artery may be tied to stop bleeding  Damaged tissue or an abnormal structure in your nose may be repaired  How can I help prevent another nosebleed? · Keep your nose moist   Put a small amount of petroleum jelly inside your nostrils as needed  Use a saline (saltwater) nasal spray  Do not put anything else inside your nose unless your healthcare provider says it is okay  Do not  use oil-based lubricants if you use oxygen therapy  They may be flammable  · Use a cool mist humidifier to increase air moisture in your home  This will help your nose stay moist      · Do not pick or blow your nose for at least a week  You can irritate or damage your nose if you pick it  Blowing your nose too hard may cause the bleeding to start again  Do not bend over or strain as this can cause the bleeding to start again  · Avoid irritants  such as tobacco smoke or chemical sprays such as   When should I seek immediate care? · Your nose is still bleeding after 20 minutes, even after you pinch it  · Your nasal packing is soaked with blood  · You have a foul-smelling discharge coming out of your nose  · You feel so weak and dizzy that you have trouble standing up      · You have trouble breathing or talking  When should I contact my healthcare provider? · You have a fever and are vomiting  · You have pain in and around your nose  · Your nasal pack is loose  · You have questions or concerns about your condition or care  CARE AGREEMENT:   You have the right to help plan your care  Learn about your health condition and how it may be treated  Discuss treatment options with your caregivers to decide what care you want to receive  You always have the right to refuse treatment  The above information is an  only  It is not intended as medical advice for individual conditions or treatments  Talk to your doctor, nurse or pharmacist before following any medical regimen to see if it is safe and effective for you  © 2017 [employer]  Information is for End User's use only and may not be sold, redistributed or otherwise used for commercial purposes  All illustrations and images included in CareNotes® are the copyrighted property of A [employer]  or Eleazar Patterson

## 2018-09-06 DIAGNOSIS — M54.2 CERVICALGIA: Primary | ICD-10-CM

## 2018-09-06 RX ORDER — NAPROXEN 500 MG/1
500 TABLET ORAL 2 TIMES DAILY WITH MEALS
Qty: 60 TABLET | Refills: 0 | Status: SHIPPED | OUTPATIENT
Start: 2018-09-06 | End: 2018-10-05

## 2018-09-06 RX ORDER — CYCLOBENZAPRINE HCL 10 MG
10 TABLET ORAL 2 TIMES DAILY PRN
Qty: 60 TABLET | Refills: 0 | Status: SHIPPED | OUTPATIENT
Start: 2018-09-06 | End: 2018-10-05

## 2018-09-06 NOTE — TELEPHONE ENCOUNTER
Patient states that orthopedic doctor told him to come back to pcp to have medication re ordered  Patient states if he does not get refills he also has an appointment with you tomorrow that way you can discuss refills

## 2018-09-07 ENCOUNTER — TELEPHONE (OUTPATIENT)
Dept: INTERNAL MEDICINE CLINIC | Facility: CLINIC | Age: 48
End: 2018-09-07

## 2018-09-11 ENCOUNTER — OFFICE VISIT (OUTPATIENT)
Dept: PHYSICAL THERAPY | Facility: CLINIC | Age: 48
End: 2018-09-11
Payer: COMMERCIAL

## 2018-09-11 DIAGNOSIS — M54.2 NECK PAIN: Primary | ICD-10-CM

## 2018-09-11 DIAGNOSIS — M62.830 MUSCLE SPASM OF BACK: ICD-10-CM

## 2018-09-11 DIAGNOSIS — S13.4XXD WHIPLASH INJURY TO NECK, SUBSEQUENT ENCOUNTER: ICD-10-CM

## 2018-09-11 DIAGNOSIS — M50.30 DDD (DEGENERATIVE DISC DISEASE), CERVICAL: ICD-10-CM

## 2018-09-11 PROCEDURE — G8990 OTHER PT/OT CURRENT STATUS: HCPCS

## 2018-09-11 PROCEDURE — G8991 OTHER PT/OT GOAL STATUS: HCPCS

## 2018-09-11 PROCEDURE — 97140 MANUAL THERAPY 1/> REGIONS: CPT

## 2018-09-11 PROCEDURE — 97110 THERAPEUTIC EXERCISES: CPT
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
## 2018-09-13 ENCOUNTER — OFFICE VISIT (OUTPATIENT)
Dept: PHYSICAL THERAPY | Facility: CLINIC | Age: 48
End: 2018-09-13
Payer: COMMERCIAL

## 2018-09-13 DIAGNOSIS — M54.2 NECK PAIN: Primary | ICD-10-CM

## 2018-09-13 DIAGNOSIS — M50.30 DDD (DEGENERATIVE DISC DISEASE), CERVICAL: ICD-10-CM

## 2018-09-13 DIAGNOSIS — M62.830 MUSCLE SPASM OF BACK: ICD-10-CM

## 2018-09-13 DIAGNOSIS — S13.4XXD WHIPLASH INJURY TO NECK, SUBSEQUENT ENCOUNTER: ICD-10-CM

## 2018-09-13 PROCEDURE — 97110 THERAPEUTIC EXERCISES: CPT

## 2018-09-13 PROCEDURE — 97140 MANUAL THERAPY 1/> REGIONS: CPT
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
## 2018-09-14 ENCOUNTER — APPOINTMENT (OUTPATIENT)
Dept: PHYSICAL THERAPY | Facility: CLINIC | Age: 48
End: 2018-09-14
Payer: COMMERCIAL

## 2018-09-17 ENCOUNTER — APPOINTMENT (OUTPATIENT)
Dept: PHYSICAL THERAPY | Facility: CLINIC | Age: 48
End: 2018-09-17
Payer: COMMERCIAL

## 2018-09-19 ENCOUNTER — APPOINTMENT (OUTPATIENT)
Dept: PHYSICAL THERAPY | Facility: CLINIC | Age: 48
End: 2018-09-19
Payer: COMMERCIAL

## 2018-09-21 ENCOUNTER — APPOINTMENT (OUTPATIENT)
Dept: PHYSICAL THERAPY | Facility: CLINIC | Age: 48
End: 2018-09-21
Payer: COMMERCIAL

## 2018-09-24 ENCOUNTER — APPOINTMENT (OUTPATIENT)
Dept: PHYSICAL THERAPY | Facility: CLINIC | Age: 48
End: 2018-09-24
Payer: COMMERCIAL

## 2018-09-26 ENCOUNTER — APPOINTMENT (OUTPATIENT)
Dept: PHYSICAL THERAPY | Facility: CLINIC | Age: 48
End: 2018-09-26
Payer: COMMERCIAL

## 2018-09-27 ENCOUNTER — APPOINTMENT (OUTPATIENT)
Dept: PHYSICAL THERAPY | Facility: CLINIC | Age: 48
End: 2018-09-27
Payer: COMMERCIAL

## 2018-10-01 ENCOUNTER — OFFICE VISIT (OUTPATIENT)
Dept: PHYSICAL THERAPY | Facility: CLINIC | Age: 48
End: 2018-10-01
Payer: COMMERCIAL

## 2018-10-01 DIAGNOSIS — M54.2 NECK PAIN: Primary | ICD-10-CM

## 2018-10-01 DIAGNOSIS — S13.4XXD WHIPLASH INJURY TO NECK, SUBSEQUENT ENCOUNTER: ICD-10-CM

## 2018-10-01 DIAGNOSIS — M50.30 DDD (DEGENERATIVE DISC DISEASE), CERVICAL: ICD-10-CM

## 2018-10-01 DIAGNOSIS — M62.830 MUSCLE SPASM OF BACK: ICD-10-CM

## 2018-10-01 PROCEDURE — G8990 OTHER PT/OT CURRENT STATUS: HCPCS | Performed by: PHYSICAL THERAPIST

## 2018-10-01 PROCEDURE — G8991 OTHER PT/OT GOAL STATUS: HCPCS | Performed by: PHYSICAL THERAPIST

## 2018-10-01 PROCEDURE — 97110 THERAPEUTIC EXERCISES: CPT

## 2018-10-01 PROCEDURE — 97140 MANUAL THERAPY 1/> REGIONS: CPT
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
## 2018-10-03 ENCOUNTER — APPOINTMENT (OUTPATIENT)
Dept: PHYSICAL THERAPY | Facility: CLINIC | Age: 48
End: 2018-10-03
Payer: COMMERCIAL

## 2018-10-04 ENCOUNTER — TELEPHONE (OUTPATIENT)
Dept: SLEEP CENTER | Facility: CLINIC | Age: 48
End: 2018-10-04

## 2018-10-04 NOTE — TELEPHONE ENCOUNTER
PT has canceled and/or rescheduled or was a no show 5 times with [facility] to get his new machine  His order will be voided at this time

## 2018-10-05 ENCOUNTER — OFFICE VISIT (OUTPATIENT)
Dept: OBGYN CLINIC | Facility: OTHER | Age: 48
End: 2018-10-05
Payer: COMMERCIAL

## 2018-10-05 ENCOUNTER — APPOINTMENT (OUTPATIENT)
Dept: PHYSICAL THERAPY | Facility: CLINIC | Age: 48
End: 2018-10-05
Payer: COMMERCIAL

## 2018-10-05 VITALS
HEIGHT: 76 IN | WEIGHT: 240 LBS | SYSTOLIC BLOOD PRESSURE: 167 MMHG | DIASTOLIC BLOOD PRESSURE: 103 MMHG | BODY MASS INDEX: 29.22 KG/M2 | HEART RATE: 106 BPM

## 2018-10-05 DIAGNOSIS — M54.2 NECK PAIN: Primary | ICD-10-CM

## 2018-10-05 PROCEDURE — 99214 OFFICE O/P EST MOD 30 MIN: CPT | Performed by: FAMILY MEDICINE

## 2018-10-05 RX ORDER — ASPIRIN 81 MG/1
81 TABLET ORAL DAILY
COMMUNITY
End: 2018-10-17

## 2018-10-05 NOTE — PATIENT INSTRUCTIONS
Explained the patient has stiffness in his neck likely related to underlying arthritis and this could be associated with a pinched nerve in the neck  He currently is failing conservative treatment including physical therapy for the last 3 months  As such we will pursue further investigation with MRI of the neck and referred patient Pain Management  I also alerted him to that his headaches could be cervicogenic in nature and also related to occipital neuralgia or pinching the nerve that feeds the scalp
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
## 2018-10-08 ENCOUNTER — APPOINTMENT (OUTPATIENT)
Dept: PHYSICAL THERAPY | Facility: CLINIC | Age: 48
End: 2018-10-08
Payer: COMMERCIAL

## 2018-10-08 NOTE — PROGRESS NOTES
Pt d/c'ed per MD   Pt attended 4 f/u appts in 6 wks with a 2-wk absence secondary to recovery from unrelated surgery

## 2018-10-16 ENCOUNTER — TELEPHONE (OUTPATIENT)
Dept: OBGYN CLINIC | Facility: OTHER | Age: 48
End: 2018-10-16

## 2018-10-16 ENCOUNTER — TELEPHONE (OUTPATIENT)
Dept: INTERNAL MEDICINE CLINIC | Facility: CLINIC | Age: 48
End: 2018-10-16

## 2018-10-16 NOTE — TELEPHONE ENCOUNTER
Yes the letter can be made for a new job  His appt on 8/18 shows that he is fit to work so no need for a visit prior   Ill write the letter tomorrow

## 2018-10-16 NOTE — TELEPHONE ENCOUNTER
----- Message from Donna Cornejo sent at 10/16/2018  8:17 AM EDT -----  Patient's insurance is denying auth for MRI [place] stating does not meed clinical necessity guidelines  They are requesting a [id number] review  To schedule please call PAIGE at [phone]     Patient ID: [id number]  : 1970  Case# [id number]    Thank you

## 2018-10-17 ENCOUNTER — OFFICE VISIT (OUTPATIENT)
Dept: INTERNAL MEDICINE CLINIC | Facility: CLINIC | Age: 48
End: 2018-10-17
Payer: COMMERCIAL

## 2018-10-17 VITALS
SYSTOLIC BLOOD PRESSURE: 146 MMHG | WEIGHT: 232.37 LBS | HEIGHT: 76 IN | DIASTOLIC BLOOD PRESSURE: 98 MMHG | HEART RATE: 80 BPM | BODY MASS INDEX: 28.3 KG/M2 | TEMPERATURE: 98.2 F

## 2018-10-17 DIAGNOSIS — I10 ESSENTIAL HYPERTENSION: Primary | ICD-10-CM

## 2018-10-17 PROCEDURE — 99213 OFFICE O/P EST LOW 20 MIN: CPT | Performed by: NURSE PRACTITIONER

## 2018-10-17 PROCEDURE — 3008F BODY MASS INDEX DOCD: CPT | Performed by: NURSE PRACTITIONER

## 2018-10-17 RX ORDER — OLANZAPINE 15 MG/1
15 TABLET ORAL
Refills: 1 | COMMUNITY
Start: 2018-09-15 | End: 2019-03-18

## 2018-10-17 RX ORDER — OFLOXACIN 3 MG/ML
SOLUTION AURICULAR (OTIC)
Refills: 0 | COMMUNITY
Start: 2018-09-12 | End: 2018-10-17

## 2018-10-17 RX ORDER — SODIUM CHLORIDE 0.65 %
AEROSOL, SPRAY (ML) NASAL
Refills: 0 | COMMUNITY
Start: 2018-09-22 | End: 2018-10-17

## 2018-10-17 RX ORDER — LISINOPRIL 20 MG/1
20 TABLET ORAL DAILY
Qty: 90 TABLET | Refills: 0 | Status: SHIPPED | OUTPATIENT
Start: 2018-10-17 | End: 2019-01-14 | Stop reason: SDUPTHER

## 2018-10-17 RX ORDER — MEDICAL SUPPLY, MISCELLANEOUS
EACH MISCELLANEOUS
Refills: 1 | COMMUNITY
Start: 2018-08-30 | End: 2019-02-25 | Stop reason: SDUPTHER

## 2018-10-17 RX ORDER — CEPHALEXIN 500 MG/1
500 CAPSULE ORAL EVERY 6 HOURS
Refills: 0 | COMMUNITY
Start: 2018-09-11 | End: 2018-10-17

## 2018-10-17 NOTE — PROGRESS NOTES
Assessment/Plan:     Diagnoses and all orders for this visit:    Essential hypertension  -     lisinopril (ZESTRIL) 20 mg tablet; Take 1 tablet (20 mg total) by mouth daily  -     TSH, 3rd generation; Future  -     Previous cholesterol test was mostly normal          Subjective: Patient presents to the clinic for elevated blood pressure     Patient ID: Gray Haines is a 50 y o  male  HPI   patient was seen at the dental clinic this morning and his blood pressure was very elevated  The dentist refused to perform the procedures for which she went for; dental fillings  Review of his records show his BP has been elevated several times, and once was above 305 mmHg systolic  He denies any chest pain, shortness of breath, dizziness, headaches, no nausea or vomiting  He reports a several years ago he was on Lopressor for HTN but stopped taking it 5-6 years ago  He reports that he was a lot heavier, after losing a lot of weight he did not need the blood pressure pill anymore  Reports Hx of HTN in all siblings and in his parents  On several degree relatives also have HTN  The following portions of the patient's history were reviewed and updated as appropriate: allergies, current medications, past family history, past medical history, past social history, past surgical history and problem list     Review of Systems   Constitutional: Negative for appetite change, chills, fatigue and fever  Eyes: Negative for pain, discharge, redness, itching and visual disturbance  Respiratory: Negative for cough, chest tightness, shortness of breath and wheezing  Cardiovascular: Negative for chest pain and palpitations  Musculoskeletal: Negative for back pain, gait problem, neck pain and neck stiffness  Neurological: Negative for dizziness, tremors, weakness, numbness and headaches           Objective:      /98 (BP Location: Right arm, Patient Position: Sitting, Cuff Size: Adult)   Pulse 80   Temp 98 2 °F (36 8 °C) (Oral)   Ht 6' 4" (1 93 m)   Wt 105 kg (232 lb 5 8 oz)   BMI 28 28 kg/m²        Physical Exam   Constitutional: He appears well-developed and well-nourished  No distress  BMI 28 28 kg/m2   Cardiovascular: Normal rate, regular rhythm and normal heart sounds  No murmur heard  Pulmonary/Chest: Effort normal and breath sounds normal  No respiratory distress  He has no wheezes  Skin: He is not diaphoretic  Psychiatric: He has a normal mood and affect  His behavior is normal  Judgment and thought content normal    Vitals reviewed

## 2018-10-17 NOTE — PATIENT INSTRUCTIONS
Heart Healthy Diet   WHAT YOU NEED TO KNOW:   What is a heart healthy diet? A heart healthy diet is an eating plan low in total fat, unhealthy fats, and sodium (salt)  A heart healthy diet helps decrease your risk for heart disease and stroke  Limit the amount of fat you eat to 25% to 35% of your total daily calories  Limit sodium to less than 2,300 mg each day  What is healthy fat, and where is it found? Healthy fats can help improve cholesterol levels  The risk for heart disease is decreased when cholesterol levels are normal  Choose healthy fats, such as the following:  · Unsaturated fat  is found in foods such as soybean, canola, olive, corn, and safflower oils  It is also found in soft tub margarine that is made with liquid vegetable oil  · Omega-3 fat  is found in certain fish, such as salmon, tuna, and trout, and in walnuts and flaxseed  What is unhealthy fat, and where is it found? Unhealthy fats can cause unhealthy cholesterol levels in your blood and increase your risk of heart disease  Limit unhealthy fats, such as the following:  · Cholesterol  is found in animal foods, such as eggs and lobster, and in dairy products made from whole milk  Limit cholesterol to less than 300 milligrams (mg) each day  You may need to limit cholesterol to 200 mg each day if you have heart disease  · Saturated fat  is found in meats, such as zarco and hamburger  It is also found in chicken or turkey skin, whole milk, and butter  Limit saturated fat to less than 7% of your total daily calories  Limit saturated fat to less than 6% if you have heart disease or are at increased risk for it  · Trans fat  is found in packaged foods, such as potato chips and cookies  It is also in hard margarine, some fried foods, and shortening  Avoid trans fats as much as possible  What can I eat and drink on a heart healthy diet?   Ask your dietitian or healthcare provider how many servings to have from each of the following food groups:  · Grains:      ¨ Whole-wheat breads, cereals, and pastas, and brown rice    ¨ Low-fat, low-sodium crackers and chips    · Vegetables:      ¨ Broccoli, green beans, green peas, and spinach    ¨ Collards, kale, and lima beans    ¨ Carrots, sweet potatoes, tomatoes, and peppers    ¨ Canned vegetables with no salt added    · Fruits:      ¨ Bananas, peaches, pears, and pineapple    ¨ Grapes, raisins, and dates    ¨ Oranges, tangerines, grapefruit, orange juice, and grapefruit juice    ¨ Apricots, mangoes, melons, and papaya    ¨ Raspberries and strawberries    ¨ Canned fruit with no added sugar    · Low-fat dairy products:      ¨ Nonfat (skim) milk, 1% milk, and low-fat almond, cashew, or soy milks fortified with calcium    ¨ Low-fat cheese, regular or frozen yogurt, and cottage cheese    · Meats and proteins , such as lean cuts of beef and pork (loin, leg, round), skinless chicken and turkey, legumes, soy products, egg whites, and nuts  Which foods and drinks do I need to limit or avoid?   Ask your dietitian or healthcare provider about these and other foods that are high in unhealthy fat, sodium, and sugar:  · Snack or packaged foods , such as frozen dinners, cookies, macaroni and cheese, and cereals with more than 300 mg of sodium per serving    · Canned or dry mixes  for cakes, soups, sauces, or gravies    · Vegetables with added sodium , such as instant potatoes, vegetables with added sauces, or regular canned vegetables    · Other foods high in sodium , such as ketchup, barbecue sauce, salad dressing, pickles, olives, soy sauce, and miso    · High-fat dairy foods  such as whole or 2% milk, cream cheese, or sour cream, and cheeses     · High-fat protein foods  such as high-fat cuts of beef (T-bone steaks, ribs), chicken or turkey with skin, and organ meats, such as liver    · Cured or smoked meats , such as hot dogs, zarco, and sausage    · Unhealthy fats and oils , such as butter, stick margarine, shortening, and cooking oils such as coconut or palm oil    · Food and drinks high in sugar , such as soft drinks (soda), sports drinks, sweetened tea, candy, cake, cookies, pies, and doughnuts  What other diet guidelines should I follow? · Eat more foods containing omega-3 fats  Eat fish high in omega-3 fats at least 2 times a week  · Limit alcohol  Too much alcohol can damage your heart and raise your blood pressure  Women should limit alcohol to 1 drink a day  Men should limit alcohol to 2 drinks a day  A drink of alcohol is 12 ounces of beer, 5 ounces of wine, or 1½ ounces of liquor  · Choose low-sodium foods  High-sodium foods can lead to high blood pressure  Add little or no salt to food you prepare  Use herbs and spices in place of salt  · Eat more fiber  to help lower cholesterol levels  Eat at least 5 servings of fruits and vegetables each day  Eat 3 ounces of whole-grain foods each day  Legumes (beans) are also a good source of fiber  What lifestyle guidelines should I follow? · Do not smoke  Nicotine and other chemicals in cigarettes and cigars can cause lung and heart damage  Ask your healthcare provider for information if you currently smoke and need help to quit  E-cigarettes or smokeless tobacco still contain nicotine  Talk to your healthcare provider before you use these products  · Exercise regularly  to help you maintain a healthy weight and improve your blood pressure and cholesterol levels  Ask your healthcare provider about the best exercise plan for you  Do not start an exercise program without asking your healthcare provider  CARE AGREEMENT:   You have the right to help plan your care  Discuss treatment options with your caregivers to decide what care you want to receive  You always have the right to refuse treatment  The above information is an  only  It is not intended as medical advice for individual conditions or treatments   Talk to your doctor, nurse or pharmacist before following any medical regimen to see if it is safe and effective for you  © 2017 [employer] Information is for End User's use only and may not be sold, redistributed or otherwise used for commercial purposes  All illustrations and images included in CareNotes® are the copyrighted property of A [employer]  or Eleazar Patterson

## 2018-10-25 ENCOUNTER — TELEPHONE (OUTPATIENT)
Dept: INTERNAL MEDICINE CLINIC | Facility: CLINIC | Age: 48
End: 2018-10-25

## 2018-10-25 ENCOUNTER — LAB (OUTPATIENT)
Dept: LAB | Facility: CLINIC | Age: 48
End: 2018-10-25
Payer: COMMERCIAL

## 2018-10-25 ENCOUNTER — TRANSCRIBE ORDERS (OUTPATIENT)
Dept: LAB | Facility: CLINIC | Age: 48
End: 2018-10-25

## 2018-10-25 DIAGNOSIS — J34.0 NASAL ULCER: Primary | ICD-10-CM

## 2018-10-25 DIAGNOSIS — E29.1 HYPOGONADISM IN MALE: ICD-10-CM

## 2018-10-25 DIAGNOSIS — J34.0 NASAL ULCER: ICD-10-CM

## 2018-10-25 DIAGNOSIS — R79.89 LOW TSH LEVEL: Primary | ICD-10-CM

## 2018-10-25 DIAGNOSIS — I10 ESSENTIAL HYPERTENSION: ICD-10-CM

## 2018-10-25 LAB
ALBUMIN SERPL BCP-MCNC: 3.8 G/DL (ref 3.5–5)
ALP SERPL-CCNC: 82 U/L (ref 46–116)
ALT SERPL W P-5'-P-CCNC: 25 U/L (ref 12–78)
ANION GAP SERPL CALCULATED.3IONS-SCNC: 8 MMOL/L (ref 4–13)
AST SERPL W P-5'-P-CCNC: 19 U/L (ref 5–45)
BASOPHILS # BLD AUTO: 0.03 THOUSANDS/ΜL (ref 0–0.1)
BASOPHILS NFR BLD AUTO: 0 % (ref 0–1)
BILIRUB SERPL-MCNC: 0.2 MG/DL (ref 0.2–1)
BUN SERPL-MCNC: 10 MG/DL (ref 5–25)
CALCIUM SERPL-MCNC: 9.3 MG/DL (ref 8.3–10.1)
CHLORIDE SERPL-SCNC: 103 MMOL/L (ref 100–108)
CO2 SERPL-SCNC: 28 MMOL/L (ref 21–32)
CREAT SERPL-MCNC: 1.08 MG/DL (ref 0.6–1.3)
EOSINOPHIL # BLD AUTO: 0.12 THOUSAND/ΜL (ref 0–0.61)
EOSINOPHIL NFR BLD AUTO: 1 % (ref 0–6)
ERYTHROCYTE [DISTWIDTH] IN BLOOD BY AUTOMATED COUNT: 12.6 % (ref 11.6–15.1)
ERYTHROCYTE [SEDIMENTATION RATE] IN BLOOD: 6 MM/HOUR (ref 0–10)
GFR SERPL CREATININE-BSD FRML MDRD: 81 ML/MIN/1.73SQ M
GLUCOSE P FAST SERPL-MCNC: 119 MG/DL (ref 65–99)
HCT VFR BLD AUTO: 47.2 % (ref 36.5–49.3)
HGB BLD-MCNC: 15.6 G/DL (ref 12–17)
IMM GRANULOCYTES # BLD AUTO: 0.02 THOUSAND/UL (ref 0–0.2)
IMM GRANULOCYTES NFR BLD AUTO: 0 % (ref 0–2)
LYMPHOCYTES # BLD AUTO: 2.01 THOUSANDS/ΜL (ref 0.6–4.47)
LYMPHOCYTES NFR BLD AUTO: 19 % (ref 14–44)
MCH RBC QN AUTO: 31.1 PG (ref 26.8–34.3)
MCHC RBC AUTO-ENTMCNC: 33.1 G/DL (ref 31.4–37.4)
MCV RBC AUTO: 94 FL (ref 82–98)
MONOCYTES # BLD AUTO: 0.47 THOUSAND/ΜL (ref 0.17–1.22)
MONOCYTES NFR BLD AUTO: 5 % (ref 4–12)
NEUTROPHILS # BLD AUTO: 7.9 THOUSANDS/ΜL (ref 1.85–7.62)
NEUTS SEG NFR BLD AUTO: 75 % (ref 43–75)
NRBC BLD AUTO-RTO: 0 /100 WBCS
PLATELET # BLD AUTO: 223 THOUSANDS/UL (ref 149–390)
PMV BLD AUTO: 10 FL (ref 8.9–12.7)
POTASSIUM SERPL-SCNC: 3.9 MMOL/L (ref 3.5–5.3)
PROT SERPL-MCNC: 7.4 G/DL (ref 6.4–8.2)
RBC # BLD AUTO: 5.01 MILLION/UL (ref 3.88–5.62)
SODIUM SERPL-SCNC: 139 MMOL/L (ref 136–145)
TSH SERPL DL<=0.05 MIU/L-ACNC: 0.33 UIU/ML (ref 0.36–3.74)
WBC # BLD AUTO: 10.55 THOUSAND/UL (ref 4.31–10.16)

## 2018-10-25 PROCEDURE — 86430 RHEUMATOID FACTOR TEST QUAL: CPT

## 2018-10-25 PROCEDURE — 84403 ASSAY OF TOTAL TESTOSTERONE: CPT

## 2018-10-25 PROCEDURE — 84270 ASSAY OF SEX HORMONE GLOBUL: CPT

## 2018-10-25 PROCEDURE — 86038 ANTINUCLEAR ANTIBODIES: CPT

## 2018-10-25 PROCEDURE — 86592 SYPHILIS TEST NON-TREP QUAL: CPT

## 2018-10-25 PROCEDURE — 84443 ASSAY THYROID STIM HORMONE: CPT

## 2018-10-25 PROCEDURE — 84402 ASSAY OF FREE TESTOSTERONE: CPT

## 2018-10-25 PROCEDURE — 80053 COMPREHEN METABOLIC PANEL: CPT

## 2018-10-25 PROCEDURE — 36415 COLL VENOUS BLD VENIPUNCTURE: CPT

## 2018-10-25 PROCEDURE — 85025 COMPLETE CBC W/AUTO DIFF WBC: CPT

## 2018-10-25 PROCEDURE — 85652 RBC SED RATE AUTOMATED: CPT

## 2018-10-25 NOTE — TELEPHONE ENCOUNTER
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

## 2018-10-26 LAB
RHEUMATOID FACT SER QL LA: NEGATIVE
RPR SER QL: NORMAL
RYE IGE QN: NEGATIVE
SHBG SERPL-SCNC: 17.8 NMOL/L (ref 16.5–55.9)
TESTOST FREE SERPL-MCNC: 8.9 PG/ML (ref 6.8–21.5)
TESTOST SERPL-MCNC: 227 NG/DL (ref 264–916)

## 2018-10-28 ENCOUNTER — HOSPITAL ENCOUNTER (OUTPATIENT)
Dept: RADIOLOGY | Facility: HOSPITAL | Age: 48
Discharge: HOME | End: 2018-10-28
Attending: FAMILY MEDICINE
Payer: COMMERCIAL

## 2018-10-28 DIAGNOSIS — M54.2 NECK PAIN: ICD-10-CM

## 2018-10-28 PROCEDURE — 72141 MRI NECK SPINE W/O DYE: CPT

## 2018-10-30 NOTE — PROGRESS NOTES
Please call the patient regarding abnormal result  Free testosterone is normal at 8 9  Total testosterone is slightly low at 2 2 7  His white blood cell count is slightly elevated at 10 55  It also appears that his recent TSH is low  It appears I may have seen him in the clinic last February  Is he planning on following up and making an appointment with the clinic?

## 2018-11-01 ENCOUNTER — TELEPHONE (OUTPATIENT)
Dept: ENDOCRINOLOGY | Facility: HOSPITAL | Age: 48
End: 2018-11-01

## 2018-11-01 DIAGNOSIS — R79.89 LOW TSH LEVEL: Primary | ICD-10-CM

## 2018-11-08 NOTE — PROGRESS NOTES
Sorry for the delay  Wanted to ask Dr Emani Cannon about how to go about it  Let's overbook him at the next available appointment for assess ment and get him restarted

## 2018-11-09 ENCOUNTER — CLINICAL SUPPORT (OUTPATIENT)
Dept: PAIN MEDICINE | Facility: CLINIC | Age: 48
End: 2018-11-09
Payer: COMMERCIAL

## 2018-11-09 VITALS
SYSTOLIC BLOOD PRESSURE: 124 MMHG | HEIGHT: 76 IN | HEART RATE: 91 BPM | TEMPERATURE: 98.7 F | BODY MASS INDEX: 28.13 KG/M2 | DIASTOLIC BLOOD PRESSURE: 80 MMHG | WEIGHT: 231 LBS

## 2018-11-09 DIAGNOSIS — M48.02 CERVICAL SPINAL STENOSIS: ICD-10-CM

## 2018-11-09 DIAGNOSIS — M47.812 SPONDYLOSIS OF CERVICAL REGION WITHOUT MYELOPATHY OR RADICULOPATHY: ICD-10-CM

## 2018-11-09 DIAGNOSIS — M54.12 CERVICAL RADICULOPATHY: Primary | ICD-10-CM

## 2018-11-09 DIAGNOSIS — M79.18 MYOFASCIAL PAIN: ICD-10-CM

## 2018-11-09 DIAGNOSIS — M54.2 CERVICALGIA: ICD-10-CM

## 2018-11-09 PROCEDURE — 99204 OFFICE O/P NEW MOD 45 MIN: CPT | Performed by: ANESTHESIOLOGY
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
## 2018-11-21 ENCOUNTER — HOSPITAL ENCOUNTER (OUTPATIENT)
Dept: RADIOLOGY | Facility: CLINIC | Age: 48
Discharge: HOME | End: 2018-11-21
Payer: COMMERCIAL

## 2018-11-21 VITALS
OXYGEN SATURATION: 98 % | HEART RATE: 88 BPM | DIASTOLIC BLOOD PRESSURE: 82 MMHG | RESPIRATION RATE: 20 BRPM | TEMPERATURE: 98.7 F | SYSTOLIC BLOOD PRESSURE: 133 MMHG

## 2018-11-21 DIAGNOSIS — M54.12 CERVICAL RADICULOPATHY: ICD-10-CM

## 2018-11-21 PROCEDURE — 62321 NJX INTERLAMINAR CRV/THRC: CPT | Performed by: ANESTHESIOLOGY

## 2018-11-21 RX ORDER — LIDOCAINE HYDROCHLORIDE 10 MG/ML
5 INJECTION, SOLUTION EPIDURAL; INFILTRATION; INTRACAUDAL; PERINEURAL ONCE
Status: COMPLETED | OUTPATIENT
Start: 2018-11-21 | End: 2018-11-21

## 2018-11-21 RX ORDER — PAPAVERINE HCL 150 MG
10 CAPSULE, EXTENDED RELEASE ORAL ONCE
Status: COMPLETED | OUTPATIENT
Start: 2018-11-21 | End: 2018-11-21

## 2018-11-21 RX ADMIN — DEXAMETHASONE SODIUM PHOSPHATE 10 MG: 10 INJECTION, SOLUTION INTRAMUSCULAR; INTRAVENOUS at 11:18

## 2018-11-21 RX ADMIN — IOHEXOL 1 ML: 300 INJECTION, SOLUTION INTRAVENOUS at 11:16

## 2018-11-21 RX ADMIN — LIDOCAINE HYDROCHLORIDE 3 ML: 10 INJECTION, SOLUTION EPIDURAL; INFILTRATION; INTRACAUDAL; PERINEURAL at 11:14
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
## 2018-11-21 NOTE — DISCHARGE INSTRUCTIONS
Epidural Steroid Injection   WHAT YOU NEED TO KNOW:   An epidural steroid injection (ONEYDA) is a procedure to inject steroid medicine into the epidural space  The epidural space is between your spinal cord and vertebrae  Steroids reduce inflammation and fluid buildup in your spine that may be causing pain  You may be given pain medicine along with the steroids  ACTIVITY  · Do not drive or operate machinery today  · No strenuous activity today - bending, lifting, etc   · You may resume normal activites starting tomorrow - start slowly and as tolerated  · You may shower today, but no tub baths or hot tubs  · You may have numbness for several hours from the local anesthetic  Please use caution and common sense, especially with weight-bearing activities  CARE OF THE INJECTION SITE  · If you have soreness or pain, apply ice to the area today (20 minutes on/20 minutes off)  · Starting tomorrow, you may use warm, moist heat or ice if needed  · You may have an increase or change in your discomfort for 36-48 hours after your treatment  · Apply ice and continue with any pain medication you have been prescribed  · Notify [facility] if you have any of the following: redness, drainage, swelling, headache, stiff neck or fever above 100°F     SPECIAL INSTRUCTIONS  · Our office will contact you in approximately 7 days for a progress report  MEDICATIONS  · Continue to take all routine medications  · Our office may have instructed you to hold some medications  If you have a problem specifically related to your procedure, please call our office at [phone]  Problems not related to your procedure should be directed to your primary care physician

## 2018-11-26 ENCOUNTER — TELEPHONE (OUTPATIENT)
Dept: PAIN MEDICINE | Facility: MEDICAL CENTER | Age: 48
End: 2018-11-26

## 2018-11-26 NOTE — TELEPHONE ENCOUNTER
S/w pt, informed him that it takes up to two weeks to get the full effects of the steroid, pt should give it more time  Pt said his neck is stiff/sore, encouraged him to try ice/heat and PRN OTC pain meds

## 2018-11-26 NOTE — TELEPHONE ENCOUNTER
Pt is calling stating he had a procedure done 11/21 that did not work and that his pain level 8 or 9/10   Pt can be reached at [phone]

## 2018-11-29 ENCOUNTER — TELEPHONE (OUTPATIENT)
Dept: PAIN MEDICINE | Facility: CLINIC | Age: 48
End: 2018-11-29

## 2018-11-29 DIAGNOSIS — M54.12 CERVICAL RADICULOPATHY: Primary | ICD-10-CM

## 2018-11-29 DIAGNOSIS — M48.02 CERVICAL SPINAL STENOSIS: ICD-10-CM

## 2018-11-29 DIAGNOSIS — M79.18 MYOFASCIAL PAIN: ICD-10-CM

## 2018-11-29 RX ORDER — TIZANIDINE 2 MG/1
2 TABLET ORAL EVERY 8 HOURS PRN
Qty: 90 TABLET | Refills: 1 | Status: SHIPPED | OUTPATIENT
Start: 2018-11-29 | End: 2018-12-13 | Stop reason: SDUPTHER

## 2018-11-29 NOTE — TELEPHONE ENCOUNTER
S/w pt, he is s/p RUDDY on 11/21  Pt said he has had no relief from the injection and it made his pain even worse  Pt said he feels extremely inflammed in the area  I s/w him on Monday and encouraged him to use Ibuprofen and ice, which pt said he has been doing but the Advil is starting to hurt his stomach he is taking it so much  Pt is aware that it can take up to two weeks to see the effects but he can not take this pain he is experiencing right now, he wants to know if Dr Nish Chang has any suggestions

## 2018-11-29 NOTE — TELEPHONE ENCOUNTER
I will give the patient a trial of tizanidine 2 mg q 8 hours p r n     We will see how the patient does over the next week

## 2018-11-29 NOTE — TELEPHONE ENCOUNTER
S/w pt, informed him of medication being sent  Told him not to drive or operate machinery while taking  Please f/u with pt in one week to assess how he is doing after injection

## 2018-11-29 NOTE — TELEPHONE ENCOUNTER
Patient called back stating the injection did not help at all  He states he actually thinks it made it worse

## 2018-11-30 NOTE — TELEPHONE ENCOUNTER
SW pharmacists (Ed) at [facility] , states prescription is ready for pickup  SW patient and made aware that prescription is ready for pickup  Patient verbalized understanding and appreciative of call back

## 2018-12-11 NOTE — PROGRESS NOTES
SPOKE WITH PATIENT AND REMINDED HIM TO COMPLETE :    TSH, 3RD GENERATION    PATIENT STATES HE WILL COMPLETE THEM ON 12/14/2018

## 2018-12-12 ENCOUNTER — LAB (OUTPATIENT)
Dept: LAB | Facility: CLINIC | Age: 48
End: 2018-12-12
Payer: COMMERCIAL

## 2018-12-12 DIAGNOSIS — R79.89 LOW TSH LEVEL: ICD-10-CM

## 2018-12-12 LAB
T3 SERPL-MCNC: 0.9 NG/ML (ref 0.6–1.8)
T4 FREE SERPL-MCNC: 0.89 NG/DL (ref 0.76–1.46)
TSH SERPL DL<=0.05 MIU/L-ACNC: 0.57 UIU/ML (ref 0.36–3.74)

## 2018-12-12 PROCEDURE — 86376 MICROSOMAL ANTIBODY EACH: CPT

## 2018-12-12 PROCEDURE — 36415 COLL VENOUS BLD VENIPUNCTURE: CPT

## 2018-12-12 PROCEDURE — 84480 ASSAY TRIIODOTHYRONINE (T3): CPT

## 2018-12-12 PROCEDURE — 84439 ASSAY OF FREE THYROXINE: CPT

## 2018-12-12 PROCEDURE — 86800 THYROGLOBULIN ANTIBODY: CPT

## 2018-12-12 PROCEDURE — 84443 ASSAY THYROID STIM HORMONE: CPT

## 2018-12-12 RX ORDER — GABAPENTIN 300 MG/1
300 CAPSULE ORAL 3 TIMES DAILY
Qty: 90 CAPSULE | Refills: 1 | Status: SHIPPED | OUTPATIENT
Start: 2018-12-12 | End: 2019-05-28

## 2018-12-12 NOTE — TELEPHONE ENCOUNTER
Has the patient initiated the gabapentin that was started by psychiatry yet? This can certainly help with the neuropathic component of the pain  And if he has initiated this, what doses he currently taking?

## 2018-12-12 NOTE — TELEPHONE ENCOUNTER
Prescription for gabapentin 300 mg t i d  Sent to pharmacy  The patient is to start one 300 mg capsule at bedtime times 5 days  If the patient tolerates this he may increase to 300 mg b i d  X5 days  If he tolerates this he may increase to 300 mg t i d  And stay there until he is seen in the office for re-evaluation  Occasionally will need to titrate up to 1800 mg daily before we reach optimal therapeutic effect of his neuropathic pain

## 2018-12-12 NOTE — TELEPHONE ENCOUNTER
S/w pt, informed him of referral  Pt would like to know if Dr Maged Diaz can start him on Gabapentin in the mean time?

## 2018-12-12 NOTE — TELEPHONE ENCOUNTER
S/w pt, he said his psychiatrist took him off the Gabapentin because it was not helping for his psych issues and put him on something else a few months ago  Pt said he would be willing to restart it but he is very frustrated because he does not want to be on medication for the rest of his life  Pt would like something to cure the pain not mask it  Pt wondering if Dr Saritha Sotelo could give him a surgical consult? He said he would try the Gabapentin in the mean time though

## 2018-12-12 NOTE — TELEPHONE ENCOUNTER
Patient is calling asking that someone call him  Patient is stating that he is still in the same amount of pain that he was in previously  Patient is also stating we called in zanaflex but patient is stating that it doesn't do anything for the pain but makes him drowsy so he can only take it before bed  Please advise

## 2018-12-13 ENCOUNTER — TELEPHONE (OUTPATIENT)
Dept: PAIN MEDICINE | Facility: CLINIC | Age: 48
End: 2018-12-13

## 2018-12-13 LAB
THYROGLOB AB SERPL-ACNC: <1 IU/ML (ref 0–0.9)
THYROPEROXIDASE AB SERPL-ACNC: 15 IU/ML (ref 0–34)

## 2018-12-13 RX ORDER — TIZANIDINE 2 MG/1
2 TABLET ORAL EVERY 8 HOURS PRN
Qty: 90 TABLET | Refills: 2 | Status: SHIPPED | OUTPATIENT
Start: 2018-12-13 | End: 2018-12-31

## 2018-12-13 NOTE — TELEPHONE ENCOUNTER
Patient called to inform Dr Lee that he no longer needs gabapentin prescribe because his psych Dr put him back on it today 12/13  Patient is asking on a refill on tiZANdine 2 mg tablet please advise thanks                                      Call back# [phone]  Dr Lee

## 2018-12-27 DIAGNOSIS — M79.18 MYOFASCIAL PAIN: ICD-10-CM

## 2018-12-27 RX ORDER — TIZANIDINE 2 MG/1
TABLET ORAL
Qty: 90 TABLET | Refills: 0 | OUTPATIENT
Start: 2018-12-27

## 2018-12-31 ENCOUNTER — OFFICE VISIT (OUTPATIENT)
Dept: NEUROSURGERY | Facility: CLINIC | Age: 48
End: 2018-12-31
Payer: COMMERCIAL

## 2018-12-31 VITALS
SYSTOLIC BLOOD PRESSURE: 158 MMHG | HEART RATE: 86 BPM | DIASTOLIC BLOOD PRESSURE: 101 MMHG | HEIGHT: 76 IN | WEIGHT: 220 LBS | RESPIRATION RATE: 16 BRPM | BODY MASS INDEX: 26.79 KG/M2

## 2018-12-31 DIAGNOSIS — M48.02 CERVICAL SPINAL STENOSIS: ICD-10-CM

## 2018-12-31 DIAGNOSIS — M54.12 CERVICAL RADICULOPATHY: Primary | ICD-10-CM

## 2018-12-31 PROCEDURE — 99204 OFFICE O/P NEW MOD 45 MIN: CPT | Performed by: NEUROLOGICAL SURGERY

## 2018-12-31 RX ORDER — VANCOMYCIN HYDROCHLORIDE 1 G/200ML
1000 INJECTION, SOLUTION INTRAVENOUS ONCE
Status: CANCELLED | OUTPATIENT
Start: 2018-12-31 | End: 2018-12-31

## 2018-12-31 RX ORDER — TIZANIDINE HYDROCHLORIDE 4 MG/1
4 CAPSULE, GELATIN COATED ORAL 3 TIMES DAILY
Qty: 60 CAPSULE | Refills: 2 | Status: SHIPPED | OUTPATIENT
Start: 2018-12-31 | End: 2019-01-23 | Stop reason: RX

## 2018-12-31 RX ORDER — CHLORHEXIDINE GLUCONATE 0.12 MG/ML
15 RINSE ORAL ONCE
Status: CANCELLED | OUTPATIENT
Start: 2018-12-31 | End: 2018-12-31
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
## 2019-01-02 ENCOUNTER — TELEPHONE (OUTPATIENT)
Dept: INTERNAL MEDICINE CLINIC | Facility: CLINIC | Age: 49
End: 2019-01-02

## 2019-01-03 ENCOUNTER — TELEPHONE (OUTPATIENT)
Dept: PAIN MEDICINE | Facility: CLINIC | Age: 49
End: 2019-01-03

## 2019-01-03 ENCOUNTER — TELEPHONE (OUTPATIENT)
Dept: INTERNAL MEDICINE CLINIC | Facility: CLINIC | Age: 49
End: 2019-01-03

## 2019-01-03 NOTE — TELEPHONE ENCOUNTER
Please see below, does pt need to be seen? His appt was for a 8 week f/u after RUDDY  Has enough meds

## 2019-01-03 NOTE — TELEPHONE ENCOUNTER
Attempted to call the patient and left a detailed mom in regards to the previous task  ovs cancelled  Pt  To Cb if any questions

## 2019-01-03 NOTE — TELEPHONE ENCOUNTER
Patient is calling because he has an upcoming appointment on 01/07  He is asking if he still needs to be seen? He is scheduling surgery with Neurosurgery  He is asking if he can cancel his appointment on Monday? He can be reached at #[phone]

## 2019-01-08 ENCOUNTER — TELEPHONE (OUTPATIENT)
Dept: NEUROSURGERY | Facility: CLINIC | Age: 49
End: 2019-01-08

## 2019-01-08 NOTE — TELEPHONE ENCOUNTER
Signed surgical consent in the presence of surgeon after procedure explained   Proposed surgical procedure:FUSION CERVICAL ANTERIOR W DISCECTOMY, C4-5, C5-6 (Bilateral Spine Cervical) 12/31/2018      Assessment for comorbid medical conditions:   HO adverse response to general anesthesia:denies   Cardiac:denies no EKG requires   Pulmonary: smoker greater than 20 years , 1 PP 3 days , plan to stop before surgery---will require ASPEN collar  For 6 weeks postoperatively   Endocrine:  Denies   MISC/Oncology : denies   Anticoagulant/Antiplatelet use etc :  Denies   Personal history of venous thromboembolic disease: denies   Imagining: MRI cervical spine 10/2018  CT Cervical spine 7/2018  Pain management:  Dr Faith De La Cruz prescribed Tizanidine , denies use of opiates   Medication hold list for surgery (AC, ASA, NSAID, vitamins, dietary supplements, OTC):--reviewed in detail , stop ASA, do not start new medication without consulting with NSX to verify if on list   Discussed overview of surgical process from office appointment thru 6 weeks post op:--done     Patient/SO verbalized and understanding
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
## 2019-01-14 ENCOUNTER — APPOINTMENT (OUTPATIENT)
Dept: LAB | Facility: CLINIC | Age: 49
End: 2019-01-14
Payer: COMMERCIAL

## 2019-01-14 ENCOUNTER — TRANSCRIBE ORDERS (OUTPATIENT)
Dept: LAB | Facility: CLINIC | Age: 49
End: 2019-01-14

## 2019-01-14 ENCOUNTER — CONSULT (OUTPATIENT)
Dept: INTERNAL MEDICINE CLINIC | Facility: CLINIC | Age: 49
End: 2019-01-14

## 2019-01-14 VITALS
BODY MASS INDEX: 26.85 KG/M2 | DIASTOLIC BLOOD PRESSURE: 84 MMHG | HEIGHT: 76 IN | HEART RATE: 88 BPM | SYSTOLIC BLOOD PRESSURE: 126 MMHG | WEIGHT: 220.46 LBS | TEMPERATURE: 98 F

## 2019-01-14 DIAGNOSIS — M48.02 CERVICAL SPINAL STENOSIS: ICD-10-CM

## 2019-01-14 DIAGNOSIS — M54.12 CERVICAL RADICULOPATHY: ICD-10-CM

## 2019-01-14 DIAGNOSIS — M48.02 SPINAL STENOSIS IN CERVICAL REGION: Primary | ICD-10-CM

## 2019-01-14 DIAGNOSIS — F17.200 NICOTINE DEPENDENCE, UNCOMPLICATED, UNSPECIFIED NICOTINE PRODUCT TYPE: Primary | ICD-10-CM

## 2019-01-14 DIAGNOSIS — E23.0 HYPOTHALAMIC HYPOGONADISM (HCC): ICD-10-CM

## 2019-01-14 DIAGNOSIS — Z01.818 PREOPERATIVE CLEARANCE: ICD-10-CM

## 2019-01-14 DIAGNOSIS — I10 ESSENTIAL HYPERTENSION: ICD-10-CM

## 2019-01-14 LAB
ALBUMIN SERPL BCP-MCNC: 3.9 G/DL (ref 3.5–5)
ALP SERPL-CCNC: 73 U/L (ref 46–116)
ALT SERPL W P-5'-P-CCNC: 16 U/L (ref 12–78)
ANION GAP SERPL CALCULATED.3IONS-SCNC: 5 MMOL/L (ref 4–13)
APTT PPP: 25 SECONDS (ref 26–38)
AST SERPL W P-5'-P-CCNC: 10 U/L (ref 5–45)
BASOPHILS # BLD AUTO: 0.03 THOUSANDS/ΜL (ref 0–0.1)
BASOPHILS NFR BLD AUTO: 1 % (ref 0–1)
BILIRUB SERPL-MCNC: 0.2 MG/DL (ref 0.2–1)
BILIRUB UR QL STRIP: NEGATIVE
BUN SERPL-MCNC: 15 MG/DL (ref 5–25)
CALCIUM SERPL-MCNC: 8.9 MG/DL (ref 8.3–10.1)
CHLORIDE SERPL-SCNC: 109 MMOL/L (ref 100–108)
CLARITY UR: CLEAR
CO2 SERPL-SCNC: 28 MMOL/L (ref 21–32)
COLOR UR: YELLOW
CREAT SERPL-MCNC: 0.92 MG/DL (ref 0.6–1.3)
EOSINOPHIL # BLD AUTO: 0.19 THOUSAND/ΜL (ref 0–0.61)
EOSINOPHIL NFR BLD AUTO: 3 % (ref 0–6)
ERYTHROCYTE [DISTWIDTH] IN BLOOD BY AUTOMATED COUNT: 12.9 % (ref 11.6–15.1)
EST. AVERAGE GLUCOSE BLD GHB EST-MCNC: 120 MG/DL
GFR SERPL CREATININE-BSD FRML MDRD: 98 ML/MIN/1.73SQ M
GLUCOSE P FAST SERPL-MCNC: 87 MG/DL (ref 65–99)
GLUCOSE UR STRIP-MCNC: NEGATIVE MG/DL
HBA1C MFR BLD: 5.8 % (ref 4.2–6.3)
HCT VFR BLD AUTO: 49.6 % (ref 36.5–49.3)
HGB BLD-MCNC: 15.5 G/DL (ref 12–17)
HGB UR QL STRIP.AUTO: NEGATIVE
IMM GRANULOCYTES # BLD AUTO: 0.02 THOUSAND/UL (ref 0–0.2)
IMM GRANULOCYTES NFR BLD AUTO: 0 % (ref 0–2)
INR PPP: 0.91 (ref 0.86–1.17)
KETONES UR STRIP-MCNC: NEGATIVE MG/DL
LEUKOCYTE ESTERASE UR QL STRIP: NEGATIVE
LYMPHOCYTES # BLD AUTO: 1.77 THOUSANDS/ΜL (ref 0.6–4.47)
LYMPHOCYTES NFR BLD AUTO: 30 % (ref 14–44)
MCH RBC QN AUTO: 30.6 PG (ref 26.8–34.3)
MCHC RBC AUTO-ENTMCNC: 31.3 G/DL (ref 31.4–37.4)
MCV RBC AUTO: 98 FL (ref 82–98)
MONOCYTES # BLD AUTO: 0.48 THOUSAND/ΜL (ref 0.17–1.22)
MONOCYTES NFR BLD AUTO: 8 % (ref 4–12)
NEUTROPHILS # BLD AUTO: 3.51 THOUSANDS/ΜL (ref 1.85–7.62)
NEUTS SEG NFR BLD AUTO: 58 % (ref 43–75)
NITRITE UR QL STRIP: NEGATIVE
NRBC BLD AUTO-RTO: 0 /100 WBCS
PH UR STRIP.AUTO: 6.5 [PH] (ref 4.5–8)
PLATELET # BLD AUTO: 197 THOUSANDS/UL (ref 149–390)
PMV BLD AUTO: 11.7 FL (ref 8.9–12.7)
POTASSIUM SERPL-SCNC: 4.2 MMOL/L (ref 3.5–5.3)
PROT SERPL-MCNC: 7.1 G/DL (ref 6.4–8.2)
PROT UR STRIP-MCNC: NEGATIVE MG/DL
PROTHROMBIN TIME: 12.4 SECONDS (ref 11.8–14.2)
RBC # BLD AUTO: 5.07 MILLION/UL (ref 3.88–5.62)
SODIUM SERPL-SCNC: 142 MMOL/L (ref 136–145)
SP GR UR STRIP.AUTO: >=1.03 (ref 1–1.03)
UROBILINOGEN UR QL STRIP.AUTO: 0.2 E.U./DL
WBC # BLD AUTO: 6 THOUSAND/UL (ref 4.31–10.16)

## 2019-01-14 PROCEDURE — 83036 HEMOGLOBIN GLYCOSYLATED A1C: CPT

## 2019-01-14 PROCEDURE — 85025 COMPLETE CBC W/AUTO DIFF WBC: CPT

## 2019-01-14 PROCEDURE — 36415 COLL VENOUS BLD VENIPUNCTURE: CPT

## 2019-01-14 PROCEDURE — 87081 CULTURE SCREEN ONLY: CPT

## 2019-01-14 PROCEDURE — 85610 PROTHROMBIN TIME: CPT

## 2019-01-14 PROCEDURE — 99243 OFF/OP CNSLTJ NEW/EST LOW 30: CPT | Performed by: INTERNAL MEDICINE

## 2019-01-14 PROCEDURE — 80053 COMPREHEN METABOLIC PANEL: CPT

## 2019-01-14 PROCEDURE — 81003 URINALYSIS AUTO W/O SCOPE: CPT | Performed by: NEUROLOGICAL SURGERY

## 2019-01-14 PROCEDURE — 85730 THROMBOPLASTIN TIME PARTIAL: CPT

## 2019-01-14 RX ORDER — NICOTINE 21 MG/24HR
1 PATCH, TRANSDERMAL 24 HOURS TRANSDERMAL EVERY 24 HOURS
Qty: 28 PATCH | Refills: 0 | Status: SHIPPED | OUTPATIENT
Start: 2019-01-14 | End: 2019-05-14

## 2019-01-14 RX ORDER — TESTOSTERONE CYPIONATE 200 MG/ML
200 INJECTION INTRAMUSCULAR
Qty: 10 ML | Refills: 0 | Status: SHIPPED | OUTPATIENT
Start: 2019-01-14 | End: 2019-05-14

## 2019-01-14 NOTE — PATIENT INSTRUCTIONS
Nicotine (Into the mouth)   Nicotine Polacrilex (YUDELKA-oh-teen [id number])  Helps you quit smoking  Brand Name(s): Commit, [facility] Nicotine, [facility] Nicotine Gum, [facility] Nicotine Polacrilex, [facility] Nicotine Polacrilex, [facility] Nicotine Polacrilex, Leader Nicotine Polacrilex, [facility] Nicotine Polacrilex Gum, Nicorelief, Nicorette, [facility] nicotine polacrilex, [facility] Mini Nicotine, [facility] Nicotine, [facility] Nicotine Polacrilex, [facility] Nicotine   There may be other brand names for this medicine  When This Medicine Should Not Be Used: This medicine is not right for everyone  Do not use it if you had an allergic reaction to nicotine  How to Use This Medicine:   Gum, Lozenge  Follow the instructions on the medicine label if you are using this medicine without a prescription  Read and follow the patient instructions that come with this medicine  Talk to your doctor or pharmacist if you have any questions  Begin using this medicine on your quit day, even if you are not able to stop smoking immediately  Use at least the minimum number of suggested pieces of gum or lozenges per day for the first 6 weeks  Do not use more than 1 piece at the same time  Keep using this medicine for the full treatment time  If you feel you need to use this medicine for a longer period of time, talk to your doctor  Read the package label to find your correct dose  The dose is based on how soon you would normally smoke after you wake up in the morning  Do not eat or drink for 15 minutes before you use this medicine or while you have it in your mouth  You may feel tingling or a warm feeling in your mouth  This means the nicotine is being released  Gum:   Do not chew this medicine like regular gum  Chew it slowly until you feel tingling or taste a peppery flavor  Move it to the inside of your cheek until the tingling goes away   Chew it a few more times until the tingling returns, then move it to your other cheek or a different place in your mouth  Repeat this until the tingling stops, usually after about 30 minutes  Throw the gum away  You may use a second piece of gum if you still feel strong cravings to smoke within the same hour  After that, you must wait until your next scheduled dose  Lozenge:   Minimize swallowing when the lozenge is in your mouth  This helps the medicine be absorbed through the tissue in your mouth  Occasionally move the lozenge from side to side in your mouth  Let it melt slowly  It should melt within 30 minutes  Do not bite, chew, or swallow the lozenge  Store this medicine at room temperature, away from heat and direct light  Wrap any used gum or leftover lozenge in a paper towel and throw it away so children or pets cannot get to it  Drugs and Foods to Avoid:      Ask your doctor or pharmacist before using any other medicine, including over-the-counter medicines, vitamins, and herbal products  Warnings While Using This Medicine:   Pregnant or breastfeeding women should only use this medicine as directed by a doctor  Smoking can seriously harm your unborn child  Try to stop smoking without using medicine  Although this medicine is believed to be safer than smoking, the risks of its use during pregnancy are not fully known  Tell your doctor if you have diabetes, heart problems, high blood pressure, a stomach ulcer, or phenylketonuria (PKU)  This medicine may cause the following problems:  High blood pressure  Increase in heart rate  If you are on a low-salt diet, talk to your doctor before you use this medicine  Keep all medicine out of the reach of children  Never share your medicine with anyone  Possible Side Effects While Using This Medicine:   Call your doctor right away if you notice any of these side effects:   Allergic reaction: Itching or hives, swelling in your face or hands, swelling or tingling in your mouth or throat, chest tightness, trouble breathing  Dizziness, headache, upset stomach, drooling, vomiting, diarrhea, cold sweats, blurred vision, trouble hearing, confusion, fainting, or weakness  Fast, slow, pounding, or uneven heartbeat  Severe sore throat, open mouth sores or blisters  If you notice these less serious side effects, talk with your doctor:   Mild nausea, constipation, heartburn  Mouth, tooth, or jaw pain  If you notice other side effects that you think are caused by this medicine, tell your doctor  Call your doctor for medical advice about side effects  You may report side effects to FDA at 9-298-FDA-8347  © 2017 [address] [employer] Information is for End User's use only and may not be sold, redistributed or otherwise used for commercial purposes  The above information is an  only  It is not intended as medical advice for individual conditions or treatments  Talk to your doctor, nurse or pharmacist before following any medical regimen to see if it is safe and effective for you  Cigarette Smoking and Your Health   AMBULATORY CARE:   Risks to your health if you smoke:  Nicotine and other chemicals found in tobacco damage every cell in your body  Even if you are a light smoker, you have an increased risk for cancer, heart disease, and lung disease  If you are pregnant or have diabetes, smoking increases your risk for complications  Benefits to your health if you stop smoking:   · You decrease respiratory symptoms such as coughing, wheezing, and shortness of breath  · You reduce your risk for cancers of the lung, mouth, throat, kidney, bladder, pancreas, stomach, and cervix  If you already have cancer, you increase the benefits of chemotherapy  You also reduce your risk for cancer returning or a second cancer from developing  · You reduce your risk for heart disease, blood clots, heart attack, and stroke       · You reduce your risk for lung infections, and diseases such as pneumonia, asthma, chronic bronchitis, and emphysema  · Your circulation improves  More oxygen can be delivered to your body  If you have diabetes, you lower your risk for complications, such as kidney, artery, and eye diseases  You also lower your risk for nerve damage  Nerve damage can lead to amputations, poor vision, and blindness  · You improve your body's ability to heal and to fight infections  Benefits to the health of others if you stop smoking:  Tobacco is harmful to nonsmokers who breathe in your secondhand smoke  The following are ways the health of others around you may improve when you stop smoking:  · You lower the risks for lung cancer and heart disease in nonsmoking adults  · If you are pregnant, you lower the risk for miscarriage, early delivery, low birth weight, and stillbirth  You also lower your baby's risk for SIDS, obesity, developmental delay, and neurobehavioral problems, such as ADHD  · If you have children, you lower their risk for ear infections, colds, pneumonia, bronchitis, and asthma  For more information and support to stop smoking:   · [employer]  Phone: 4- 079 - 015-8297  Web Address: www [employer]  Follow up with your healthcare provider as directed:  Write down your questions so you remember to ask them during your visits  © 2017 [employer]  Information is for End User's use only and may not be sold, redistributed or otherwise used for commercial purposes  All illustrations and images included in CareNotes® are the copyrighted property of [employer]  or Eleazar Patterson  The above information is an  only  It is not intended as medical advice for individual conditions or treatments  Talk to your doctor, nurse or pharmacist before following any medical regimen to see if it is safe and effective for you

## 2019-01-15 ENCOUNTER — TELEPHONE (OUTPATIENT)
Dept: INTERNAL MEDICINE CLINIC | Facility: CLINIC | Age: 49
End: 2019-01-15

## 2019-01-15 DIAGNOSIS — E23.0 HYPOTHALAMIC HYPOGONADISM (HCC): Primary | ICD-10-CM

## 2019-01-15 LAB — MRSA NOSE QL CULT: NORMAL

## 2019-01-15 RX ORDER — BLOOD PRESSURE TEST KIT
KIT MISCELLANEOUS AS NEEDED
Qty: 100 EACH | Refills: 0 | Status: SHIPPED | OUTPATIENT
Start: 2019-01-15 | End: 2019-05-14

## 2019-01-15 NOTE — TELEPHONE ENCOUNTER
Pharmacy is stating they did not receive medication called in from us yesterday  Please resend medication to [facility] pharmacy

## 2019-01-15 NOTE — TELEPHONE ENCOUNTER
Called pharmacy to discuss prescriptions  Medications are now being filled for patient  Also discussed testosterone injections with patient  He is coming to office to  prescriptions  In green resident folder

## 2019-01-17 RX ORDER — LISINOPRIL 20 MG/1
TABLET ORAL
Qty: 15 TABLET | Refills: 0 | Status: SHIPPED | OUTPATIENT
Start: 2019-01-17 | End: 2019-01-18 | Stop reason: SDUPTHER

## 2019-01-18 DIAGNOSIS — I10 ESSENTIAL HYPERTENSION: ICD-10-CM

## 2019-01-18 RX ORDER — LISINOPRIL 20 MG/1
20 TABLET ORAL DAILY
Qty: 90 TABLET | Refills: 3 | Status: SHIPPED | OUTPATIENT
Start: 2019-01-18 | End: 2019-12-10 | Stop reason: SDUPTHER

## 2019-01-18 NOTE — TELEPHONE ENCOUNTER
Pt is scheduled to see Dr Arabella Jones on 2/22/19  He does not want to see Dr Lozada Memory and requested that update his chart to say so       I will speak to Dr Arabella Jones about extending his lisinopril refill until his appt

## 2019-01-18 NOTE — TELEPHONE ENCOUNTER
Two week supply will be sent  Pt needs to make an appointment and show up  He had an appointment with me but he was a no show

## 2019-01-21 NOTE — TELEPHONE ENCOUNTER
EXPLAINED THE SITUATION TO DR Merline Bach AND HE WAS AGREEABLE TO EXTENDING THE REFILL UNTIL THE PT'S APPT

## 2019-01-23 ENCOUNTER — TELEPHONE (OUTPATIENT)
Dept: NEUROSURGERY | Facility: CLINIC | Age: 49
End: 2019-01-23

## 2019-01-23 DIAGNOSIS — M54.12 CERVICAL RADICULOPATHY: ICD-10-CM

## 2019-01-23 DIAGNOSIS — M48.02 CERVICAL SPINAL STENOSIS: ICD-10-CM

## 2019-01-23 DIAGNOSIS — M62.838 SPASM OF CERVICAL PARASPINOUS MUSCLE: Primary | ICD-10-CM

## 2019-01-23 RX ORDER — TIZANIDINE 4 MG/1
4 TABLET ORAL EVERY 8 HOURS PRN
Qty: 30 TABLET | Refills: 0 | Status: SHIPPED | OUTPATIENT
Start: 2019-01-23 | End: 2019-02-05 | Stop reason: SDUPTHER

## 2019-01-23 NOTE — TELEPHONE ENCOUNTER
Call transferred to me form  For OR  ---request refill for Tizanidine ---is not scheduled for surgery until 1/29 ---reports DR Aliza Nogueira ordered medication for muscle spasms to hole him over until surgery ----  Initial script on 12/31 picked up by patient Jones 12 ----prescription plan approved only  15 capsules  Pharmacist tried to enter order again ---rejected by plan, d/c order , entered pills instead  Escript ---Pharmacist will let me know if script declined  Returned joanie to patient with update  He complains of cervical parous spinal muscle spasm that refer pain radiation into right shoulder then down right arm  Suggest he joanie pharmacy , verify coverage and medication fill  Cervical radiculopathy (M54 12);  Cervical spinal stenosis (M48 02)

## 2019-01-24 NOTE — PRE-PROCEDURE INSTRUCTIONS
Pre-Surgery Instructions:   Medication Instructions    gabapentin (NEURONTIN) 300 mg capsule Instructed patient per Anesthesia Guidelines   lisinopril (ZESTRIL) 20 mg tablet Instructed patient per Anesthesia Guidelines   nicotine (NICODERM CQ) 21 mg/24 hr TD 24 hr patch Instructed patient per Anesthesia Guidelines   nicotine polacrilex (NICORETTE) 2 mg gum Instructed patient per Anesthesia Guidelines   OLANZapine (ZyPREXA) 15 mg tablet Instructed patient per Anesthesia Guidelines   tiZANidine (ZANAFLEX) 4 mg tablet Instructed patient per Anesthesia Guidelines  Pre-procedure instructions given without any further questions or concerns at this time  Pt reports he has the [facility] wash and will review the written instructions from Dr Breaux Ok office prior to use

## 2019-01-28 ENCOUNTER — DOCUMENTATION (OUTPATIENT)
Dept: NEUROSURGERY | Facility: CLINIC | Age: 49
End: 2019-01-28

## 2019-01-28 ENCOUNTER — TELEPHONE (OUTPATIENT)
Dept: NEUROSURGERY | Facility: CLINIC | Age: 49
End: 2019-01-28

## 2019-01-28 DIAGNOSIS — G89.18 POSTOPERATIVE PAIN AFTER SPINAL SURGERY: ICD-10-CM

## 2019-01-28 DIAGNOSIS — Z98.890 POSTOPERATIVE STATE: Primary | ICD-10-CM

## 2019-01-28 DIAGNOSIS — M62.838 SPASM OF CERVICAL PARASPINOUS MUSCLE: ICD-10-CM

## 2019-01-28 RX ORDER — DIAZEPAM 5 MG/1
TABLET ORAL
Qty: 40 TABLET | Refills: 0 | Status: SHIPPED | OUTPATIENT
Start: 2019-01-28 | End: 2019-01-28 | Stop reason: SINTOL

## 2019-01-28 RX ORDER — OXYCODONE HYDROCHLORIDE AND ACETAMINOPHEN 5; 325 MG/1; MG/1
TABLET ORAL
Qty: 40 TABLET | Refills: 0 | Status: SHIPPED | OUTPATIENT
Start: 2019-01-29 | End: 2019-02-08 | Stop reason: SDUPTHER

## 2019-01-28 RX ORDER — SULFAMETHOXAZOLE AND TRIMETHOPRIM 800; 160 MG/1; MG/1
TABLET ORAL
Qty: 6 TABLET | Refills: 0 | Status: SHIPPED | OUTPATIENT
Start: 2019-01-29 | End: 2019-02-01

## 2019-01-28 NOTE — TELEPHONE ENCOUNTER
Pt reports being upset because of the possibility that his surgery may be cancelled due to recent finding he had been daignosed with sleep apnea  He reports too many plans re other people as well, have been set in motion  He had also just spoke with Catrina Daniels, and Jeaenth  is also aware  Contact will be made with pt

## 2019-01-28 NOTE — TELEPHONE ENCOUNTER
Patient calls regarding trouble with picking up his medications preoperatively  Patient is scheduled to have an ACDF tomorrow with Dr Criss Eid in [facility] and was instructed to  his medications (antibiotic, muscle relaxer, percocet) prior to surgery as he will not have a ride the day of surgery to take him to the pharmacy  He ran into two issues - there is a hard stop on the percocet which prohibits the medication to be dispensed before 1/29/2019 and his name is spelled wrong (correct spelling is "[place]") so he would not be able to  any of the medications  The pharmacy closes at 9:30pm and he is concerned that he will not be able to have his post operative medications available to him when he is discharged from the hospital     Per the pharmacy ([facility] [phone]) patient already picked up the bactrim and zanaflex and was just waiting on the percocet to   Per the pharmacy his name was misspelled and they were not able to dispense the medications to him however he was still able to obtain them  The Percocet has a "do not fill until 1/29/2019" warning on it and therefore the patient cannot obtain that medication at this time  Spoke to the patient again who states he did  the bactrim and zanaflex without issue, and that his name was in fact spelled correctly on the bottles - it is the percocet that has his name misspelled  He is waiting to get the Percocet and reiterates he does not have anyone to  the medications for him after surgery  I informed him that I am unable to give verbal orders for narcotics to the pharmacy and since there is no way for me to change his name in the system, he will have to wait until tomorrow for someone to write him a new script and possibly have it filled at the pharmacy at the hospital to take home prior to discharge  He is agreeable and understands that I am not able to fill a narcotic prescription for him over the phone    I informed him I would email Terence Lombardi, and Dr Ly Began about this issue in hopes they can address this in the morning

## 2019-01-28 NOTE — TELEPHONE ENCOUNTER
Pre operative call day prior surgery scheduled in the AM w/ Dr Reshma Jenkins the following information is confirmed / discussed: 1/29/2018 ANTERIOR CERVICAL FUSION W DISCECTOMY, C4-5, C5-6 (N/A Spine Cervical)  Allergies Reviewed reviewed   Hold medications reviewed: as per list , hold ASA  NPO after MN, night prior surgery reviewed:---reinforced   Medication (s) instructed by healthcare provider to take the morning of surgery w/ sip of water 4 OZ discussed: as per ASU   Post operative scripts electronic transmission: cephalexin , percocet   PDMP site reviewed accessed and reviewed scheduled drug list printed and scanned into record  Pain management script:percocet , has 19 pills remaining for tizanidine 4 mg   Pre- operative shower protocol reviewed; Clarify instructions as per protocol, third chlorhexidine shower tonight before surgery, then use LEXY wipes as per packaging instructions, Use a clean towel and wash cloth starting tonight and continue nightly until seen 2 weeks post operative visit for incision check removal  Change bed linens tonight and continue at least 1-2 times weekly  --reinforced   Smoking cessation discussed-not interested continues to smoke  ---willl require ASPEN collar x 6 weeks postoperatively, exchange collars    Informed will receive a telephone call tonight from a hospital representative with time to report on surgery day: received time   Informed will receive a f/u call within in 24 -48 hours post-op to assess recovery reinforce instructions, and to answer any questions  --reinforced   Follow-up appointments reviewed --2 week and 6 week   Patient verbalized understanding information discussed  While entering medications --alert for diazepam --sleep apnea UNK prior to today patient denied during preoperative assessment ---pulmonary consult 4/17/2018   Supports sleep apnea  Ordered ambulatory referral for sleep study   9/4/2018 DR Christiansen sleep study impressions; Impression : 1   Obstructive sleep apnea mild    Plan:   1  Results of studies were reviewed with Richard Doyle  2  I discussed treatment options with the risks and benefits  3  He elected to try Positive airway pressure therapy  A prescription was provided to initiate therapy at the above described setting  4  I also advised on weight reduction  Script for CPAP attached in note with settings ----script d/c secondary patient cancelled 4 appointment for CPAP fitting--  To date patient has never purchased CPAP machine --will discuss case with DR Reshma Jenkins --informed patient and return call regarding surgery for tomorrow   DR Reshma Jenkins aware called [facility]SU left message  Pt aware as per Dr Reshma Jenkins will proceed with surgery w/ CPAP on hand Dr Rsehma Jenkins will discuss w/ anesthesia on surgery day    Patient asked for Dr Leopoldo Scriver number so he can call and get script for CPAP

## 2019-01-29 ENCOUNTER — TELEPHONE (OUTPATIENT)
Dept: NEUROSURGERY | Facility: CLINIC | Age: 49
End: 2019-01-29

## 2019-01-29 ENCOUNTER — HOSPITAL ENCOUNTER (OUTPATIENT)
Facility: HOSPITAL | Age: 49
Setting detail: OUTPATIENT SURGERY
Discharge: HOME | End: 2019-01-29
Attending: NEUROLOGICAL SURGERY | Admitting: NEUROLOGICAL SURGERY
Payer: COMMERCIAL

## 2019-01-29 ENCOUNTER — APPOINTMENT (OUTPATIENT)
Dept: RADIOLOGY | Facility: HOSPITAL | Age: 49
End: 2019-01-29
Attending: NEUROLOGICAL SURGERY
Payer: COMMERCIAL

## 2019-01-29 ENCOUNTER — ANESTHESIA (OUTPATIENT)
Dept: PERIOP | Facility: HOSPITAL | Age: 49
End: 2019-01-29
Payer: COMMERCIAL

## 2019-01-29 ENCOUNTER — ANESTHESIA EVENT (OUTPATIENT)
Dept: PERIOP | Facility: HOSPITAL | Age: 49
End: 2019-01-29
Payer: COMMERCIAL

## 2019-01-29 VITALS
WEIGHT: 213.3 LBS | BODY MASS INDEX: 25.97 KG/M2 | HEART RATE: 84 BPM | RESPIRATION RATE: 16 BRPM | SYSTOLIC BLOOD PRESSURE: 133 MMHG | DIASTOLIC BLOOD PRESSURE: 80 MMHG | HEIGHT: 76 IN | OXYGEN SATURATION: 97 % | TEMPERATURE: 98.2 F

## 2019-01-29 DIAGNOSIS — Z98.890 POSTOPERATIVE STATE: ICD-10-CM

## 2019-01-29 DIAGNOSIS — Z98.1 STATUS POST CERVICAL SPINAL FUSION: Primary | ICD-10-CM

## 2019-01-29 DIAGNOSIS — M48.02 CERVICAL SPINAL STENOSIS: ICD-10-CM

## 2019-01-29 DIAGNOSIS — M54.12 CERVICAL RADICULOPATHY: ICD-10-CM

## 2019-01-29 DIAGNOSIS — M54.12 CERVICAL RADICULOPATHY: Primary | ICD-10-CM

## 2019-01-29 PROCEDURE — C1713 ANCHOR/SCREW BN/BN,TIS/BN: HCPCS | Performed by: NEUROLOGICAL SURGERY

## 2019-01-29 PROCEDURE — 22552 ARTHRD ANT NTRBD CERVICAL EA: CPT | Performed by: NEUROLOGICAL SURGERY

## 2019-01-29 PROCEDURE — 22551 ARTHRD ANT NTRBDY CERVICAL: CPT | Performed by: NEUROLOGICAL SURGERY

## 2019-01-29 PROCEDURE — 22551 ARTHRD ANT NTRBDY CERVICAL: CPT | Performed by: PHYSICIAN ASSISTANT

## 2019-01-29 PROCEDURE — 72040 X-RAY EXAM NECK SPINE 2-3 VW: CPT

## 2019-01-29 PROCEDURE — 22853 INSJ BIOMECHANICAL DEVICE: CPT | Performed by: PHYSICIAN ASSISTANT

## 2019-01-29 PROCEDURE — 22845 INSERT SPINE FIXATION DEVICE: CPT | Performed by: PHYSICIAN ASSISTANT

## 2019-01-29 PROCEDURE — 22552 ARTHRD ANT NTRBD CERVICAL EA: CPT | Performed by: PHYSICIAN ASSISTANT

## 2019-01-29 PROCEDURE — 20930 SP BONE ALGRFT MORSEL ADD-ON: CPT | Performed by: NEUROLOGICAL SURGERY

## 2019-01-29 PROCEDURE — 22853 INSJ BIOMECHANICAL DEVICE: CPT | Performed by: NEUROLOGICAL SURGERY

## 2019-01-29 PROCEDURE — 22845 INSERT SPINE FIXATION DEVICE: CPT | Performed by: NEUROLOGICAL SURGERY

## 2019-01-29 DEVICE — SELF-DRILLING SCREW
Type: IMPLANTABLE DEVICE | Site: SPINE CERVICAL | Status: FUNCTIONAL
Brand: AVS ANCHOR-C

## 2019-01-29 DEVICE — CERVICAL CAGE
Type: IMPLANTABLE DEVICE | Site: SPINE CERVICAL | Status: FUNCTIONAL
Brand: AVS ANCHOR-C

## 2019-01-29 DEVICE — BONE GRAFT SUBSTITUTE, FOAM PACK, BETA-TRICALCIUM PHOSPHATE AND TYPE I BOVINE COLLAGEN
Type: IMPLANTABLE DEVICE | Site: SPINE CERVICAL | Status: FUNCTIONAL
Brand: VITOSS

## 2019-01-29 RX ORDER — CHLORHEXIDINE GLUCONATE 0.12 MG/ML
15 RINSE ORAL ONCE
Status: COMPLETED | OUTPATIENT
Start: 2019-01-29 | End: 2019-01-29

## 2019-01-29 RX ORDER — MIDAZOLAM HYDROCHLORIDE 1 MG/ML
INJECTION INTRAMUSCULAR; INTRAVENOUS AS NEEDED
Status: DISCONTINUED | OUTPATIENT
Start: 2019-01-29 | End: 2019-01-29 | Stop reason: SURG

## 2019-01-29 RX ORDER — LIDOCAINE HYDROCHLORIDE AND EPINEPHRINE 10; 10 MG/ML; UG/ML
INJECTION, SOLUTION INFILTRATION; PERINEURAL AS NEEDED
Status: DISCONTINUED | OUTPATIENT
Start: 2019-01-29 | End: 2019-01-29 | Stop reason: HOSPADM

## 2019-01-29 RX ORDER — PROPOFOL 10 MG/ML
INJECTION, EMULSION INTRAVENOUS AS NEEDED
Status: DISCONTINUED | OUTPATIENT
Start: 2019-01-29 | End: 2019-01-29 | Stop reason: SURG

## 2019-01-29 RX ORDER — OXYCODONE HYDROCHLORIDE AND ACETAMINOPHEN 5; 325 MG/1; MG/1
1 TABLET ORAL EVERY 6 HOURS PRN
Qty: 30 TABLET | Refills: 0 | Status: SHIPPED | OUTPATIENT
Start: 2019-01-29 | End: 2019-01-29 | Stop reason: SDUPTHER

## 2019-01-29 RX ORDER — GLYCOPYRROLATE 0.2 MG/ML
INJECTION INTRAMUSCULAR; INTRAVENOUS AS NEEDED
Status: DISCONTINUED | OUTPATIENT
Start: 2019-01-29 | End: 2019-01-29 | Stop reason: SURG

## 2019-01-29 RX ORDER — HYDROMORPHONE HCL/PF 1 MG/ML
0.5 SYRINGE (ML) INJECTION
Status: DISCONTINUED | OUTPATIENT
Start: 2019-01-29 | End: 2019-01-29 | Stop reason: HOSPADM

## 2019-01-29 RX ORDER — ONDANSETRON 2 MG/ML
INJECTION INTRAMUSCULAR; INTRAVENOUS AS NEEDED
Status: DISCONTINUED | OUTPATIENT
Start: 2019-01-29 | End: 2019-01-29 | Stop reason: SURG

## 2019-01-29 RX ORDER — FENTANYL CITRATE/PF 50 MCG/ML
25 SYRINGE (ML) INJECTION
Status: COMPLETED | OUTPATIENT
Start: 2019-01-29 | End: 2019-01-29

## 2019-01-29 RX ORDER — FENTANYL CITRATE 50 UG/ML
INJECTION, SOLUTION INTRAMUSCULAR; INTRAVENOUS AS NEEDED
Status: DISCONTINUED | OUTPATIENT
Start: 2019-01-29 | End: 2019-01-29 | Stop reason: SURG

## 2019-01-29 RX ORDER — VANCOMYCIN HYDROCHLORIDE 1 G/200ML
1000 INJECTION, SOLUTION INTRAVENOUS ONCE
Status: DISCONTINUED | OUTPATIENT
Start: 2019-01-29 | End: 2019-01-29

## 2019-01-29 RX ORDER — OXYCODONE HYDROCHLORIDE 5 MG/1
5 TABLET ORAL EVERY 4 HOURS PRN
Status: DISCONTINUED | OUTPATIENT
Start: 2019-01-29 | End: 2019-01-29 | Stop reason: HOSPADM

## 2019-01-29 RX ORDER — DEXAMETHASONE 2 MG/1
2 TABLET ORAL 2 TIMES DAILY WITH MEALS
Qty: 6 TABLET | Refills: 0 | Status: SHIPPED | OUTPATIENT
Start: 2019-01-29 | End: 2019-02-01

## 2019-01-29 RX ORDER — SODIUM CHLORIDE, SODIUM LACTATE, POTASSIUM CHLORIDE, CALCIUM CHLORIDE 600; 310; 30; 20 MG/100ML; MG/100ML; MG/100ML; MG/100ML
75 INJECTION, SOLUTION INTRAVENOUS CONTINUOUS
Status: DISCONTINUED | OUTPATIENT
Start: 2019-01-29 | End: 2019-01-29 | Stop reason: HOSPADM

## 2019-01-29 RX ORDER — LORAZEPAM 2 MG/ML
1 INJECTION INTRAMUSCULAR ONCE
Status: COMPLETED | OUTPATIENT
Start: 2019-01-29 | End: 2019-01-29

## 2019-01-29 RX ORDER — ROCURONIUM BROMIDE 10 MG/ML
INJECTION, SOLUTION INTRAVENOUS AS NEEDED
Status: DISCONTINUED | OUTPATIENT
Start: 2019-01-29 | End: 2019-01-29 | Stop reason: SURG

## 2019-01-29 RX ORDER — DEXAMETHASONE 2 MG/1
2 TABLET ORAL 2 TIMES DAILY WITH MEALS
Qty: 6 TABLET | Refills: 0 | Status: SHIPPED | OUTPATIENT
Start: 2019-01-29 | End: 2019-01-29 | Stop reason: SDUPTHER

## 2019-01-29 RX ORDER — CHLORHEXIDINE GLUCONATE 0.12 MG/ML
15 RINSE ORAL ONCE
Status: DISCONTINUED | OUTPATIENT
Start: 2019-01-29 | End: 2019-01-29

## 2019-01-29 RX ORDER — NEOSTIGMINE METHYLSULFATE 1 MG/ML
INJECTION INTRAVENOUS AS NEEDED
Status: DISCONTINUED | OUTPATIENT
Start: 2019-01-29 | End: 2019-01-29 | Stop reason: SURG

## 2019-01-29 RX ADMIN — FENTANYL CITRATE 25 MCG: 50 INJECTION, SOLUTION INTRAMUSCULAR; INTRAVENOUS at 10:05

## 2019-01-29 RX ADMIN — ROCURONIUM BROMIDE 20 MG: 10 INJECTION, SOLUTION INTRAVENOUS at 08:19

## 2019-01-29 RX ADMIN — SODIUM CHLORIDE, SODIUM LACTATE, POTASSIUM CHLORIDE, AND CALCIUM CHLORIDE: .6; .31; .03; .02 INJECTION, SOLUTION INTRAVENOUS at 08:30

## 2019-01-29 RX ADMIN — FENTANYL CITRATE 25 MCG: 50 INJECTION, SOLUTION INTRAMUSCULAR; INTRAVENOUS at 09:57

## 2019-01-29 RX ADMIN — PROPOFOL 70 MG: 10 INJECTION, EMULSION INTRAVENOUS at 09:24

## 2019-01-29 RX ADMIN — LORAZEPAM 1 MG: 2 INJECTION, SOLUTION INTRAMUSCULAR; INTRAVENOUS at 09:30

## 2019-01-29 RX ADMIN — FENTANYL CITRATE 50 MCG: 50 INJECTION, SOLUTION INTRAMUSCULAR; INTRAVENOUS at 08:08

## 2019-01-29 RX ADMIN — CHLORHEXIDINE GLUCONATE 0.12% ORAL RINSE 15 ML: 1.2 LIQUID ORAL at 06:27

## 2019-01-29 RX ADMIN — ONDANSETRON 4 MG: 2 INJECTION, SOLUTION INTRAMUSCULAR; INTRAVENOUS at 09:00

## 2019-01-29 RX ADMIN — PROPOFOL 200 MG: 10 INJECTION, EMULSION INTRAVENOUS at 07:28

## 2019-01-29 RX ADMIN — MIDAZOLAM HYDROCHLORIDE 2 MG: 1 INJECTION, SOLUTION INTRAMUSCULAR; INTRAVENOUS at 07:25

## 2019-01-29 RX ADMIN — ROCURONIUM BROMIDE 50 MG: 10 INJECTION, SOLUTION INTRAVENOUS at 07:28

## 2019-01-29 RX ADMIN — FENTANYL CITRATE 100 MCG: 50 INJECTION, SOLUTION INTRAMUSCULAR; INTRAVENOUS at 07:25

## 2019-01-29 RX ADMIN — DEXAMETHASONE SODIUM PHOSPHATE 6 MG: 10 INJECTION INTRAMUSCULAR; INTRAVENOUS at 07:53

## 2019-01-29 RX ADMIN — FENTANYL CITRATE 25 MCG: 50 INJECTION, SOLUTION INTRAMUSCULAR; INTRAVENOUS at 09:39

## 2019-01-29 RX ADMIN — FENTANYL CITRATE 50 MCG: 50 INJECTION, SOLUTION INTRAMUSCULAR; INTRAVENOUS at 09:18

## 2019-01-29 RX ADMIN — SODIUM CHLORIDE, SODIUM LACTATE, POTASSIUM CHLORIDE, AND CALCIUM CHLORIDE 75 ML/HR: .6; .31; .03; .02 INJECTION, SOLUTION INTRAVENOUS at 06:36

## 2019-01-29 RX ADMIN — VANCOMYCIN HYDROCHLORIDE 1500 MG: 1 INJECTION, POWDER, LYOPHILIZED, FOR SOLUTION INTRAVENOUS at 07:11

## 2019-01-29 RX ADMIN — PROPOFOL 40 MG: 10 INJECTION, EMULSION INTRAVENOUS at 09:18

## 2019-01-29 RX ADMIN — OXYCODONE HYDROCHLORIDE 5 MG: 5 TABLET ORAL at 11:54

## 2019-01-29 RX ADMIN — FENTANYL CITRATE 50 MCG: 50 INJECTION, SOLUTION INTRAMUSCULAR; INTRAVENOUS at 08:03

## 2019-01-29 RX ADMIN — FENTANYL CITRATE 25 MCG: 50 INJECTION, SOLUTION INTRAMUSCULAR; INTRAVENOUS at 09:44

## 2019-01-29 RX ADMIN — NEOSTIGMINE METHYLSULFATE 3 MG: 1 INJECTION INTRAVENOUS at 09:11

## 2019-01-29 RX ADMIN — GLYCOPYRROLATE 0.6 MG: 0.2 INJECTION, SOLUTION INTRAMUSCULAR; INTRAVENOUS at 09:11

## 2019-01-29 RX ADMIN — FENTANYL CITRATE 50 MCG: 50 INJECTION, SOLUTION INTRAMUSCULAR; INTRAVENOUS at 08:05

## 2019-01-29 NOTE — TELEPHONE ENCOUNTER
Notified this AM problem with spelling of first name in EPIC system, [facility] will not fill percocet script until 1/29 -as entered and we need to verify name corrected  DR Tiana Raygoza ordering Decadron po x 3 day postop--  Telephoned [facility] pharmacy --verified first name correction in EPIC form incorrect spelling ZURDO to [facility] --will fill script I submitted yesterday   Script for Decadron initially printed re-routed to pharmacy ---verified with [facility] recite and will fill   Ed pharmacist at [facility] will have scripts ready for pickup within 1 hour    Spoke with Emani Lacy she will stop at pharmacy in route home to  scripts
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
## 2019-01-29 NOTE — PROGRESS NOTES
Patient states he is ready to leave  Has voided, gotten up and walked to bathroom    Tolerating PO intake

## 2019-01-29 NOTE — ANESTHESIA POSTPROCEDURE EVALUATION
Post-Op Assessment Note      CV Status:  Stable    Mental Status:  Awake, agitated and combative    Hydration Status:  Euvolemic    PONV Controlled:  Controlled    Airway Patency:  Patent    Post Op Vitals Reviewed: Yes          Staff: Anesthesiologist, with CRNAs       Comments: Arrived to PACU on 3L NC, aggitated and combative   Propofol given and Fentanyl IV          BP   121/84   Temp      Pulse  81   Resp   16   SpO2   98

## 2019-01-29 NOTE — OP NOTE
OPERATIVE REPORT  PATIENT NAME: Mariano Guy    :  1970  MRN: [id number]  Pt Location:  OR ROOM 01    SURGERY DATE: 2019    Surgeon(s) and Role:     * Liza Leavitt MD - Primary     * Sneha Contreras PA-C - Assisting    Preop Diagnosis:  Cervical radiculopathy [M54 12]  Cervical spinal stenosis [M48 02]    Post-Op Diagnosis Codes:     * Cervical radiculopathy [M54 12]     * Cervical spinal stenosis [M48 02]    Procedure(s) (LRB):  ANTERIOR CERVICAL FUSION W DISCECTOMY, C4-5, C5-6 (N/A)    Specimen(s):  * No specimens in log *    Estimated Blood Loss:   Minimal    Drains:       Anesthesia Type:   General    Operative Indications:  Cervical radiculopathy [M54 12]  Cervical spinal stenosis [M48 02]      Operative Findings:  See dictated note    Complications:   None    Procedure and Technique:  The patient was brought to the operating room and transferred onto the OR table  After being placed under general anesthetic, and all appropriate lines were in position, a roll was placed between scapula and head was placed in a gel staley  Based on lateral fluoroscopy, an incision was planned from the medial border of the right sternocleidomastoid muscle to midline  The skin was then prepped and draped in usual sterile fashion  10 blade was used to incise the skin  Monopolar cautery was used to dissect down and through the platysma  The platysma was undermined with monopolar cautery  Monopolar cautery was used to expose the medial border of the sternocleidomastoid muscle  A combination of bipolar cautery, Metzenbaum scissors and blunt dissection was used to further expose to the right at the medial border of the sternocleidomastoid muscle  I could then palpate the carotid artery and swept medially to identify the anterior cervical spine  With hand-held retractors in place, the prevertebral fascia was identified, cauterized with bipolar cautery and incised with Metzenbaum scissors   This plane was further developed with combination of blunt and sharp dissection  Bent spinal needles were placed at the disc space of the appropriate surgical level which was also confirmed on lateral fluoroscopy and with a neuroradiology  With hand-held retractors in place, and the longus coli and anterior vertebral bodies were exposed and the longus coli muscles undermined with monopolar cautery  The barney rice retraction system was then applied  Starting at C5-6 [place] distraction pins were placed in the disc space was distracted open after incising the annulus with a 15 blade  Pituitary rongeur and curved curettes were used to remove disc material  Further disc material was also delivered from behind the C5  and C6 vertebral bodies  A matchstick bur was used to remove posterior osteophytes  The posterior longitudinal ligament was undermined with an M hook and removed with one and 2 mm Kerrison punches  Bilateral foraminotomies were completed, Following decompression, the nerve hook could be passed along the ventral surface of the thecal sac into both foramina without any difficulty  We used the standalone screw system which was inspected for appropriate placement on lateral fluoroscopy  There was on screw placed in C5 and the other in C6 and were well placed  The final locking mechanism was applied       FloSseal was used hemostasis with excess FloSeal removed  The endplates were further prepared with a matchstick bur and curved curet  A trial was placed under lateral fluoroscopy and did not seem to over distract the facet joints  A final 8 millimeter cage filled with bone substitute was placed under lateral fluoroscopy  The introducer was then removed  The [place] distraction pins were removed and bone wax was used for hemostasis  Next at C4-5 [place] distraction pins were placed in the disc space was distracted open after incising the annulus with a 15 blade   Pituitary rongeur and curved curettes were used to remove disc material  Further disc material was also delivered from behind the C4  and C5 vertebral bodies  A matchstick bur was used to remove posterior osteophytes  The posterior longitudinal ligament was undermined with an M hook and removed with one and 2 mm Kerrison punches  Bilateral foraminotomies were completed, Following decompression, the nerve hook could be passed along the ventral surface of the thecal sac into both foramina without any difficulty  We used the standalone screw system which was inspected for appropriate placement on lateral fluoroscopy  There was on screw placed in C4 and the other in C5 and were well placed  The final locking mechanism was applied       FloSseal was used hemostasis with excess FloSeal removed  The endplates were further prepared with a matchstick bur and curved curet  A trial was placed under lateral fluoroscopy and did not seem to over distract the facet joints  A final 7 millimeter cage filled with bone substitute was placed under lateral fluoroscopy  The introducer was then removed  The [place] distraction pins were removed and bone wax was used for hemostasis      The surgical site was irrigated copiously with antibiotic impregnated irrigation  Vicryl suture was used to approximate the subcutaneous tissue  A running 4-0 Monocryl was used to approximate the skin edges      The count was correct at the end of the case and there were no complications   The patient was extubated and a cervical collar was applied  The patient was transferred to PACU  and was noted to be hemodynamically and neurologicaly stable       I was present for the entire procedure, A qualified resident physician was not available and A physician assistant was required during the procedure for retraction tissue handling,dissection and suturing    Patient Disposition:  PACU     SIGNATURE: Serena Izquierdo MD  DATE: January 29, 2019  TIME: 9:00 AM    [place]  C4-5 [place] C [id number] 0 degree; 14 up and 12 down screws  C5-6 [place] C [id number] 8 degree; 12 up and down screws

## 2019-01-29 NOTE — ANESTHESIA PREPROCEDURE EVALUATION
Review of Systems/Medical History  Patient summary reviewed  Chart reviewed      Cardiovascular  EKG reviewed, Hyperlipidemia, Hypertension ,   Comment: Pt denies hx of MI despite multiple inquiries,  Pulmonary  Smoker cigarette smoker  , Sleep apnea Sleep Study completed,        GI/Hepatic            Endo/Other     GYN       Hematology   Musculoskeletal  Back pain , cervical pain,   Arthritis     Neurology   Psychology           Physical Exam    Airway    Mallampati score: II  TM Distance: >3 FB  Neck ROM: full     Dental   Comment: Thick tongue,     Cardiovascular  Rhythm: regular, Rate: normal, Cardiovascular exam normal    Pulmonary  Pulmonary exam normal Breath sounds clear to auscultation,     Other Findings        Anesthesia Plan  ASA Score- 2     Anesthesia Type- general with ASA Monitors  Additional Monitors:   Airway Plan: ETT  Comment: Benefits and risks of planned anesthetic discussed with patient, and agrees to proceed  I, Dr Ashlyn Cowan, the attending anesthesiologist, have personally seen and evaluated the patient prior to anesthetic care  I have reviewed the preanesthetic record, and other medical records if appropriate to the anesthetic care  If a CRNA is involved in the case, I have reviewed the CRNA assessment, if present, and agree  The patient is in a suitable condition to proceed with my formulated anesthetic plan        Plan Factors-    Induction- intravenous  Postoperative Plan- Plan for postoperative opioid use  Informed Consent- Anesthetic plan and risks discussed with patient

## 2019-01-29 NOTE — DISCHARGE INSTRUCTIONS
Anterior Cervical Discectomy and Fusion Discharge instructions    Activity:    1  Do not lift more than 20 pounds for 2 weeks  2  Avoid bending, lifting and twisting for 2 weeks  3  Wear cervical collar at all times until cleared to remove  You may wear it in the shower and change the pads after the shower  Surgical incision care:    1  Keep incisions dry for 3 days  2  May allow clean water to flow over incisions after 3 days  3  Do not immerse the incisions in water for 4 weeks  4  Do not apply any creams or ointments to the incision, unless otherwise instructed by [facility]  5  Contact office if increasing redness, drainage, pain or swelling around the incisions  Postoperative medication:    1  [employer] [employer] [facility] will provide pain medication for the first 2 weeks after surgery as coordinated with your pain specialist    2  If [employer] [facility] is providing pain medication, please contact office for questions regarding dosage and modifications  3  If [facility] is not providing pain medication postoperatively, then contact pain specialist for additional instructions and prescriptions  4  No antiplatelet or anticoagulation medication for 2 weeks after surgery, unless otherwise instructed  Please contact [facility] if you have any questions about the effects of any of your medications on blood clotting  5  Do not operate heavy machinery or vehicles while taking sedating medications    6  You may resume Aspirin when instructed to by [facility]

## 2019-01-30 NOTE — TELEPHONE ENCOUNTER
1st attempt - Called Modesto Lott on primary contact number after surgery yesterday to check in on recovery and provide post surgical instructions  Got voicemail, left message to callback  Will make another attempt to contact him if no callback is received

## 2019-01-31 NOTE — TELEPHONE ENCOUNTER
2nd attempt - Spoke with Dario Sotelo to see how he is doing after surgery 1/29/2019  He reports that he is doing well overall and denies any incisional issues or fevers  Advised that after three days he may take a shower and allow soapy water to wash over the surgical site, do not submerge in water until cleared by the surgeon  Went over incision care with patient including keeping incision clean and dry and not to apply any creams or ointments to the site, he has no further questions at this time  Verified date/time/location of his upcoming POV on 2/12/2019 and advised him to call the office with any further questions or concerns, or if any incisional issues or fevers would arise  Reports pain medication was finally refilled and he was picking it up today  Patient was appreciative for the call

## 2019-02-05 ENCOUNTER — TELEPHONE (OUTPATIENT)
Dept: NEUROSURGERY | Facility: CLINIC | Age: 49
End: 2019-02-05

## 2019-02-05 DIAGNOSIS — M48.02 CERVICAL SPINAL STENOSIS: ICD-10-CM

## 2019-02-05 DIAGNOSIS — M62.838 SPASM OF CERVICAL PARASPINOUS MUSCLE: ICD-10-CM

## 2019-02-05 RX ORDER — TIZANIDINE 4 MG/1
TABLET ORAL
Qty: 60 TABLET | Refills: 0 | Status: SHIPPED | OUTPATIENT
Start: 2019-02-05 | End: 2019-02-12 | Stop reason: SDUPTHER

## 2019-02-05 NOTE — TELEPHONE ENCOUNTER
Pt is 1 week post ACDF last week and c/o terible low neck, shoulder blade, and shoulder pain  Suggested he use heat, and may use OTC sports rubs such as icy hot etc  He reports he is using the Tizanadine  Will check if he may take more frequently than q 8 hr

## 2019-02-05 NOTE — TELEPHONE ENCOUNTER
Spoke with pharmacy and provided oral order for Biofreeze gel since pt reports his insurance will cover if a script    Pt notified [employer] called to pharmacy

## 2019-02-05 NOTE — TELEPHONE ENCOUNTER
Returned call to patient he reports postoperative neck pain ,s/p surgery 1/29/2019 ANTERIOR CERVICAL FUSION W DISCECTOMY, C4-5, C5-6 (N/A Spine Cervical)    Spoke w/ nurse and treatment prescribed refer t note earlier today

## 2019-02-05 NOTE — TELEPHONE ENCOUNTER
Discussed with India Davila  It was suggested the pt may take 1-2 tabs every six hr prn  A refill will be sent to pharmacy on record  Left a message for pt to call back to review

## 2019-02-08 ENCOUNTER — DOCUMENTATION (OUTPATIENT)
Dept: NEUROSURGERY | Facility: CLINIC | Age: 49
End: 2019-02-08

## 2019-02-08 DIAGNOSIS — G89.18 POSTOPERATIVE PAIN AFTER SPINAL SURGERY: ICD-10-CM

## 2019-02-08 DIAGNOSIS — Z98.890 POSTOPERATIVE STATE: ICD-10-CM

## 2019-02-08 RX ORDER — OXYCODONE HYDROCHLORIDE AND ACETAMINOPHEN 5; 325 MG/1; MG/1
1 TABLET ORAL EVERY 6 HOURS PRN
Qty: 20 TABLET | Refills: 0 | Status: SHIPPED | OUTPATIENT
Start: 2019-02-08 | End: 2019-02-12 | Stop reason: SDUPTHER

## 2019-02-08 NOTE — TELEPHONE ENCOUNTER
Pt reports he continues with pain and shoulder muscle spasms  He reports he has run out of pain medication and has been taking Advil  Encouraged him to stop the Advil and instructed him not to take any NSAIDS due to the fact they impede the fusion process  He stated an understanding  Will discuss refill with provider  Per K[facility] refill to be provided

## 2019-02-12 ENCOUNTER — CLINICAL SUPPORT (OUTPATIENT)
Dept: NEUROSURGERY | Facility: CLINIC | Age: 49
End: 2019-02-12

## 2019-02-12 VITALS — DIASTOLIC BLOOD PRESSURE: 90 MMHG | TEMPERATURE: 98.1 F | SYSTOLIC BLOOD PRESSURE: 142 MMHG

## 2019-02-12 DIAGNOSIS — Z98.890 POST-OPERATIVE STATE: Primary | ICD-10-CM

## 2019-02-12 DIAGNOSIS — M62.838 SPASM OF CERVICAL PARASPINOUS MUSCLE: ICD-10-CM

## 2019-02-12 DIAGNOSIS — M48.02 CERVICAL SPINAL STENOSIS: ICD-10-CM

## 2019-02-12 DIAGNOSIS — Z98.890 POSTOPERATIVE STATE: ICD-10-CM

## 2019-02-12 DIAGNOSIS — G89.18 POSTOPERATIVE PAIN AFTER SPINAL SURGERY: ICD-10-CM

## 2019-02-12 PROCEDURE — 99024 POSTOP FOLLOW-UP VISIT: CPT

## 2019-02-12 RX ORDER — TIZANIDINE 4 MG/1
4 TABLET ORAL EVERY 8 HOURS PRN
Qty: 18 TABLET | Refills: 0 | Status: SHIPPED | OUTPATIENT
Start: 2019-02-12 | End: 2019-04-01 | Stop reason: SDUPTHER

## 2019-02-12 RX ORDER — OXYCODONE HYDROCHLORIDE AND ACETAMINOPHEN 5; 325 MG/1; MG/1
1 TABLET ORAL EVERY 6 HOURS PRN
Qty: 24 TABLET | Refills: 0 | Status: SHIPPED | OUTPATIENT
Start: 2019-02-13 | End: 2019-02-25 | Stop reason: SDUPTHER

## 2019-02-12 NOTE — TELEPHONE ENCOUNTER
Med refill order  PDMP checked and refill is appropriate with fill date of 2/13/2019  Placed order accordingly

## 2019-02-12 NOTE — PROGRESS NOTES
Post-Op Visit-Neurosurgery    Bushra Bryant 50 y o  male MRN: [id number]    Chief Complaint  Patient presents post: ANTERIOR CERVICAL FUSION W DISCECTOMY, C4-5, C5-6 (N/A Spine Cervical)    History of Present Illness  Patient presents for 2 week POV for incision check  Arrived unaccompanied and ambulated well without assisitive device  Reports pain is an 8/10 and is currently taking Percocet which he believes it is working for only a short time  Requesting refill of pain medication  Assessment  Wound Exam: Incision is clean, dry, and tact, well approximated, without heat, swelling, redness, or drainage  See image below:         Procedure  Staple/suture observation  location: Anterior cervical spine region  Procedure Note: Dissolvable sutures   Patient Status:  the patient tolerated the procedure well  Complications: None  Incision AIDA  Discussion/Summary  Not due for pain medication refill until tomorrow  Discussed with ROX WILKES who agreed to refill medications with first fill date of tomorrow  Despite allergy to Morphine patient has tolerated Percocet without incident  Placed orders accordingly to be eRx to patients preferred pharmacy  PDMP checked and refill is appropriated, report is attached to visit notes  Provided new Aspen collar as well as replacement pads and shower collar  Reviewed incision care with patient including daily observation for s/s infection including: increased erythema, edema, drainage, dehiscence of incision or fever >101  Should these be observed, he understands that he is to call and/or return immediately for reassessment  Advised patient to continue cleansing area with mild soap and water and pat dry  Not to apply any lotions, creams, or ointments, & not to submerge in any water for two more weeks   He is to maintain activity restrictions and continue wearing cervical collar until cleared by the surgeon  Activity levels were also reviewed with the patient in detail, he is to lift no greater than 10 pounds, advised to limit bend/twisting at the waist and ambulation is encouraged as tolerated  Patient is aware that he cannot drive while still in the cervical collar  Verified date/time/location of upcoming POV and reminded him to complete x-rays prior to his visit on 3/7/2019   He is to call the office with any further questions or concerns, or if any incisional issues or fevers would arise

## 2019-02-15 ENCOUNTER — HOSPITAL ENCOUNTER (OUTPATIENT)
Dept: RADIOLOGY | Facility: HOSPITAL | Age: 49
Discharge: HOME | End: 2019-02-15
Attending: NEUROLOGICAL SURGERY
Payer: COMMERCIAL

## 2019-02-15 DIAGNOSIS — Z98.890 POST-OPERATIVE STATE: ICD-10-CM

## 2019-02-15 PROCEDURE — 72040 X-RAY EXAM NECK SPINE 2-3 VW: CPT

## 2019-02-16 DIAGNOSIS — M62.838 SPASM OF CERVICAL PARASPINOUS MUSCLE: ICD-10-CM

## 2019-02-16 DIAGNOSIS — M48.02 CERVICAL SPINAL STENOSIS: ICD-10-CM

## 2019-02-20 RX ORDER — TIZANIDINE 4 MG/1
TABLET ORAL
Qty: 60 TABLET | Refills: 0 | Status: SHIPPED | OUTPATIENT
Start: 2019-02-20 | End: 2019-04-01

## 2019-02-21 ENCOUNTER — TELEPHONE (OUTPATIENT)
Dept: NEUROLOGY | Facility: CLINIC | Age: 49
End: 2019-02-21

## 2019-02-21 DIAGNOSIS — E78.5 DYSLIPIDEMIA: ICD-10-CM

## 2019-02-21 RX ORDER — ATORVASTATIN CALCIUM 10 MG/1
TABLET, FILM COATED ORAL
Qty: 90 TABLET | Refills: 1 | Status: SHIPPED | OUTPATIENT
Start: 2019-02-21 | End: 2019-09-07 | Stop reason: SDUPTHER

## 2019-02-21 NOTE — TELEPHONE ENCOUNTER
I received a certified letter addressed from patient for records regarding dates 7/10/18- present  Patient has never been seen by Neurology or Neuropsychology  I did fax back and state "patient never seen here before"

## 2019-02-25 ENCOUNTER — DOCUMENTATION (OUTPATIENT)
Dept: NEUROSURGERY | Facility: CLINIC | Age: 49
End: 2019-02-25

## 2019-02-25 DIAGNOSIS — I10 ESSENTIAL HYPERTENSION: ICD-10-CM

## 2019-02-25 DIAGNOSIS — Z98.890 POSTOPERATIVE STATE: ICD-10-CM

## 2019-02-25 DIAGNOSIS — G89.18 POSTOPERATIVE PAIN AFTER SPINAL SURGERY: ICD-10-CM

## 2019-02-25 DIAGNOSIS — E78.5 HYPERLIPIDEMIA, UNSPECIFIED HYPERLIPIDEMIA TYPE: ICD-10-CM

## 2019-02-25 RX ORDER — OXYCODONE HYDROCHLORIDE AND ACETAMINOPHEN 5; 325 MG/1; MG/1
1 TABLET ORAL EVERY 8 HOURS PRN
Qty: 21 TABLET | Refills: 0 | Status: SHIPPED | OUTPATIENT
Start: 2019-02-25 | End: 2019-03-04 | Stop reason: SDUPTHER

## 2019-02-25 RX ORDER — ASPIRIN 81 MG/1
TABLET ORAL
Qty: 90 TABLET | Refills: 1 | Status: SHIPPED | OUTPATIENT
Start: 2019-02-25 | End: 2019-05-14

## 2019-03-04 ENCOUNTER — DOCUMENTATION (OUTPATIENT)
Dept: NEUROSURGERY | Facility: CLINIC | Age: 49
End: 2019-03-04

## 2019-03-04 DIAGNOSIS — Z98.890 POSTOPERATIVE STATE: ICD-10-CM

## 2019-03-04 DIAGNOSIS — G89.18 POSTOPERATIVE PAIN AFTER SPINAL SURGERY: ICD-10-CM

## 2019-03-04 RX ORDER — OXYCODONE HYDROCHLORIDE AND ACETAMINOPHEN 5; 325 MG/1; MG/1
1 TABLET ORAL EVERY 8 HOURS PRN
Qty: 21 TABLET | Refills: 0 | Status: SHIPPED | OUTPATIENT
Start: 2019-03-04 | End: 2019-03-12 | Stop reason: SDUPTHER

## 2019-03-08 DIAGNOSIS — Z98.890 POSTOPERATIVE STATE: Primary | ICD-10-CM

## 2019-03-08 DIAGNOSIS — G89.18 POSTOPERATIVE PAIN AFTER SPINAL SURGERY: ICD-10-CM

## 2019-03-08 RX ORDER — TIZANIDINE 4 MG/1
TABLET ORAL
Qty: 60 TABLET | Refills: 0 | Status: SHIPPED | OUTPATIENT
Start: 2019-03-08 | End: 2019-03-18

## 2019-03-12 ENCOUNTER — DOCUMENTATION (OUTPATIENT)
Dept: NEUROSURGERY | Facility: CLINIC | Age: 49
End: 2019-03-12

## 2019-03-12 DIAGNOSIS — G89.18 POSTOPERATIVE PAIN AFTER SPINAL SURGERY: ICD-10-CM

## 2019-03-12 DIAGNOSIS — Z98.890 POSTOPERATIVE STATE: ICD-10-CM

## 2019-03-12 RX ORDER — OXYCODONE HYDROCHLORIDE AND ACETAMINOPHEN 5; 325 MG/1; MG/1
1 TABLET ORAL EVERY 12 HOURS PRN
Qty: 14 TABLET | Refills: 0 | Status: SHIPPED | OUTPATIENT
Start: 2019-03-12 | End: 2019-03-20 | Stop reason: SDUPTHER

## 2019-03-12 NOTE — TELEPHONE ENCOUNTER
Per pt request, Percocet refill will be transmitted to pharmacy of choice  Pt notified of schedule change

## 2019-03-13 DIAGNOSIS — M54.12 CERVICAL RADICULOPATHY: ICD-10-CM

## 2019-03-13 RX ORDER — GABAPENTIN 300 MG/1
CAPSULE ORAL
Qty: 90 CAPSULE | Refills: 0 | OUTPATIENT
Start: 2019-03-13

## 2019-03-18 ENCOUNTER — OFFICE VISIT (OUTPATIENT)
Dept: NEUROSURGERY | Facility: CLINIC | Age: 49
End: 2019-03-18

## 2019-03-18 VITALS
RESPIRATION RATE: 16 BRPM | HEART RATE: 100 BPM | SYSTOLIC BLOOD PRESSURE: 182 MMHG | HEIGHT: 76 IN | WEIGHT: 212 LBS | BODY MASS INDEX: 25.82 KG/M2 | DIASTOLIC BLOOD PRESSURE: 107 MMHG | TEMPERATURE: 97.6 F

## 2019-03-18 DIAGNOSIS — Z48.89 AFTERCARE FOLLOWING SURGERY: Primary | ICD-10-CM

## 2019-03-18 PROCEDURE — 99024 POSTOP FOLLOW-UP VISIT: CPT | Performed by: NEUROLOGICAL SURGERY

## 2019-03-18 NOTE — PROGRESS NOTES
Assessment/Plan:    No problem-specific Assessment & Plan notes found for this encounter  Arm pain completely gone  Has been wearing collar  Reports shoulder pain still worse at the end of the day  XR reviewed no issues  Normal for facet pain given large graft sizes, can be 3 months is duration  F/u 3 months will refer to pain management for further pain control  F/u with XRays  Diagnoses and all orders for this visit:    Aftercare following surgery  -     XR spine cervical 2 or 3 vw injury; Future  -     Ambulatory referral to Pain Management; Future          Subjective:      Patient ID: Selene Kimbrough is a 50 y o  male  Patient 6 weeks postop from ACDF 2 level  Arm pain is gone still has facet shoulder pain  HPI    The following portions of the patient's history were reviewed and updated as appropriate: allergies, current medications, past family history, past medical history, past social history, past surgical history and problem list     Review of Systems   Constitutional: Negative  HENT: Negative  Eyes: Negative  Respiratory: Negative  Cardiovascular: Negative  Gastrointestinal: Negative  Endocrine: Negative  Genitourinary: Negative  Negative for difficulty urinating  Musculoskeletal: Positive for arthralgias, neck pain and neck stiffness  Negative for back pain and gait problem  Allergic/Immunologic: Negative  Neurological: Negative for dizziness, speech difficulty, weakness, numbness and headaches  Objective:      BP (!) 182/107 (BP Location: Right arm)   Pulse 100   Temp 97 6 °F (36 4 °C) (Tympanic)   Resp 16   Ht 6' 4" (1 93 m)   Wt 96 2 kg (212 lb)   BMI 25 81 kg/m²          Physical Exam   Constitutional: He is oriented to person, place, and time  He appears well-developed  HENT:   Head: Normocephalic  Eyes: Pupils are equal, round, and reactive to light  EOM are normal    Neck: Normal range of motion  Cardiovascular: Normal rate  Pulmonary/Chest: Effort normal    Neurological: He is alert and oriented to person, place, and time  He has normal strength  No cranial nerve deficit or sensory deficit

## 2019-03-20 ENCOUNTER — DOCUMENTATION (OUTPATIENT)
Dept: NEUROSURGERY | Facility: CLINIC | Age: 49
End: 2019-03-20

## 2019-03-20 DIAGNOSIS — G89.18 POSTOPERATIVE PAIN AFTER SPINAL SURGERY: ICD-10-CM

## 2019-03-20 DIAGNOSIS — Z98.890 POSTOPERATIVE STATE: ICD-10-CM

## 2019-03-20 RX ORDER — OXYCODONE HYDROCHLORIDE AND ACETAMINOPHEN 5; 325 MG/1; MG/1
1 TABLET ORAL EVERY 12 HOURS PRN
Qty: 24 TABLET | Refills: 0 | Status: SHIPPED | OUTPATIENT
Start: 2019-03-20 | End: 2019-04-01

## 2019-03-20 NOTE — TELEPHONE ENCOUNTER
Pt reports his first pain appt is 4/1 and questions if we can manage his pain med until then  Christian Dempsey is in agreement and script will be transmitted to pharmacy of choice

## 2019-03-22 DIAGNOSIS — G89.18 POSTOPERATIVE PAIN AFTER SPINAL SURGERY: ICD-10-CM

## 2019-03-22 DIAGNOSIS — Z98.890 POSTOPERATIVE STATE: ICD-10-CM

## 2019-03-22 RX ORDER — TIZANIDINE 4 MG/1
TABLET ORAL
Qty: 60 TABLET | Refills: 0 | Status: SHIPPED | OUTPATIENT
Start: 2019-03-22 | End: 2019-04-07 | Stop reason: SDUPTHER

## 2019-04-01 ENCOUNTER — CLINICAL SUPPORT (OUTPATIENT)
Dept: PAIN MEDICINE | Facility: CLINIC | Age: 49
End: 2019-04-01
Payer: COMMERCIAL

## 2019-04-01 VITALS
DIASTOLIC BLOOD PRESSURE: 73 MMHG | TEMPERATURE: 97.8 F | HEART RATE: 79 BPM | BODY MASS INDEX: 26.55 KG/M2 | SYSTOLIC BLOOD PRESSURE: 117 MMHG | WEIGHT: 218 LBS | HEIGHT: 76 IN

## 2019-04-01 DIAGNOSIS — M48.02 CERVICAL SPINAL STENOSIS: ICD-10-CM

## 2019-04-01 DIAGNOSIS — M47.812 SPONDYLOSIS OF CERVICAL REGION WITHOUT MYELOPATHY OR RADICULOPATHY: ICD-10-CM

## 2019-04-01 DIAGNOSIS — Z48.89 AFTERCARE FOLLOWING SURGERY: ICD-10-CM

## 2019-04-01 DIAGNOSIS — M54.2 CERVICALGIA: Primary | ICD-10-CM

## 2019-04-01 DIAGNOSIS — M62.838 SPASM OF CERVICAL PARASPINOUS MUSCLE: ICD-10-CM

## 2019-04-01 DIAGNOSIS — M79.18 MYOFASCIAL PAIN: ICD-10-CM

## 2019-04-01 PROCEDURE — 99214 OFFICE O/P EST MOD 30 MIN: CPT | Performed by: ANESTHESIOLOGY

## 2019-04-01 RX ORDER — TIZANIDINE 4 MG/1
4 TABLET ORAL EVERY 8 HOURS PRN
Qty: 90 TABLET | Refills: 1 | Status: SHIPPED | OUTPATIENT
Start: 2019-04-01 | End: 2019-04-02 | Stop reason: SDUPTHER

## 2019-04-02 ENCOUNTER — TELEPHONE (OUTPATIENT)
Dept: PAIN MEDICINE | Facility: CLINIC | Age: 49
End: 2019-04-02

## 2019-04-02 DIAGNOSIS — M62.838 SPASM OF CERVICAL PARASPINOUS MUSCLE: ICD-10-CM

## 2019-04-02 DIAGNOSIS — G89.4 CHRONIC PAIN SYNDROME: Primary | ICD-10-CM

## 2019-04-02 DIAGNOSIS — M48.02 CERVICAL SPINAL STENOSIS: ICD-10-CM

## 2019-04-02 RX ORDER — HYDROCODONE BITARTRATE AND ACETAMINOPHEN 7.5; 325 MG/1; MG/1
1 TABLET ORAL 2 TIMES DAILY PRN
Qty: 60 TABLET | Refills: 0 | Status: SHIPPED | OUTPATIENT
Start: 2019-04-02 | End: 2019-04-02 | Stop reason: SDUPTHER

## 2019-04-02 RX ORDER — HYDROCODONE BITARTRATE AND ACETAMINOPHEN 7.5; 325 MG/1; MG/1
1 TABLET ORAL 2 TIMES DAILY PRN
Qty: 60 TABLET | Refills: 0 | Status: SHIPPED | OUTPATIENT
Start: 2019-05-01 | End: 2019-05-14

## 2019-04-02 RX ORDER — TIZANIDINE 4 MG/1
4 TABLET ORAL EVERY 8 HOURS PRN
Qty: 90 TABLET | Refills: 1 | Status: SHIPPED | OUTPATIENT
Start: 2019-04-02 | End: 2019-05-14

## 2019-04-05 ENCOUNTER — TELEPHONE (OUTPATIENT)
Dept: RADIOLOGY | Facility: CLINIC | Age: 49
End: 2019-04-05

## 2019-04-07 DIAGNOSIS — Z98.890 POSTOPERATIVE STATE: ICD-10-CM

## 2019-04-07 DIAGNOSIS — G89.18 POSTOPERATIVE PAIN AFTER SPINAL SURGERY: ICD-10-CM

## 2019-04-08 RX ORDER — TIZANIDINE 4 MG/1
TABLET ORAL
Qty: 60 TABLET | Refills: 0 | Status: SHIPPED | OUTPATIENT
Start: 2019-04-08 | End: 2019-05-16 | Stop reason: SDUPTHER

## 2019-05-06 ENCOUNTER — APPOINTMENT (EMERGENCY)
Dept: RADIOLOGY | Facility: HOSPITAL | Age: 49
End: 2019-05-06
Payer: COMMERCIAL

## 2019-05-06 ENCOUNTER — HOSPITAL ENCOUNTER (EMERGENCY)
Facility: HOSPITAL | Age: 49
Discharge: HOME | End: 2019-05-06
Attending: EMERGENCY MEDICINE | Admitting: EMERGENCY MEDICINE
Payer: COMMERCIAL

## 2019-05-06 ENCOUNTER — TELEPHONE (OUTPATIENT)
Dept: NEUROSURGERY | Facility: CLINIC | Age: 49
End: 2019-05-06

## 2019-05-06 VITALS
SYSTOLIC BLOOD PRESSURE: 158 MMHG | DIASTOLIC BLOOD PRESSURE: 98 MMHG | HEART RATE: 92 BPM | BODY MASS INDEX: 25.74 KG/M2 | WEIGHT: 211.42 LBS | TEMPERATURE: 98.3 F | RESPIRATION RATE: 18 BRPM | OXYGEN SATURATION: 97 %
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
## 2019-05-06 DIAGNOSIS — S16.1XXA STRAIN OF NECK MUSCLE, INITIAL ENCOUNTER: Primary | ICD-10-CM

## 2019-05-06 DIAGNOSIS — S39.012A BACK STRAIN, INITIAL ENCOUNTER: ICD-10-CM

## 2019-05-06 PROCEDURE — 99283 EMERGENCY DEPT VISIT LOW MDM: CPT

## 2019-05-06 PROCEDURE — 99284 EMERGENCY DEPT VISIT MOD MDM: CPT | Performed by: PHYSICIAN ASSISTANT

## 2019-05-06 PROCEDURE — 72040 X-RAY EXAM NECK SPINE 2-3 VW: CPT

## 2019-05-06 PROCEDURE — 72070 X-RAY EXAM THORAC SPINE 2VWS: CPT

## 2019-05-06 PROCEDURE — 96374 THER/PROPH/DIAG INJ IV PUSH: CPT

## 2019-05-06 RX ORDER — KETOROLAC TROMETHAMINE 30 MG/ML
15 INJECTION, SOLUTION INTRAMUSCULAR; INTRAVENOUS ONCE
Status: COMPLETED | OUTPATIENT
Start: 2019-05-06 | End: 2019-05-06

## 2019-05-06 RX ORDER — LIDOCAINE 50 MG/G
2 PATCH TOPICAL ONCE
Status: DISCONTINUED | OUTPATIENT
Start: 2019-05-06 | End: 2019-05-06 | Stop reason: HOSPADM

## 2019-05-06 RX ORDER — CYCLOBENZAPRINE HCL 10 MG
10 TABLET ORAL ONCE
Status: COMPLETED | OUTPATIENT
Start: 2019-05-06 | End: 2019-05-06

## 2019-05-06 RX ORDER — CYCLOBENZAPRINE HCL 10 MG
10 TABLET ORAL 2 TIMES DAILY PRN
Qty: 8 TABLET | Refills: 0 | Status: SHIPPED | OUTPATIENT
Start: 2019-05-06 | End: 2019-05-14

## 2019-05-06 RX ORDER — CAPSAICIN 0.07 G/100G
CREAM TOPICAL 3 TIMES DAILY
Qty: 28.3 G | Refills: 0 | Status: SHIPPED | OUTPATIENT
Start: 2019-05-06 | End: 2019-05-14

## 2019-05-06 RX ADMIN — CYCLOBENZAPRINE HYDROCHLORIDE 10 MG: 10 TABLET, FILM COATED ORAL at 21:01

## 2019-05-06 RX ADMIN — LIDOCAINE 1 PATCH: 50 PATCH TOPICAL at 21:00

## 2019-05-06 RX ADMIN — KETOROLAC TROMETHAMINE 15 MG: 30 INJECTION, SOLUTION INTRAMUSCULAR; INTRAVENOUS at 21:01

## 2019-05-14 ENCOUNTER — OFFICE VISIT (OUTPATIENT)
Dept: OBGYN CLINIC | Facility: MEDICAL CENTER | Age: 49
End: 2019-05-14
Payer: COMMERCIAL

## 2019-05-14 ENCOUNTER — HOSPITAL ENCOUNTER (EMERGENCY)
Facility: HOSPITAL | Age: 49
Discharge: HOME | End: 2019-05-14
Attending: EMERGENCY MEDICINE
Payer: COMMERCIAL

## 2019-05-14 VITALS
DIASTOLIC BLOOD PRESSURE: 102 MMHG | SYSTOLIC BLOOD PRESSURE: 144 MMHG | HEART RATE: 90 BPM | HEIGHT: 76 IN | BODY MASS INDEX: 26.47 KG/M2 | WEIGHT: 217.4 LBS

## 2019-05-14 VITALS
DIASTOLIC BLOOD PRESSURE: 90 MMHG | RESPIRATION RATE: 16 BRPM | TEMPERATURE: 98.5 F | WEIGHT: 219.36 LBS | SYSTOLIC BLOOD PRESSURE: 150 MMHG | OXYGEN SATURATION: 100 % | HEART RATE: 90 BPM | BODY MASS INDEX: 26.71 KG/M2 | HEIGHT: 76 IN

## 2019-05-14 DIAGNOSIS — R32 URINARY INCONTINENCE, UNSPECIFIED TYPE: ICD-10-CM

## 2019-05-14 DIAGNOSIS — M54.50 ACUTE BILATERAL LOW BACK PAIN WITHOUT SCIATICA: ICD-10-CM

## 2019-05-14 DIAGNOSIS — M54.6 ACUTE BILATERAL THORACIC BACK PAIN: Primary | ICD-10-CM

## 2019-05-14 DIAGNOSIS — R35.0 URINARY FREQUENCY: Primary | ICD-10-CM

## 2019-05-14 PROCEDURE — 99203 OFFICE O/P NEW LOW 30 MIN: CPT | Performed by: EMERGENCY MEDICINE

## 2019-05-14 PROCEDURE — 99282 EMERGENCY DEPT VISIT SF MDM: CPT | Performed by: EMERGENCY MEDICINE

## 2019-05-14 PROCEDURE — 99283 EMERGENCY DEPT VISIT LOW MDM: CPT

## 2019-05-14 RX ORDER — FLUTICASONE PROPIONATE 50 MCG
SPRAY, SUSPENSION (ML) NASAL
Refills: 2 | COMMUNITY
Start: 2019-04-23 | End: 2019-07-03 | Stop reason: ALTCHOICE

## 2019-05-16 ENCOUNTER — OFFICE VISIT (OUTPATIENT)
Dept: INTERNAL MEDICINE CLINIC | Facility: CLINIC | Age: 49
End: 2019-05-16

## 2019-05-16 VITALS
WEIGHT: 227.96 LBS | TEMPERATURE: 97.9 F | HEIGHT: 76 IN | BODY MASS INDEX: 27.76 KG/M2 | SYSTOLIC BLOOD PRESSURE: 148 MMHG | DIASTOLIC BLOOD PRESSURE: 100 MMHG | HEART RATE: 92 BPM

## 2019-05-16 DIAGNOSIS — Z98.890 POSTOPERATIVE STATE: ICD-10-CM

## 2019-05-16 DIAGNOSIS — G89.18 POSTOPERATIVE PAIN AFTER SPINAL SURGERY: ICD-10-CM

## 2019-05-16 DIAGNOSIS — N39.498 OTHER URINARY INCONTINENCE: ICD-10-CM

## 2019-05-16 DIAGNOSIS — R20.0 NUMBNESS: ICD-10-CM
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
## 2019-05-16 PROCEDURE — 99213 OFFICE O/P EST LOW 20 MIN: CPT | Performed by: INTERNAL MEDICINE

## 2019-05-16 RX ORDER — TIZANIDINE 4 MG/1
4 TABLET ORAL EVERY 8 HOURS PRN
Qty: 30 TABLET | Refills: 0 | Status: SHIPPED | OUTPATIENT
Start: 2019-05-16 | End: 2019-07-03

## 2019-05-18 DIAGNOSIS — Z98.890 POSTOPERATIVE STATE: ICD-10-CM

## 2019-05-18 DIAGNOSIS — G89.18 POSTOPERATIVE PAIN AFTER SPINAL SURGERY: ICD-10-CM

## 2019-05-20 ENCOUNTER — TELEPHONE (OUTPATIENT)
Dept: PAIN MEDICINE | Facility: CLINIC | Age: 49
End: 2019-05-20

## 2019-05-22 ENCOUNTER — TELEPHONE (OUTPATIENT)
Dept: PAIN MEDICINE | Facility: CLINIC | Age: 49
End: 2019-05-22

## 2019-05-24 ENCOUNTER — HOSPITAL ENCOUNTER (OUTPATIENT)
Dept: RADIOLOGY | Facility: IMAGING CENTER | Age: 49
Discharge: HOME | End: 2019-05-24
Payer: COMMERCIAL

## 2019-05-24 ENCOUNTER — TELEPHONE (OUTPATIENT)
Dept: INTERNAL MEDICINE CLINIC | Facility: CLINIC | Age: 49
End: 2019-05-24
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
## 2019-05-24 DIAGNOSIS — R20.0 NUMBNESS: ICD-10-CM

## 2019-05-24 DIAGNOSIS — N39.498 OTHER URINARY INCONTINENCE: ICD-10-CM

## 2019-05-24 PROCEDURE — 72148 MRI LUMBAR SPINE W/O DYE: CPT

## 2019-05-28 ENCOUNTER — OFFICE VISIT (OUTPATIENT)
Dept: OBGYN CLINIC | Facility: MEDICAL CENTER | Age: 49
End: 2019-05-28
Payer: COMMERCIAL

## 2019-05-28 VITALS
BODY MASS INDEX: 26.79 KG/M2 | HEART RATE: 88 BPM | HEIGHT: 76 IN | DIASTOLIC BLOOD PRESSURE: 85 MMHG | WEIGHT: 220 LBS | SYSTOLIC BLOOD PRESSURE: 128 MMHG

## 2019-05-28 DIAGNOSIS — M54.6 ACUTE BILATERAL THORACIC BACK PAIN: ICD-10-CM

## 2019-05-28 DIAGNOSIS — M54.50 ACUTE BILATERAL LOW BACK PAIN WITHOUT SCIATICA: Primary | ICD-10-CM

## 2019-05-28 PROCEDURE — 99213 OFFICE O/P EST LOW 20 MIN: CPT | Performed by: EMERGENCY MEDICINE

## 2019-05-28 RX ORDER — METHYLPREDNISOLONE 4 MG/1
TABLET ORAL
Qty: 1 EACH | Refills: 0 | Status: SHIPPED | OUTPATIENT
Start: 2019-05-28 | End: 2019-06-11

## 2019-05-28 RX ORDER — ASPIRIN 81 MG/1
TABLET ORAL
COMMUNITY
Start: 2019-05-18 | End: 2019-07-23

## 2019-06-06 ENCOUNTER — OFFICE VISIT (OUTPATIENT)
Dept: NEUROSURGERY | Facility: CLINIC | Age: 49
End: 2019-06-06
Payer: COMMERCIAL

## 2019-06-06 VITALS
SYSTOLIC BLOOD PRESSURE: 151 MMHG | TEMPERATURE: 98.5 F | HEART RATE: 95 BPM | RESPIRATION RATE: 16 BRPM | WEIGHT: 220 LBS | DIASTOLIC BLOOD PRESSURE: 86 MMHG | HEIGHT: 76 IN | BODY MASS INDEX: 26.79 KG/M2

## 2019-06-06 DIAGNOSIS — Z48.89 AFTERCARE FOLLOWING SURGERY: Primary | ICD-10-CM

## 2019-06-06 PROCEDURE — 99213 OFFICE O/P EST LOW 20 MIN: CPT | Performed by: NEUROLOGICAL SURGERY

## 2019-06-10 RX ORDER — TIZANIDINE 4 MG/1
TABLET ORAL
Qty: 60 TABLET | Refills: 0 | OUTPATIENT
Start: 2019-06-10

## 2019-06-11 ENCOUNTER — OFFICE VISIT (OUTPATIENT)
Dept: OBGYN CLINIC | Facility: MEDICAL CENTER | Age: 49
End: 2019-06-11
Payer: COMMERCIAL

## 2019-06-11 VITALS
HEART RATE: 91 BPM | BODY MASS INDEX: 26.84 KG/M2 | HEIGHT: 76 IN | DIASTOLIC BLOOD PRESSURE: 68 MMHG | SYSTOLIC BLOOD PRESSURE: 110 MMHG | WEIGHT: 220.4 LBS

## 2019-06-11 DIAGNOSIS — M54.6 ACUTE BILATERAL THORACIC BACK PAIN: ICD-10-CM

## 2019-06-11 DIAGNOSIS — M54.50 ACUTE BILATERAL LOW BACK PAIN WITHOUT SCIATICA: Primary | ICD-10-CM

## 2019-06-11 DIAGNOSIS — M54.2 NECK PAIN: ICD-10-CM

## 2019-06-11 PROCEDURE — 99213 OFFICE O/P EST LOW 20 MIN: CPT | Performed by: EMERGENCY MEDICINE

## 2019-06-11 RX ORDER — NAPROXEN 500 MG/1
500 TABLET ORAL 2 TIMES DAILY WITH MEALS
Qty: 30 TABLET | Refills: 1 | Status: SHIPPED | OUTPATIENT
Start: 2019-06-11 | End: 2019-07-23

## 2019-06-11 RX ORDER — CYCLOBENZAPRINE HCL 5 MG
10 TABLET ORAL 3 TIMES DAILY PRN
Qty: 30 TABLET | Refills: 2 | Status: SHIPPED | OUTPATIENT
Start: 2019-06-11 | End: 2019-06-11 | Stop reason: SDUPTHER

## 2019-06-11 RX ORDER — CYCLOBENZAPRINE HCL 5 MG
10 TABLET ORAL 3 TIMES DAILY PRN
Qty: 30 TABLET | Refills: 2 | Status: SHIPPED | OUTPATIENT
Start: 2019-06-11 | End: 2019-11-18

## 2019-06-11 RX ORDER — NAPROXEN 500 MG/1
500 TABLET ORAL 2 TIMES DAILY WITH MEALS
Qty: 30 TABLET | Refills: 1 | Status: SHIPPED | OUTPATIENT
Start: 2019-06-11 | End: 2019-06-11 | Stop reason: SDUPTHER

## 2019-06-12 ENCOUNTER — EVALUATION (OUTPATIENT)
Dept: PHYSICAL THERAPY | Facility: CLINIC | Age: 49
End: 2019-06-12
Payer: COMMERCIAL

## 2019-06-12 DIAGNOSIS — M54.50 ACUTE BILATERAL LOW BACK PAIN WITHOUT SCIATICA: ICD-10-CM

## 2019-06-12 DIAGNOSIS — M54.6 ACUTE BILATERAL THORACIC BACK PAIN: ICD-10-CM

## 2019-06-12 PROCEDURE — 97110 THERAPEUTIC EXERCISES: CPT | Performed by: PHYSICAL THERAPIST

## 2019-06-12 PROCEDURE — 97162 PT EVAL MOD COMPLEX 30 MIN: CPT | Performed by: PHYSICAL THERAPIST

## 2019-06-14 ENCOUNTER — APPOINTMENT (OUTPATIENT)
Dept: PHYSICAL THERAPY | Facility: CLINIC | Age: 49
End: 2019-06-14
Payer: COMMERCIAL

## 2019-06-14 NOTE — PROGRESS NOTES
Daily Note     Today's date: 2019  Patient name: Christi Leon  : 1970  MRN: [id number]  Referring provider: Franchesca Gagnon MD  Dx: No diagnosis found  Subjective: ***      Objective: See treatment diary below      Assessment: Tolerated treatment {Tolerated treatment :}   Patient {assessment:[id number]}      Plan: {PLAN:}    Precautions: Cervical fusion C2-C7 per pt (MD lifted restrictions)     Daily Treatment Diary      Manual  6-12 (IE)                     MET to correct L posterior innominate NV                     Prone femoral n glides NV                     CPA to thoracic spine T4/T5- T11/T12 NV                                                                           Exercise Diary  6-12 (IE)                     NuStep 5 mins NV                                             Standing:                       - hip flexor str 4 x 30" NV                                             Seated:                       - pball flex 10 x 10" NV                     - pball QL str 4 x 30" ea NV                     - HS stretch 4 x 30" ea NV                                             After manual:                       - PPT 10 x 10" NV                     - TA 10 x 10" NV                     - TA with alt march 2 x 10 ea NV                     - TA with bridge 2 x 10 NV                                             - Piriformis str 4 x 30" ea NV                     - SKTC 10 x 10" ea NV                                                                                                                                                                           Modalities  6-12 (IE)                     Cryo as needed                       estim as needed

## 2019-06-17 ENCOUNTER — APPOINTMENT (OUTPATIENT)
Dept: PHYSICAL THERAPY | Facility: CLINIC | Age: 49
End: 2019-06-17
Payer: COMMERCIAL

## 2019-06-17 NOTE — PROGRESS NOTES
Daily Note     Today's date: 2019  Patient name: Kyaw Okeefe  : 1970  MRN: [id number]  Referring provider: Yecenia Mo MD  Dx: No diagnosis found  Subjective: ***      Objective: See treatment diary below      Assessment: Tolerated treatment {Tolerated treatment :7304422553}   Patient {assessment:[id number]}      Plan: {PLAN:}    Precautions: Cervical fusion C2-C7 per pt (MD lifted restrictions)     Daily Treatment Diary      Manual  6-12 (IE)                     MET to correct L posterior innominate NV                     Prone femoral n glides NV                     CPA to thoracic spine T4/T5- T11/T12 NV                                                                           Exercise Diary  6-12 (IE)                     NuStep 5 mins NV                                             Standing:                       - hip flexor str 4 x 30" NV                                             Seated:                       - pball flex 10 x 10" NV                     - pball QL str 4 x 30" ea NV                     - HS stretch 4 x 30" ea NV                                             After manual:                       - PPT 10 x 10" NV                     - TA 10 x 10" NV                     - TA with alt march 2 x 10 ea NV                     - TA with bridge 2 x 10 NV                                             - Piriformis str 4 x 30" ea NV                     - SKTC 10 x 10" ea NV                                                                                                                                                                           Modalities  6-12 (IE)                     Cryo as needed                       estim as needed

## 2019-06-18 ENCOUNTER — APPOINTMENT (OUTPATIENT)
Dept: PHYSICAL THERAPY | Facility: CLINIC | Age: 49
End: 2019-06-18
Payer: COMMERCIAL

## 2019-06-21 ENCOUNTER — APPOINTMENT (OUTPATIENT)
Dept: PHYSICAL THERAPY | Facility: CLINIC | Age: 49
End: 2019-06-21
Payer: COMMERCIAL

## 2019-06-24 ENCOUNTER — OFFICE VISIT (OUTPATIENT)
Dept: PHYSICAL THERAPY | Facility: CLINIC | Age: 49
End: 2019-06-24
Payer: COMMERCIAL

## 2019-06-24 DIAGNOSIS — M54.6 ACUTE BILATERAL THORACIC BACK PAIN: Primary | ICD-10-CM

## 2019-06-24 DIAGNOSIS — M54.50 ACUTE BILATERAL LOW BACK PAIN WITHOUT SCIATICA: ICD-10-CM

## 2019-06-24 PROCEDURE — 97140 MANUAL THERAPY 1/> REGIONS: CPT | Performed by: PHYSICAL THERAPIST

## 2019-06-24 PROCEDURE — 97110 THERAPEUTIC EXERCISES: CPT | Performed by: PHYSICAL THERAPIST

## 2019-06-26 ENCOUNTER — APPOINTMENT (OUTPATIENT)
Dept: PHYSICAL THERAPY | Facility: CLINIC | Age: 49
End: 2019-06-26
Payer: COMMERCIAL

## 2019-06-26 NOTE — PROGRESS NOTES
Daily Note     Today's date: 2019  Patient name: Wendi Issa  : 1970  MRN: [id number]  Referring provider: Kimmie Wynn MD  Dx: No diagnosis found  Subjective: ***      Objective: See treatment diary below      Assessment: Tolerated treatment {Tolerated treatment :[id number]}   Patient {assessment:[id number]}      Plan: {PLAN:5326726124}    Precautions: Cervical fusion C2-C7 per pt (MD lifted restrictions)     Daily Treatment Diary      Manual  6-12 (IE)  6-24                   MET to correct L posterior innominate NV  JG                   Prone femoral n glides NV  NV                   CPA to thoracic spine T4/T5- T11/T12 NV  NV                                                                         Exercise Diary  6-12 (IE)  6-24                   NuStep 5 mins NV  5 mins, L5                                           Standing:                       - hip flexor str 4 x 30" NV  4 x 30" ea                                           Seated:                       - pball flex 10 x 10" NV  10 x 10"                   - pball QL str 4 x 30" ea NV  4 x 30" ea                   - HS stretch 4 x 30" ea NV  2 x 30" ea                                           After manual:                       - PPT 10 x 10" NV  10 x 10"                   - TA 10 x 10" NV  10 x 10"                   - TA with alt march 2 x 10 ea NV  2 x 10 ea NV                   - TA with bridge 2 x 10 NV  2 x 10 NV                                           - Piriformis str 4 x 30" ea NV  2 x 30" ea                   - SKTC 10 x 10" ea NV  10 x 10" ea NV                                                                                                                                                                         Modalities  6-12 (IE)                     Cryo as needed                       estim as needed

## 2019-06-28 ENCOUNTER — APPOINTMENT (OUTPATIENT)
Dept: PHYSICAL THERAPY | Facility: CLINIC | Age: 49
End: 2019-06-28
Payer: COMMERCIAL

## 2019-06-29 ENCOUNTER — HOSPITAL ENCOUNTER (EMERGENCY)
Facility: HOSPITAL | Age: 49
Discharge: HOME | End: 2019-06-29
Attending: EMERGENCY MEDICINE
Payer: COMMERCIAL

## 2019-06-29 VITALS
SYSTOLIC BLOOD PRESSURE: 151 MMHG | OXYGEN SATURATION: 98 % | RESPIRATION RATE: 20 BRPM | DIASTOLIC BLOOD PRESSURE: 91 MMHG | TEMPERATURE: 98.2 F | WEIGHT: 220.02 LBS | HEART RATE: 88 BPM | BODY MASS INDEX: 26.78 KG/M2

## 2019-06-29 DIAGNOSIS — K04.7 DENTAL INFECTION: Primary | ICD-10-CM

## 2019-06-29 PROCEDURE — 99282 EMERGENCY DEPT VISIT SF MDM: CPT

## 2019-06-29 PROCEDURE — 99283 EMERGENCY DEPT VISIT LOW MDM: CPT | Performed by: EMERGENCY MEDICINE

## 2019-06-29 RX ORDER — CLINDAMYCIN HYDROCHLORIDE 150 MG/1
300 CAPSULE ORAL ONCE
Status: DISCONTINUED | OUTPATIENT
Start: 2019-06-29 | End: 2019-06-29

## 2019-06-29 RX ORDER — CLINDAMYCIN HYDROCHLORIDE 150 MG/1
450 CAPSULE ORAL EVERY 8 HOURS SCHEDULED
Qty: 90 CAPSULE | Refills: 0 | Status: SHIPPED | OUTPATIENT
Start: 2019-06-29 | End: 2019-07-09

## 2019-06-29 RX ORDER — CLINDAMYCIN HYDROCHLORIDE 150 MG/1
450 CAPSULE ORAL ONCE
Status: COMPLETED | OUTPATIENT
Start: 2019-06-29 | End: 2019-06-29

## 2019-06-29 RX ADMIN — CLINDAMYCIN HYDROCHLORIDE 450 MG: 150 CAPSULE ORAL at 16:27

## 2019-06-29 NOTE — ED PROVIDER NOTES
History  Chief Complaint   Patient presents with    Dental Pain     lower pain right lower teeth , pt had previous nasal surgery with ENT for ulcer removal , called them today, instructed to come to ER , dont think its related  bad taste in mouth with broken tooth     Patient is a 30-year-old male with a history of hypertension, osteomyelitis and nasal septal perforation who presents with swelling of the lower jaw  Patient states his symptoms started about 1 week ago  He has noticed swelling along his lower right gumline  He also had a "bad taste" in his mouth when swallowing  He had previous jaw surgery and therefore has decreased sensation at baseline  He denies any significant pain in his teeth or jaw  He was evaluated by his ENT who recommended dental follow-up as he did not see any abnormalities in his ears, nose or pharynx  Patient was not able to schedule a dental follow-up  He was told to come to the emergency department for further evaluation  He denies any fevers, chills, difficulty swallowing or difficulty breathing  History provided by:  Patient  Dental Pain   Location:  Lower  Severity:  No pain  Duration:  1 week  Timing:  Constant  Progression:  Worsening  Chronicity:  New  Ineffective treatments:  None tried  Associated symptoms: gum swelling    Associated symptoms: no difficulty swallowing, no drooling, no fever, no headaches, no neck pain, no neck swelling and no trismus        Prior to Admission Medications   Prescriptions Last Dose Informant Patient Reported? Taking? OLANZapine (ZyPREXA) 20 MG tablet Not Taking at Unknown time Self Yes No   Sig: Take 20 mg by mouth daily at bedtime   aspirin (ECOTRIN LOW STRENGTH) 81 mg EC tablet  Self Yes Yes   atorvastatin (LIPITOR) 10 mg tablet  Self No Yes   Sig: TAKE 1 TABLET DAILY AS DIRECTED     cyclobenzaprine (FLEXERIL) 5 mg tablet   No Yes   Sig: Take 2 tablets (10 mg total) by mouth 3 (three) times a day as needed for muscle spasms fluticasone (FLONASE) 50 mcg/act nasal spray Not Taking at Unknown time Self Yes No   Sig: SPRAY 1 SPRAY INTO EACH NOSTRIL TWICE A DAY   lisinopril (ZESTRIL) 20 mg tablet  Self No Yes   Sig: Take 1 tablet (20 mg total) by mouth daily   naproxen (EC NAPROSYN) 500 MG EC tablet   No Yes   Sig: Take 1 tablet (500 mg total) by mouth 2 (two) times a day with meals   tiZANidine (ZANAFLEX) 4 mg tablet  Self No Yes   Sig: Take 1 tablet (4 mg total) by mouth every 8 (eight) hours as needed for muscle spasms      Facility-Administered Medications: None       Past Medical History:   Diagnosis Date    Angina pectoris ([facility] )     stable-pt denies     Ascites     told he had a "wave stomach"    Bipolar 1 disorder ([facility] )     CHF (congestive heart failure) (HCC)     pt denies     Coronary artery disease     Depression     Family history of colon cancer requiring screening colonoscopy     brother    Fatty liver     History of colon polyps     Hyperlipidemia     Hypertension     Hypothalamic hypogonadism ([facility] )     Liver disease     fatty liver    MI (myocardial infarction) ([facility] )     2012-pt denies     Osteomyelitis ([facility] )     Sleep apnea     Spondylosis of cervical region without myelopathy or radiculopathy 11/9/2018       Past Surgical History:   Procedure Laterality Date    COLONOSCOPY      FRACTURE SURGERY      jaw    FL ARTHRODESIS ANT INTERBODY INC DISCECTOMY, CERVICAL BELOW C2 N/A 1/29/2019    Procedure: ANTERIOR CERVICAL FUSION W DISCECTOMY, C4-5, C5-6;  Surgeon: Cora Torres MD;  [facility];  Service: Neurosurgery    FL COLONOSCOPY FLX DX W/COLLJ SPEC WHEN PFRMD N/A 7/27/2017    Procedure: COLONOSCOPY;  Surgeon: Colin Stone MD;  [facility]; Service: Gastroenterology    FL COLONOSCOPY FLX DX W/COLLJ [facility] WHEN PFRMD N/A 8/29/2017    Procedure: COLONOSCOPY;  Surgeon: Colin Stone MD;  [facility];   Service: Gastroenterology    FL COLONOSCOPY FLX DX W/COLLJ SPEC WHEN PFRMD N/A 12/1/2017    Procedure: COLONOSCOPY;  Surgeon: Nanci Priest MD;  [facility]; Service: Gastroenterology    WI NASAL/SINUS ENDOSCOPY,RMV MILDRED BULL N/A 8/24/2018    Procedure: FUNCTIONAL ENDOSCOPIC SINUS SURGERY (FESS) LEFT MILDRED;  BILATERAL  GRAFTS; EXCISION OF NASAL MUCOSAL ULCER; CLOSURE WITH ENDONASAL FLAPS;  Surgeon: Tonio Arriola MD;  [facility];  Service: ENT    WI REPAIR OF NASAL SEPTUM N/A 8/24/2018    Procedure: SEPTOPLASTY;  Surgeon: Tonio Arriola MD;  [facility];  Service: ENT    TURBINOPLASTY N/A 8/24/2018    Procedure: Katheran Born;  Surgeon: Tonio Arriola MD;  [facility];  Service: ENT       Family History   Problem Relation Age of Onset    Breast cancer Mother     Thyroid nodules Mother     Colon cancer Father         around age 45    Heart failure Father         congestive    No Known Problems Sister     Colon cancer Brother         around age 45   [facility] No Known Problems Maternal Grandmother     No Known Problems Maternal Grandfather     No Known Problems Paternal Grandmother     No Known Problems Paternal Grandfather      I have reviewed and agree with the history as documented  Social History     Tobacco Use    Smoking status: Heavy Tobacco Smoker     Packs/day: 0 50     Types: Cigarettes    Smokeless tobacco: Never Used   Substance Use Topics    Alcohol use: No     Comment: rare    Drug use: No     Types: Cocaine     Comment: stopped in 2007        Review of Systems   Constitutional: Negative for chills, diaphoresis and fever  HENT: Negative for drooling, nosebleeds, sore throat and trouble swallowing  Eyes: Negative for photophobia, pain and visual disturbance  Respiratory: Negative for cough, chest tightness and shortness of breath  Cardiovascular: Negative for chest pain, palpitations and leg swelling  Gastrointestinal: Negative for abdominal pain, constipation, diarrhea, nausea and vomiting     Endocrine: Negative for polydipsia and polyuria  Genitourinary: Negative for difficulty urinating, dysuria and hematuria  Musculoskeletal: Negative for back pain, neck pain and neck stiffness  Skin: Negative for pallor and rash  Neurological: Negative for dizziness, syncope, light-headedness and headaches  All other systems reviewed and are negative  Physical Exam  Physical Exam   Constitutional: He is oriented to person, place, and time  He appears well-developed and well-nourished  No distress  HENT:   Head: Normocephalic and atraumatic  Mouth/Throat: Oropharynx is clear and moist and mucous membranes are normal    Poor dentition overall  No visible abscess or active drainage  Eyes: Pupils are equal, round, and reactive to light  EOM are normal    Neck: Normal range of motion  Neck supple  Cardiovascular: Normal rate, regular rhythm, normal heart sounds, intact distal pulses and normal pulses  Pulmonary/Chest: Effort normal and breath sounds normal  No respiratory distress  Abdominal: Soft  He exhibits no distension  There is no tenderness  There is no rigidity, no rebound and no guarding  Musculoskeletal: Normal range of motion  He exhibits no edema or tenderness  Lymphadenopathy:     He has no cervical adenopathy  Neurological: He is alert and oriented to person, place, and time  He has normal strength  No cranial nerve deficit or sensory deficit  Skin: Skin is warm and dry  Capillary refill takes less than 2 seconds  Psychiatric: He has a normal mood and affect  Nursing note and vitals reviewed        Vital Signs  ED Triage Vitals [06/29/19 1544]   Temperature Pulse Respirations Blood Pressure SpO2   98 2 °F (36 8 °C) 88 20 151/91 98 %      Temp Source Heart Rate Source Patient Position - Orthostatic VS BP Location FiO2 (%)   Oral -- Lying Right arm --      Pain Score       No Pain           Vitals:    06/29/19 1544   BP: 151/91   Pulse: 88   Patient Position - Orthostatic VS: Lying Visual Acuity      ED Medications  Medications   clindamycin (CLEOCIN) capsule 450 mg (has no administration in time range)       Diagnostic Studies  Results Reviewed     None                 No orders to display              Procedures  Procedures       ED Course                               MDM  Number of Diagnoses or Management Options  Dental infection: new and does not require workup  Diagnosis management comments: Patient presents with swelling of the right lower gumline  He also describes a bad taste in his mouth  There is no visible oral abscess or active drainage  He is afebrile and nontoxic appearing  He does have poor dentition overall  Possible dental infection  Will treat with oral clindamycin  Patient should arrange for dental follow-up  He agrees to return to ED sooner if symptoms worsen or persist   He is stable for outpatient follow-up  Amount and/or Complexity of Data Reviewed  Review and summarize past medical records: yes    Risk of Complications, Morbidity, and/or Mortality  Presenting problems: moderate  Diagnostic procedures: minimal  Management options: moderate    Patient Progress  Patient progress: stable      Disposition  Final diagnoses:   Dental infection     Time reflects when diagnosis was documented in both MDM as applicable and the Disposition within this note     Time User Action Codes Description Comment    6/29/2019  3:58 PM Karolina Browning Add [K04 7] Dental infection       ED Disposition     ED Disposition Condition Date/Time Comment    Discharge Stable Sat Jun 29, 2019  3:58 PM Modesto Lott discharge to home/self care              Follow-up Information     Follow up With Specialties Details Why Contact Info    Your Dentist  Schedule an appointment as soon as possible for a visit  Return to ED sooner if symptoms worsen or persist            Patient's Medications   Discharge Prescriptions    CLINDAMYCIN (CLEOCIN) 150 MG CAPSULE    Take 3 capsules (450 mg total) by mouth every 8 (eight) hours for 10 days       Start Date: 6/29/2019 End Date: 7/9/2019       Order Dose: 450 mg       Quantity: 90 capsule    Refills: 0     No discharge procedures on file      ED Provider  Electronically Signed by           Anders Dykes DO  06/29/19 9320

## 2019-07-03 ENCOUNTER — LAB (OUTPATIENT)
Dept: LAB | Facility: MEDICAL CENTER | Age: 49
End: 2019-07-03
Payer: COMMERCIAL

## 2019-07-03 ENCOUNTER — OFFICE VISIT (OUTPATIENT)
Dept: FAMILY MEDICINE CLINIC | Facility: MEDICAL CENTER | Age: 49
End: 2019-07-03
Payer: COMMERCIAL

## 2019-07-03 ENCOUNTER — CLINICAL SUPPORT (OUTPATIENT)
Dept: URGENT CARE | Facility: MEDICAL CENTER | Age: 49
End: 2019-07-03
Payer: COMMERCIAL

## 2019-07-03 VITALS
SYSTOLIC BLOOD PRESSURE: 112 MMHG | WEIGHT: 223.8 LBS | RESPIRATION RATE: 16 BRPM | DIASTOLIC BLOOD PRESSURE: 60 MMHG | HEART RATE: 84 BPM | BODY MASS INDEX: 27.24 KG/M2

## 2019-07-03 DIAGNOSIS — I10 ESSENTIAL HYPERTENSION: Primary | ICD-10-CM

## 2019-07-03 DIAGNOSIS — Z13.1 SCREENING FOR DIABETES MELLITUS: ICD-10-CM

## 2019-07-03 DIAGNOSIS — J34.2 DEVIATED NASAL SEPTUM: ICD-10-CM

## 2019-07-03 DIAGNOSIS — K76.0 NON-ALCOHOLIC FATTY LIVER DISEASE: ICD-10-CM

## 2019-07-03 DIAGNOSIS — F31.9 BIPOLAR AFFECTIVE DISORDER, REMISSION STATUS UNSPECIFIED (HCC): ICD-10-CM

## 2019-07-03 DIAGNOSIS — E78.2 ELEVATED CHOLESTEROL WITH ELEVATED TRIGLYCERIDES: ICD-10-CM

## 2019-07-03 DIAGNOSIS — E23.0 HYPOTHALAMIC HYPOGONADISM (HCC): ICD-10-CM

## 2019-07-03 DIAGNOSIS — F17.200 NICOTINE DEPENDENCE, UNCOMPLICATED, UNSPECIFIED NICOTINE PRODUCT TYPE: ICD-10-CM

## 2019-07-03 PROBLEM — E03.8 SUBCLINICAL HYPOTHYROIDISM: Status: RESOLVED | Noted: 2017-03-01 | Resolved: 2019-07-03

## 2019-07-03 PROBLEM — E78.5 HYPERLIPIDEMIA: Status: RESOLVED | Noted: 2017-03-01 | Resolved: 2019-07-03

## 2019-07-03 PROBLEM — I20.89 CHRONIC STABLE ANGINA: Status: RESOLVED | Noted: 2017-03-01 | Resolved: 2019-07-03

## 2019-07-03 PROBLEM — I20.8 CHRONIC STABLE ANGINA (HCC): Status: RESOLVED | Noted: 2017-03-01 | Resolved: 2019-07-03

## 2019-07-03 PROBLEM — J34.89 NOSE PAIN: Status: RESOLVED | Noted: 2018-06-05 | Resolved: 2019-07-03

## 2019-07-03 PROBLEM — Z48.89 AFTERCARE FOLLOWING SURGERY: Status: RESOLVED | Noted: 2019-04-01 | Resolved: 2019-07-03

## 2019-07-03 LAB
ALBUMIN SERPL BCP-MCNC: 4 G/DL (ref 3.5–5)
ALP SERPL-CCNC: 80 U/L (ref 46–116)
ALT SERPL W P-5'-P-CCNC: 23 U/L (ref 12–78)
ANION GAP SERPL CALCULATED.3IONS-SCNC: 6 MMOL/L (ref 4–13)
AST SERPL W P-5'-P-CCNC: 16 U/L (ref 5–45)
BASOPHILS # BLD AUTO: 0.04 THOUSANDS/ΜL (ref 0–0.1)
BASOPHILS NFR BLD AUTO: 1 % (ref 0–1)
BILIRUB DIRECT SERPL-MCNC: 0.14 MG/DL (ref 0–0.2)
BILIRUB SERPL-MCNC: 0.54 MG/DL (ref 0.2–1)
BUN SERPL-MCNC: 15 MG/DL (ref 5–25)
CALCIUM SERPL-MCNC: 9.4 MG/DL (ref 8.3–10.1)
CHLORIDE SERPL-SCNC: 107 MMOL/L (ref 100–108)
CHOLEST SERPL-MCNC: 229 MG/DL (ref 50–200)
CO2 SERPL-SCNC: 31 MMOL/L (ref 21–32)
CREAT SERPL-MCNC: 1.01 MG/DL (ref 0.6–1.3)
EOSINOPHIL # BLD AUTO: 0.25 THOUSAND/ΜL (ref 0–0.61)
EOSINOPHIL NFR BLD AUTO: 3 % (ref 0–6)
ERYTHROCYTE [DISTWIDTH] IN BLOOD BY AUTOMATED COUNT: 13.3 % (ref 11.6–15.1)
EST. AVERAGE GLUCOSE BLD GHB EST-MCNC: 117 MG/DL
GFR SERPL CREATININE-BSD FRML MDRD: 88 ML/MIN/1.73SQ M
GLUCOSE SERPL-MCNC: 80 MG/DL (ref 65–140)
HBA1C MFR BLD: 5.7 % (ref 4.2–6.3)
HCT VFR BLD AUTO: 47 % (ref 36.5–49.3)
HDLC SERPL-MCNC: 47 MG/DL (ref 40–60)
HGB BLD-MCNC: 15.3 G/DL (ref 12–17)
IMM GRANULOCYTES # BLD AUTO: 0.02 THOUSAND/UL (ref 0–0.2)
IMM GRANULOCYTES NFR BLD AUTO: 0 % (ref 0–2)
LDLC SERPL CALC-MCNC: 143 MG/DL (ref 0–100)
LYMPHOCYTES # BLD AUTO: 2.76 THOUSANDS/ΜL (ref 0.6–4.47)
LYMPHOCYTES NFR BLD AUTO: 35 % (ref 14–44)
MCH RBC QN AUTO: 31.2 PG (ref 26.8–34.3)
MCHC RBC AUTO-ENTMCNC: 32.6 G/DL (ref 31.4–37.4)
MCV RBC AUTO: 96 FL (ref 82–98)
MONOCYTES # BLD AUTO: 0.65 THOUSAND/ΜL (ref 0.17–1.22)
MONOCYTES NFR BLD AUTO: 8 % (ref 4–12)
NEUTROPHILS # BLD AUTO: 4.09 THOUSANDS/ΜL (ref 1.85–7.62)
NEUTS SEG NFR BLD AUTO: 53 % (ref 43–75)
NRBC BLD AUTO-RTO: 0 /100 WBCS
PLATELET # BLD AUTO: 224 THOUSANDS/UL (ref 149–390)
PMV BLD AUTO: 11.7 FL (ref 8.9–12.7)
POTASSIUM SERPL-SCNC: 4 MMOL/L (ref 3.5–5.3)
PROT SERPL-MCNC: 7.2 G/DL (ref 6.4–8.2)
RBC # BLD AUTO: 4.9 MILLION/UL (ref 3.88–5.62)
SODIUM SERPL-SCNC: 144 MMOL/L (ref 136–145)
TRIGL SERPL-MCNC: 194 MG/DL
WBC # BLD AUTO: 7.81 THOUSAND/UL (ref 4.31–10.16)

## 2019-07-03 PROCEDURE — 80048 BASIC METABOLIC PNL TOTAL CA: CPT

## 2019-07-03 PROCEDURE — 82043 UR ALBUMIN QUANTITATIVE: CPT | Performed by: FAMILY MEDICINE

## 2019-07-03 PROCEDURE — 85025 COMPLETE CBC W/AUTO DIFF WBC: CPT

## 2019-07-03 PROCEDURE — 80076 HEPATIC FUNCTION PANEL: CPT

## 2019-07-03 PROCEDURE — 99204 OFFICE O/P NEW MOD 45 MIN: CPT | Performed by: FAMILY MEDICINE

## 2019-07-03 PROCEDURE — 36415 COLL VENOUS BLD VENIPUNCTURE: CPT

## 2019-07-03 PROCEDURE — 83036 HEMOGLOBIN GLYCOSYLATED A1C: CPT

## 2019-07-03 PROCEDURE — 80061 LIPID PANEL: CPT

## 2019-07-03 PROCEDURE — 82570 ASSAY OF URINE CREATININE: CPT | Performed by: FAMILY MEDICINE

## 2019-07-03 RX ORDER — NICOTINE 21 MG/24HR
1 PATCH, TRANSDERMAL 24 HOURS TRANSDERMAL EVERY 24 HOURS
Qty: 28 PATCH | Refills: 1 | Status: SHIPPED | OUTPATIENT
Start: 2019-07-03 | End: 2019-11-18
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
## 2019-07-04 LAB
CREAT UR-MCNC: 203 MG/DL
MICROALBUMIN UR-MCNC: 16.5 MG/L (ref 0–20)
MICROALBUMIN/CREAT 24H UR: 8 MG/G CREATININE (ref 0–30)

## 2019-07-05 ENCOUNTER — APPOINTMENT (OUTPATIENT)
Dept: PHYSICAL THERAPY | Facility: CLINIC | Age: 49
End: 2019-07-05
Payer: COMMERCIAL

## 2019-07-05 ENCOUNTER — TELEPHONE (OUTPATIENT)
Dept: FAMILY MEDICINE CLINIC | Facility: MEDICAL CENTER | Age: 49
End: 2019-07-05

## 2019-07-05 DIAGNOSIS — E78.2 ELEVATED CHOLESTEROL WITH ELEVATED TRIGLYCERIDES: Primary | ICD-10-CM

## 2019-07-05 LAB
ATRIAL RATE: 80 BPM
P AXIS: 48 DEGREES
PR INTERVAL: 132 MS
QRS AXIS: 72 DEGREES
QRSD INTERVAL: 98 MS
QT INTERVAL: 384 MS
QTC INTERVAL: 442 MS
T WAVE AXIS: 27 DEGREES
VENTRICULAR RATE: 80 BPM

## 2019-07-05 PROCEDURE — 93010 ELECTROCARDIOGRAM REPORT: CPT | Performed by: INTERNAL MEDICINE

## 2019-07-05 NOTE — TELEPHONE ENCOUNTER
----- Message from Molly Roth DO sent at 7/5/2019  9:35 AM EDT -----  Labs reviewed  Cholesterol not very well controlled  I recommend switching from atorvastatin to rosuvastatin  This should provide better control patient's cholesterol  If he is agreeable I will fax in the new medication and recheck his labs in about six weeks  Otherwise the rest of his blood work, including the labs from the ENT, along with his urine test were unremarkable

## 2019-07-08 ENCOUNTER — OFFICE VISIT (OUTPATIENT)
Dept: PHYSICAL THERAPY | Facility: CLINIC | Age: 49
End: 2019-07-08
Payer: COMMERCIAL

## 2019-07-08 DIAGNOSIS — M54.50 ACUTE BILATERAL LOW BACK PAIN WITHOUT SCIATICA: ICD-10-CM

## 2019-07-08 DIAGNOSIS — M54.6 ACUTE BILATERAL THORACIC BACK PAIN: Primary | ICD-10-CM

## 2019-07-08 PROCEDURE — 97110 THERAPEUTIC EXERCISES: CPT

## 2019-07-08 PROCEDURE — 97112 NEUROMUSCULAR REEDUCATION: CPT

## 2019-07-08 NOTE — PROGRESS NOTES
Daily Note     Today's date: 2019  Patient name: Dalia Honeycutt  : 1970  MRN: [id number]  Referring provider: Karlene Stone MD  Dx:   Encounter Diagnosis     ICD-10-CM    1  Acute bilateral thoracic back pain M54 6    2  Acute bilateral low back pain without sciatica M54 5        Start Time: 1100  Stop Time: 1145  Total time in clinic (min): 45 minutes    Subjective: Patient reported currently just having stiffness/ soreness in lower back  Over the weekend patient reported he as sciatic symptoms down LLE to his feet while playing with his granddaughter, which went away later In the day  Pt reported he is currently thinking about going to an chiropractor to adjusted his back as well  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 doing this at the point in time  Pt stated that he is too busy to perform his HEP  Objective: See treatment diary below      Assessment:  Continued with treatment, no progressions made today, Tolerated treatment well  Patient demonstrated fatigue post treatment, exhibited good technique with therapeutic exercises and would benefit from continued PT  Pt educated on DOMS along with to perform exercises at home on a daily basis  Pt reported increased muscle soreness at the end of treatment session but no increase in pain  Pt educated on some anatomy of the spine  FOTO completed at the end of treatment session, pt reported still having a lot of difficulty with walking several blocks and lifting and carrying objects  Plan: Continue per plan of care       Precautions: Cervical fusion C2-C7 per pt (MD lifted restrictions)     Daily Treatment Diary      Manual  6-12 (IE)  6-24                   MET to correct L posterior innominate NV  JG  np                 Prone femoral n glides NV  NV  np                 CPA to thoracic spine T4/T5- T11/T12 NV  NV  np                                                                       Exercise Diary  6-12 (IE)  6-24  7/8                 NuStep 5 mins NV  5 mins, L5  10 min L5  UE's                                         Standing:                       - hip flexor str 4 x 30" NV  4 x 30" ea  4x 30"                                          Seated:                       - pball flex 10 x 10" NV  10 x 10"  10x 10"                  - pball QL str 4 x 30" ea NV  4 x 30" ea  4x 30"                  - HS stretch 4 x 30" ea NV  2 x 30" ea  2x 30" ea                                           After manual:                       - PPT 10 x 10" NV  10 x 10"  10 10"                  - TA 10 x 10" NV  10 x 10"  10x 10                  - TA with alt march 2 x 10 ea NV  2 x 10 ea NV  2x 10 ea                      - TA with bridge 2 x 10 NV  2 x 10 NV  2x 10                                          - Piriformis str 4 x 30" ea NV  2 x 30" ea  4x 30"                  - SKTC 10 x 10" ea NV  10 x 10" ea NV  np                                                                                                                                                                       Modalities  6-12 (IE)                     Cryo as needed                       estim as needed

## 2019-07-09 ENCOUNTER — OFFICE VISIT (OUTPATIENT)
Dept: OBGYN CLINIC | Facility: MEDICAL CENTER | Age: 49
End: 2019-07-09
Payer: COMMERCIAL

## 2019-07-09 VITALS
DIASTOLIC BLOOD PRESSURE: 84 MMHG | HEART RATE: 76 BPM | HEIGHT: 76 IN | SYSTOLIC BLOOD PRESSURE: 123 MMHG | BODY MASS INDEX: 27.45 KG/M2 | WEIGHT: 225.4 LBS

## 2019-07-09 DIAGNOSIS — M54.2 NECK PAIN: ICD-10-CM

## 2019-07-09 DIAGNOSIS — M54.50 ACUTE BILATERAL LOW BACK PAIN WITHOUT SCIATICA: Primary | ICD-10-CM

## 2019-07-09 DIAGNOSIS — M54.6 ACUTE BILATERAL THORACIC BACK PAIN: ICD-10-CM

## 2019-07-09 PROCEDURE — 99213 OFFICE O/P EST LOW 20 MIN: CPT | Performed by: EMERGENCY MEDICINE

## 2019-07-09 NOTE — LETTER
July 9, 2019     Patient: Sahra White   YOB: 1970   Date of Visit: 7/9/2019       To Whom it May Concern:    Sahra White is under my professional care  He was seen in my office on 7/9/2019  Continue work excuse until further notice  Referred to Pain Management  If you have any questions or concerns, please don't hesitate to call           Sincerely,          Della Carranza MD        CC: No Recipients
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
## 2019-07-10 ENCOUNTER — EVALUATION (OUTPATIENT)
Dept: PHYSICAL THERAPY | Facility: CLINIC | Age: 49
End: 2019-07-10
Payer: COMMERCIAL

## 2019-07-10 DIAGNOSIS — M54.50 ACUTE BILATERAL LOW BACK PAIN WITHOUT SCIATICA: ICD-10-CM

## 2019-07-10 DIAGNOSIS — M54.6 ACUTE BILATERAL THORACIC BACK PAIN: Primary | ICD-10-CM

## 2019-07-10 PROCEDURE — 97140 MANUAL THERAPY 1/> REGIONS: CPT | Performed by: PHYSICAL THERAPIST

## 2019-07-10 PROCEDURE — 97110 THERAPEUTIC EXERCISES: CPT | Performed by: PHYSICAL THERAPIST
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
## 2019-07-12 ENCOUNTER — APPOINTMENT (OUTPATIENT)
Dept: PHYSICAL THERAPY | Facility: CLINIC | Age: 49
End: 2019-07-12
Payer: COMMERCIAL

## 2019-07-15 ENCOUNTER — APPOINTMENT (OUTPATIENT)
Dept: PHYSICAL THERAPY | Facility: CLINIC | Age: 49
End: 2019-07-15
Payer: COMMERCIAL

## 2019-07-17 NOTE — TELEPHONE ENCOUNTER
Patient was scheduled for two appointments recently one of which was canceled and the other one was a no-show  I see that he has an upcoming appointment in the new year with a new PCP  If he still agreeable to the medication switch or will he be waiting until he discusses this with his new PCP?

## 2019-07-19 ENCOUNTER — APPOINTMENT (OUTPATIENT)
Dept: PHYSICAL THERAPY | Facility: CLINIC | Age: 49
End: 2019-07-19
Payer: COMMERCIAL

## 2019-07-19 NOTE — TELEPHONE ENCOUNTER
Called patient again  Still no answer-and another message left  Multiple attempts to get in touch-please advise how you would like us to proceed since he is not returning our calls

## 2019-07-22 ENCOUNTER — APPOINTMENT (OUTPATIENT)
Dept: PHYSICAL THERAPY | Facility: CLINIC | Age: 49
End: 2019-07-22
Payer: COMMERCIAL

## 2019-07-23 NOTE — TELEPHONE ENCOUNTER
S/w patient   He would like to switch the medication , can you please send it to [facility], mail the blood work orders  He will be seeing the new PCP in January  Only reason is he is right down the street from him

## 2019-07-24 ENCOUNTER — APPOINTMENT (OUTPATIENT)
Dept: PHYSICAL THERAPY | Facility: CLINIC | Age: 49
End: 2019-07-24
Payer: COMMERCIAL

## 2019-07-24 NOTE — PROGRESS NOTES
Addendum: resolved episode of care due to non-compliance with POC frequency  Pt has attended 4 sessions and canceled/no showed to 6 sessions

## 2019-07-25 RX ORDER — ROSUVASTATIN CALCIUM 10 MG/1
10 TABLET, COATED ORAL DAILY
Qty: 90 TABLET | Refills: 0 | Status: SHIPPED | OUTPATIENT
Start: 2019-07-25 | End: 2019-09-11

## 2019-07-25 NOTE — TELEPHONE ENCOUNTER
See further in the message  Pt is aware Dr Red Rosa was going to send rx already and asked to have labs mailed   Done

## 2019-07-29 ENCOUNTER — APPOINTMENT (OUTPATIENT)
Dept: PHYSICAL THERAPY | Facility: CLINIC | Age: 49
End: 2019-07-29
Payer: COMMERCIAL

## 2019-07-29 PROBLEM — J34.89 PERFORATION OF NASAL SEPTUM: Status: ACTIVE | Noted: 2019-07-29

## 2019-07-31 DIAGNOSIS — F17.200 NICOTINE DEPENDENCE, UNCOMPLICATED, UNSPECIFIED NICOTINE PRODUCT TYPE: ICD-10-CM

## 2019-07-31 RX ORDER — TIZANIDINE 4 MG/1
TABLET ORAL
Qty: 90 TABLET | Refills: 1 | OUTPATIENT
Start: 2019-07-31

## 2019-08-02 ENCOUNTER — HOSPITAL ENCOUNTER (OUTPATIENT)
Facility: HOSPITAL | Age: 49
Setting detail: OUTPATIENT SURGERY
Discharge: HOME | End: 2019-08-02
Attending: OTOLARYNGOLOGY | Admitting: OTOLARYNGOLOGY
Payer: COMMERCIAL

## 2019-08-02 ENCOUNTER — ANESTHESIA EVENT (OUTPATIENT)
Dept: PERIOP | Facility: HOSPITAL | Age: 49
End: 2019-08-02
Payer: COMMERCIAL

## 2019-08-02 ENCOUNTER — ANESTHESIA (OUTPATIENT)
Dept: PERIOP | Facility: HOSPITAL | Age: 49
End: 2019-08-02
Payer: COMMERCIAL

## 2019-08-02 VITALS
HEIGHT: 75 IN | BODY MASS INDEX: 29.84 KG/M2 | SYSTOLIC BLOOD PRESSURE: 124 MMHG | WEIGHT: 240 LBS | HEART RATE: 85 BPM | OXYGEN SATURATION: 97 % | DIASTOLIC BLOOD PRESSURE: 84 MMHG | TEMPERATURE: 98.1 F | RESPIRATION RATE: 18 BRPM

## 2019-08-02 DIAGNOSIS — J34.2 DEVIATED NASAL SEPTUM: Primary | ICD-10-CM

## 2019-08-02 DIAGNOSIS — J34.89 PERFORATION OF NASAL SEPTUM: ICD-10-CM

## 2019-08-02 PROCEDURE — 30802 ABLATE INF TURBINATE SUBMUC: CPT | Performed by: OTOLARYNGOLOGY

## 2019-08-02 PROCEDURE — 30520 REPAIR OF NASAL SEPTUM: CPT | Performed by: OTOLARYNGOLOGY

## 2019-08-02 PROCEDURE — 30630 REPAIR NASAL SEPTUM DEFECT: CPT | Performed by: OTOLARYNGOLOGY

## 2019-08-02 RX ORDER — LIDOCAINE HYDROCHLORIDE 10 MG/ML
INJECTION, SOLUTION INFILTRATION; PERINEURAL AS NEEDED
Status: DISCONTINUED | OUTPATIENT
Start: 2019-08-02 | End: 2019-08-02 | Stop reason: SURG

## 2019-08-02 RX ORDER — MIDAZOLAM HYDROCHLORIDE 1 MG/ML
INJECTION INTRAMUSCULAR; INTRAVENOUS AS NEEDED
Status: DISCONTINUED | OUTPATIENT
Start: 2019-08-02 | End: 2019-08-02 | Stop reason: SURG

## 2019-08-02 RX ORDER — SODIUM CHLORIDE 9 MG/ML
INJECTION, SOLUTION INTRAVENOUS CONTINUOUS PRN
Status: DISCONTINUED | OUTPATIENT
Start: 2019-08-02 | End: 2019-08-02 | Stop reason: SURG

## 2019-08-02 RX ORDER — HYDROCODONE BITARTRATE AND ACETAMINOPHEN 5; 325 MG/1; MG/1
1 TABLET ORAL EVERY 4 HOURS PRN
Qty: 20 TABLET | Refills: 0 | Status: SHIPPED | OUTPATIENT
Start: 2019-08-02 | End: 2019-11-08 | Stop reason: HOSPADM

## 2019-08-02 RX ORDER — MAGNESIUM HYDROXIDE 1200 MG/15ML
LIQUID ORAL AS NEEDED
Status: DISCONTINUED | OUTPATIENT
Start: 2019-08-02 | End: 2019-08-02 | Stop reason: HOSPADM

## 2019-08-02 RX ORDER — NEOSTIGMINE METHYLSULFATE 1 MG/ML
INJECTION INTRAVENOUS AS NEEDED
Status: DISCONTINUED | OUTPATIENT
Start: 2019-08-02 | End: 2019-08-02 | Stop reason: SURG

## 2019-08-02 RX ORDER — SODIUM CHLORIDE, SODIUM LACTATE, POTASSIUM CHLORIDE, CALCIUM CHLORIDE 600; 310; 30; 20 MG/100ML; MG/100ML; MG/100ML; MG/100ML
125 INJECTION, SOLUTION INTRAVENOUS CONTINUOUS
Status: DISCONTINUED | OUTPATIENT
Start: 2019-08-02 | End: 2019-08-02 | Stop reason: HOSPADM

## 2019-08-02 RX ORDER — ASPIRIN 81 MG/1
81 TABLET, CHEWABLE ORAL DAILY
COMMUNITY
End: 2019-08-02 | Stop reason: HOSPADM

## 2019-08-02 RX ORDER — GLYCOPYRROLATE 0.2 MG/ML
INJECTION INTRAMUSCULAR; INTRAVENOUS AS NEEDED
Status: DISCONTINUED | OUTPATIENT
Start: 2019-08-02 | End: 2019-08-02 | Stop reason: SURG

## 2019-08-02 RX ORDER — HYDROMORPHONE HCL/PF 1 MG/ML
SYRINGE (ML) INJECTION AS NEEDED
Status: DISCONTINUED | OUTPATIENT
Start: 2019-08-02 | End: 2019-08-02 | Stop reason: SURG

## 2019-08-02 RX ORDER — ROCURONIUM BROMIDE 10 MG/ML
INJECTION, SOLUTION INTRAVENOUS AS NEEDED
Status: DISCONTINUED | OUTPATIENT
Start: 2019-08-02 | End: 2019-08-02 | Stop reason: SURG

## 2019-08-02 RX ORDER — PROPOFOL 10 MG/ML
INJECTION, EMULSION INTRAVENOUS AS NEEDED
Status: DISCONTINUED | OUTPATIENT
Start: 2019-08-02 | End: 2019-08-02 | Stop reason: SURG

## 2019-08-02 RX ORDER — DEXAMETHASONE SODIUM PHOSPHATE 10 MG/ML
INJECTION, SOLUTION INTRAMUSCULAR; INTRAVENOUS AS NEEDED
Status: DISCONTINUED | OUTPATIENT
Start: 2019-08-02 | End: 2019-08-02 | Stop reason: SURG

## 2019-08-02 RX ORDER — ONDANSETRON 2 MG/ML
4 INJECTION INTRAMUSCULAR; INTRAVENOUS ONCE AS NEEDED
Status: DISCONTINUED | OUTPATIENT
Start: 2019-08-02 | End: 2019-08-02 | Stop reason: HOSPADM

## 2019-08-02 RX ORDER — FENTANYL CITRATE/PF 50 MCG/ML
25 SYRINGE (ML) INJECTION
Status: COMPLETED | OUTPATIENT
Start: 2019-08-02 | End: 2019-08-02

## 2019-08-02 RX ORDER — SODIUM CHLORIDE/ALOE VERA
GEL (GRAM) NASAL AS NEEDED
Status: DISCONTINUED | OUTPATIENT
Start: 2019-08-02 | End: 2019-08-02 | Stop reason: HOSPADM

## 2019-08-02 RX ORDER — FENTANYL CITRATE 50 UG/ML
INJECTION, SOLUTION INTRAMUSCULAR; INTRAVENOUS AS NEEDED
Status: DISCONTINUED | OUTPATIENT
Start: 2019-08-02 | End: 2019-08-02 | Stop reason: SURG

## 2019-08-02 RX ORDER — HYDROCODONE BITARTRATE AND ACETAMINOPHEN 5; 325 MG/1; MG/1
2 TABLET ORAL EVERY 4 HOURS PRN
Status: DISCONTINUED | OUTPATIENT
Start: 2019-08-02 | End: 2019-08-02 | Stop reason: HOSPADM

## 2019-08-02 RX ORDER — CEFAZOLIN SODIUM 2 G/50ML
SOLUTION INTRAVENOUS AS NEEDED
Status: DISCONTINUED | OUTPATIENT
Start: 2019-08-02 | End: 2019-08-02 | Stop reason: SURG

## 2019-08-02 RX ORDER — METOCLOPRAMIDE HYDROCHLORIDE 5 MG/ML
5 INJECTION INTRAMUSCULAR; INTRAVENOUS ONCE AS NEEDED
Status: DISCONTINUED | OUTPATIENT
Start: 2019-08-02 | End: 2019-08-02 | Stop reason: HOSPADM

## 2019-08-02 RX ORDER — ONDANSETRON 2 MG/ML
4 INJECTION INTRAMUSCULAR; INTRAVENOUS EVERY 6 HOURS PRN
Status: DISCONTINUED | OUTPATIENT
Start: 2019-08-02 | End: 2019-08-02 | Stop reason: HOSPADM

## 2019-08-02 RX ORDER — HYDROMORPHONE HCL/PF 1 MG/ML
0.5 SYRINGE (ML) INJECTION
Status: DISCONTINUED | OUTPATIENT
Start: 2019-08-02 | End: 2019-08-02 | Stop reason: HOSPADM

## 2019-08-02 RX ORDER — OXYMETAZOLINE HYDROCHLORIDE 0.05 G/100ML
SPRAY NASAL AS NEEDED
Status: DISCONTINUED | OUTPATIENT
Start: 2019-08-02 | End: 2019-08-02 | Stop reason: HOSPADM

## 2019-08-02 RX ORDER — ONDANSETRON 2 MG/ML
INJECTION INTRAMUSCULAR; INTRAVENOUS AS NEEDED
Status: DISCONTINUED | OUTPATIENT
Start: 2019-08-02 | End: 2019-08-02 | Stop reason: SURG

## 2019-08-02 RX ORDER — SODIUM CHLORIDE, SODIUM LACTATE, POTASSIUM CHLORIDE, CALCIUM CHLORIDE 600; 310; 30; 20 MG/100ML; MG/100ML; MG/100ML; MG/100ML
INJECTION, SOLUTION INTRAVENOUS CONTINUOUS PRN
Status: DISCONTINUED | OUTPATIENT
Start: 2019-08-02 | End: 2019-08-02 | Stop reason: SURG

## 2019-08-02 RX ORDER — CEPHALEXIN 500 MG/1
500 CAPSULE ORAL 4 TIMES DAILY
Qty: 40 CAPSULE | Refills: 0 | Status: SHIPPED | OUTPATIENT
Start: 2019-08-02 | End: 2019-08-12

## 2019-08-02 RX ORDER — LORAZEPAM 2 MG/ML
0.5 INJECTION INTRAMUSCULAR
Status: DISCONTINUED | OUTPATIENT
Start: 2019-08-02 | End: 2019-08-02 | Stop reason: HOSPADM

## 2019-08-02 RX ORDER — EPHEDRINE SULFATE 50 MG/ML
INJECTION INTRAVENOUS AS NEEDED
Status: DISCONTINUED | OUTPATIENT
Start: 2019-08-02 | End: 2019-08-02 | Stop reason: SURG

## 2019-08-02 RX ORDER — DEXMEDETOMIDINE HYDROCHLORIDE 100 UG/ML
INJECTION, SOLUTION INTRAVENOUS AS NEEDED
Status: DISCONTINUED | OUTPATIENT
Start: 2019-08-02 | End: 2019-08-02 | Stop reason: SURG

## 2019-08-02 RX ORDER — LIDOCAINE HYDROCHLORIDE AND EPINEPHRINE 10; 10 MG/ML; UG/ML
INJECTION, SOLUTION INFILTRATION; PERINEURAL AS NEEDED
Status: DISCONTINUED | OUTPATIENT
Start: 2019-08-02 | End: 2019-08-02 | Stop reason: HOSPADM

## 2019-08-02 RX ADMIN — MIDAZOLAM HYDROCHLORIDE 2 MG: 1 INJECTION, SOLUTION INTRAMUSCULAR; INTRAVENOUS at 11:00

## 2019-08-02 RX ADMIN — HYDROMORPHONE HYDROCHLORIDE 0.5 MG: 1 INJECTION, SOLUTION INTRAMUSCULAR; INTRAVENOUS; SUBCUTANEOUS at 14:58

## 2019-08-02 RX ADMIN — CEFAZOLIN SODIUM 2000 MG: 2 SOLUTION INTRAVENOUS at 11:24

## 2019-08-02 RX ADMIN — NEOSTIGMINE METHYLSULFATE 3 MG: 1 INJECTION INTRAVENOUS at 13:44

## 2019-08-02 RX ADMIN — FENTANYL CITRATE 100 MCG: 50 INJECTION INTRAMUSCULAR; INTRAVENOUS at 11:07

## 2019-08-02 RX ADMIN — HYDROCODONE BITARTRATE AND ACETAMINOPHEN 2 TABLET: 5; 325 TABLET ORAL at 15:44

## 2019-08-02 RX ADMIN — ROCURONIUM BROMIDE 70 MG: 10 INJECTION INTRAVENOUS at 11:07

## 2019-08-02 RX ADMIN — FENTANYL CITRATE 25 MCG: 50 INJECTION, SOLUTION INTRAMUSCULAR; INTRAVENOUS at 14:48

## 2019-08-02 RX ADMIN — Medication 0.1 MCG/KG/MIN: at 11:35

## 2019-08-02 RX ADMIN — PHENYLEPHRINE HYDROCHLORIDE 100 MCG: 10 INJECTION INTRAVENOUS at 11:35

## 2019-08-02 RX ADMIN — MIDAZOLAM HYDROCHLORIDE 2 MG: 1 INJECTION, SOLUTION INTRAMUSCULAR; INTRAVENOUS at 13:54

## 2019-08-02 RX ADMIN — FENTANYL CITRATE 25 MCG: 50 INJECTION, SOLUTION INTRAMUSCULAR; INTRAVENOUS at 14:33

## 2019-08-02 RX ADMIN — SODIUM CHLORIDE, SODIUM LACTATE, POTASSIUM CHLORIDE, AND CALCIUM CHLORIDE: .6; .31; .03; .02 INJECTION, SOLUTION INTRAVENOUS at 10:29

## 2019-08-02 RX ADMIN — FENTANYL CITRATE 25 MCG: 50 INJECTION, SOLUTION INTRAMUSCULAR; INTRAVENOUS at 14:39

## 2019-08-02 RX ADMIN — PHENYLEPHRINE HYDROCHLORIDE 100 MCG: 10 INJECTION INTRAVENOUS at 11:51

## 2019-08-02 RX ADMIN — DEXMEDETOMIDINE HYDROCHLORIDE 50 MCG: 100 INJECTION, SOLUTION INTRAVENOUS at 13:41

## 2019-08-02 RX ADMIN — FENTANYL CITRATE 25 MCG: 50 INJECTION, SOLUTION INTRAMUSCULAR; INTRAVENOUS at 14:53

## 2019-08-02 RX ADMIN — SODIUM CHLORIDE: 0.9 INJECTION, SOLUTION INTRAVENOUS at 11:14

## 2019-08-02 RX ADMIN — HYDROMORPHONE HYDROCHLORIDE 0.5 MG: 1 INJECTION, SOLUTION INTRAMUSCULAR; INTRAVENOUS; SUBCUTANEOUS at 13:28

## 2019-08-02 RX ADMIN — DEXAMETHASONE SODIUM PHOSPHATE 8 MG: 10 INJECTION, SOLUTION INTRAMUSCULAR; INTRAVENOUS at 11:16

## 2019-08-02 RX ADMIN — PROPOFOL 200 MG: 10 INJECTION, EMULSION INTRAVENOUS at 11:07

## 2019-08-02 RX ADMIN — ROCURONIUM BROMIDE 10 MG: 10 INJECTION INTRAVENOUS at 12:59

## 2019-08-02 RX ADMIN — DEXMEDETOMIDINE HYDROCHLORIDE 50 MCG: 100 INJECTION, SOLUTION INTRAVENOUS at 11:10

## 2019-08-02 RX ADMIN — LIDOCAINE HYDROCHLORIDE ANHYDROUS 50 MG: 10 INJECTION, SOLUTION INFILTRATION at 11:07

## 2019-08-02 RX ADMIN — ONDANSETRON 4 MG: 2 INJECTION INTRAMUSCULAR; INTRAVENOUS at 11:16

## 2019-08-02 RX ADMIN — EPHEDRINE SULFATE 10 MG: 50 INJECTION, SOLUTION INTRAVENOUS at 12:26

## 2019-08-02 RX ADMIN — PHENYLEPHRINE HYDROCHLORIDE 200 MCG: 10 INJECTION INTRAVENOUS at 12:10

## 2019-08-02 RX ADMIN — GLYCOPYRROLATE 0.4 MG: 0.2 INJECTION, SOLUTION INTRAMUSCULAR; INTRAVENOUS at 13:44

## 2019-08-02 NOTE — OP NOTE
OPERATIVE REPORT  PATIENT NAME: Mechelle Nino    :  1970  MRN: [id number]  Pt Location: AN OR ROOM 01    SURGERY DATE: 2019    Surgeon(s) and Role:     Dolores Rolon MD - Primary    Preop Diagnosis:  Deviated nasal septum [J34 2]  Perforation of nasal septum [J34 89]    Post-Op Diagnosis Codes:     * Deviated nasal septum [J34 2]     * Perforation of nasal septum [J34 89]    Procedure(s) (LRB):  REPAIR NASAL/SEPTAL PERFORATION W AUTOGRAFT (N/A)  SEPTOPLASTY (N/A)  TURBINOPLASTY (Bilateral)    Specimen(s):  * No specimens in log *    Estimated Blood Loss:   Minimal    Drains:  * No LDAs found *    Anesthesia Type:   General    Operative Indications:  Deviated nasal septum [J34 2]  Perforation of nasal septum [J34 89]  History of nasal staph infection    Operative Findings:  Septal perforation approximately 4 x 6 mm, edges well healed, mucosa appeared healthy  Extremely difficult dissection on the most caudal portion of the septum, due to extensive fibrosis  A right superior relaxing incision was done as well as a left inferior relaxing incision  Complications:   None    Procedure and Technique:  Patient was met in holding area, positively identified and risks, benefits and alternatives discussed, Patient confirmed informed consent  The patient was transferred onto the operating table in the supine position  Appropriate monitoring devices were put in place, general anesthesia was administered by anesthesiologist and patient intubated without complications, tube taped in midline  The patient was prepped and draped in the usual clean fashion including Betadine prepping of the right ear  Before proceeding further, a time-out was taken during which the patients identification and planned surgical procedure were confirmed  Examination with a speculum confirmed septal perforation described in the physical exam at the office    Also noted a right septal deviation basically at the chondral ethmoidal junction  Topical oxymetazoline applied with cotton pledgets and 1% lidocaine with 1/100,000 epinephrine was injected to the septum on  right caudal edge  Septoplasty hemitransfixion  incision along the caudal margin of the septum on the right  side was performed with the 15 blade  Mucoperichondrial and mucoperiosteal flaps were elevated with the iris scissors in a very slow and methodical fashion as there was a very dense fibrotic scar tissue along the septum bilaterally  Eventually the perichondrium was easier to elevate at the superior portion of the cartilage, above the septal perforation  A similar dissection was then carried along the nasal spine and into the nasal crest elevating periosteal flap to the inferior edge of the septal perforation  Dissection was then extended posteriorly  Once dissection had been done inferiorly anteriorly superiorly and posterior to the perforation, the perforation was incised  the 2 flaps with a 15 blade  Attention was then directed to the chondral ethmoidal junction, a strip of cartilage was then excised and subsequently the inferior deviated edge of perpendicular plate of ethmoid at chondroethmoidal junction was resected with Brigitte Eugene-Cut forceps  At this point a 4 0 chromic running suture was done on the right septal perforation, the closure was near total but not completely things still a 2 x 1 mm slit, to avoid additional tension a relaxing incision was done superiorly approximately 1 cm below the nasal vault and parallel to the nasal vault  Additional stitches with 4 0 chromic were then placed obtaining complete closure  A similar technique was used on the left side and to allow complete closure a relaxing incision was then completed at the junction of the septal mucosa with the floor mucosa  Inferior turbinates were reduced by endocauterization with coblation needle and outfracturing them  Bilateral nasal cavities were then re-examined, and the longest nasal speculum could be passed back along the septum on both sides without meeting any resistance  The surgery continued with harvesting of the graft  Attention was then directed to the right ear, and 1% lidocaine with 1/100,000 epinephrine was injected to the post auricular area as well as the skin over the macho auricularis  After allowing time for anesthetic and vasoconstrictive effect, a post auricular incision was performed with 15 blade  Post auricular dissection is carried out leaving a thin layer of perichondrium  The edge of the Helix is marked and the cartilage incised immediately medial (inferior) to the rim, cartilages dissected with perichondrium attached to it and  an ellipse corresponding to the macho cymba cartilage is harvested and placed in saline  Hemostasis is obtained with bipolar and wound closed with 4/0 chromic gut stitches in the deep planes and skin closure achieved with running 4 0 chromic suture  The harvested cartilage is then incised with the 15 blade to  remove the curve of the cartilage  The cartilage was then inset between septal flaps at the location of the septal perforation and defect of the cartilaginous septum, and fixed with 4/0 plain gut transfixion stitches  The septoplasty incision was closed using  4-0 chromic stitches  The mucoperichondral flaps were sutured with mattress type 4/0 plain gut with small Thiago needle  Silicone septal splints were then placed and sutured with 3/0 Prolene  The nasopharynx was then suctioned free of blood and secretions  Patient was handed to the anesthesiologist who weaned from anesthesia, and extubated patient  All counts were correct  Patient was awakened, and taken to the recovery room in stable condition  All counts were correct at the end of the case, and no complications were encountered     I was present for the entire procedure    Patient Disposition:  PACU     SIGNATURE: Cammy Deleon MD  DATE: August 2, 2019  TIME: 1:58 PM

## 2019-08-02 NOTE — DISCHARGE INSTRUCTIONS
No bending, lifting    No nose blowing, sneeze through mouth    Saline nose spray every 2 hours while awake    Bacitracin to behind the ear twice a day

## 2019-08-02 NOTE — ANESTHESIA POSTPROCEDURE EVALUATION
Post-Op Assessment Note    CV Status:  Stable  Pain Score: 0    Pain management: adequate     Mental Status:  Sleepy   Hydration Status:  Euvolemic   PONV Controlled:  Controlled   Airway Patency:  Patent   Post Op Vitals Reviewed: Yes      Staff: CRNA   Comments: vss sv nonobstructed uneventful          /55 (08/02/19 1403)    Temp 97 5 °F (36 4 °C) (08/02/19 1403)    Pulse 73 (08/02/19 1403)   Resp 18 (08/02/19 1403)    SpO2 100 % (08/02/19 1403)

## 2019-08-02 NOTE — H&P
Inge Man 52 y o  male MRN: [id number]  Unit/Bed#: [facility] Encounter: [id number]            History of Present Illness       HPI: Inge Man is a 52y o  year old male  who presented with chronic nasal obstruction, a large septal perforation, septal deviation was noticed in initial exam last year  He underwent septoplasty turbinoplasty and endoscopic surgery and closure of septal perforation in August 2018   In the postop period patient had a nasal trauma approximately2 or 3 weeks after the surgery   There was new ulcerationon the mucosa and eventually the perforation recurred   I treated him with antibiotics   Currently having sensation of obstruction, no significant crusting but does complain of whistling  He recently underwent neck spine surgery  In last visit had lower lip edema secondary to a dental/periodontal infection  Seen in ER and prescribed clindamycin, has a dental appointment scheduled         Review of Systems  Revision of Systems:    Complete review done, only positive for the symptoms described in the H&P section above      Historical Information   Past Medical History:   Diagnosis Date    Angina pectoris ([facility] [address] )     stable-pt denies     Ascites     told he had a "wave stomach"    Bipolar 1 disorder ([facility] [address] )     Depression     Family history of colon cancer requiring screening colonoscopy     brother    Fatty liver     History of colon polyps     Hyperlipidemia     Hypertension     Hypothalamic hypogonadism ([facility] [address] )     Liver disease     fatty liver    Osteomyelitis ([facility] [address] )     Sleep apnea     Spondylosis of cervical region without myelopathy or radiculopathy 11/9/2018     Past Surgical History:   Procedure Laterality Date    BACK SURGERY      COLONOSCOPY      FRACTURE SURGERY      jaw    [place] ARTHRODESIS ANT INTERBODY INC DISCECTOMY, CERVICAL BELOW C2 N/A 1/29/2019    Procedure: ANTERIOR CERVICAL FUSION W DISCECTOMY, C4-5, C5-6;  Surgeon: Milo Mcdonald MD;  Location:  MAIN OR; Service: Neurosurgery    PA COLONOSCOPY FLX DX W/COLLJ [address] [facility] [address] WHEN PFRMD N/A 7/27/2017    Procedure: COLONOSCOPY;  Surgeon: Blaise Avina MD;  Location: [facility]; Service: Gastroenterology    PA COLONOSCOPY FLX DX W/COLLJ [address] [facility] [address] WHEN PFRMD N/A 8/29/2017    Procedure: COLONOSCOPY;  Surgeon: Blaise Avina MD;  Location: [facility]; Service: Gastroenterology    PA COLONOSCOPY FLX DX W/COLLJ [address] [facility] [address] WHEN PFRMD N/A 12/1/2017    Procedure: COLONOSCOPY;  Surgeon: Blaise Avina MD;  Location: [facility];   Service: Gastroenterology    PA NASAL/SINUS ENDOSCOPY,RMV MILDRED BULL N/A 8/24/2018    Procedure: FUNCTIONAL ENDOSCOPIC SINUS SURGERY (FESS) LEFT [place];  BILATERAL  GRAFTS; EXCISION OF NASAL MUCOSAL ULCER; CLOSURE WITH ENDONASAL FLAPS;  Surgeon: Malka Vasquez MD;  Location: [facility];  Service: ENT    PA REPAIR OF NASAL SEPTUM N/A 8/24/2018    Procedure: SEPTOPLASTY;  Surgeon: Malka Vasquez MD;  Location: [facility];  Service: ENT    TURBINOPLASTY N/A 8/24/2018    Procedure: TURBINOPLASTY;  Surgeon: Malka Vasquez MD;  Location: [facility];  Service: ENT     Social History   Social History     Substance and Sexual Activity   Alcohol Use No    Comment: rare     Social History     Substance and Sexual Activity   Drug Use Not Currently    Types: Cocaine    Comment: stopped in 2007, however did use cocaine again recently at his son's wedding     Social History     Tobacco Use   Smoking Status Heavy Tobacco Smoker    Packs/day: 1 00    Types: Cigarettes   Smokeless Tobacco Never Used   Tobacco Comment    using patch cutting back     Family History:   Family History   Problem Relation Age of Onset    Breast cancer Mother     Thyroid nodules Mother     Colon cancer Father         around age 45    Heart failure Father         congestive    No Known Problems Sister     Colon cancer Brother         around age 45    No Known Problems Maternal Grandmother     No Known Problems Maternal Grandfather     No Known Problems Paternal Grandmother     No Known Problems Paternal Grandfather        Meds/Allergies   No current facility-administered medications for this encounter  Allergies   Allergen Reactions    Clarithromycin Swelling    Morphine Other (See Comments)     Pt can"t recall his reaction, swelling per patient    Penicillins Swelling       Objective       Physical Exam   Blood pressure 134/90, pulse 88, temperature 97 6 °F (36 4 °C), temperature source Temporal, resp  rate 18, height 6' 3" (1 905 m), weight 109 kg (240 lb), SpO2 100 %  Constitutional: Oriented to person, place, and time  Well-developed and well-nourished, no apparent distress, non-toxic appearance  Cooperative, able to hear and answer questions without difficulty     Voice: Normal voice quality  Head: Normocephalic, atraumatic   No scars, masses or lesions  Face: Symmetric, no edema, no sinus tenderness  Eyes: Vision grossly intact, extra-ocular movement intact  Right Ear: External ear normal   Auditory canal clear   Tympanic membrane well-appearing, without retraction or scarring   No fluid present  No post-auricular erythema or tenderness  Left Ear: External ear normal   Auditory canal clear   Tympanic membrane well-appearing, without retraction or scarring   No fluid present   No post-auricular erythema or tenderness  Nose: Septum with superior right deviation, overall most septum is midline  Small anterior 3x5 mm perforation, now without ulceration or crusting   Mucosa moist, appears to be in good conditions  turbinates well appearing   No crusting, polyps or discharge evident  Oral cavity: partially edentulous, Mucosa moist, lips normal   Tongue mobile, floor of mouth normal   Hard palate unremarkable   No masses or lesions  Abdoulaye Greulich is midline, sot palate normal   Unremarkable oropharyngeal inlet   Tonsils atrophic  Posterior pharyngeal wall clear  No masses or lesions    Salivary glands:  Parotid glands and submandibular glands symmetric, no enlargement or tenderness  Neck: Normal laryngeal elevation with swallow   Trachea midline   No masses or lesions  No palpable adenopathy  Thyroid: normal in size, unremarkable without tenderness or palpable nodules  Pulmonary/Chest: No respiratory distress  Lungs clear on auscultation  Heart S1 S2 RRR  Musculoskeletal: Normal range of motion  Neurological: Cranial nerves 2-12 intact  Skin: Skin is warm and dry  Psychiatric: Normal mood and affect  ASSESSMENT AND PLAN:       1  Nasal septal perforation      2  Deviated septum      3  Periodontitis chronic, apical      4  Arthralgia of right temporomandibular joint         The mucosa of the nose has recovered completely, there is no ulceration  Septum perforation is well healed  I discussed with the patient some of the plans for the surgery including the possibility of rotating the tip to better accomplish the closure  He will need also cartilage from an auricular graft  Revision septoplasty, turbinoplasty, closure of septal perforation with auricular graft is indicated  Risk benefits and alternatives were discussed patient decides to proceed with surgery

## 2019-08-02 NOTE — ANESTHESIA PREPROCEDURE EVALUATION
Review of Systems/Medical History  Patient summary reviewed  Chart reviewed  No history of anesthetic complications     Cardiovascular  EKG reviewed, Exercise tolerance (METS): >4,  Hyperlipidemia, Hypertension ,    Pulmonary  Smoker cigarette smoker  , Sleep apnea ,        GI/Hepatic    GERD , Liver disease (fatty liver) ,        Negative  ROS        Endo/Other    Comment: Hypothalmic Hypogonadism    GYN       Hematology  Negative hematology ROS      Musculoskeletal    Arthritis     Neurology  Negative neurology ROS      Psychology   Depression , bipolar disorder,              Physical Exam    Airway    Mallampati score: I  TM Distance: >3 FB  Neck ROM: full     Dental   No notable dental hx     Cardiovascular  Cardiovascular exam normal    Pulmonary  Pulmonary exam normal     Other Findings      Lab Results   Component Value Date    WBC 7 81 07/03/2019    HGB 15 3 07/03/2019     07/03/2019     Lab Results   Component Value Date    SODIUM 144 07/03/2019    K 4 0 07/03/2019    BUN 15 07/03/2019    CREATININE 1 01 07/03/2019    EGFR 88 07/03/2019     Lab Results   Component Value Date    HGBA1C 5 7 07/03/2019     Anesthesia Plan  ASA Score- 2     Anesthesia Type- general with ASA Monitors  Additional Monitors:   Airway Plan: ETT  Plan Factors-    Induction- intravenous  Postoperative Plan- Plan for postoperative opioid use  Informed Consent- Anesthetic plan and risks discussed with patient  I personally reviewed this patient with the CRNA  Discussed and agreed on the Anesthesia Plan with the CRNA  Al Wiley

## 2019-08-06 RX ORDER — NICOTINE 21 MG/24HR
1 PATCH, TRANSDERMAL 24 HOURS TRANSDERMAL EVERY 24 HOURS
Qty: 28 PATCH | Refills: 1 | OUTPATIENT
Start: 2019-08-06

## 2019-08-08 ENCOUNTER — TELEPHONE (OUTPATIENT)
Dept: FAMILY MEDICINE CLINIC | Facility: CLINIC | Age: 49
End: 2019-08-08

## 2019-08-08 NOTE — TELEPHONE ENCOUNTER
Dr Anjelica Sanchez office called from [facility] and Dr will not see patient  They assumed it was for pain management    He is scheduled here for as a new patient with you 1/2020    [facility]

## 2019-08-20 ENCOUNTER — TELEPHONE (OUTPATIENT)
Dept: NEUROSURGERY | Facility: CLINIC | Age: 49
End: 2019-08-20

## 2019-08-20 NOTE — TELEPHONE ENCOUNTER
Pt is s/p ACDF C4- C5-6 1/2019  Next f/u 12/2019 with xray  Pt reports for the last week he is experiencing increased neck stiffness and resistance  He requests an appt  With Dr Barbi Stoll  Will review with provider and return call

## 2019-08-20 NOTE — TELEPHONE ENCOUNTER
Reviewed with Atul Ragsdale who suggests the pt get the upcoming xray done and f/u with Dr Pasquale Oliva

## 2019-09-07 DIAGNOSIS — E78.5 DYSLIPIDEMIA: ICD-10-CM

## 2019-09-11 ENCOUNTER — HOSPITAL ENCOUNTER (OUTPATIENT)
Dept: RADIOLOGY | Facility: HOSPITAL | Age: 49
Discharge: HOME | End: 2019-09-11
Attending: NEUROLOGICAL SURGERY
Payer: COMMERCIAL

## 2019-09-11 DIAGNOSIS — Z48.89 AFTERCARE FOLLOWING SURGERY: ICD-10-CM

## 2019-09-11 PROCEDURE — 72040 X-RAY EXAM NECK SPINE 2-3 VW: CPT

## 2019-09-11 RX ORDER — ATORVASTATIN CALCIUM 10 MG/1
TABLET, FILM COATED ORAL
Qty: 90 TABLET | Refills: 0 | Status: SHIPPED | OUTPATIENT
Start: 2019-09-11 | End: 2019-12-10 | Stop reason: SDUPTHER

## 2019-09-11 RX ORDER — ASPIRIN 81 MG/1
TABLET ORAL
Qty: 90 TABLET | Refills: 0 | Status: SHIPPED | OUTPATIENT
Start: 2019-09-11 | End: 2019-11-18

## 2019-11-07 ENCOUNTER — APPOINTMENT (EMERGENCY)
Dept: CT IMAGING | Facility: HOSPITAL | Age: 49
End: 2019-11-07
Payer: COMMERCIAL

## 2019-11-07 ENCOUNTER — HOSPITAL ENCOUNTER (OUTPATIENT)
Facility: HOSPITAL | Age: 49
Setting detail: OBSERVATION
Discharge: HOME | End: 2019-11-08
Attending: EMERGENCY MEDICINE | Admitting: INTERNAL MEDICINE
Payer: COMMERCIAL

## 2019-11-07 ENCOUNTER — APPOINTMENT (EMERGENCY)
Dept: RADIOLOGY | Facility: HOSPITAL | Age: 49
End: 2019-11-07
Payer: COMMERCIAL

## 2019-11-07 DIAGNOSIS — K21.9 GERD (GASTROESOPHAGEAL REFLUX DISEASE): ICD-10-CM

## 2019-11-07 DIAGNOSIS — K85.90 ACUTE PANCREATITIS: Primary | ICD-10-CM

## 2019-11-07 LAB
ALBUMIN SERPL BCP-MCNC: 4.1 G/DL (ref 3.5–5)
ALP SERPL-CCNC: 72 U/L (ref 46–116)
ALT SERPL W P-5'-P-CCNC: 21 U/L (ref 12–78)
ANION GAP SERPL CALCULATED.3IONS-SCNC: 7 MMOL/L (ref 4–13)
AST SERPL W P-5'-P-CCNC: 13 U/L (ref 5–45)
BASOPHILS # BLD AUTO: 0.04 THOUSANDS/ΜL (ref 0–0.1)
BASOPHILS NFR BLD AUTO: 0 % (ref 0–1)
BILIRUB SERPL-MCNC: 0.8 MG/DL (ref 0.2–1)
BUN SERPL-MCNC: 8 MG/DL (ref 5–25)
CALCIUM SERPL-MCNC: 9.4 MG/DL (ref 8.3–10.1)
CHLORIDE SERPL-SCNC: 100 MMOL/L (ref 100–108)
CO2 SERPL-SCNC: 32 MMOL/L (ref 21–32)
CREAT SERPL-MCNC: 0.89 MG/DL (ref 0.6–1.3)
EOSINOPHIL # BLD AUTO: 0.04 THOUSAND/ΜL (ref 0–0.61)
EOSINOPHIL NFR BLD AUTO: 0 % (ref 0–6)
ERYTHROCYTE [DISTWIDTH] IN BLOOD BY AUTOMATED COUNT: 12.2 % (ref 11.6–15.1)
GFR SERPL CREATININE-BSD FRML MDRD: 100 ML/MIN/1.73SQ M
GLUCOSE SERPL-MCNC: 108 MG/DL (ref 65–140)
HCT VFR BLD AUTO: 48.9 % (ref 36.5–49.3)
HGB BLD-MCNC: 16.5 G/DL (ref 12–17)
IMM GRANULOCYTES # BLD AUTO: 0.06 THOUSAND/UL (ref 0–0.2)
IMM GRANULOCYTES NFR BLD AUTO: 0 % (ref 0–2)
LIPASE SERPL-CCNC: 923 U/L (ref 73–393)
LYMPHOCYTES # BLD AUTO: 1.56 THOUSANDS/ΜL (ref 0.6–4.47)
LYMPHOCYTES NFR BLD AUTO: 10 % (ref 14–44)
MCH RBC QN AUTO: 31.4 PG (ref 26.8–34.3)
MCHC RBC AUTO-ENTMCNC: 33.7 G/DL (ref 31.4–37.4)
MCV RBC AUTO: 93 FL (ref 82–98)
MONOCYTES # BLD AUTO: 0.94 THOUSAND/ΜL (ref 0.17–1.22)
MONOCYTES NFR BLD AUTO: 6 % (ref 4–12)
NEUTROPHILS # BLD AUTO: 12.49 THOUSANDS/ΜL (ref 1.85–7.62)
NEUTS SEG NFR BLD AUTO: 84 % (ref 43–75)
NRBC BLD AUTO-RTO: 0 /100 WBCS
PLATELET # BLD AUTO: 225 THOUSANDS/UL (ref 149–390)
PMV BLD AUTO: 10 FL (ref 8.9–12.7)
POTASSIUM SERPL-SCNC: 3.7 MMOL/L (ref 3.5–5.3)
PROT SERPL-MCNC: 7.4 G/DL (ref 6.4–8.2)
RBC # BLD AUTO: 5.25 MILLION/UL (ref 3.88–5.62)
SODIUM SERPL-SCNC: 139 MMOL/L (ref 136–145)
TROPONIN I SERPL-MCNC: <0.02 NG/ML
WBC # BLD AUTO: 15.13 THOUSAND/UL (ref 4.31–10.16)

## 2019-11-07 PROCEDURE — 84484 ASSAY OF TROPONIN QUANT: CPT | Performed by: PHYSICIAN ASSISTANT

## 2019-11-07 PROCEDURE — 71046 X-RAY EXAM CHEST 2 VIEWS: CPT

## 2019-11-07 PROCEDURE — 74177 CT ABD & PELVIS W/CONTRAST: CPT

## 2019-11-07 PROCEDURE — 93005 ELECTROCARDIOGRAM TRACING: CPT

## 2019-11-07 PROCEDURE — 99285 EMERGENCY DEPT VISIT HI MDM: CPT | Performed by: PHYSICIAN ASSISTANT

## 2019-11-07 PROCEDURE — 99285 EMERGENCY DEPT VISIT HI MDM: CPT

## 2019-11-07 PROCEDURE — 36415 COLL VENOUS BLD VENIPUNCTURE: CPT | Performed by: PHYSICIAN ASSISTANT

## 2019-11-07 PROCEDURE — 80053 COMPREHEN METABOLIC PANEL: CPT | Performed by: PHYSICIAN ASSISTANT

## 2019-11-07 PROCEDURE — 96375 TX/PRO/DX INJ NEW DRUG ADDON: CPT

## 2019-11-07 PROCEDURE — 85025 COMPLETE CBC W/AUTO DIFF WBC: CPT | Performed by: PHYSICIAN ASSISTANT

## 2019-11-07 PROCEDURE — 96361 HYDRATE IV INFUSION ADD-ON: CPT

## 2019-11-07 PROCEDURE — 96374 THER/PROPH/DIAG INJ IV PUSH: CPT

## 2019-11-07 PROCEDURE — 83690 ASSAY OF LIPASE: CPT | Performed by: PHYSICIAN ASSISTANT

## 2019-11-07 RX ORDER — HYDROMORPHONE HCL/PF 1 MG/ML
0.5 SYRINGE (ML) INJECTION ONCE
Status: COMPLETED | OUTPATIENT
Start: 2019-11-07 | End: 2019-11-07

## 2019-11-07 RX ORDER — ONDANSETRON 2 MG/ML
4 INJECTION INTRAMUSCULAR; INTRAVENOUS ONCE
Status: COMPLETED | OUTPATIENT
Start: 2019-11-07 | End: 2019-11-07

## 2019-11-07 RX ORDER — ASPIRIN 81 MG/1
324 TABLET, CHEWABLE ORAL ONCE
Status: COMPLETED | OUTPATIENT
Start: 2019-11-07 | End: 2019-11-07

## 2019-11-07 RX ADMIN — HYDROMORPHONE HYDROCHLORIDE 0.5 MG: 1 INJECTION, SOLUTION INTRAMUSCULAR; INTRAVENOUS; SUBCUTANEOUS at 23:32

## 2019-11-07 RX ADMIN — ASPIRIN 81 MG 324 MG: 81 TABLET ORAL at 22:54

## 2019-11-07 RX ADMIN — ONDANSETRON 4 MG: 2 INJECTION INTRAMUSCULAR; INTRAVENOUS at 22:54

## 2019-11-07 RX ADMIN — IOHEXOL 100 ML: 350 INJECTION, SOLUTION INTRAVENOUS at 23:56

## 2019-11-07 RX ADMIN — SODIUM CHLORIDE 1000 ML: 0.9 INJECTION, SOLUTION INTRAVENOUS at 23:31

## 2019-11-08 ENCOUNTER — APPOINTMENT (OUTPATIENT)
Dept: ULTRASOUND IMAGING | Facility: HOSPITAL | Age: 49
End: 2019-11-08
Payer: COMMERCIAL

## 2019-11-08 ENCOUNTER — TELEPHONE (OUTPATIENT)
Dept: GASTROENTEROLOGY | Facility: CLINIC | Age: 49
End: 2019-11-08

## 2019-11-08 VITALS
BODY MASS INDEX: 25.91 KG/M2 | DIASTOLIC BLOOD PRESSURE: 95 MMHG | HEIGHT: 76 IN | RESPIRATION RATE: 18 BRPM | HEART RATE: 85 BPM | SYSTOLIC BLOOD PRESSURE: 145 MMHG | TEMPERATURE: 99.2 F | WEIGHT: 212.74 LBS | OXYGEN SATURATION: 95 %

## 2019-11-08 PROBLEM — K85.90 ACUTE PANCREATITIS: Status: RESOLVED | Noted: 2019-11-08 | Resolved: 2019-11-08

## 2019-11-08 PROBLEM — K85.90 ACUTE PANCREATITIS: Status: ACTIVE | Noted: 2019-11-08

## 2019-11-08 LAB
ALBUMIN SERPL BCP-MCNC: 3.6 G/DL (ref 3.5–5)
ALP SERPL-CCNC: 72 U/L (ref 46–116)
ALT SERPL W P-5'-P-CCNC: 18 U/L (ref 12–78)
ANION GAP SERPL CALCULATED.3IONS-SCNC: 7 MMOL/L (ref 4–13)
AST SERPL W P-5'-P-CCNC: 13 U/L (ref 5–45)
ATRIAL RATE: 87 BPM
BASOPHILS # BLD AUTO: 0.02 THOUSANDS/ΜL (ref 0–0.1)
BASOPHILS NFR BLD AUTO: 0 % (ref 0–1)
BILIRUB SERPL-MCNC: 0.9 MG/DL (ref 0.2–1)
BUN SERPL-MCNC: 9 MG/DL (ref 5–25)
CALCIUM SERPL-MCNC: 8.9 MG/DL (ref 8.3–10.1)
CHLORIDE SERPL-SCNC: 103 MMOL/L (ref 100–108)
CO2 SERPL-SCNC: 30 MMOL/L (ref 21–32)
CREAT SERPL-MCNC: 0.74 MG/DL (ref 0.6–1.3)
EOSINOPHIL # BLD AUTO: 0.07 THOUSAND/ΜL (ref 0–0.61)
EOSINOPHIL NFR BLD AUTO: 1 % (ref 0–6)
ERYTHROCYTE [DISTWIDTH] IN BLOOD BY AUTOMATED COUNT: 12.4 % (ref 11.6–15.1)
ETHANOL SERPL-MCNC: <3 MG/DL (ref 0–3)
GFR SERPL CREATININE-BSD FRML MDRD: 108 ML/MIN/1.73SQ M
GLUCOSE P FAST SERPL-MCNC: 100 MG/DL (ref 65–99)
GLUCOSE SERPL-MCNC: 100 MG/DL (ref 65–140)
HCT VFR BLD AUTO: 45.5 % (ref 36.5–49.3)
HGB BLD-MCNC: 14.9 G/DL (ref 12–17)
IMM GRANULOCYTES # BLD AUTO: 0.03 THOUSAND/UL (ref 0–0.2)
IMM GRANULOCYTES NFR BLD AUTO: 0 % (ref 0–2)
LIPASE SERPL-CCNC: 662 U/L (ref 73–393)
LYMPHOCYTES # BLD AUTO: 2.43 THOUSANDS/ΜL (ref 0.6–4.47)
LYMPHOCYTES NFR BLD AUTO: 23 % (ref 14–44)
MCH RBC QN AUTO: 30.5 PG (ref 26.8–34.3)
MCHC RBC AUTO-ENTMCNC: 32.7 G/DL (ref 31.4–37.4)
MCV RBC AUTO: 93 FL (ref 82–98)
MONOCYTES # BLD AUTO: 0.87 THOUSAND/ΜL (ref 0.17–1.22)
MONOCYTES NFR BLD AUTO: 8 % (ref 4–12)
NEUTROPHILS # BLD AUTO: 7.33 THOUSANDS/ΜL (ref 1.85–7.62)
NEUTS SEG NFR BLD AUTO: 68 % (ref 43–75)
NRBC BLD AUTO-RTO: 0 /100 WBCS
P AXIS: 67 DEGREES
PLATELET # BLD AUTO: 207 THOUSANDS/UL (ref 149–390)
PMV BLD AUTO: 9.8 FL (ref 8.9–12.7)
POTASSIUM SERPL-SCNC: 3.7 MMOL/L (ref 3.5–5.3)
PR INTERVAL: 140 MS
PROT SERPL-MCNC: 6.8 G/DL (ref 6.4–8.2)
QRS AXIS: 77 DEGREES
QRSD INTERVAL: 92 MS
QT INTERVAL: 344 MS
QTC INTERVAL: 413 MS
RBC # BLD AUTO: 4.88 MILLION/UL (ref 3.88–5.62)
SODIUM SERPL-SCNC: 140 MMOL/L (ref 136–145)
T WAVE AXIS: 53 DEGREES
VENTRICULAR RATE: 87 BPM
WBC # BLD AUTO: 10.75 THOUSAND/UL (ref 4.31–10.16)

## 2019-11-08 PROCEDURE — 99214 OFFICE O/P EST MOD 30 MIN: CPT | Performed by: PHYSICIAN ASSISTANT

## 2019-11-08 PROCEDURE — 99236 HOSP IP/OBS SAME DATE HI 85: CPT | Performed by: STUDENT IN AN ORGANIZED HEALTH CARE EDUCATION/TRAINING PROGRAM

## 2019-11-08 PROCEDURE — 36415 COLL VENOUS BLD VENIPUNCTURE: CPT | Performed by: INTERNAL MEDICINE

## 2019-11-08 PROCEDURE — 83690 ASSAY OF LIPASE: CPT | Performed by: INTERNAL MEDICINE

## 2019-11-08 PROCEDURE — 85025 COMPLETE CBC W/AUTO DIFF WBC: CPT | Performed by: INTERNAL MEDICINE

## 2019-11-08 PROCEDURE — 80320 DRUG SCREEN QUANTALCOHOLS: CPT | Performed by: INTERNAL MEDICINE

## 2019-11-08 PROCEDURE — 76705 ECHO EXAM OF ABDOMEN: CPT

## 2019-11-08 PROCEDURE — 93010 ELECTROCARDIOGRAM REPORT: CPT | Performed by: INTERNAL MEDICINE

## 2019-11-08 PROCEDURE — 80053 COMPREHEN METABOLIC PANEL: CPT | Performed by: INTERNAL MEDICINE

## 2019-11-08 RX ORDER — SODIUM CHLORIDE, SODIUM LACTATE, POTASSIUM CHLORIDE, CALCIUM CHLORIDE 600; 310; 30; 20 MG/100ML; MG/100ML; MG/100ML; MG/100ML
250 INJECTION, SOLUTION INTRAVENOUS CONTINUOUS
Status: DISCONTINUED | OUTPATIENT
Start: 2019-11-08 | End: 2019-11-08 | Stop reason: HOSPADM

## 2019-11-08 RX ORDER — CYCLOBENZAPRINE HCL 10 MG
10 TABLET ORAL 3 TIMES DAILY PRN
Status: DISCONTINUED | OUTPATIENT
Start: 2019-11-08 | End: 2019-11-08 | Stop reason: HOSPADM

## 2019-11-08 RX ORDER — ONDANSETRON 2 MG/ML
4 INJECTION INTRAMUSCULAR; INTRAVENOUS EVERY 6 HOURS PRN
Status: DISCONTINUED | OUTPATIENT
Start: 2019-11-08 | End: 2019-11-08 | Stop reason: HOSPADM

## 2019-11-08 RX ORDER — ATORVASTATIN CALCIUM 10 MG/1
10 TABLET, FILM COATED ORAL DAILY
Status: DISCONTINUED | OUTPATIENT
Start: 2019-11-08 | End: 2019-11-08 | Stop reason: HOSPADM

## 2019-11-08 RX ORDER — HYDROMORPHONE HCL/PF 1 MG/ML
0.5 SYRINGE (ML) INJECTION
Status: DISCONTINUED | OUTPATIENT
Start: 2019-11-08 | End: 2019-11-08 | Stop reason: HOSPADM

## 2019-11-08 RX ORDER — HEPARIN SODIUM 5000 [USP'U]/ML
5000 INJECTION, SOLUTION INTRAVENOUS; SUBCUTANEOUS EVERY 8 HOURS SCHEDULED
Status: DISCONTINUED | OUTPATIENT
Start: 2019-11-08 | End: 2019-11-08 | Stop reason: HOSPADM

## 2019-11-08 RX ORDER — ONDANSETRON 2 MG/ML
4 INJECTION INTRAMUSCULAR; INTRAVENOUS ONCE
Status: COMPLETED | OUTPATIENT
Start: 2019-11-08 | End: 2019-11-08

## 2019-11-08 RX ORDER — RANITIDINE 150 MG/1
150 TABLET ORAL 2 TIMES DAILY
Qty: 60 TABLET | Refills: 0 | Status: SHIPPED | OUTPATIENT
Start: 2019-11-08 | End: 2019-12-02 | Stop reason: HOSPADM

## 2019-11-08 RX ORDER — OMEPRAZOLE 40 MG/1
40 CAPSULE, DELAYED RELEASE ORAL DAILY
Qty: 30 CAPSULE | Refills: 0 | Status: SHIPPED | OUTPATIENT
Start: 2019-11-08 | End: 2019-11-10 | Stop reason: HOSPADM

## 2019-11-08 RX ORDER — PANTOPRAZOLE SODIUM 40 MG/1
40 TABLET, DELAYED RELEASE ORAL
Status: DISCONTINUED | OUTPATIENT
Start: 2019-11-08 | End: 2019-11-08 | Stop reason: HOSPADM

## 2019-11-08 RX ORDER — OXYCODONE AND ACETAMINOPHEN 2.5; 325 MG/1; MG/1
1 TABLET ORAL EVERY 4 HOURS PRN
Qty: 18 TABLET | Refills: 0 | Status: SHIPPED | OUTPATIENT
Start: 2019-11-08 | End: 2019-11-10 | Stop reason: HOSPADM

## 2019-11-08 RX ORDER — ASPIRIN 81 MG/1
81 TABLET ORAL DAILY
Status: DISCONTINUED | OUTPATIENT
Start: 2019-11-08 | End: 2019-11-08 | Stop reason: HOSPADM

## 2019-11-08 RX ORDER — NICOTINE 21 MG/24HR
1 PATCH, TRANSDERMAL 24 HOURS TRANSDERMAL EVERY 24 HOURS
Status: DISCONTINUED | OUTPATIENT
Start: 2019-11-08 | End: 2019-11-08 | Stop reason: HOSPADM

## 2019-11-08 RX ORDER — LISINOPRIL 20 MG/1
20 TABLET ORAL DAILY
Status: DISCONTINUED | OUTPATIENT
Start: 2019-11-08 | End: 2019-11-08 | Stop reason: HOSPADM

## 2019-11-08 RX ORDER — ECHINACEA PURPUREA EXTRACT 125 MG
2 TABLET ORAL
Status: DISCONTINUED | OUTPATIENT
Start: 2019-11-08 | End: 2019-11-08

## 2019-11-08 RX ADMIN — HEPARIN SODIUM 5000 UNITS: 5000 INJECTION INTRAVENOUS; SUBCUTANEOUS at 06:27

## 2019-11-08 RX ADMIN — ASPIRIN 81 MG: 81 TABLET, COATED ORAL at 10:12

## 2019-11-08 RX ADMIN — ATORVASTATIN CALCIUM 10 MG: 10 TABLET, FILM COATED ORAL at 10:12

## 2019-11-08 RX ADMIN — ONDANSETRON 4 MG: 2 INJECTION INTRAMUSCULAR; INTRAVENOUS at 03:12

## 2019-11-08 RX ADMIN — LISINOPRIL 20 MG: 20 TABLET ORAL at 10:12

## 2019-11-08 RX ADMIN — SODIUM CHLORIDE, SODIUM LACTATE, POTASSIUM CHLORIDE, AND CALCIUM CHLORIDE 250 ML/HR: .6; .31; .03; .02 INJECTION, SOLUTION INTRAVENOUS at 08:37

## 2019-11-08 RX ADMIN — HYDROMORPHONE HYDROCHLORIDE 0.5 MG: 1 INJECTION, SOLUTION INTRAMUSCULAR; INTRAVENOUS; SUBCUTANEOUS at 06:50

## 2019-11-08 RX ADMIN — SODIUM CHLORIDE, SODIUM LACTATE, POTASSIUM CHLORIDE, AND CALCIUM CHLORIDE 250 ML/HR: .6; .31; .03; .02 INJECTION, SOLUTION INTRAVENOUS at 03:11

## 2019-11-08 NOTE — H&P (VIEW-ONLY)
Consultation - [facility]  Pancho Birmingham 52 y o  male MRN: [id number]  Unit/Bed#: -01 Encounter: [id number]        Consults    Reason for Consult / Principal Problem: Abdominal Pain    HPI: Mr Jose Eduardo Silver is a 51 yo M with a PMH of HTN, HLD, tobacco use, presenting for acute onset of upper abdominal pain associated with belching, indigestion, nausea and vomiting  He reports he has never had pain like this before  He reports he has had kidney stones and gallstones in the past causing pain  He still has a gallbladder  He has never had pancreatitis before  He denies any alcohol use  He does smoke cigarettes  He does take NSAIDs very frequently for back pain and cervical pain is he is a fusion  He denies any melena or hematochezia  He has never had an endoscopy before  He does report relatively frequent reflux symptoms  He eating regular diet today and is feeling much better      REVIEW OF SYSTEMS: Negative except for as stated above      Historical Information   Past Medical History:   Diagnosis Date    Angina pectoris [facility] [address] )     stable-pt denies     Ascites     told he had a "wave stomach"    Bipolar 1 disorder ([facility] [address] [facility]     Depression     Family history of colon cancer requiring screening colonoscopy     brother    Fatty liver     History of colon polyps     Hyperlipidemia     Hypertension     Hypothalamic hypogonadism ([facility] [address] [facility]     Liver disease     fatty liver    Osteomyelitis ([facility] [address] [facility]     Sleep apnea     Spondylosis of cervical region without myelopathy or radiculopathy 11/9/2018     Past Surgical History:   Procedure Laterality Date    BACK SURGERY      COLONOSCOPY      FRACTURE SURGERY      jaw    AR ARTHRODESIS ANT INTERBODY INC DISCECTOMY, CERVICAL BELOW C2 N/A 1/29/2019    Procedure: ANTERIOR CERVICAL FUSION W DISCECTOMY, C4-5, C5-6;  Surgeon: Julienne Castillo MD;  Location:  MAIN OR;  Service: Neurosurgery    AR COLONOSCOPY FLX DX W/COLLJ SPEC WHEN PFRMD N/A 7/27/2017 Procedure: COLONOSCOPY;  Surgeon: Karena Rueda MD;  Location: BE GI LAB; Service: Gastroenterology    NJ COLONOSCOPY FLX DX W/[facility] INPATIENT REHABILITATION WHEN PFRMD N/A 8/29/2017    Procedure: COLONOSCOPY;  Surgeon: Karena Rueda MD;  Location: BE GI LAB; Service: Gastroenterology    NJ COLONOSCOPY FLX DX W/[facility] INPATIENT REHABILITATION WHEN PFRMD N/A 12/1/2017    Procedure: COLONOSCOPY;  Surgeon: Karena Rueda MD;  Location: BE GI LAB;   Service: Gastroenterology    NJ NASAL/SINUS ENDOSCOPY,RMV MILDRED BULL N/A 8/24/2018    Procedure: FUNCTIONAL ENDOSCOPIC SINUS SURGERY (FESS) LEFT MILDRED;  BILATERAL  GRAFTS; EXCISION OF NASAL MUCOSAL ULCER; CLOSURE WITH ENDONASAL FLAPS;  Surgeon: Hossein Henderson MD;  Location: BE MAIN OR;  Service: ENT    NJ REPAIR NASAL SEPTUM PERFORATN N/A 8/2/2019    Procedure: REPAIR NASAL/SEPTAL PERFORATION W AUTOGRAFT;  Surgeon: Hossein Henderson MD;  Location: AN Main OR;  Service: ENT    NJ REPAIR OF NASAL SEPTUM N/A 8/24/2018    Procedure: SEPTOPLASTY;  Surgeon: Hossein Henderson MD;  Location: BE MAIN OR;  Service: ENT    NJ REPAIR OF NASAL SEPTUM N/A 8/2/2019    Procedure: SEPTOPLASTY;  Surgeon: Hossein Henderson MD;  Location: AN Main OR;  Service: ENT    TURBINOPLASTY N/A 8/24/2018    Procedure: TURBINOPLASTY;  Surgeon: Hossein Henderson MD;  Location: [facility] MAIN OR;  Service: ENT    TURBINOPLASTY Bilateral 8/2/2019    Procedure: TURBINOPLASTY;  Surgeon: Hossein Henderson MD;  Location: AN Main OR;  Service: ENT     Social History   Social History     Substance and Sexual Activity   Alcohol Use Never    Frequency: Never    Comment: rare     Social History     Substance and Sexual Activity   Drug Use Not Currently    Types: Cocaine    Comment: stopped in 2007, however did use cocaine again recently at his son's wedding     Social History     Tobacco Use   Smoking Status Heavy Tobacco Smoker    Packs/day: 1 00    Types: Cigarettes   Smokeless Tobacco Never Used   Tobacco Comment    using patch cutting back     Family History   Problem Relation Age of Onset    Breast cancer Mother     Thyroid nodules Mother     Colon cancer Father         around age 45    Heart failure Father         congestive    No Known Problems Sister     Colon cancer Brother         around age 45    No Known Problems Maternal Grandmother     No Known Problems Maternal Grandfather     No Known Problems Paternal Grandmother     No Known Problems Paternal Grandfather        Meds/Allergies     Medications Prior to Admission   Medication    aspirin (ECOTRIN LOW STRENGTH) 81 mg EC tablet    atorvastatin (LIPITOR) 10 mg tablet    cyclobenzaprine (FLEXERIL) 5 mg tablet    HYDROcodone-acetaminophen (NORCO) 5-325 mg per tablet    lisinopril (ZESTRIL) 20 mg tablet    mupirocin (BACTROBAN) 2 % nasal ointment    nicotine (NICODERM CQ) 21 mg/24 hr TD 24 hr patch    sodium chloride (OCEAN) 0 65 % nasal spray     Current Facility-Administered Medications   Medication Dose Route Frequency    aspirin (ECOTRIN LOW STRENGTH) EC tablet 81 mg  81 mg Oral Daily    atorvastatin (LIPITOR) tablet 10 mg  10 mg Oral Daily    cyclobenzaprine (FLEXERIL) tablet 10 mg  10 mg Oral TID PRN    heparin (porcine) subcutaneous injection 5,000 Units  5,000 Units Subcutaneous Q8H [facility]    HYDROmorphone (DILAUDID) injection 0 5 mg  0 5 mg Intravenous Q3H PRN    lactated ringers infusion  250 mL/hr Intravenous Continuous    lisinopril (ZESTRIL) tablet 20 mg  20 mg Oral Daily    nicotine (NICODERM CQ) 21 mg/24 hr TD 24 hr patch 1 patch  1 patch Transdermal Q24H    ondansetron (ZOFRAN) injection 4 mg  4 mg Intravenous Q6H PRN       Allergies   Allergen Reactions    Clarithromycin Swelling    Morphine Other (See Comments)     Pt can"t recall his reaction, swelling per patient    Penicillins Swelling           Objective     Blood pressure 145/95, pulse 85, temperature 99 2 °F (37 3 °C), resp  rate 18, height 6' 4" (1 93 m), weight 96 5 kg (212 lb 11 9 oz), SpO2 95 %        Intake/Output Summary (Last 24 hours) at 11/8/2019 1341  Last data filed at 11/8/2019 1300  Gross per 24 hour   Intake 1778 33 ml   Output --   Net 1778 33 ml         PHYSICAL EXAM:      General Appearance:   Alert, oriented x3, nontoxic appearing   HEENT:   Normocephalic, atraumatic, anicteric      Neck:  Supple, symmetrical, trachea midline   Lungs:   Clear to auscultation bilaterally; no rales, rhonchi or wheezing; respirations unlabored    Heart[de-identified]   S1 and S2 normal; regular rate and rhythm; no murmur, rub, or gallop  Abdomen:   Non-distended, soft, BS active, (+) mild TTP in the epigastric region    Rectal:   Deferred    Extremities:  No cyanosis, clubbing or edema    Pulses:  2+ and symmetric all extremities    Skin:  Skin color, texture, turgor normal, no rashes or lesions      Lab Results:   Results from last 7 days   Lab Units 11/08/19  0637   WBC Thousand/uL 10 75*   HEMOGLOBIN g/dL 14 9   HEMATOCRIT % 45 5   PLATELETS Thousands/uL 207   NEUTROS PCT % 68   LYMPHS PCT % 23   MONOS PCT % 8   EOS PCT % 1     Results from last 7 days   Lab Units 11/08/19  0637   POTASSIUM mmol/L 3 7   CHLORIDE mmol/L 103   CO2 mmol/L 30   BUN mg/dL 9   CREATININE mg/dL 0 74   CALCIUM mg/dL 8 9   ALK PHOS U/L 72   ALT U/L 18   AST U/L 13         Results from last 7 days   Lab Units 11/08/19  0637   LIPASE u/L 662*       Imaging Studies: I have personally reviewed pertinent imaging studies  X-ray Chest 2 Views    Result Date: 11/8/2019  Impression: No acute cardiopulmonary disease  Workstation performed: [id number]     Us Right Upper Quadrant    Result Date: 11/8/2019  Impression: 1  Unremarkable appearance of the gallbladder  2  Probable tiny nonobstructing right renal calculi  3  Moderate hepatomegaly without focal abnormality  Workstation performed: [id number]     Ct Abdomen Pelvis With Contrast    Result Date: 11/8/2019  Impression: No pericholecystic inflammatory change    Gallbladder is distended; questionable noncalcified stones are noted within the gallbladder    No pericholecystic inflammatory change  Punctate calcifications in the kidneys bilaterally possibly nonobstructing stones    No hydronephrosis  Workstation performed: [id number]       ASSESSMENT and PLAN:      Epigastric Pain  Nausea/Vomiting  Elevated Lipase  - CT A/P on admission with possible gallbladder distention, no evidence of pancreatitis  - Lipase elevated at 923 which is <3x the ULN so he does not meet criteria for pancreatitis given his neg CT  - Suspect this may be 2/2 PUD as he reports frequent indigestion symptoms and this can elevated the lipase and cause similar pain  - RUQ US negative for cholelithiasis or biliary abnormalities  - His symptoms have essentially resolved and he tolerated a regular diet  - Recommend outpatient EGD, we will call him to arrange this  - Start PPI BID, okay to take tums and H2 blockers in the meantime in addition to PPI  - Okay for discharge from GI standpoint      The patient's care was discussed with Dr Jennifer Minaya

## 2019-11-08 NOTE — TELEPHONE ENCOUNTER
----- Message from Izzy Callahan PA-C sent at 11/8/2019  1:47 PM EST -----  Please direct schedule him for a EGD  I saw him in the hospital today and he was discharged  It is for epigastric pain, nausea, vomiting, and elevated lipase  He is aware and expecting call to schedule   Thanks!!

## 2019-11-08 NOTE — H&P
H&P- Kristen Cuellar 1970, 52 y o  male MRN: [id number]    Unit/Bed#: -01 Encounter: [id number]    Primary Care Provider: So Levine DO   Date and time admitted to hospital: 11/7/2019 10:07 PM        * Acute pancreatitis  Assessment & Plan  With normal LFTs but evidence of gallbladder distention  Gastroenterology consultation  Right upper quadrant ultrasound  CMP in a m  To monitor LFTs in light of the abnormal appearance of the gallbladder  Recheck lipase in a m  NPO  IV fluids  P r n  Pain control  P r n  Antiemetics  Supportive care    Nicotine dependence  Assessment & Plan  Cessation counseled  Replacement offered    Elevated cholesterol with elevated triglycerides  Assessment & Plan  Continue Lipitor 10 mg daily  Check triglyceride level    Hypertension  Assessment & Plan  Continue lisinopril 20 mg daily  Monitor blood pressure with routine vitals      VTE Prophylaxis: Heparin  / sequential compression device   Code Status:  Full code  POLST: There is no POLST form on file for this patient (pre-hospital)  Discussion with family:      Anticipated Length of Stay:  Patient will be admitted on an Observation basis with an anticipated length of stay of  less than 2 midnights  Justification for Hospital Stay:  Acute pancreatitis    Total Time for Visit, including Counseling / Coordination of Care: 30 minutes  Greater than 50% of this total time spent on direct patient counseling and coordination of care  Chief Complaint:   Epigastric pain    History of Present Illness:    Kristen Cuellar is a 52 y o  male past medical history in this case significant for hypercholesterolemia hypertension and hyperlipidemia as well as a history of BLUNT, who presents to the hospital with complaint of epigastric pain  Patient initially identified this pain is coming from his chest but when he indicates the position of his pain is clearly epigastric  The pain started at 7:00 a m   On 11/07/2019, with associated nausea and vomiting  The pain itself is in epigastrium with radiation along the left costal margin to the back with no exacerbating or relieving factors  Patient denies diarrhea, abdominal trauma, fevers chills, recent alcohol intake  He reports medication compliance  Patient was found by CT to have pancreatitis as well as an elevated lipase  Patient will be admitted for management pancreatitis  Review of Systems:    Review of Systems   Constitutional: Negative  Negative for activity change, appetite change, chills, diaphoresis, fatigue, fever and unexpected weight change  HENT: Negative  Negative for congestion, rhinorrhea, sinus pressure, sinus pain and sore throat  Eyes: Negative  Negative for photophobia, pain, discharge and visual disturbance  Respiratory: Negative  Negative for cough, chest tightness, shortness of breath, wheezing and stridor  Cardiovascular: Negative  Negative for chest pain, palpitations and leg swelling  Gastrointestinal: Positive for abdominal pain, nausea and vomiting  Negative for abdominal distention, constipation and diarrhea  Endocrine: Negative  Negative for cold intolerance, heat intolerance and polyuria  Genitourinary: Negative  Negative for difficulty urinating, dysuria, flank pain, frequency, hematuria and urgency  Musculoskeletal: Negative  Negative for arthralgias, gait problem, joint swelling, myalgias and neck stiffness  Skin: Negative  Negative for color change, pallor, rash and wound  Allergic/Immunologic: Negative  Negative for immunocompromised state  Neurological: Negative  Negative for dizziness, seizures, syncope, weakness, light-headedness, numbness and headaches  Hematological: Negative  Negative for adenopathy  Does not bruise/bleed easily  Psychiatric/Behavioral: Negative  Negative for confusion and hallucinations  The patient is not nervous/anxious          Past Medical and Surgical History:     Past Medical History: Diagnosis Date    Angina pectoris [facility] [address] )     stable-pt denies     Ascites     told he had a "wave stomach"    Bipolar 1 disorder ([facility] [address] )     Depression     Family history of colon cancer requiring screening colonoscopy     brother    Fatty liver     History of colon polyps     Hyperlipidemia     Hypertension     Hypothalamic hypogonadism ([facility] [address] )     Liver disease     fatty liver    [facility]     Sleep apnea     Spondylosis of cervical region without myelopathy or radiculopathy 11/9/2018       Past Surgical History:   Procedure Laterality Date    BACK SURGERY      COLONOSCOPY      FRACTURE SURGERY      jaw    VT ARTHRODESIS ANT INTERBODY INC DISCECTOMY, CERVICAL BELOW C2 N/A 1/29/2019    Procedure: ANTERIOR CERVICAL FUSION W DISCECTOMY, C4-5, C5-6;  Surgeon: Mimi Coombs MD;  [facility]: [facility];  Service: Neurosurgery    VT COLONOSCOPY FLX DX W/[facility] SPEC WHEN PFRMD N/A 7/27/2017    Procedure: COLONOSCOPY;  Surgeon: Elin George MD;  Location: [facility] GI LAB; Service: Gastroenterology    VT COLONOSCOPY FLX DX /[facility] INPATIENT REHABILITATION WHEN PFRMD N/A 8/29/2017    Procedure: COLONOSCOPY;  Surgeon: Elin George MD;  Location: [facility] LAB; Service: Gastroenterology    VT COLONOSCOPY FLX DX /[facility] REHABILITATION WHEN PFRMD N/A 12/1/2017    Procedure: COLONOSCOPY;  Surgeon: Elin George MD;  Location: [facility];   Service: Gastroenterology    VT NASAL/SINUS ENDOSCOPY,RMV MILDRED BULL N/A 8/24/2018    Procedure: FUNCTIONAL ENDOSCOPIC SINUS SURGERY (FESS) LEFT MILDRED;  BILATERAL  GRAFTS; EXCISION OF NASAL MUCOSAL ULCER; CLOSURE WITH ENDONASAL FLAPS;  Surgeon: Pablito Boss MD;  Location: [facility] MA[facility] OR;  Service: ENT    VT REPAIR NASAL SEPTUM PERFORATN N/A 8/2/2019    Procedure: REPAIR NASAL/SEPTAL PERFORATION W AUTOGRAFT;  Surgeon: Pablito Boss MD;  Location: [facility] Main OR;  Service: ENT    VT REPAIR OF NASAL SEPTUM N/A 8/24/2018    Procedure: SEPTOPLASTY;  Surgeon: Pablito Boss MD;  Location: [facility];  Service: ENT    VT REPAIR OF NASAL SEPTUM N/A 8/2/2019    Procedure: SEPTOPLASTY;  Surgeon: Adrien Cruz MD;  Location: [facility];  Service: ENT    TURBINOPLASTY N/A 8/24/2018    Procedure: Adelso Prasad;  Surgeon: Adrien Cruz MD;  Location: [facility];  Service: ENT    TURBINOPLASTY Bilateral 8/2/2019    Procedure: Adelso Prasad;  Surgeon: Adrien Cruz MD;  Location: [facility] Main OR;  Service: ENT       Meds/Allergies:    Prior to Admission medications    Medication Sig Start Date End Date Taking? Authorizing Provider   aspirin (ECOTRIN LOW STRENGTH) 81 mg EC tablet TAKE 1 TABLET DAILY  9/11/19   Lata Irving,    atorvastatin (LIPITOR) 10 mg tablet TAKE 1 TABLET DAILY AS DIRECTED  9/11/19   Andre Mcdermott DO   cyclobenzaprine (FLEXERIL) 5 mg tablet Take 2 tablets (10 mg total) by mouth 3 (three) times a day as needed for muscle spasms  Patient not taking: Reported on 8/13/2019 6/11/19   Veronica Rene MD   HYDROcodone-acetaminophen [facility]) 5-325 mg per tablet Take 1 tablet by mouth every 4 (four) hours as needed for pain for up to 20 dosesMax Daily Amount: 6 tablets 8/2/19   Adrien Cruz MD   lisinopril (ZESTRIL) 20 mg tablet Take 1 tablet (20 mg total) by mouth daily 1/18/19   Snehadarci Gonzalez DO   mupirocin (BACTROBAN) 2 % nasal ointment into each nostril 2 (two) times a day 8/13/19   Adrien Cruz MD   nicotine (NICODERM CQ) 21 mg/24 hr TD 24 hr patch Place 1 patch on the skin every 24 hours 7/3/19   Judd Zhu DO   sodium chloride (OCEAN) 0 65 % nasal spray 2 sprays into each nostril every 2 (two) hours while awake for 30 days 8/2/19 9/1/19  Adrien Cruz MD     I have reviewed home medications using allscripts  Allergies:    Allergies   Allergen Reactions    Clarithromycin Swelling    Morphine Other (See Comments)     Pt can"t recall his reaction, swelling per patient    Penicillins Swelling       Social History:     Marital Status:    Occupation:  [employer]  Patient Pre-hospital Living Situation: Independent  Patient Pre-hospital Level of Mobility:  Ambulates without assist device  Patient Pre-hospital Diet Restrictions:  None  Substance Use History:   Social History     Substance and Sexual Activity   Alcohol Use Never    Frequency: Never    Comment: rare     Social History     Tobacco Use   Smoking Status Heavy Tobacco Smoker    Packs/day: 1 00    Types: Cigarettes   Smokeless Tobacco Never Used   Tobacco Comment    using patch cutting back     Social History     Substance and Sexual Activity   Drug Use Not Currently    Types: Cocaine    Comment: stopped in 2007, however did use cocaine again recently at his son's wedding       Family History:    non-contributory    Physical Exam:     Vitals:   Blood Pressure: 148/97 (11/08/19 0302)  Pulse: 88 (11/08/19 0200)  Temperature: 98 1 °F (36 7 °C) (11/07/19 2217)  Temp Source: Oral (11/07/19 2217)  Respirations: 16 (11/08/19 0200)  Height: 6' 4" (193 cm) (11/08/19 0200)  Weight - Scale: 96 5 kg (212 lb 11 9 oz) (11/07/19 2311)  SpO2: 99 % (11/08/19 0200)    Physical Exam   Constitutional: He is oriented to person, place, and time  He appears well-developed and well-nourished  No distress  HENT:   Head: Normocephalic and atraumatic  Right Ear: External ear normal    Left Ear: External ear normal    Nose: Nose normal    Mouth/Throat: Oropharynx is clear and moist  No oropharyngeal exudate  Eyes: Conjunctivae are normal  Right eye exhibits no discharge  Left eye exhibits no discharge  No scleral icterus  Neck: Normal range of motion  No JVD present  No tracheal deviation present  No thyromegaly present  Cardiovascular: Normal rate, regular rhythm, normal heart sounds and intact distal pulses  Exam reveals no gallop and no friction rub  No murmur heard  Pulmonary/Chest: Effort normal and breath sounds normal  No stridor  No respiratory distress  He has no wheezes  He has no rales  Abdominal: Soft  Bowel sounds are normal  He exhibits no distension  There is tenderness (Nelson sign negative tender to light palpation over the epigastrium)  There is no rebound and no guarding  Musculoskeletal: Normal range of motion  He exhibits no edema, tenderness or deformity  Neurological: He is alert and oriented to person, place, and time  He has normal reflexes  He exhibits normal muscle tone  Coordination normal    Skin: Skin is warm and dry  No rash noted  He is not diaphoretic  No erythema  No pallor  Psychiatric: He has a normal mood and affect  His behavior is normal  Judgment and thought content normal    Nursing note and vitals reviewed  Additional Data:     Lab Results: I have personally reviewed pertinent reports  Results from last 7 days   Lab Units 11/07/19  2253   WBC Thousand/uL 15 13*   HEMOGLOBIN g/dL 16 5   HEMATOCRIT % 48 9   PLATELETS Thousands/uL 225   NEUTROS PCT % 84*   LYMPHS PCT % 10*   MONOS PCT % 6   EOS PCT % 0     Results from last 7 days   Lab Units 11/07/19  2253   SODIUM mmol/L 139   POTASSIUM mmol/L 3 7   CHLORIDE mmol/L 100   CO2 mmol/L 32   BUN mg/dL 8   CREATININE mg/dL 0 89   ANION GAP mmol/L 7   CALCIUM mg/dL 9 4   ALBUMIN g/dL 4 1   TOTAL BILIRUBIN mg/dL 0 80   ALK PHOS U/L 72   ALT U/L 21   AST U/L 13   GLUCOSE RANDOM mg/dL 108                       Imaging: I have personally reviewed pertinent reports  CT abdomen pelvis with contrast   Final Result by aHydee Solo MD (11/08 0205)      No pericholecystic inflammatory change  Gallbladder is distended; questionable noncalcified stones are noted within the gallbladder    No pericholecystic inflammatory change  Punctate calcifications in the kidneys bilaterally possibly nonobstructing stones    No hydronephrosis        Workstation performed: [id number]         X-ray chest 2 views   ED Interpretation by Eboni Williamson PA-C (11/07 2313)   No acute abnormality           EKG, Pathology, and Other Studies Reviewed on Admission:   · EKG:      Allscripts / Avanell Ana Records Reviewed: Yes     ** Please Note: This note has been constructed using a voice recognition system   **

## 2019-11-08 NOTE — SOCIAL WORK
CM met with pt at bedside  Pt alert  CM reviewed observation status with pt  CM provided pt with a copy of obs notice  Pt signed obs and CM placed it in medical records

## 2019-11-08 NOTE — ED NOTES
1  Cc: acute pancreatitis  2  Admission related to injury?: no  3  Orientation status: alert and oriented x4  4  Abnormal labs/abnormal focused assessment/vitals: lipase 923, WBC 15 13, see CT report  5  Medication/drips: no running medications  6  Time of last narcotics given: dilaudid 0 5mg at 2332  7  IV lines, drains, tubes: 20 R AC  8  Isolation status: standard  9  Skin check: pt voiced no complaints  10  Ambulation status: pt not ambulatory during my care for assessment  11   ED RN name and phone number: Cjdarlin Ernst [address] [place] [address], RN  11/08/19 7997

## 2019-11-08 NOTE — UTILIZATION REVIEW
Initial Clinical Review    Admission: Date/Time/Statement: OBservation 11/8/19 @0219  Orders Placed This Encounter   Procedures    Place in Observation     Standing Status:   Standing     Number of Occurrences:   1     Order Specific Question:   Admitting Physician     Answer:   Diego Owen [[id number]]     Order Specific Question:   Level of Care     Answer:   Med Surg [16]     ED Arrival Information     Expected Arrival Acuity Means of Arrival Escorted By Service Admission Type    - 11/7/2019 22:04 Emergent Walk-In Self General Medicine Emergency    Arrival Complaint    chest pain        Chief Complaint   Patient presents with    Chest Pain     since 0700; vomiting; denies diarrhea     Assessment/Plan: 52year old male to the ED from home with complaints of chest pain, nausea, vomiting  For about 15 hours prior to his arrival    Admitted under observation for acute pancreatitis  Initially, he felt indigestion, tried pepto bismol with not relief at home  He arrives with left upper quadrant abdominal tenderness and is anxious  Lipase elevated, CT shows distended gallbladder  IV zofran and IV pain medications given  GI consult, right upper quadrant ultrasound  REcheck LIpase  NPO  IV fluids     ED Triage Vitals   Temperature Pulse Respirations Blood Pressure SpO2   11/07/19 2217 11/07/19 2209 11/07/19 2209 11/07/19 2209 11/07/19 2209   98 1 °F (36 7 °C) 91 20 (!) 182/107 98 %      Temp Source Heart Rate Source Patient Position - Orthostatic VS BP Location FiO2 (%)   11/07/19 2217 11/07/19 2209 11/07/19 2209 11/07/19 2311 --   Oral Monitor Sitting Left arm       Pain Score       11/07/19 2209       9        Wt Readings from Last 1 Encounters:   11/07/19 96 5 kg (212 lb 11 9 oz)     Additional Vital Signs:   Date/Time Temp Pulse Resp BP MAP (mmHg) SpO2 O2 Device   11/08/19 07:15:32 99 2 °F (37 3 °C) 85 18 145/95 112 95 %    11/08/19 0302    148/97      11/08/19 0200  88 16 160/93  99 % None (Room air)   11/08/19 0100  86 16 165/102Abnormal   98 % None (Room air)   11/08/19 0030  82 14 159/98  100 % None (Room air)   11/07/19 2311  76 20 175/103Abnormal   97 % None (Room air)   11/07/19 2217 98 1 °F (36 7 °C)         11/07/19 2209  91 20 182/107Abnormal         Pertinent Labs/Diagnostic Test Results:  CT a/P;No pericholecystic inflammatory change   Gallbladder is distended; questionable noncalcified stones are noted within the gallbladder    No pericholecystic inflammatory change       Punctate calcifications in the kidneys bilaterally possibly nonobstructing stones     No hydronephrosis  CXR11/8:    No acute cardiopulmonary disease  Ultrasound gallbladder 11/8:  Unremarkable appearance of the gallbladder  2  Probable tiny nonobstructing right renal calculi  3  Moderate hepatomegaly without focal abnormality    EKG  Interpretation:     Interpretation: normal    Rate:     ECG rate:  87    ECG rate assessment: normal    Rhythm:     Rhythm: sinus rhythm    Ectopy:     Ectopy: none    QRS:     QRS axis:  Normal    QRS intervals:  Normal  Conduction:     Conduction: normal    ST segments:     ST segments:  Normal  T waves:     T waves: normal     Results from last 7 days   Lab Units 11/08/19  0637 11/07/19  2253   WBC Thousand/uL 10 75* 15 13*   HEMOGLOBIN g/dL 14 9 16 5   HEMATOCRIT % 45 5 48 9   PLATELETS Thousands/uL 207 225   NEUTROS ABS Thousands/µL 7 33 12 49*         Results from last 7 days   Lab Units 11/08/19  0637 11/07/19  2253   SODIUM mmol/L 140 139   POTASSIUM mmol/L 3 7 3 7   CHLORIDE mmol/L 103 100   CO2 mmol/L 30 32   ANION GAP mmol/L 7 7   BUN mg/dL 9 8   CREATININE mg/dL 0 74 0 89   EGFR ml/min/1 73sq m 108 100   CALCIUM mg/dL 8 9 9 4     Results from last 7 days   Lab Units 11/08/19  0637 11/07/19  2253   AST U/L 13 13   ALT U/L 18 21   ALK PHOS U/L 72 72   TOTAL PROTEIN g/dL 6 8 7 4   ALBUMIN g/dL 3 6 4 1   TOTAL BILIRUBIN mg/dL 0 90 0 80         Results from last 7 days   Lab Units 11/08/19  4078 11/07/19  2253   GLUCOSE RANDOM mg/dL 100 108       Results from last 7 days   Lab Units 11/07/19  2253   TROPONIN I ng/mL <0 02     Results from last 7 days   Lab Units 11/08/19  0637 11/07/19  2253   LIPASE u/L 662* 923*     Results from last 7 days   Lab Units 11/08/19  0655   ETHANOL LVL mg/dL <3         ED Treatment:   Medication Administration from 11/07/2019 2204 to 11/08/2019 0248       Date/Time Order Dose Route Action     11/07/2019 2254 aspirin chewable tablet 324 mg 324 mg Oral Given     11/07/2019 2254 ondansetron (ZOFRAN) injection 4 mg 4 mg Intravenous Given     11/07/2019 2332 HYDROmorphone (DILAUDID) injection 0 5 mg 0 5 mg Intravenous Given     11/07/2019 2331 sodium chloride 0 9 % bolus 1,000 mL 1,000 mL Intravenous New Bag        Past Medical History:   Diagnosis Date    [facility] pectoris ([facility] )     stable-pt denies     Ascites     told he had a "wave stomach"    [facility] disorder ([facility] )     Depression     Family history of colon cancer requiring screening colonoscopy     brother    Fatty liver     History of colon polyps     Hyperlipidemia     Hypertension     Hypothalamic hypogonadism ([facility] )     Liver disease     fatty liver    Osteomyelitis ([facility] )     Sleep apnea     Spondylosis of cervical region without myelopathy or radiculopathy 11/9/2018     Present on Admission:   Nicotine dependence   Hypertension   Elevated cholesterol with elevated triglycerides   Acute pancreatitis      Admitting Diagnosis: Acute pancreatitis [K85 90]  Chest pain [R07 9]  Age/Sex: 52 y o  male  Admission Orders:    Scheduled Medications:    Medications:  aspirin 81 mg Oral Daily   atorvastatin 10 mg Oral Daily   heparin (porcine) 5,000 Units Subcutaneous Q8H [facility]   lisinopril 20 mg Oral Daily   nicotine 1 patch Transdermal Q24H     Continuous IV Infusions:    lactated ringers 250 mL/hr Intravenous Continuous     PRN Meds:    cyclobenzaprine 10 mg Oral TID PRN   HYDROmorphone 0 5 mg Intravenous Q3H PRN x1   ondansetron 4 mg Intravenous Q6H PRN       IP CONSULT TO GASTROENTEROLOGY    Network Utilization Review Department  [email or web address]  ATTENTION: Please call with any questions or concerns to [phone] and carefully listen to the prompts so that you are directed to the right person  All voicemails are confidential   Jacey Jay all requests for admission clinical reviews, approved or denied determinations and any other requests to dedicated fax number below belonging to the campus where the patient is receiving treatment    FACILITY NAME UR FAX NUMBER   ADMISSION DENIALS (Administrative/Medical Necessity) [address] [facility] (Maternity/NICU/Pediatrics) [phone]   [facility] [address] [facility] [phone]   [address] [phone]   Rolando Us [address] [facility] [address] [place] [address] Jesús [place] [phone]

## 2019-11-08 NOTE — PLAN OF CARE
Problem: PAIN - ADULT  Goal: Verbalizes/displays adequate comfort level or baseline comfort level  Description  Interventions:  - Encourage patient to monitor pain and request assistance  - Assess pain using appropriate pain scale  - Administer analgesics based on type and severity of pain and evaluate response  - Implement non-pharmacological measures as appropriate and evaluate response  - Consider cultural and social influences on pain and pain management  - Notify physician/advanced practitioner if interventions unsuccessful or patient reports new pain  Outcome: Progressing     Problem: INFECTION - ADULT  Goal: Absence or prevention of progression during hospitalization  Description  INTERVENTIONS:  - Assess and monitor for signs and symptoms of infection  - Monitor lab/diagnostic results  - Monitor all insertion sites, i e  indwelling lines, tubes, and drains  - Monitor endotracheal if appropriate and nasal secretions for changes in amount and color  - [place] appropriate cooling/warming therapies per order  - Administer medications as ordered  - Instruct and encourage patient and family to use good hand hygiene technique  - Identify and instruct in appropriate isolation precautions for identified infection/condition  Outcome: Progressing  Goal: Absence of fever/infection during neutropenic period  Description  INTERVENTIONS:  - Monitor WBC    Outcome: Progressing     Problem: SAFETY ADULT  Goal: Patient will remain free of falls  Description  INTERVENTIONS:  - Assess patient frequently for physical needs  -  Identify cognitive and physical deficits and behaviors that affect risk of falls    -  [place] fall precautions as indicated by assessment   - Educate patient/family on patient safety including physical limitations  - Instruct patient to call for assistance with activity based on assessment  - Modify environment to reduce risk of injury  - Consider OT/PT consult to assist with strengthening/mobility  Outcome: Progressing  Goal: Maintain or return to baseline ADL function  Description  INTERVENTIONS:  -  Assess patient's ability to carry out ADLs; assess patient's baseline for ADL function and identify physical deficits which impact ability to perform ADLs (bathing, care of mouth/teeth, toileting, grooming, dressing, etc )  - Assess/evaluate cause of self-care deficits   - Assess range of motion  - Assess patient's mobility; develop plan if impaired  - Assess patient's need for assistive devices and provide as appropriate  - Encourage maximum independence but intervene and supervise when necessary  - Involve family in performance of ADLs  - Assess for home care needs following discharge   - Consider OT consult to assist with ADL evaluation and planning for discharge  - Provide patient education as appropriate  Outcome: Progressing  Goal: Maintain or return mobility status to optimal level  Description  INTERVENTIONS:  - Assess patient's baseline mobility status (ambulation, transfers, stairs, etc )    - Identify cognitive and physical deficits and behaviors that affect mobility  - Identify mobility aids required to assist with transfers and/or ambulation (gait belt, sit-to-stand, lift, walker, cane, etc )  - [place] fall precautions as indicated by assessment  - Record patient progress and toleration of activity level on Mobility SBAR; progress patient to next Phase/Stage  - Instruct patient to call for assistance with activity based on assessment  - Consider rehabilitation consult to assist with strengthening/weightbearing, etc   Outcome: Progressing     Problem: DISCHARGE PLANNING  Goal: Discharge to home or other facility with appropriate resources  Description  INTERVENTIONS:  - Identify barriers to discharge w/patient and caregiver  - Arrange for needed discharge resources and transportation as appropriate  - Identify discharge learning needs (meds, wound care, etc )  - Arrange for interpretive services to assist at discharge as needed  - Refer to Case Management Department for coordinating discharge planning if the patient needs post-hospital services based on physician/advanced practitioner order or complex needs related to functional status, cognitive ability, or social support system  Outcome: Progressing     Problem: Knowledge Deficit  Goal: Patient/family/caregiver demonstrates understanding of disease process, treatment plan, medications, and discharge instructions  Description  Complete learning assessment and assess knowledge base    Interventions:  - Provide teaching at level of understanding  - Provide teaching via preferred learning methods  Outcome: Progressing     Problem: GASTROINTESTINAL - ADULT  Goal: Minimal or absence of nausea and/or vomiting  Description  INTERVENTIONS:  - Administer IV fluids if ordered to ensure adequate hydration  - Maintain NPO status until nausea and vomiting are resolved  - Nasogastric tube if ordered  - Administer ordered antiemetic medications as needed  - Provide nonpharmacologic comfort measures as appropriate  - Advance diet as tolerated, if ordered  - Consider nutrition services referral to assist patient with adequate nutrition and appropriate food choices  Outcome: Progressing  Goal: Maintains or returns to baseline bowel function  Description  INTERVENTIONS:  - Assess bowel function  - Encourage oral fluids to ensure adequate hydration  - Administer IV fluids if ordered to ensure adequate hydration  - Administer ordered medications as needed  - Encourage mobilization and activity  - Consider nutritional services referral to assist patient with adequate nutrition and appropriate food choices  Outcome: Progressing  Goal: Maintains adequate nutritional intake  Description  INTERVENTIONS:  - Monitor percentage of each meal consumed  - Identify factors contributing to decreased intake, treat as appropriate  - Assist with meals as needed  - Monitor I&O, weight, and lab values if indicated  - Obtain nutrition services referral as needed  Outcome: Progressing  Goal: Establish and maintain optimal ostomy function  Description  INTERVENTIONS:  - Assess bowel function  - Encourage oral fluids to ensure adequate hydration  - Administer IV fluids if ordered to ensure adequate hydration   - Administer ordered medications as needed  - Encourage mobilization and activity  - Nutrition services referral to assist patient with appropriate food choices  - Assess stoma site  - Consider wound care consult   Outcome: Progressing

## 2019-11-08 NOTE — PROGRESS NOTES
Progress Note - Cristin Syed 1970, 52 y o  male MRN: [id number]    Unit/Bed#: -01 Encounter: [id number]    Primary Care Provider: Clarice Orozco DO   Date and time admitted to hospital: 2019 10:07 PM        * Acute pancreatitis  Assessment & Plan  In dressing having epigastric pain radiating to back with elevated lipase consistent with acute pancreatitis  CT imaging studies negative for pancreatic inflammation  Right upper quadrant ultrasound negative for gallbladder thickening pancreatic inflammation  On IV fluids, Dilaudid for pain control and will advance diet as tolerated  GI consultation will appreciate recommendations    Nicotine dependence  Assessment & Plan  Cessation counseled  Replacement offered    Elevated cholesterol with elevated triglycerides  Assessment & Plan  Continue Lipitor 10 mg daily  Triglyceride level 194    Hypertension  Assessment & Plan  Continue lisinopril 20 mg daily  Monitor blood pressure with routine vitals        VTE Pharmacologic Prophylaxis:   Pharmacologic: Heparin  Mechanical VTE Prophylaxis in Place: Yes    Patient Centered Rounds: I have performed bedside rounds with nursing staff today  Discussions with Specialists or Other Care Team Provider:  GI    Education and Discussions with Family / Patient:  Pancreatitis    Time Spent for Care: 20 minutes  More than 50% of total time spent on counseling and coordination of care as described above      Current Length of Stay: 0 day(s)    Current Patient Status: Observation   Certification Statement: The patient will continue to require additional inpatient hospital stay due to Pancreatitis    Discharge Plan: 24 hours    Code Status: Level 1 - Full Code      Subjective:   Patient is still experiencing epigastric but feels much better than on initial presentation    Objective:     Vitals:   Temp (24hrs), Av 7 °F (37 1 °C), Min:98 1 °F (36 7 °C), Max:99 2 °F (37 3 °C)    Temp:  [98 1 °F (36 7 °C)-99 2 °F (37 3 °C)] 99 2 °F (37 3 °C)  HR:  [76-91] 85  Resp:  [14-20] 18  BP: (145-182)/() 145/95  SpO2:  [95 %-100 %] 95 %  Body mass index is 25 9 kg/m²  Input and Output Summary (last 24 hours): Intake/Output Summary (Last 24 hours) at 11/8/2019 1056  Last data filed at 11/8/2019 0837  Gross per 24 hour   Intake 1358 33 ml   Output    Net 1358 33 ml       Physical Exam:     Resting comfortably no acute distress  Regular rate rhythm  Clear to auscultation bilaterally  Bowel sounds positive, nondistended, epigastric tenderness no guarding or rigidity  No lower extremity edema  Alert and oriented x3    Additional Data:     Labs:    Results from last 7 days   Lab Units 11/08/19  0637   WBC Thousand/uL 10 75*   HEMOGLOBIN g/dL 14 9   HEMATOCRIT % 45 5   PLATELETS Thousands/uL 207   NEUTROS PCT % 68   LYMPHS PCT % 23   MONOS PCT % 8   EOS PCT % 1     Results from last 7 days   Lab Units 11/08/19  0637   SODIUM mmol/L 140   POTASSIUM mmol/L 3 7   CHLORIDE mmol/L 103   CO2 mmol/L 30   BUN mg/dL 9   CREATININE mg/dL 0 74   ANION GAP mmol/L 7   CALCIUM mg/dL 8 9   ALBUMIN g/dL 3 6   TOTAL BILIRUBIN mg/dL 0 90   ALK PHOS U/L 72   ALT U/L 18   AST U/L 13   GLUCOSE RANDOM mg/dL 100                           * I Have Reviewed All Lab Data Listed Above  * Additional Pertinent Lab Tests Reviewed:  [facility] Reviewed    Imaging:    Imaging Reports Reviewed Today Include: RUQ US, CT A/P  Imaging Personally Reviewed by Myself Includes:  NA    Recent Cultures (last 7 days):           Last 24 Hours Medication List:     Current Facility-Administered Medications:  aspirin 81 mg Oral Daily Jaylene Henson MD    atorvastatin 10 mg Oral Daily Jaylene Henson MD    cyclobenzaprine 10 mg Oral TID PRN Jaylene Henson MD    heparin (porcine) 5,000 Units Subcutaneous [facility] Jaylene Henson MD    HYDROmorphone 0 5 mg Intravenous Q3H PRN Jaylene Henson MD    lactated ringers 250 mL/hr Intravenous Continuous Tiffanie Ludwig MD Last Rate: 250 mL/hr (11/08/19 0345)   lisinopril 20 mg Oral Daily Tiffanie Ludwig MD    nicotine 1 patch Transdermal Q24H Tiffanie Ludwig MD    ondansetron 4 mg Intravenous Q6H PRN Tiffanie Ludwig MD         Today, Patient Was Seen By: MOSHE Noe      ** Please Note: Dictation voice to text software may have been used in the creation of this document   **

## 2019-11-08 NOTE — PLAN OF CARE
Problem: PAIN - ADULT  Goal: Verbalizes/displays adequate comfort level or baseline comfort level  Description  Interventions:  - Encourage patient to monitor pain and request assistance  - Assess pain using appropriate pain scale  - Administer analgesics based on type and severity of pain and evaluate response  - Implement non-pharmacological measures as appropriate and evaluate response  - Consider cultural and social influences on pain and pain management  - Notify physician/advanced practitioner if interventions unsuccessful or patient reports new pain  Outcome: Progressing     Problem: INFECTION - ADULT  Goal: Absence or prevention of progression during hospitalization  Description  INTERVENTIONS:  - Assess and monitor for signs and symptoms of infection  - Monitor lab/diagnostic results  - Monitor all insertion sites, i e  indwelling lines, tubes, and drains  - Monitor endotracheal if appropriate and nasal secretions for changes in amount and color  - [place] appropriate cooling/warming therapies per order  - Administer medications as ordered  - Instruct and encourage patient and family to use good hand hygiene technique  - Identify and instruct in appropriate isolation precautions for identified infection/condition  Outcome: Progressing  Goal: Absence of fever/infection during neutropenic period  Description  INTERVENTIONS:  - Monitor WBC    Outcome: Progressing     Problem: SAFETY ADULT  Goal: Patient will remain free of falls  Description  INTERVENTIONS:  - Assess patient frequently for physical needs  -  Identify cognitive and physical deficits and behaviors that affect risk of falls    -  [place] fall precautions as indicated by assessment   - Educate patient/family on patient safety including physical limitations  - Instruct patient to call for assistance with activity based on assessment  - Modify environment to reduce risk of injury  - Consider OT/PT consult to assist with strengthening/mobility  Outcome: Progressing  Goal: Maintain or return to baseline ADL function  Description  INTERVENTIONS:  -  Assess patient's ability to carry out ADLs; assess patient's baseline for ADL function and identify physical deficits which impact ability to perform ADLs (bathing, care of mouth/teeth, toileting, grooming, dressing, etc )  - Assess/evaluate cause of self-care deficits   - Assess range of motion  - Assess patient's mobility; develop plan if impaired  - Assess patient's need for assistive devices and provide as appropriate  - Encourage maximum independence but intervene and supervise when necessary  - Involve family in performance of ADLs  - Assess for home care needs following discharge   - Consider OT consult to assist with ADL evaluation and planning for discharge  - Provide patient education as appropriate  Outcome: Progressing  Goal: Maintain or return mobility status to optimal level  Description  INTERVENTIONS:  - Assess patient's baseline mobility status (ambulation, transfers, stairs, etc )    - Identify cognitive and physical deficits and behaviors that affect mobility  - Identify mobility aids required to assist with transfers and/or ambulation (gait belt, sit-to-stand, lift, walker, cane, etc )  - [place] fall precautions as indicated by assessment  - Record patient progress and toleration of activity level on Mobility SBAR; progress patient to next Phase/Stage  - Instruct patient to call for assistance with activity based on assessment  - Consider rehabilitation consult to assist with strengthening/weightbearing, etc   Outcome: Progressing     Problem: DISCHARGE PLANNING  Goal: Discharge to home or other facility with appropriate resources  Description  INTERVENTIONS:  - Identify barriers to discharge w/patient and caregiver  - Arrange for needed discharge resources and transportation as appropriate  - Identify discharge learning needs (meds, wound care, etc )  - Arrange for interpretive services to assist at discharge as needed  - Refer to Case Management Department for coordinating discharge planning if the patient needs post-hospital services based on physician/advanced practitioner order or complex needs related to functional status, cognitive ability, or social support system  Outcome: Progressing     Problem: Knowledge Deficit  Goal: Patient/family/caregiver demonstrates understanding of disease process, treatment plan, medications, and discharge instructions  Description  Complete learning assessment and assess knowledge base    Interventions:  - Provide teaching at level of understanding  - Provide teaching via preferred learning methods  Outcome: Progressing     Problem: GASTROINTESTINAL - ADULT  Goal: Minimal or absence of nausea and/or vomiting  Description  INTERVENTIONS:  - Administer IV fluids if ordered to ensure adequate hydration  - Maintain NPO status until nausea and vomiting are resolved  - Nasogastric tube if ordered  - Administer ordered antiemetic medications as needed  - Provide nonpharmacologic comfort measures as appropriate  - Advance diet as tolerated, if ordered  - Consider nutrition services referral to assist patient with adequate nutrition and appropriate food choices  Outcome: Progressing  Goal: Maintains or returns to baseline bowel function  Description  INTERVENTIONS:  - Assess bowel function  - Encourage oral fluids to ensure adequate hydration  - Administer IV fluids if ordered to ensure adequate hydration  - Administer ordered medications as needed  - Encourage mobilization and activity  - Consider nutritional services referral to assist patient with adequate nutrition and appropriate food choices  Outcome: Progressing  Goal: Maintains adequate nutritional intake  Description  INTERVENTIONS:  - Monitor percentage of each meal consumed  - Identify factors contributing to decreased intake, treat as appropriate  - Assist with meals as needed  - Monitor I&O, weight, and lab values if indicated  - Obtain nutrition services referral as needed  Outcome: Progressing  Goal: Establish and maintain optimal ostomy function  Description  INTERVENTIONS:  - Assess bowel function  - Encourage oral fluids to ensure adequate hydration  - Administer IV fluids if ordered to ensure adequate hydration   - Administer ordered medications as needed  - Encourage mobilization and activity  - Nutrition services referral to assist patient with appropriate food choices  - Assess stoma site  - Consider wound care consult   Outcome: Progressing

## 2019-11-08 NOTE — ED PROVIDER NOTES
History  Chief Complaint   Patient presents with    Chest Pain     since 0700; vomiting; denies diarrhea     52yo male with a PMH of tobacco use, HTN, and HLD presenting for evaluation of chest pain x 15 hours  Patient woke up with the pain this morning  He describes the feeling as a pressure and currently rates his pain as an 8/10 in severity  Patient initially felt the pain was indigestion  He used Pepto-Bismol without relief  Patient reports that he has had numerous episodes of vomiting today as well  Associated symptoms include palpitations, nausea, and shortness of breath  Patient reports similar symptoms about 10 years ago  He had a stress test at that time which was negative  Patient's father  from an MI in his 62s  Patient denies fever, chills, cough, abdominal pain, recent travel, calf pain, calf swelling, diaphoresis, diarrhea  Patient reports history of gallstones  History provided by:  Patient   used: No    Chest Pain   Pain location:  Epigastric and substernal area  Pain quality: pressure    Pain radiates to:  Does not radiate  Pain radiates to the back: no    Pain severity:  Moderate  Onset quality:  Gradual  Duration:  15 hours  Timing:  Constant  Progression:  Unchanged  Chronicity:  New  Context: movement    Relieved by:  Nothing  Worsened by: Movement  Ineffective treatments:  None tried  Associated symptoms: abdominal pain, nausea, palpitations, shortness of breath, vomiting and weakness    Associated symptoms: no altered mental status, no back pain, no diaphoresis, no dizziness, no fever, no lower extremity edema, no numbness and no syncope    Risk factors: high cholesterol and hypertension        Prior to Admission Medications   Prescriptions Last Dose Informant Patient Reported? Taking?    HYDROcodone-acetaminophen (NORCO) 5-325 mg per tablet   No No   Sig: Take 1 tablet by mouth every 4 (four) hours as needed for pain for up to 20 dosesMax Daily Amount: 6 tablets   aspirin (ECOTRIN LOW STRENGTH) 81 mg EC tablet   No No   Sig: TAKE 1 TABLET DAILY  atorvastatin (LIPITOR) 10 mg tablet   No No   Sig: TAKE 1 TABLET DAILY AS DIRECTED  cyclobenzaprine (FLEXERIL) 5 mg tablet  Self No No   Sig: Take 2 tablets (10 mg total) by mouth 3 (three) times a day as needed for muscle spasms   Patient not taking: Reported on 2019   lisinopril (ZESTRIL) 20 mg tablet  Self No No   Sig: Take 1 tablet (20 mg total) by mouth daily   mupirocin (BACTROBAN) 2 % nasal ointment   No No   Sig: into each nostril 2 (two) times a day   nicotine (NICODERM CQ) 21 mg/24 hr TD 24 hr patch  Self No No   Sig: Place 1 patch on the skin every 24 hours   sodium chloride (OCEAN) 0 65 % nasal spray   No No   Si sprays into each nostril every 2 (two) hours while awake for 30 days      Facility-Administered Medications: None       Past Medical History:   Diagnosis Date    [facility]ina pectoris [facility] [address] )     stable-pt denies     Ascites     told he had a "wave stomach"    Bipolar 1 disorder ([facility] [address] )     Depression     Family history of colon cancer requiring screening colonoscopy     brother    Fatty liver     History of colon polyps     Hyperlipidemia     Hypertension     Hypothalamic hypogonadism ([facility] [address] )     Liver disease     fatty liver    Osteomyelitis ([facility] [address] )     Sleep apnea     Spondylosis of cervical region without myelopathy or radiculopathy 2018       Past Surgical History:   Procedure Laterality Date    BACK SURGERY      COLONOSCOPY      FRACTURE SURGERY      jaw    MI ARTHRODESIS ANT INTERBODY INC DISCECTOMY, CERVICAL BELOW C2 N/A 2019    Procedure: ANTERIOR CERVICAL FUSION W DISCECTOMY, C4-5, C5-6;  Surgeon: Andrew Montemayor MD;  Location:  MAIN OR;  Service: Neurosurgery    MI COLONOSCOPY FLX DX W/COLLJ SPEC WHEN PFRMD N/A 2017    Procedure: COLONOSCOPY;  Surgeon: Tiffanie Mcneil MD;  [facility];   Service: Gastroenterology    MI COLONOSCOPY FLX DX W/COLLJ SPEC WHEN PFRMD N/A 8/29/2017    Procedure: COLONOSCOPY;  Surgeon: Shirin Nunn MD;  [facility]; Service: Gastroenterology    [place] COLONOSCOPY FLX DX W/COLLJ [address] [facility] [address] WHEN PFRMD N/A 12/1/2017    Procedure: COLONOSCOPY;  Surgeon: Shirin Nunn MD;  [facility]; Service: Gastroenterology    [place] NASAL/SINUS ENDOSCOPY,RMV MILDR[place] BULL N/A 8/24/2018    Procedure: FUNCTIONAL ENDOSCOPIC SINUS SURGERY (FESS) LEFT MIL[place];  BILATERAL  GRAFTS; EXCISION OF NASAL MUCOSAL ULCER; CLOSURE WITH ENDONASAL FLAPS;  Surgeon: Esme Macias MD;  [facility];  Service: ENT    [address] [place] [address] N/A 8/2/2019    Procedure: REPAIR NASAL/SEPTAL PERFORATION W AUTOGRAFT;  Surgeon: Esme Macias MD;  [facility];  Service: ENT    [place] REPAIR OF NASAL SEPTUM N/A 8/24/2018    Procedure: SEPTOPLASTY;  Surgeon: Esme Macias MD;  [facility];  Service: ENT    [place] REPAIR OF NASAL SEPTUM N/A 8/2/2019    Procedure: SEPTOPLASTY;  Surgeon: Esme Macias MD;  [facility];  Service: ENT    TURBINOPLASTY N/A 8/24/2018    Procedure: TURBINOPLASTY;  Surgeon: Esme Macias MD;  [facility];  Service: ENT    TURBINOPLASTY Bilateral 8/2/2019    Procedure: TURBINOPLASTY;  Surgeon: Esme Macias MD;  [facility];  Service: ENT       Family History   Problem Relation Age of Onset    Breast cancer Mother     Thyroid nodules Mother     Colon cancer Father         around age 45    Heart failure Father         congestive    No Known Problems Sister     Colon cancer Brother         around age 45   [facility] No Known Problems Maternal Grandmother     No Known Problems Maternal Grandfather     No Known Problems Paternal Grandmother     No Known Problems Paternal Grandfather      I have reviewed and agree with the history as documented      Social History     Tobacco Use    Smoking status: Heavy Tobacco Smoker     Packs/day: 1 00     Types: Cigarettes    Smokeless tobacco: Never Used    Tobacco comment: using patch cutting back   Substance Use Topics    Alcohol use: No     Comment: rare    Drug use: Not Currently     Types: Cocaine     Comment: stopped in 2007, however did use cocaine again recently at his son's wedding        Review of Systems   Constitutional: Negative for chills, diaphoresis and fever  Respiratory: Positive for shortness of breath  Cardiovascular: Positive for chest pain and palpitations  Negative for syncope  Gastrointestinal: Positive for abdominal pain, nausea and vomiting  Musculoskeletal: Negative for back pain  Allergic/Immunologic: Negative for immunocompromised state  Neurological: Positive for weakness  Negative for dizziness and numbness  Psychiatric/Behavioral: Negative for confusion  All other systems reviewed and are negative  Physical Exam  Physical Exam   Constitutional: He appears well-developed and well-nourished  No distress  HENT:   Head: Normocephalic and atraumatic  Right Ear: External ear normal    Left Ear: External ear normal    Mouth/Throat: Oropharynx is clear and moist    Eyes: Conjunctivae are normal  Right eye exhibits no discharge  Left eye exhibits no discharge  No scleral icterus  Neck: Normal range of motion  Neck supple  Cardiovascular: Normal rate, regular rhythm and normal heart sounds  No murmur heard  Pulmonary/Chest: Effort normal and breath sounds normal  No stridor  No respiratory distress  He has no wheezes  He has no rales  He exhibits no tenderness  Chest pain not reproducible   Abdominal: Soft  Bowel sounds are normal  He exhibits no distension  There is tenderness in the epigastric area and left upper quadrant  There is no guarding  Musculoskeletal: Normal range of motion  He exhibits no deformity  No peripheral edema or calf tenderness   Neurological: He is alert  He is not disoriented  GCS eye subscore is 4  GCS verbal subscore is 5  GCS motor subscore is 6  Skin: Skin is warm and dry   He is not diaphoretic  Psychiatric: His behavior is normal  His mood appears anxious  Nursing note and vitals reviewed        Vital Signs  ED Triage Vitals   Temperature Pulse Respirations Blood Pressure SpO2   11/07/19 2217 11/07/19 2209 11/07/19 2209 11/07/19 2209 11/07/19 2209   98 1 °F (36 7 °C) 91 20 (!) 182/107 98 %      Temp Source Heart Rate Source Patient Position - Orthostatic VS BP Location FiO2 (%)   11/07/19 2217 11/07/19 2209 11/07/19 2209 11/07/19 2311 --   Oral Monitor Sitting Left arm       Pain Score       11/07/19 2209       9           Vitals:    11/07/19 2209 11/07/19 2311   BP: (!) 182/107 (!) 175/103   Pulse: 91 76   Patient Position - Orthostatic VS: Sitting          Visual Acuity      ED Medications  Medications   sodium chloride 0 9 % bolus 1,000 mL (1,000 mL Intravenous New Bag 11/7/19 2331)   aspirin chewable tablet 324 mg (324 mg Oral Given 11/7/19 2254)   ondansetron (ZOFRAN) injection 4 mg (4 mg Intravenous Given 11/7/19 2254)   HYDROmorphone (DILAUDID) injection 0 5 mg (0 5 mg Intravenous Given 11/7/19 2332)   iohexol (OMNIPAQUE) 350 MG/ML injection (MULTI-DOSE) 100 mL (100 mL Intravenous Given 11/7/19 2356)       Diagnostic Studies  Results Reviewed     Procedure Component Value Units Date/Time    Troponin I [[id number]]  (Normal) Collected:  11/07/19 2253    Lab Status:  Final result Specimen:  Blood from Arm, Right Updated:  11/07/19 2319     Troponin I <0 02 ng/mL     Lipase [[id number]]  (Abnormal) Collected:  11/07/19 2253    Lab Status:  Final result Specimen:  Blood from Arm, Right Updated:  11/07/19 2318     Lipase 923 u/L     Comprehensive metabolic panel [[id number]] Collected:  11/07/19 2253    Lab Status:  Final result Specimen:  Blood from Arm, Right Updated:  11/07/19 2318     Sodium 139 mmol/L      Potassium 3 7 mmol/L      Chloride 100 mmol/L      CO2 32 mmol/L      ANION GAP 7 mmol/L      BUN 8 mg/dL      Creatinine 0 89 mg/dL      Glucose 108 mg/dL      Calcium 9 4 mg/dL AST 13 U/L      ALT 21 U/L      Alkaline Phosphatase 72 U/L      Total Protein 7 4 g/dL      Albumin 4 1 g/dL      Total Bilirubin 0 80 mg/dL      eGFR 100 ml/min/1 73sq m     Narrative:       [place] guidelines for Chronic Kidney Disease (CKD):     Stage 1 with normal or high GFR (GFR > 90 mL/min/1 73 square meters)    Stage 2 Mild CKD (GFR = 60-89 mL/min/1 73 square meters)    Stage 3A Moderate CKD (GFR = 45-59 mL/min/1 73 square meters)    Stage 3B Moderate CKD (GFR = 30-44 mL/min/1 73 square meters)    Stage 4 Severe CKD (GFR = 15-29 mL/min/1 73 square meters)    Stage 5 End Stage CKD (GFR <15 mL/min/1 73 square meters)  Note: GFR calculation is accurate only with a steady state creatinine    CBC and differential [[id number]]  (Abnormal) Collected:  11/07/19 2253    Lab Status:  Final result Specimen:  Blood from Arm, Right Updated:  11/07/19 2302     WBC 15 13 Thousand/uL      RBC 5 25 Million/uL      Hemoglobin 16 5 g/dL      Hematocrit 48 9 %      MCV 93 fL      MCH 31 4 pg      MCHC 33 7 g/dL      RDW 12 2 %      MPV 10 0 fL      Platelets 639 Thousands/uL      nRBC 0 /100 WBCs      Neutrophils Relative 84 %      Immat GRANS % 0 %      Lymphocytes Relative 10 %      Monocytes Relative 6 %      Eosinophils Relative 0 %      Basophils Relative 0 %      Neutrophils Absolute 12 49 Thousands/µL      Immature Grans Absolute 0 06 Thousand/uL      Lymphocytes Absolute 1 56 Thousands/µL      Monocytes Absolute 0 94 Thousand/µL      Eosinophils Absolute 0 04 Thousand/µL      Basophils Absolute 0 04 Thousands/µL                  X-ray chest 2 views   ED Interpretation by Connie Ba PA-C (11/07 2313)   No acute abnormality       CT abdomen pelvis with contrast    (Results Pending)              Procedures  ECG 12 Lead Documentation Only  Date/Time: 11/8/2019 12:00 AM  Performed by: Connie Ba PA-C  Authorized by: Connie Ba PA-C     Indications / Diagnosis: Chest pain  ECG reviewed by me, the ED Provider: yes    Patient location:  ED  Previous ECG:     Previous ECG:  Compared to current    Comparison ECG info:  7/3/19    Similarity:  No change  Interpretation:     Interpretation: normal    Rate:     ECG rate:  87    ECG rate assessment: normal    Rhythm:     Rhythm: sinus rhythm    Ectopy:     Ectopy: none    QRS:     QRS axis:  Normal    QRS intervals:  Normal  Conduction:     Conduction: normal    ST segments:     ST segments:  Normal  T waves:     T waves: normal             ED Course  ED Course as of Nov 08 0030   Thu Nov 07, 2019   2324 Lipase(!): 923             MDM  Number of Diagnoses or Management Options  Acute pancreatitis: new and requires workup  Diagnosis management comments: 52yo male presenting for evaluation epigastric pain, chest pain, and vomiting  Differential diagnosis includes but is not limited to: ACS, pneumonia, pneumothorax, pericarditis, GERD, gastritis, pancreatitis, cholecystitis, musculoskeletal, anxiety, PE, aortic dissection, pleuritis, esophagitis, referred pain, nonspecific chest pain  Initial ED plan: Cardiac workup including troponin, EKG, CXR  Will check lipase given epigastric pain  Will administer aspirin  IV Zofran and fluids for symptom relief  Final assessment: Cardiac workup unremarkable  Troponin negative  EKG reveals NSR without ST/T wave changes  Lipase elevated around 1000  Patient does have history of gallstones and reports only occasional alcohol use  CT abdomen subsequently ordered to evaluate for complications  Patient signed out to [facility] prior to CT results and final disposition  Will plan on admission given significant pain           Amount and/or Complexity of Data Reviewed  Clinical lab tests: ordered and reviewed  Tests in the radiology section of CPT®: ordered and reviewed  Review and summarize past medical records: yes  Independent visualization of images, tracings, or specimens: yes    Risk of Complications, Morbidity, and/or Mortality  Presenting problems: high  Diagnostic procedures: high  Management options: high        Disposition  Final diagnoses:   Acute pancreatitis     Time reflects when diagnosis was documented in both MDM as applicable and the Disposition within this note     Time User Action Codes Description Comment    11/8/2019 12:01 AM Ronn, [address] Add [[id number]] Acute pancreatitis       ED Disposition     None      Follow-up Information    None         Patient's Medications   Discharge Prescriptions    No medications on file     No discharge procedures on file      ED Provider  Electronically Signed by           Gayla Thompson PA-C  11/08/19 7123

## 2019-11-08 NOTE — CONSULTS
Consultation - [facility]  Isabel Alan 52 y o  male MRN: [id number]  Unit/Bed#: -01 Encounter: [id number]        Consults    Reason for Consult / Principal Problem: Abdominal Pain    HPI: Mr Kelleen Olszewski is a 53 yo M with a PMH of HTN, HLD, tobacco use, presenting for acute onset of upper abdominal pain associated with belching, indigestion, nausea and vomiting  He reports he has never had pain like this before  He reports he has had kidney stones and gallstones in the past causing pain  He still has a gallbladder  He has never had pancreatitis before  He denies any alcohol use  He does smoke cigarettes  He does take NSAIDs very frequently for back pain and cervical pain is he is a fusion  He denies any melena or hematochezia  He has never had an endoscopy before  He does report relatively frequent reflux symptoms  He eating regular diet today and is feeling much better      REVIEW OF SYSTEMS: Negative except for as stated above      Historical Information   Past Medical History:   Diagnosis Date    Angina pectoris ([facility] [address] )     stable-pt denies     Ascites     told he had a "wave stomach"    Bipolar 1 disorder ([facility] [address] )     Depression     Family history of colon cancer requiring screening colonoscopy     brother    Fatty liver     History of colon polyps     Hyperlipidemia     Hypertension     Hypothalamic hypogonadism ([facility] [address] )     Liver disease     fatty liver    Osteomyelitis ([facility] [address] )     Sleep apnea     Spondylosis of cervical region without myelopathy or radiculopathy 11/9/2018     Past Surgical History:   Procedure Laterality Date    BACK SURGERY      COLONOSCOPY      FRACTURE SURGERY      jaw    NC ARTHRODESIS ANT INTERBODY INC DISCECTOMY, CERVICAL BELOW C2 N/A 1/29/2019    Procedure: ANTERIOR CERVICAL FUSION W DISCECTOMY, C4-5, C5-6;  Surgeon: Chanell Prasad MD;  Location:  MAIN OR;  Service: Neurosurgery    NC COLONOSCOPY FLX DX W/COLLJ SPEC WHEN PFRMD N/A 7/27/2017 Procedure: COLONOSCOPY;  Surgeon: Osmin Go MD;  Location: [facility]; Service: Gastroenterology    NM COLONOSCOPY FLX DX W/[facility]PATIENT [facility]HABIL[facility]ATION WHEN PFRMD N/A 8/29/2017    Procedure: COLONOSCOPY;  Surgeon: Osmin Go MD;  [facility]: [facility]; Service: Gastroenterology    NM COLONOSCOPY FLX DX W/[facility] INPATIENT REHABILITATION WHEN PFRMD N/A 12/1/2017    Procedure: COLONOSCOPY;  Surgeon: Osmin Go MD;  [facility];   Service: Gastroenterology    NM NASAL/SINUS ENDOSCOPY,RMV MILDRED BULL N/A 8/24/2018    Procedure: FUNCTIONAL ENDOSCOPIC SINUS SURGERY (FESS) LEFT MILDRED;  BILATERAL  GRAFTS; EXCISION OF NASAL MUCOSAL ULCER; CLOSURE WITH ENDONASAL FLAPS;  Surgeon: Maik Peterson MD;  Location: [facility];  Service: ENT    NM REPAIR NASAL SEPTUM PERFORATN N/A 8/2/2019    Procedure: REPAIR NASAL/SEPTAL PERFORATION W AUTOGRAFT;  Surgeon: Maik Peterson MD;  Location[facility];  Service: ENT    NM REPAIR OF NASAL SEPTUM N/A 8/24/2018    Procedure: SEPTOPLASTY;  Surgeon: Maik Peterson MD;  [facility];  Service: ENT    NM REPAIR OF NASAL SEPTUM N/A 8/2/2019    Procedure: SEPTOPLASTY;  Surgeon: Maik Peterson MD;  [facility];  Service: ENT    TURBINOPLASTY N/A 8/24/2018    Procedure: TURBINOPLASTY;  Surgeon: Maik Peterson MD;  [facility];  Service: ENT    TURBINOPLASTY Bilateral 8/2/2019    Procedure: TURBINOPLASTY;  Surgeon: Maik Peterson MD;  [facility];  Service: ENT     Social History   Social History     Substance and Sexual Activity   Alcohol Use Never    Frequency: Never    Comment: rare     Social History     Substance and Sexual Activity   Drug Use Not Currently    Types: Cocaine    Comment: stopped in 2007, however did use cocaine again recently at his son's wedding     Social History     Tobacco Use   Smoking Status Heavy Tobacco Smoker    Packs/day: 1 00    Types: Cigarettes   Smokeless Tobacco Never Used   Tobacco Comment    using patch cutting back     Family History   Problem Relation Age of Onset    Breast cancer Mother     Thyroid nodules Mother     Colon cancer Father         around age 45    Heart failure Father         congestive    No Known Problems Sister     Colon cancer Brother         around age 45    No Known Problems Maternal Grandmother     No Known Problems Maternal Grandfather     No Known Problems Paternal Grandmother     No Known Problems Paternal Grandfather        Meds/Allergies     Medications Prior to Admission   Medication    aspirin (ECOTRIN LOW STRENGTH) 81 mg EC tablet    atorvastatin (LIPITOR) 10 mg tablet    cyclobenzaprine (FLEXERIL) 5 mg tablet    HYDROcodone-acetaminophen (NORCO) 5-325 mg per tablet    lisinopril (ZESTRIL) 20 mg tablet    mupirocin (BACTROBAN) 2 % nasal ointment    nicotine (NICODERM CQ) 21 mg/24 hr TD 24 hr patch    sodium chloride (OCEAN) 0 65 % nasal spray     Current Facility-Administered Medications   Medication Dose Route Frequency    aspirin (ECOTRIN LOW STRENGTH) EC tablet 81 mg  81 mg Oral Daily    atorvastatin (LIPITOR) tablet 10 mg  10 mg Oral Daily    cyclobenzaprine (FLEXERIL) tablet 10 mg  10 mg Oral TID PRN    heparin (porcine) subcutaneous injection 5,000 Units  5,000 Units Subcutaneous Q8H [facility]    HYDROmorphone (DILAUDID) injection 0 5 mg  0 5 mg Intravenous Q3H PRN    lactated ringers infusion  250 mL/hr Intravenous Continuous    lisinopril (ZESTRIL) tablet 20 mg  20 mg Oral Daily    nicotine (NICODERM CQ) 21 mg/24 hr TD 24 hr patch 1 patch  1 patch Transdermal Q24H    ondansetron (ZOFRAN) injection 4 mg  4 mg Intravenous Q6H PRN       Allergies   Allergen Reactions    Clarithromycin Swelling    Morphine Other (See Comments)     Pt can"t recall his reaction, swelling per patient    Penicillins Swelling           Objective     Blood pressure 145/95, pulse 85, temperature 99 2 °F (37 3 °C), resp  rate 18, height 6' 4" (1 93 m), weight 96 5 kg (212 lb 11 9 oz), SpO2 95 %        Intake/Output Summary (Last 24 hours) at 11/8/2019 1341  Last data filed at 11/8/2019 1300  Gross per 24 hour   Intake 1778 33 ml   Output --   Net 1778 33 ml         PHYSICAL EXAM:      General Appearance:   Alert, oriented x3, nontoxic appearing   HEENT:   Normocephalic, atraumatic, anicteric      Neck:  Supple, symmetrical, trachea midline   Lungs:   Clear to auscultation bilaterally; no rales, rhonchi or wheezing; respirations unlabored    Heart[de-identified]   S1 and S2 normal; regular rate and rhythm; no murmur, rub, or gallop  Abdomen:   Non-distended, soft, BS active, (+) mild TTP in the epigastric region    Rectal:   Deferred    Extremities:  No cyanosis, clubbing or edema    Pulses:  2+ and symmetric all extremities    Skin:  Skin color, texture, turgor normal, no rashes or lesions      Lab Results:   Results from last 7 days   Lab Units 11/08/19  0637   WBC Thousand/uL 10 75*   HEMOGLOBIN g/dL 14 9   HEMATOCRIT % 45 5   PLATELETS Thousands/uL 207   NEUTROS PCT % 68   LYMPHS PCT % 23   MONOS PCT % 8   EOS PCT % 1     Results from last 7 days   Lab Units 11/08/19  0637   POTASSIUM mmol/L 3 7   CHLORIDE mmol/L 103   CO2 mmol/L 30   BUN mg/dL 9   CREATININE mg/dL 0 74   CALCIUM mg/dL 8 9   ALK PHOS U/L 72   ALT U/L 18   AST U/L 13         Results from last 7 days   Lab Units 11/08/19  0637   LIPASE u/L 662*       Imaging Studies: I have personally reviewed pertinent imaging studies  X-ray Chest 2 Views    Result Date: 11/8/2019  Impression: No acute cardiopulmonary disease  Workstation performed: [id number]     Us Right Upper Quadrant    Result Date: 11/8/2019  Impression: 1  Unremarkable appearance of the gallbladder  2  Probable tiny nonobstructing right renal calculi  3  Moderate hepatomegaly without focal abnormality  Workstation performed: [id number]     Ct Abdomen Pelvis With Contrast    Result Date: 11/8/2019  Impression: No pericholecystic inflammatory change    Gallbladder is distended; questionable noncalcified stones are noted within the gallbladder    No pericholecystic inflammatory change  Punctate calcifications in the kidneys bilaterally possibly nonobstructing stones    No hydronephrosis  Workstation performed: [id number]       ASSESSMENT and PLAN:      Epigastric Pain  Nausea/Vomiting  Elevated Lipase  - CT A/P on admission with possible gallbladder distention, no evidence of pancreatitis  - Lipase elevated at 923 which is <3x the ULN so he does not meet criteria for pancreatitis given his neg CT  - Suspect this may be 2/2 PUD as he reports frequent indigestion symptoms and this can elevated the lipase and cause similar pain  - RUQ US negative for cholelithiasis or biliary abnormalities  - His symptoms have essentially resolved and he tolerated a regular diet  - Recommend outpatient EGD, we will call him to arrange this  - Start PPI BID, okay to take tums and H2 blockers in the meantime in addition to PPI  - Okay for discharge from GI standpoint      The patient's care was discussed with Dr Jacqueline Brown

## 2019-11-08 NOTE — ASSESSMENT & PLAN NOTE
With normal LFTs but evidence of gallbladder distention  Gastroenterology consultation  Right upper quadrant ultrasound  CMP in a m  To monitor LFTs in light of the abnormal appearance of the gallbladder  Recheck lipase in a m  NPO  IV fluids  P r n  Pain control  P r n   Antiemetics  Supportive care

## 2019-11-08 NOTE — ASSESSMENT & PLAN NOTE
In dressing having epigastric pain radiating to back with elevated lipase consistent with acute pancreatitis  CT imaging studies negative for pancreatic inflammation  Right upper quadrant ultrasound negative for gallbladder thickening pancreatic inflammation  On IV fluids, Dilaudid for pain control and will advance diet as tolerated  GI consultation will appreciate recommendations

## 2019-11-10 RX ORDER — OXYCODONE HYDROCHLORIDE AND ACETAMINOPHEN 5; 325 MG/1; MG/1
1 TABLET ORAL EVERY 4 HOURS PRN
Qty: 18 TABLET | Refills: 0 | Status: SHIPPED | OUTPATIENT
Start: 2019-11-10 | End: 2019-11-13

## 2019-11-11 ENCOUNTER — TRANSITIONAL CARE MANAGEMENT (OUTPATIENT)
Dept: FAMILY MEDICINE CLINIC | Facility: MEDICAL CENTER | Age: 49
End: 2019-11-11

## 2019-11-18 ENCOUNTER — OFFICE VISIT (OUTPATIENT)
Dept: NEUROSURGERY | Facility: CLINIC | Age: 49
End: 2019-11-18
Payer: COMMERCIAL

## 2019-11-18 ENCOUNTER — APPOINTMENT (EMERGENCY)
Dept: RADIOLOGY | Facility: HOSPITAL | Age: 49
End: 2019-11-18
Payer: COMMERCIAL

## 2019-11-18 ENCOUNTER — HOSPITAL ENCOUNTER (EMERGENCY)
Facility: HOSPITAL | Age: 49
Discharge: HOME | End: 2019-11-18
Attending: EMERGENCY MEDICINE | Admitting: EMERGENCY MEDICINE
Payer: COMMERCIAL

## 2019-11-18 ENCOUNTER — TELEPHONE (OUTPATIENT)
Dept: FAMILY MEDICINE CLINIC | Facility: MEDICAL CENTER | Age: 49
End: 2019-11-18

## 2019-11-18 VITALS
BODY MASS INDEX: 29.22 KG/M2 | WEIGHT: 240 LBS | RESPIRATION RATE: 17 BRPM | HEART RATE: 92 BPM | TEMPERATURE: 98.1 F | HEIGHT: 76 IN | SYSTOLIC BLOOD PRESSURE: 143 MMHG | OXYGEN SATURATION: 95 % | DIASTOLIC BLOOD PRESSURE: 91 MMHG

## 2019-11-18 VITALS
RESPIRATION RATE: 16 BRPM | TEMPERATURE: 97.4 F | HEART RATE: 90 BPM | HEIGHT: 76 IN | SYSTOLIC BLOOD PRESSURE: 180 MMHG | BODY MASS INDEX: 25.82 KG/M2 | WEIGHT: 212 LBS | DIASTOLIC BLOOD PRESSURE: 110 MMHG

## 2019-11-18 DIAGNOSIS — Z98.1 S/P CERVICAL SPINAL FUSION: Primary | ICD-10-CM

## 2019-11-18 DIAGNOSIS — M25.562 LEFT KNEE PAIN: Primary | ICD-10-CM

## 2019-11-18 DIAGNOSIS — M54.12 RADICULOPATHY, CERVICAL: ICD-10-CM

## 2019-11-18 PROCEDURE — 73564 X-RAY EXAM KNEE 4 OR MORE: CPT

## 2019-11-18 PROCEDURE — 99284 EMERGENCY DEPT VISIT MOD MDM: CPT | Performed by: PHYSICIAN ASSISTANT

## 2019-11-18 PROCEDURE — 99213 OFFICE O/P EST LOW 20 MIN: CPT | Performed by: NEUROLOGICAL SURGERY

## 2019-11-18 PROCEDURE — 99283 EMERGENCY DEPT VISIT LOW MDM: CPT

## 2019-11-18 RX ORDER — IBUPROFEN 400 MG/1
400 TABLET ORAL ONCE
Status: COMPLETED | OUTPATIENT
Start: 2019-11-18 | End: 2019-11-18

## 2019-11-18 RX ORDER — IBUPROFEN 400 MG/1
400 TABLET ORAL EVERY 6 HOURS PRN
Qty: 12 TABLET | Refills: 0 | Status: SHIPPED | OUTPATIENT
Start: 2019-11-18 | End: 2019-12-10 | Stop reason: ALTCHOICE

## 2019-11-18 RX ORDER — SENNOSIDES 8.6 MG
650 CAPSULE ORAL EVERY 8 HOURS PRN
Qty: 9 TABLET | Refills: 0 | Status: SHIPPED | OUTPATIENT
Start: 2019-11-18 | End: 2019-11-21

## 2019-11-18 RX ADMIN — IBUPROFEN 400 MG: 400 TABLET ORAL at 22:11
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
## 2019-11-18 NOTE — TELEPHONE ENCOUNTER
Pt called he tripped over his dog 2 days ago, but is to the point he is going to go to the ER, it's getting worse

## 2019-11-19 NOTE — ED NOTES
Pt discharge instructions reviewed by Provider GRADY Pantoja, Pt has no further questions at this time  Pt awake and alert, no signs of acute distress noted         Rodrigue Pa RN  11/18/19 [id number]

## 2019-11-19 NOTE — ED PROVIDER NOTES
History  Chief Complaint   Patient presents with    Knee Injury     Pt reports falling yesterday onto his L knee when he tripped over his dog  +swelling States he thought he just jammed it up, but has had increased pain and inability to walk on effected leg  Patient is a 26-year-old male with history of hyperlipidemia, hypertension, hypothalamic hypogonadism, and with a surgical history of sinus surgery that presents emergency department with abrupt onset aching nonradiating left knee pain for 3 days  Patient denies associated symptomatology  Patient denies history of left knee injury  Patient has states that 3 days prior to current, he tripped over his dog with patient landing on left lower extremity, with left knee pain following directly after the incident  Patient verbalize being self ambulatory without assistance of persons, cane, assistive ambulatory device, however patient states that favors his right lower extremity when he walks  Patient verbalizes palliative factors of oxycodone, which she had taken 2 hours prior to current ED presentation  Patient affirms provocative factors of pressure to left knee  Patient denies not effective treatment  Patient denies fevers, chills, nausea, vomiting  Patient denies diarrhea, constipation, urinary symptoms  Patient denies numbness, tingling, loss of power  Patient denies fall or recent trauma  Patient denies sick contacts or recent travel  Patient denies chest pain, shortness of breath, and abdominal pain  Patient is not in acute distress        History provided by:  Patient   used: No    Knee Pain   Location:  Knee  Time since incident:  2 days  Knee location:  L knee  Pain details:     Quality:  Aching    Radiates to:  Does not radiate    Severity:  Mild    Onset quality:  Gradual    Duration:  3 days    Timing:  Intermittent    Progression:  Worsening  Chronicity:  New  Dislocation: no    Foreign body present:  No foreign bodies  Prior injury to area:  No  Relieved by: oxycodone   Worsened by:  Bearing weight  Ineffective treatments:  None tried  Associated symptoms: no back pain, no decreased ROM, no fatigue, no fever, no itching, no muscle weakness, no neck pain, no numbness, no stiffness, no swelling and no tingling    Risk factors: no concern for non-accidental trauma, no frequent fractures, no obesity and no recent illness        Prior to Admission Medications   Prescriptions Last Dose Informant Patient Reported? Taking?   atorvastatin (LIPITOR) 10 mg tablet   No No   Sig: TAKE 1 TABLET DAILY AS DIRECTED     lisinopril (ZESTRIL) 20 mg tablet  Self No No   Sig: Take 1 tablet (20 mg total) by mouth daily   mupirocin (BACTROBAN) 2 % nasal ointment   No No   Sig: into each nostril 2 (two) times a day   ranitidine (ZANTAC) 150 mg tablet   No No   Sig: Take 1 tablet (150 mg total) by mouth 2 (two) times a day   sodium chloride (OCEAN) 0 65 % nasal spray   No No   Si sprays into each nostril every 2 (two) hours while awake for 30 days      Facility-Administered Medications: None       Past Medical History:   Diagnosis Date    Angina pectoris ([facility] [address] )     stable-pt denies     Ascites     told he had a "wave stomach"    Bipolar 1 disorder ([facility] [address] )     Depression     Family history of colon cancer requiring screening colonoscopy     brother    Fatty liver     History of colon polyps     Hyperlipidemia     Hypertension     Hypothalamic hypogonadism ([facility] [address] )     Liver disease     fatty liver    Osteomyelitis ([facility] [address] )     Sleep apnea     Spondylosis of cervical region without myelopathy or radiculopathy 2018       Past Surgical History:   Procedure Laterality Date    BACK SURGERY      COLONOSCOPY      FRACTURE SURGERY      jaw    PA ARTHRODESIS ANT INTERBODY INC DISCECTOMY, CERVICAL BELOW C2 N/A 2019    Procedure: ANTERIOR CERVICAL FUSION W DISCECTOMY, C4-5, C5-6;  Surgeon: Amaris Delcid MD;  Location:  MAIN OR;  Service: Neurosurgery    [place] COLONOSCOPY FLX DX W/[facility] SPEC WHEN PFRMD N/A 7/27/2017    Procedure: COLONOSCOPY;  Surgeon: Beatriz Martinez MD;  Location: [facility]; Service: Gastroenterology    [place] COLONOSCOPY FLX DX [facility] INPATIENT REHABIL[facility]ATION WHEN PFRMD N/A 8/29/2017    Procedure: COLONOSCOPY;  Surgeon: Beatriz Martinez MD;  Location: [facility]; Service: Gastroenterology    [place] COLONOSCOPY FLX DX [facility] INPATIENT REHABILITATION WHEN PFRMD N/A 12/1/2017    Procedure: COLONOSCOPY;  Surgeon: Beatriz Martinez MD;  Location: [facility];   Service: Gastroenterology    [place] NASAL/SINUS ENDOSCOPY,RMV MILDRED BULL N/A 8/24/2018    Procedure: FUNCTIONAL ENDOSCOPIC SINUS SURGERY (FESS) LEFT MILDRED;  BILATERAL  GRAFTS; EXCISION OF NASAL MUCOSAL ULCER; CLOSURE WITH ENDONASAL FLAPS;  Surgeon: Kaycee Sandoval MD;  Location: [facility];  Service: ENT    [place] REPAIR NASAL SEPTUM PERFORATN N/A 8/2/2019    Procedure: REPAIR NASAL/SEPTAL PERFORATION W AUTOGRAFT;  Surgeon: Kaycee Sandoval MD;  Location: [facility] Main OR;  Service: ENT    [place] REPAIR OF NASAL SEPTUM N/A 8/24/2018    Procedure: SEPTOPLASTY;  Surgeon: Kaycee Sandoval MD;  Location: [facility];  Service: ENT    [place] REPAIR OF NASAL SEPTUM N/A 8/2/2019    Procedure: SEPTOPLASTY;  Surgeon: Kaycee Sandoval MD;  Location: [facility];  Service: ENT    TURBINOPLASTY N/A 8/24/2018    Procedure: TURBINOPLASTY;  Surgeon: Kaycee Sandoval MD;  Location: [facility];  Service: ENT    TURBINOPLASTY Bilateral 8/2/2019    Procedure: TURBINOPLASTY;  Surgeon: Kaycee Sandoval MD;  Location: [facility];  Service: ENT       Family History   Problem Relation Age of Onset    Breast cancer Mother     Thyroid nodules Mother     Colon cancer Father         around age 45    Heart failure Father         congestive    No Known Problems Sister     Colon cancer Brother         around age 45   Cassandra Slipper No Known Problems Maternal Grandmother     No Known Problems Maternal Grandfather     No Known Problems Paternal Grandmother     No Known Problems Paternal Grandfather      I have reviewed and agree with the history as documented  Social History     Tobacco Use    Smoking status: Heavy Tobacco Smoker     Packs/day: 1 00     Types: Cigarettes    Smokeless tobacco: Never Used    Tobacco comment: using patch cutting back   Substance Use Topics    Alcohol use: Never     Frequency: Never     Comment: rare    Drug use: Not Currently     Types: Cocaine     Comment: stopped in 2007, however did use cocaine again recently at his son's wedding        Review of Systems   Constitutional: Negative for activity change, appetite change, chills, fatigue and fever  HENT: Negative for congestion, postnasal drip, rhinorrhea, sinus pressure, sinus pain, sore throat and tinnitus  Eyes: Negative for photophobia and visual disturbance  Respiratory: Negative for cough, chest tightness and shortness of breath  Cardiovascular: Negative for chest pain and palpitations  Gastrointestinal: Negative for abdominal pain, constipation, diarrhea, nausea and vomiting  Genitourinary: Negative for difficulty urinating, dysuria, flank pain, frequency and urgency  Musculoskeletal: Positive for arthralgias  Negative for back pain, gait problem, neck pain, neck stiffness and stiffness  Skin: Negative for itching, pallor and rash  Allergic/Immunologic: Negative for environmental allergies and food allergies  Neurological: Negative for dizziness, weakness, numbness and headaches  Psychiatric/Behavioral: Negative for confusion  All other systems reviewed and are negative  Physical Exam  Physical Exam   Constitutional: He is oriented to person, place, and time  He appears well-developed and well-nourished  He is active and cooperative  Non-toxic appearance  He does not have a sickly appearance  He does not appear ill  No distress  HENT:   Head: Normocephalic and atraumatic  Right Ear: Hearing and external ear normal  No drainage, swelling or tenderness  No mastoid tenderness   No decreased hearing is noted  Left Ear: Hearing and external ear normal  No drainage, swelling or tenderness  No mastoid tenderness  No decreased hearing is noted  Nose: Nose normal    Mouth/Throat: Uvula is midline, oropharynx is clear and moist and mucous membranes are normal    Eyes: Pupils are equal, round, and reactive to light  Conjunctivae, EOM and lids are normal  Right eye exhibits no discharge  Left eye exhibits no discharge  Neck: Trachea normal, normal range of motion, full passive range of motion without pain and phonation normal  Neck supple  No JVD present  No tracheal tenderness, no spinous process tenderness and no muscular tenderness present  No neck rigidity  No tracheal deviation and normal range of motion present  Cardiovascular: Normal rate, regular rhythm, normal heart sounds, intact distal pulses and normal pulses  Pulses:       Radial pulses are 2+ on the right side, and 2+ on the left side  Posterior tibial pulses are 2+ on the right side, and 2+ on the left side  Pulmonary/Chest: Effort normal and breath sounds normal  No stridor  He has no decreased breath sounds  He has no wheezes  He has no rhonchi  He has no rales  He exhibits no tenderness, no bony tenderness and no crepitus  Abdominal: Soft  Bowel sounds are normal  He exhibits no distension  There is no tenderness  There is no rigidity, no rebound, no guarding and no CVA tenderness  Musculoskeletal: Normal range of motion  Left knee: He exhibits swelling  He exhibits normal range of motion, no effusion, no ecchymosis, no deformity, no laceration, no erythema, normal alignment, no LCL laxity, normal patellar mobility, no bony tenderness, normal meniscus and no MCL laxity  Tenderness found  Medial joint line and MCL tenderness noted  No lateral joint line, no LCL and no patellar tendon tenderness noted          Legs:  Passive ROM intact  Upper and lower extremity 5/5 bilaterally  Neurovascularly intact  No grinding or clicking of joints      Patient has no overlying epidermal rashes, lesions, erythema, ecchymosis, or abrasion left knee  Skin intact  Lymphadenopathy:        Head (right side): No submental, no submandibular, no tonsillar, no preauricular, no posterior auricular and no occipital adenopathy present  Head (left side): No submental, no submandibular, no tonsillar, no preauricular, no posterior auricular and no occipital adenopathy present  He has no cervical adenopathy  Right cervical: No superficial cervical, no deep cervical and no posterior cervical adenopathy present  Left cervical: No superficial cervical, no deep cervical and no posterior cervical adenopathy present  Neurological: He is alert and oriented to person, place, and time  He has normal strength and normal reflexes  No sensory deficit  GCS eye subscore is 4  GCS verbal subscore is 5  GCS motor subscore is 6  Reflex Scores:       Patellar reflexes are 2+ on the right side and 2+ on the left side  Skin: Skin is warm and intact  Capillary refill takes less than 2 seconds  He is not diaphoretic  Psychiatric: He has a normal mood and affect  His speech is normal and behavior is normal  Judgment and thought content normal  Cognition and memory are normal    Nursing note and vitals reviewed        Vital Signs  ED Triage Vitals [11/18/19 2126]   Temperature Pulse Respirations Blood Pressure SpO2   98 1 °F (36 7 °C) 92 17 143/91 95 %      Temp Source Heart Rate Source Patient Position - Orthostatic VS BP Location FiO2 (%)   Oral Monitor Sitting Right arm --      Pain Score       9           Vitals:    11/18/19 2126   BP: 143/91   Pulse: 92   Patient Position - Orthostatic VS: Sitting         Visual Acuity      ED Medications  Medications   ibuprofen (MOTRIN) tablet 400 mg (400 mg Oral Given 11/18/19 2211)       Diagnostic Studies  Results Reviewed     None                 XR knee 4+ vw left injury   Final Result by Harman Moulton, MD (11/19 9846)      No acute osseous abnormality  Workstation performed: [id number]                    Procedures  Procedures       ED Course                               MDM  Number of Diagnoses or Management Options  Left knee pain: new and does not require workup     Amount and/or Complexity of Data Reviewed  Tests in the radiology section of CPT®: ordered and reviewed  Review and summarize past medical records: yes  Independent visualization of images, tracings, or specimens: yes    Risk of Complications, Morbidity, and/or Mortality  Presenting problems: low  Diagnostic procedures: low  Management options: low    Patient Progress  Patient progress: stable       Patient is a 70-year-old male with history of hyperlipidemia, hypertension, hypothalamic hypogonadism, and with a surgical history of sinus surgery that presents emergency department with abrupt onset aching nonradiating left knee pain for 3 days  Left knee x-ray initial read with no acute osseous abnormality  Delivered Motrin and ice in the emergency department; patient verbalized decrease in left knee pain symptoms status post medication delivery  ED technician had provided patient with Ace wrap to patient's left knee for the treatment of left knee strain verses left knee sprain  Counseled patient on following up with Orthopedics for further evaluation of left knee pain  Patient demonstrated mechanical understanding of crutch use under direct visual supervision by clinical nursing staff    Prescribed Tylenol and Motrin and counseled patient on medication administration and side effects  Follow-up with Orthopedics needed  Follow-up with PCP  Follow up with emergency department symptoms persist or exacerbate  Patient demonstrates verbal understanding of all clinical imaging findings, discharge instructions, follow-up with verbalized agreement with treatment plan  Patient hemodynamically stable in no acute distress at time of final evaluation and discharge    Disposition  Final diagnoses:   Left knee pain     Time reflects when diagnosis was documented in both MDM as applicable and the Disposition within this note     Time User Action Codes Description Comment    11/18/2019 10:13 PM Mandeep Breanna Add [V90 [id number]] Left knee pain       ED Disposition     ED Disposition Condition Date/Time Comment    Discharge Stable Mon Nov 18, 2019 10:11 PM Modesto Lott discharge to home/self care              Follow-up Information     Follow up With Specialties Details Why Contact Info Additional Information    Saad Hodges DO Family Medicine Call in 1 week for further evaluation of symptoms [address] [facility] [address]       [address] [facility] Heather Callahan Orthopedic Surgery Call  As needed Kathleen [address] [place] [phone]  [address] [place] [address] Specialists [facility], [address] [place] [address] Emergency Department Emergency Medicine Go to  As needed [address]  [phone]  ED, [address], [place], [place], [address]          Discharge Medication List as of 11/18/2019 10:14 PM      START taking these medications    Details   acetaminophen (TYLENOL) 650 mg CR tablet Take 1 tablet (650 mg total) by mouth every 8 (eight) hours as needed for mild pain for up to 3 days, Starting Mon 11/18/2019, Until Thu 11/21/2019, Print      ibuprofen (MOTRIN) 400 mg tablet Take 1 tablet (400 mg total) by mouth every 6 (six) hours as needed for mild pain for up to 3 days, Starting Mon 11/18/2019, Until Thu 11/21/2019, Print         CONTINUE these medications which have NOT CHANGED    Details   atorvastatin (LIPITOR) 10 mg tablet TAKE 1 TABLET DAILY AS DIRECTED , Normal      lisinopril (ZESTRIL) 20 mg tablet Take 1 tablet (20 mg total) by mouth daily, Starting Fri 1/18/2019, Normal      mupirocin (BACTROBAN) 2 % nasal ointment into each nostril 2 (two) times a day, Starting Tue 8/13/2019, Normal      ranitidine (ZANTAC) 150 mg tablet Take 1 tablet (150 mg total) by mouth 2 (two) times a day, Starting Fri 11/8/2019, Until Sun 12/8/2019, Normal      sodium chloride (OCEAN) 0 65 % nasal spray 2 sprays into each nostril every 2 (two) hours while awake for 30 days, Starting Fri 8/2/2019, Until Mon 11/18/2019, Print           No discharge procedures on file      ED Provider  Electronically Signed by           Roshan Rueda PA-C  11/19/19 4221

## 2019-11-25 ENCOUNTER — TELEPHONE (OUTPATIENT)
Dept: NEUROSURGERY | Facility: CLINIC | Age: 49
End: 2019-11-25

## 2019-11-25 DIAGNOSIS — M54.12 RADICULOPATHY OF CERVICAL SPINE: ICD-10-CM

## 2019-11-25 DIAGNOSIS — Z98.1 S/P CERVICAL SPINAL FUSION: Primary | ICD-10-CM

## 2019-11-29 ENCOUNTER — HOSPITAL ENCOUNTER (OUTPATIENT)
Dept: RADIOLOGY | Facility: HOSPITAL | Age: 49
Discharge: HOME | End: 2019-11-29
Attending: NEUROLOGICAL SURGERY
Payer: COMMERCIAL

## 2019-11-29 ENCOUNTER — TRANSCRIBE ORDERS (OUTPATIENT)
Dept: LAB | Facility: CLINIC | Age: 49
End: 2019-11-29

## 2019-11-29 ENCOUNTER — APPOINTMENT (OUTPATIENT)
Dept: LAB | Facility: CLINIC | Age: 49
End: 2019-11-29
Payer: COMMERCIAL

## 2019-11-29 DIAGNOSIS — Z98.1 S/P CERVICAL SPINAL FUSION: ICD-10-CM

## 2019-11-29 DIAGNOSIS — Z98.1 S/P CERVICAL SPINAL FUSION: Primary | ICD-10-CM

## 2019-11-29 LAB
BUN SERPL-MCNC: 9 MG/DL (ref 5–25)
CREAT SERPL-MCNC: 0.93 MG/DL (ref 0.6–1.3)
GFR SERPL CREATININE-BSD FRML MDRD: 96 ML/MIN/1.73SQ M

## 2019-11-29 PROCEDURE — 36415 COLL VENOUS BLD VENIPUNCTURE: CPT

## 2019-11-29 PROCEDURE — 82565 ASSAY OF CREATININE: CPT

## 2019-11-29 PROCEDURE — 84520 ASSAY OF UREA NITROGEN: CPT

## 2019-11-29 PROCEDURE — 72050 X-RAY EXAM NECK SPINE 4/5VWS: CPT

## 2019-12-01 ENCOUNTER — ANESTHESIA EVENT (OUTPATIENT)
Dept: GASTROENTEROLOGY | Facility: HOSPITAL | Age: 49
End: 2019-12-01

## 2019-12-02 ENCOUNTER — ANESTHESIA (OUTPATIENT)
Dept: GASTROENTEROLOGY | Facility: HOSPITAL | Age: 49
End: 2019-12-02

## 2019-12-02 ENCOUNTER — HOSPITAL ENCOUNTER (OUTPATIENT)
Dept: GASTROENTEROLOGY | Facility: HOSPITAL | Age: 49
Setting detail: OUTPATIENT SURGERY
Discharge: HOME | End: 2019-12-02
Attending: INTERNAL MEDICINE | Admitting: INTERNAL MEDICINE
Payer: COMMERCIAL

## 2019-12-02 VITALS
BODY MASS INDEX: 26.39 KG/M2 | HEIGHT: 76 IN | OXYGEN SATURATION: 99 % | HEART RATE: 83 BPM | DIASTOLIC BLOOD PRESSURE: 96 MMHG | WEIGHT: 216.71 LBS | SYSTOLIC BLOOD PRESSURE: 141 MMHG | TEMPERATURE: 98.2 F | RESPIRATION RATE: 17 BRPM

## 2019-12-02 DIAGNOSIS — R14.0 ABDOMINAL DISTENSION (GASEOUS): ICD-10-CM

## 2019-12-02 DIAGNOSIS — K21.00 GERD WITH ESOPHAGITIS: Primary | ICD-10-CM

## 2019-12-02 PROCEDURE — 88305 TISSUE EXAM BY PATHOLOGIST: CPT | Performed by: PATHOLOGY

## 2019-12-02 PROCEDURE — 88342 IMHCHEM/IMCYTCHM 1ST ANTB: CPT | Performed by: PATHOLOGY

## 2019-12-02 PROCEDURE — 43239 EGD BIOPSY SINGLE/MULTIPLE: CPT | Performed by: INTERNAL MEDICINE

## 2019-12-02 RX ORDER — LIDOCAINE HYDROCHLORIDE 10 MG/ML
0.5 INJECTION, SOLUTION EPIDURAL; INFILTRATION; INTRACAUDAL; PERINEURAL ONCE AS NEEDED
Status: DISCONTINUED | OUTPATIENT
Start: 2019-12-02 | End: 2019-12-06 | Stop reason: HOSPADM

## 2019-12-02 RX ORDER — SODIUM CHLORIDE, SODIUM LACTATE, POTASSIUM CHLORIDE, CALCIUM CHLORIDE 600; 310; 30; 20 MG/100ML; MG/100ML; MG/100ML; MG/100ML
125 INJECTION, SOLUTION INTRAVENOUS CONTINUOUS
Status: DISCONTINUED | OUTPATIENT
Start: 2019-12-02 | End: 2019-12-06 | Stop reason: HOSPADM

## 2019-12-02 RX ORDER — PROPOFOL 10 MG/ML
INJECTION, EMULSION INTRAVENOUS AS NEEDED
Status: DISCONTINUED | OUTPATIENT
Start: 2019-12-02 | End: 2019-12-02 | Stop reason: SURG

## 2019-12-02 RX ORDER — LIDOCAINE HYDROCHLORIDE 10 MG/ML
INJECTION, SOLUTION EPIDURAL; INFILTRATION; INTRACAUDAL; PERINEURAL AS NEEDED
Status: DISCONTINUED | OUTPATIENT
Start: 2019-12-02 | End: 2019-12-02 | Stop reason: SURG

## 2019-12-02 RX ORDER — PANTOPRAZOLE SODIUM 40 MG/1
40 TABLET, DELAYED RELEASE ORAL DAILY
Qty: 30 TABLET | Refills: 2 | Status: SHIPPED | OUTPATIENT
Start: 2019-12-02 | End: 2020-03-02

## 2019-12-02 RX ADMIN — PROPOFOL 100 MG: 10 INJECTION, EMULSION INTRAVENOUS at 11:01

## 2019-12-02 RX ADMIN — SODIUM CHLORIDE, SODIUM LACTATE, POTASSIUM CHLORIDE, AND CALCIUM CHLORIDE: .6; .31; .03; .02 INJECTION, SOLUTION INTRAVENOUS at 10:42

## 2019-12-02 RX ADMIN — LIDOCAINE HYDROCHLORIDE 50 MG: 10 INJECTION, SOLUTION EPIDURAL; INFILTRATION; INTRACAUDAL; PERINEURAL at 11:01

## 2019-12-02 RX ADMIN — PROPOFOL 50 MG: 10 INJECTION, EMULSION INTRAVENOUS at 11:03

## 2019-12-02 NOTE — ANESTHESIA POSTPROCEDURE EVALUATION
Post-Op Assessment Note    CV Status:  Stable  Pain Score: 0    Pain management: adequate     Mental Status:  Sleepy   Hydration Status:  Stable   PONV Controlled:  None   Airway Patency:  Patent   Post Op Vitals Reviewed: Yes      Staff: Anesthesiologist, JEN           BP (!) 163/113 (12/02/19 1103)    Temp      Pulse 89 (12/02/19 1103)   Resp 16 (12/02/19 1103)    SpO2 98 % (12/02/19 1103)

## 2019-12-02 NOTE — DISCHARGE INSTRUCTIONS
Upper Endoscopy   WHAT YOU NEED TO KNOW:   An upper endoscopy is also called an upper gastrointestinal (GI) endoscopy, or an esophagogastroduodenoscopy (EGD)  You may feel bloated, gassy, or have some abdominal discomfort after your procedure  Your throat may be sore for 24 to 36 hours  You may burp or pass gas from air that is still inside your body  DISCHARGE INSTRUCTIONS:   Call 911 if:   · You have sudden chest pain or trouble breathing  Seek care immediately if:   · You feel dizzy or faint  · You have trouble swallowing  · You have severe throat pain  · Your bowel movements are very dark or black  · Your abdomen is hard and firm and you have severe pain  · You vomit blood  Contact your healthcare provider if:   · You feel full or bloated and cannot burp or pass gas  · You have not had a bowel movement for 3 days after your procedure  · You have neck pain  · You have a fever or chills  · You have nausea or are vomiting  · You have a rash or hives  · You have questions or concerns about your endoscopy  Relieve a sore throat:  Suck on throat lozenges or crushed ice  Gargle with a small amount of warm salt water  Mix 1 teaspoon of salt and 1 cup of warm water to make salt water  Relieve gas and discomfort from bloating:  Lie on your right side with a heating pad on your abdomen  Take short walks to help pass gas  Eat small meals until bloating is relieved  Rest after your procedure:  Do not drive or make important decisions until the day after your procedure  Return to your normal activity as directed  You can usually return to work the day after your procedure  Follow up with your healthcare provider as directed:  Write down your questions so you remember to ask them during your visits  © 2017 [employer]  Information is for End User's use only and may not be sold, redistributed or otherwise used for commercial purposes   All illustrations and images included in [employer] are the copyrighted property of [employer]  or Eleazar Patterson  The above information is an  only  It is not intended as medical advice for individual conditions or treatments  Talk to your doctor, nurse or pharmacist before following any medical regimen to see if it is safe and effective for you
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
## 2019-12-02 NOTE — INTERVAL H&P NOTE
H&P reviewed  After examining the patient I find no changes in the patients condition since the H&P had been written      Vitals:    12/02/19 0951   BP: 161/99   Pulse: 85   Resp: 15   Temp: 97 8 °F (36 6 °C)   SpO2: 97%

## 2019-12-06 ENCOUNTER — TELEPHONE (OUTPATIENT)
Dept: GASTROENTEROLOGY | Facility: CLINIC | Age: 49
End: 2019-12-06

## 2019-12-06 NOTE — TELEPHONE ENCOUNTER
----- Message from Valentina Mistry MD sent at 12/6/2019  3:19 PM EST -----  Please tell him that all of the biopsies were negative

## 2019-12-06 NOTE — TELEPHONE ENCOUNTER
Spoke with patient advised biopsies were negative  Patient voiced understanding and did not have any additional questions at this time

## 2019-12-10 ENCOUNTER — TELEPHONE (OUTPATIENT)
Dept: GASTROENTEROLOGY | Facility: CLINIC | Age: 49
End: 2019-12-10

## 2019-12-10 ENCOUNTER — OFFICE VISIT (OUTPATIENT)
Dept: FAMILY MEDICINE CLINIC | Facility: MEDICAL CENTER | Age: 49
End: 2019-12-10
Payer: COMMERCIAL

## 2019-12-10 VITALS
SYSTOLIC BLOOD PRESSURE: 120 MMHG | HEART RATE: 98 BPM | BODY MASS INDEX: 25.49 KG/M2 | DIASTOLIC BLOOD PRESSURE: 78 MMHG | OXYGEN SATURATION: 100 % | WEIGHT: 209.4 LBS

## 2019-12-10 DIAGNOSIS — K29.70 HELICOBACTER POSITIVE GASTRITIS: Primary | ICD-10-CM

## 2019-12-10 DIAGNOSIS — E78.2 ELEVATED CHOLESTEROL WITH ELEVATED TRIGLYCERIDES: ICD-10-CM

## 2019-12-10 DIAGNOSIS — E78.5 DYSLIPIDEMIA: ICD-10-CM

## 2019-12-10 DIAGNOSIS — I10 ESSENTIAL HYPERTENSION: ICD-10-CM

## 2019-12-10 DIAGNOSIS — B96.81 HELICOBACTER POSITIVE GASTRITIS: Primary | ICD-10-CM

## 2019-12-10 DIAGNOSIS — K21.9 GASTROESOPHAGEAL REFLUX DISEASE, ESOPHAGITIS PRESENCE NOT SPECIFIED: Primary | ICD-10-CM

## 2019-12-10 PROCEDURE — 99214 OFFICE O/P EST MOD 30 MIN: CPT | Performed by: FAMILY MEDICINE

## 2019-12-10 PROCEDURE — 4004F PT TOBACCO SCREEN RCVD TLK: CPT | Performed by: FAMILY MEDICINE

## 2019-12-10 PROCEDURE — 3074F SYST BP LT 130 MM HG: CPT | Performed by: FAMILY MEDICINE

## 2019-12-10 PROCEDURE — 3078F DIAST BP <80 MM HG: CPT | Performed by: FAMILY MEDICINE

## 2019-12-10 RX ORDER — TETRACYCLINE HYDROCHLORIDE 500 MG/1
500 CAPSULE ORAL 4 TIMES DAILY
Qty: 40 CAPSULE | Refills: 0 | Status: SHIPPED | OUTPATIENT
Start: 2019-12-10 | End: 2019-12-20

## 2019-12-10 RX ORDER — LISINOPRIL 20 MG/1
20 TABLET ORAL DAILY
Qty: 30 TABLET | Refills: 1 | Status: SHIPPED | OUTPATIENT
Start: 2019-12-10 | End: 2020-02-07

## 2019-12-10 RX ORDER — ATORVASTATIN CALCIUM 10 MG/1
10 TABLET, FILM COATED ORAL DAILY
Qty: 30 TABLET | Refills: 1 | Status: SHIPPED | OUTPATIENT
Start: 2019-12-10 | End: 2020-04-09 | Stop reason: SDUPTHER

## 2019-12-10 RX ORDER — METRONIDAZOLE 500 MG/1
500 TABLET ORAL EVERY 8 HOURS SCHEDULED
Qty: 30 TABLET | Refills: 0 | Status: SHIPPED | OUTPATIENT
Start: 2019-12-10 | End: 2019-12-20

## 2019-12-10 NOTE — TELEPHONE ENCOUNTER
----- Message from Lainey Valle MD sent at 12/10/2019  9:56 AM EST -----  We spoke; will treat with quadruple therapy with Flagyl tetracycline bismuth and Protonix for 10 days; please send stool H pylori antigen to him for 2 months from now

## 2019-12-10 NOTE — PROGRESS NOTES
Assessment/Plan:     No problem-specific Assessment & Plan notes found for this encounter  Diagnoses and all orders for this visit:    Gastroesophageal reflux disease, esophagitis presence not specified  Hospital records reviewed  GERD seen on EGD  Continue pantoprazole  Follow-up with GI  Essential hypertension  -     lisinopril (ZESTRIL) 20 mg tablet; Take 1 tablet (20 mg total) by mouth daily  Blood pressure well controlled in the office today  Review of labs shows stable renal function  Continue lisinopril  Elevated cholesterol with elevated triglycerides  -     atorvastatin (LIPITOR) 10 mg tablet; Take 1 tablet (10 mg total) by mouth daily  Continue statin therapy for lipid lowering  Age-appropriate vaccinations highly recommended but patient declined  Follow-up in six months or sooner if needed  Subjective:     Patient ID: Amaris Gordillo is a 52 y o  male  Patient presents for hospital follow-up  He has GERD  Recently seen in the emergency room I have he was having chest pain and he thought was secondary to his heart  Patient was subsequently admitted and he was told it is his stomach that is causing the pain  He was started on pantoprazole and symptoms have essentially resolved  He did have an upper endoscopy while hospitalized  He has hypertension  Blood pressure remained well controlled while he was in the hospital   He is on lisinopril 20 mg daily  He has hyperlipidemia  Currently treated with atorvastatin 10 mg daily  Review of Systems   Constitutional: Negative for fever  Respiratory: Negative for shortness of breath  Cardiovascular: Negative for chest pain  Gastrointestinal: Negative for abdominal pain  Objective:     Physical Exam   Constitutional: He appears well-developed and well-nourished  Cardiovascular: Normal rate, regular rhythm and normal heart sounds     Pulmonary/Chest: Effort normal and breath sounds normal  No respiratory distress  Vitals:    12/10/19 0921   BP: 120/78   BP Location: Left arm   Patient Position: Sitting   Cuff Size: Adult   Pulse: 98   SpO2: 100%   Weight: 95 kg (209 lb 6 4 oz)       Transitional Care Management Review:  Holli Hartley is a 52 y o  male here for TCM follow up  During the TCM phone call patient stated:    TCM Call (since 11/9/2019)     Date and time call was made  11/11/2019  2:00 PM    Hospital care reviewed  Records reviewed    Patient was hospitialized at  [facility]    Date of Admission  11/07/19    Date of discharge  11/08/19    Diagnosis  acute pancreatitis    Disposition  Home    Were the patients medications reviewed and updated  No    Current Symptoms  None      TCM Call (since 11/9/2019)     Post hospital issues  None    Should patient be enrolled in anticoag monitoring? No    Scheduled for follow up? Yes    Did you obtain your prescribed medications  No    Why were you unable to obtain your medications  Per pt meds were sent with incorrect dosage but pharmacy reached out to fix this       Do you need help managing your prescriptions or medications  No    Is transportation to your appointment needed  No    I have advised the patient to call PCP with any new or worsening symptoms  KATHRIN Orosco,

## 2019-12-11 DIAGNOSIS — E78.5 DYSLIPIDEMIA: ICD-10-CM

## 2019-12-12 ENCOUNTER — TELEPHONE (OUTPATIENT)
Dept: GASTROENTEROLOGY | Facility: CLINIC | Age: 49
End: 2019-12-12

## 2019-12-12 DIAGNOSIS — I10 ESSENTIAL HYPERTENSION: ICD-10-CM

## 2019-12-12 RX ORDER — ATORVASTATIN CALCIUM 10 MG/1
TABLET, FILM COATED ORAL
Qty: 90 TABLET | Refills: 0 | OUTPATIENT
Start: 2019-12-12

## 2019-12-12 RX ORDER — LISINOPRIL 20 MG/1
TABLET ORAL
Qty: 90 TABLET | Refills: 1 | OUTPATIENT
Start: 2019-12-12

## 2019-12-12 RX ORDER — ASPIRIN 81 MG/1
TABLET ORAL
Qty: 90 TABLET | Refills: 0 | OUTPATIENT
Start: 2019-12-12

## 2019-12-12 NOTE — TELEPHONE ENCOUNTER
Misa Chew pt - Pt has some questions regarding what Dr Misa Chew found on the biopsy  Please call [phone]   Ty

## 2019-12-13 ENCOUNTER — TELEPHONE (OUTPATIENT)
Dept: NEUROSURGERY | Facility: CLINIC | Age: 49
End: 2019-12-13

## 2019-12-13 NOTE — TELEPHONE ENCOUNTER
Spoke with patient he is concerned because he read on the Internet that H pylori is contagious  I advised patient per Dr Wolf Ballesteros that this is not contagious  I advised patient of this  He stated you should go on the Internet and read what it  Says  I advised that is why we tell patients not to go on the Internet  I advised patient to use proper hygiene and to complete the medication that was given and repeat stool studies 2 months  He voiced understanding and did not have any additional questions at this time
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
## 2019-12-18 PROBLEM — J34.89 NASAL VALVE COLLAPSE: Status: ACTIVE | Noted: 2019-12-18

## 2019-12-18 PROBLEM — M95.0 NASAL VALVE COLLAPSE: Status: ACTIVE | Noted: 2019-12-18

## 2020-01-07 ENCOUNTER — TELEPHONE (OUTPATIENT)
Dept: GASTROENTEROLOGY | Facility: CLINIC | Age: 50
End: 2020-01-07

## 2020-01-07 NOTE — TELEPHONE ENCOUNTER
Dr Stalin Finnegan - patient called lmom - patient finished his medication for H  Plyori  How will the patient know if he is cleared  Will we order a stool test for patient   Please call Oren at [phone]

## 2020-01-07 NOTE — TELEPHONE ENCOUNTER
Spoke with patient advised he should have H  Pylori testing done in February and I explained depending on the results is how it is determined if patient is clear of the H  pylori He voiced understanding and did not have any further questions

## 2020-01-08 ENCOUNTER — HOSPITAL ENCOUNTER (OUTPATIENT)
Dept: MRI IMAGING | Facility: HOSPITAL | Age: 50
Discharge: HOME | End: 2020-01-08
Attending: NEUROLOGICAL SURGERY
Payer: COMMERCIAL

## 2020-01-08 ENCOUNTER — HOSPITAL ENCOUNTER (OUTPATIENT)
Dept: CT IMAGING | Facility: HOSPITAL | Age: 50
Discharge: HOME | End: 2020-01-08
Attending: NEUROLOGICAL SURGERY
Payer: COMMERCIAL

## 2020-01-08 DIAGNOSIS — M54.12 RADICULOPATHY, CERVICAL: ICD-10-CM

## 2020-01-08 DIAGNOSIS — Z98.1 S/P CERVICAL SPINAL FUSION: ICD-10-CM

## 2020-01-08 PROCEDURE — 72141 MRI NECK SPINE W/O DYE: CPT

## 2020-01-08 PROCEDURE — 72125 CT NECK SPINE W/O DYE: CPT

## 2020-01-08 NOTE — PRE-PROCEDURE INSTRUCTIONS
Pre-Surgery Instructions:   Medication Instructions    atorvastatin (LIPITOR) 10 mg tablet Instructed patient per Anesthesia Guidelines   lisinopril (ZESTRIL) 20 mg tablet Instructed patient per Anesthesia Guidelines   pantoprazole (PROTONIX) 40 mg tablet Instructed patient per Anesthesia Guidelines      Pre-op medication, and showering instructions with antibacteral soap reviewed

## 2020-01-09 ENCOUNTER — TELEPHONE (OUTPATIENT)
Dept: NEUROSURGERY | Facility: CLINIC | Age: 50
End: 2020-01-09

## 2020-01-09 DIAGNOSIS — M54.12 CERVICAL RADICULOPATHY: Primary | ICD-10-CM

## 2020-01-09 DIAGNOSIS — M54.2 CERVICALGIA: ICD-10-CM

## 2020-01-09 NOTE — TELEPHONE ENCOUNTER
Its okay think the images are adequate  I would have violetta set him up for steroid injections with pain management if possible

## 2020-01-09 NOTE — TELEPHONE ENCOUNTER
Patient left [facility] that he went for MRI yesterday but was not able to complete it because of pain in his neck  Patient is asking if the images that were captured enough for you to determine treatment or does he need to do it again? If he needs to get it done again he will need another script

## 2020-01-10 NOTE — TELEPHONE ENCOUNTER
Patient called back on nurse line  Informed patient of Dr Tawanna Herman recommendation  I placed script for S&P referral and provided patient with their phone number as well

## 2020-01-11 DIAGNOSIS — E78.2 ELEVATED CHOLESTEROL WITH ELEVATED TRIGLYCERIDES: ICD-10-CM

## 2020-01-13 ENCOUNTER — ANESTHESIA EVENT (OUTPATIENT)
Dept: PERIOP | Facility: AMBULARY SURGERY CENTER | Age: 50
End: 2020-01-13
Payer: COMMERCIAL

## 2020-01-13 RX ORDER — ATORVASTATIN CALCIUM 10 MG/1
TABLET, FILM COATED ORAL
Qty: 90 TABLET | Refills: 0 | OUTPATIENT
Start: 2020-01-13

## 2020-01-17 ENCOUNTER — TELEPHONE (OUTPATIENT)
Dept: PAIN MEDICINE | Facility: CLINIC | Age: 50
End: 2020-01-17

## 2020-01-17 NOTE — TELEPHONE ENCOUNTER
MD Kasi Hernandez   Cc: Marigene Plants              YES  PLEASE SCHEDULE    Previous Messages      ----- Message -----   From: Kasi Dominique    Sent: 1/16/2020   3:12 PM EST   To: Dariela Hernandez MD, Marigene Plants   Subject: patient being re- ref                            Good afternoon Dr Marilyn Salomon! Patient Ketan Jane is being re referred to our practice by Dr Dee De León (Neuro) to be re evaluated to discuss injections  This patient was a patient of Dr Pema Gallego but moved to  area  We discussed a KATY back in May  In patients chart on 5/22/19- Dr Pema Gallego said that CAMPBELL GUTIERREZS was ok but to have you look at patients last UDS  Your response was DNS due to inconsistent UDS  Are you willing to see this patient for interventional therapy? Please advise  Thank you       (Please reply to all)

## 2020-01-17 NOTE — TELEPHONE ENCOUNTER
FOUND FORM IN RED PROVIDER FOLDER  I REVIEWED IT AND FAXED TO [facility] OFFICE [phone]   CONFIRMATION RCVD 0

## 2020-01-17 NOTE — TELEPHONE ENCOUNTER
----- Message from Ben Finnegan MD sent at 1/16/2020  3:32 PM EST -----  Regarding: RE: patient being re- ref  YES  PLEASE SCHEDULE  ----- Message -----  From: Belkys Henley  Sent: 1/16/2020   3:12 PM EST  To: Ben Finnegan MD, Gee Sanders MA  Subject: patient being re- ref  Good afternoon Dr Leonel Judge! Patient Kriss Valdivia is being re referred to our practice by Dr Elise Carrillo (Neuro) to be re evaluated to discuss injections  This patient was a patient of Dr Vazquez Lezama but moved to  area  We discussed a KATY back in May  In patients chart on 5/22/19- Dr Vazquez Lezama said that ADRIAN SILVA was ok but to have you look at patients last UDS  Your response was DNS due to inconsistent UDS  Are you willing to see this patient for interventional therapy? Please advise  Thank you       (Please reply to all)

## 2020-01-27 ENCOUNTER — HOSPITAL ENCOUNTER (OUTPATIENT)
Facility: AMBULARY SURGERY CENTER | Age: 50
Setting detail: OUTPATIENT SURGERY
Discharge: HOME | End: 2020-01-27
Attending: OTOLARYNGOLOGY | Admitting: OTOLARYNGOLOGY
Payer: COMMERCIAL

## 2020-01-27 ENCOUNTER — ANESTHESIA (OUTPATIENT)
Dept: PERIOP | Facility: AMBULARY SURGERY CENTER | Age: 50
End: 2020-01-27
Payer: COMMERCIAL

## 2020-01-27 VITALS
HEART RATE: 71 BPM | WEIGHT: 209 LBS | BODY MASS INDEX: 25.45 KG/M2 | DIASTOLIC BLOOD PRESSURE: 86 MMHG | OXYGEN SATURATION: 100 % | TEMPERATURE: 97.7 F | RESPIRATION RATE: 18 BRPM | SYSTOLIC BLOOD PRESSURE: 150 MMHG | HEIGHT: 76 IN

## 2020-01-27 DIAGNOSIS — S02.2XXS CLOSED DISPLACED FRACTURE OF NASAL BONE, SEQUELA: Primary | ICD-10-CM

## 2020-01-27 DIAGNOSIS — J34.89 NASAL VALVE COLLAPSE: ICD-10-CM

## 2020-01-27 PROCEDURE — 30435 REVISION OF NOSE: CPT | Performed by: OTOLARYNGOLOGY

## 2020-01-27 PROCEDURE — 30465 REPAIR NASAL STENOSIS: CPT | Performed by: OTOLARYNGOLOGY

## 2020-01-27 RX ORDER — HYDROMORPHONE HCL/PF 1 MG/ML
0.2 SYRINGE (ML) INJECTION
Status: DISCONTINUED | OUTPATIENT
Start: 2020-01-27 | End: 2020-01-27 | Stop reason: HOSPADM

## 2020-01-27 RX ORDER — SODIUM CHLORIDE, SODIUM LACTATE, POTASSIUM CHLORIDE, CALCIUM CHLORIDE 600; 310; 30; 20 MG/100ML; MG/100ML; MG/100ML; MG/100ML
INJECTION, SOLUTION INTRAVENOUS CONTINUOUS PRN
Status: DISCONTINUED | OUTPATIENT
Start: 2020-01-27 | End: 2020-01-27 | Stop reason: SURG

## 2020-01-27 RX ORDER — NEOSTIGMINE METHYLSULFATE 1 MG/ML
INJECTION INTRAVENOUS AS NEEDED
Status: DISCONTINUED | OUTPATIENT
Start: 2020-01-27 | End: 2020-01-27 | Stop reason: SURG

## 2020-01-27 RX ORDER — CLINDAMYCIN PHOSPHATE 900 MG/50ML
INJECTION INTRAVENOUS AS NEEDED
Status: DISCONTINUED | OUTPATIENT
Start: 2020-01-27 | End: 2020-01-27 | Stop reason: SURG

## 2020-01-27 RX ORDER — ONDANSETRON 2 MG/ML
INJECTION INTRAMUSCULAR; INTRAVENOUS AS NEEDED
Status: DISCONTINUED | OUTPATIENT
Start: 2020-01-27 | End: 2020-01-27 | Stop reason: SURG

## 2020-01-27 RX ORDER — FENTANYL CITRATE/PF 50 MCG/ML
50 SYRINGE (ML) INJECTION
Status: DISCONTINUED | OUTPATIENT
Start: 2020-01-27 | End: 2020-01-27 | Stop reason: HOSPADM

## 2020-01-27 RX ORDER — ONDANSETRON 2 MG/ML
4 INJECTION INTRAMUSCULAR; INTRAVENOUS EVERY 6 HOURS PRN
Status: DISCONTINUED | OUTPATIENT
Start: 2020-01-27 | End: 2020-01-27 | Stop reason: HOSPADM

## 2020-01-27 RX ORDER — DEXMEDETOMIDINE HYDROCHLORIDE 100 UG/ML
INJECTION, SOLUTION INTRAVENOUS AS NEEDED
Status: DISCONTINUED | OUTPATIENT
Start: 2020-01-27 | End: 2020-01-27 | Stop reason: SURG

## 2020-01-27 RX ORDER — GLYCOPYRROLATE 0.2 MG/ML
INJECTION INTRAMUSCULAR; INTRAVENOUS AS NEEDED
Status: DISCONTINUED | OUTPATIENT
Start: 2020-01-27 | End: 2020-01-27 | Stop reason: SURG

## 2020-01-27 RX ORDER — PROPOFOL 10 MG/ML
INJECTION, EMULSION INTRAVENOUS AS NEEDED
Status: DISCONTINUED | OUTPATIENT
Start: 2020-01-27 | End: 2020-01-27 | Stop reason: SURG

## 2020-01-27 RX ORDER — HYDROCODONE BITARTRATE AND ACETAMINOPHEN 5; 325 MG/1; MG/1
1 TABLET ORAL EVERY 4 HOURS PRN
Qty: 18 TABLET | Refills: 0 | Status: SHIPPED | OUTPATIENT
Start: 2020-01-27 | End: 2020-04-09

## 2020-01-27 RX ORDER — SODIUM CHLORIDE, SODIUM LACTATE, POTASSIUM CHLORIDE, CALCIUM CHLORIDE 600; 310; 30; 20 MG/100ML; MG/100ML; MG/100ML; MG/100ML
125 INJECTION, SOLUTION INTRAVENOUS CONTINUOUS
Status: DISCONTINUED | OUTPATIENT
Start: 2020-01-27 | End: 2020-01-27 | Stop reason: HOSPADM

## 2020-01-27 RX ORDER — METOCLOPRAMIDE HYDROCHLORIDE 5 MG/ML
10 INJECTION INTRAMUSCULAR; INTRAVENOUS ONCE AS NEEDED
Status: DISCONTINUED | OUTPATIENT
Start: 2020-01-27 | End: 2020-01-27 | Stop reason: HOSPADM

## 2020-01-27 RX ORDER — ALBUTEROL SULFATE 2.5 MG/3ML
2.5 SOLUTION RESPIRATORY (INHALATION) ONCE AS NEEDED
Status: DISCONTINUED | OUTPATIENT
Start: 2020-01-27 | End: 2020-01-27 | Stop reason: HOSPADM

## 2020-01-27 RX ORDER — LIDOCAINE HYDROCHLORIDE AND EPINEPHRINE 10; 10 MG/ML; UG/ML
INJECTION, SOLUTION INFILTRATION; PERINEURAL AS NEEDED
Status: DISCONTINUED | OUTPATIENT
Start: 2020-01-27 | End: 2020-01-27 | Stop reason: HOSPADM

## 2020-01-27 RX ORDER — SUCCINYLCHOLINE/SOD CL,ISO/PF 100 MG/5ML
SYRINGE (ML) INTRAVENOUS AS NEEDED
Status: DISCONTINUED | OUTPATIENT
Start: 2020-01-27 | End: 2020-01-27 | Stop reason: SURG

## 2020-01-27 RX ORDER — ONDANSETRON 2 MG/ML
4 INJECTION INTRAMUSCULAR; INTRAVENOUS ONCE AS NEEDED
Status: DISCONTINUED | OUTPATIENT
Start: 2020-01-27 | End: 2020-01-27 | Stop reason: HOSPADM

## 2020-01-27 RX ORDER — ASPIRIN 81 MG/1
81 TABLET, CHEWABLE ORAL DAILY
COMMUNITY
End: 2020-01-27 | Stop reason: HOSPADM

## 2020-01-27 RX ORDER — MAGNESIUM HYDROXIDE 1200 MG/15ML
LIQUID ORAL AS NEEDED
Status: DISCONTINUED | OUTPATIENT
Start: 2020-01-27 | End: 2020-01-27 | Stop reason: HOSPADM

## 2020-01-27 RX ORDER — LIDOCAINE HYDROCHLORIDE 10 MG/ML
INJECTION, SOLUTION EPIDURAL; INFILTRATION; INTRACAUDAL; PERINEURAL AS NEEDED
Status: DISCONTINUED | OUTPATIENT
Start: 2020-01-27 | End: 2020-01-27 | Stop reason: SURG

## 2020-01-27 RX ORDER — HYDROCODONE BITARTRATE AND ACETAMINOPHEN 5; 325 MG/1; MG/1
2 TABLET ORAL EVERY 6 HOURS PRN
Status: DISCONTINUED | OUTPATIENT
Start: 2020-01-27 | End: 2020-01-27 | Stop reason: HOSPADM

## 2020-01-27 RX ORDER — OXYMETAZOLINE HYDROCHLORIDE 0.05 G/100ML
SPRAY NASAL AS NEEDED
Status: DISCONTINUED | OUTPATIENT
Start: 2020-01-27 | End: 2020-01-27 | Stop reason: HOSPADM

## 2020-01-27 RX ORDER — FENTANYL CITRATE 50 UG/ML
INJECTION, SOLUTION INTRAMUSCULAR; INTRAVENOUS AS NEEDED
Status: DISCONTINUED | OUTPATIENT
Start: 2020-01-27 | End: 2020-01-27 | Stop reason: SURG

## 2020-01-27 RX ORDER — ROCURONIUM BROMIDE 10 MG/ML
INJECTION, SOLUTION INTRAVENOUS AS NEEDED
Status: DISCONTINUED | OUTPATIENT
Start: 2020-01-27 | End: 2020-01-27 | Stop reason: SURG

## 2020-01-27 RX ORDER — DEXAMETHASONE SODIUM PHOSPHATE 10 MG/ML
INJECTION, SOLUTION INTRAMUSCULAR; INTRAVENOUS AS NEEDED
Status: DISCONTINUED | OUTPATIENT
Start: 2020-01-27 | End: 2020-01-27 | Stop reason: SURG

## 2020-01-27 RX ORDER — MIDAZOLAM HYDROCHLORIDE 2 MG/2ML
INJECTION, SOLUTION INTRAMUSCULAR; INTRAVENOUS AS NEEDED
Status: DISCONTINUED | OUTPATIENT
Start: 2020-01-27 | End: 2020-01-27 | Stop reason: SURG

## 2020-01-27 RX ORDER — CLINDAMYCIN HYDROCHLORIDE 150 MG/1
300 CAPSULE ORAL EVERY 8 HOURS SCHEDULED
Qty: 42 CAPSULE | Refills: 0 | Status: SHIPPED | OUTPATIENT
Start: 2020-01-27 | End: 2020-02-03

## 2020-01-27 RX ADMIN — FENTANYL CITRATE 50 MCG: 50 INJECTION, SOLUTION INTRAMUSCULAR; INTRAVENOUS at 11:44

## 2020-01-27 RX ADMIN — GLYCOPYRROLATE 0.8 MG: 0.2 INJECTION, SOLUTION INTRAMUSCULAR; INTRAVENOUS at 12:26

## 2020-01-27 RX ADMIN — LIDOCAINE HYDROCHLORIDE 100 MG: 10 INJECTION, SOLUTION EPIDURAL; INFILTRATION; INTRACAUDAL; PERINEURAL at 11:44

## 2020-01-27 RX ADMIN — DEXMEDETOMIDINE HYDROCHLORIDE 12 MCG: 100 INJECTION, SOLUTION INTRAVENOUS at 12:51

## 2020-01-27 RX ADMIN — HYDROCODONE BITARTRATE AND ACETAMINOPHEN 2 TABLET: 5; 325 TABLET ORAL at 14:24

## 2020-01-27 RX ADMIN — PROPOFOL 300 MG: 10 INJECTION, EMULSION INTRAVENOUS at 11:44

## 2020-01-27 RX ADMIN — ONDANSETRON 4 MG: 2 INJECTION INTRAMUSCULAR; INTRAVENOUS at 11:44

## 2020-01-27 RX ADMIN — HYDROMORPHONE HYDROCHLORIDE 0.2 MG: 1 INJECTION, SOLUTION INTRAMUSCULAR; INTRAVENOUS; SUBCUTANEOUS at 13:35

## 2020-01-27 RX ADMIN — DEXMEDETOMIDINE HYDROCHLORIDE 12 MCG: 100 INJECTION, SOLUTION INTRAVENOUS at 12:01

## 2020-01-27 RX ADMIN — FENTANYL CITRATE 50 MCG: 50 INJECTION, SOLUTION INTRAMUSCULAR; INTRAVENOUS at 11:56

## 2020-01-27 RX ADMIN — CLINDAMYCIN PHOSPHATE 900 MG: 18 INJECTION, SOLUTION INTRAMUSCULAR; INTRAVENOUS at 11:58

## 2020-01-27 RX ADMIN — FENTANYL CITRATE 50 MCG: 50 INJECTION, SOLUTION INTRAMUSCULAR; INTRAVENOUS at 13:07

## 2020-01-27 RX ADMIN — SODIUM CHLORIDE, SODIUM LACTATE, POTASSIUM CHLORIDE, AND CALCIUM CHLORIDE: .6; .31; .03; .02 INJECTION, SOLUTION INTRAVENOUS at 11:42

## 2020-01-27 RX ADMIN — FENTANYL CITRATE 50 MCG: 50 INJECTION, SOLUTION INTRAMUSCULAR; INTRAVENOUS at 13:17

## 2020-01-27 RX ADMIN — Medication 100 MG: at 11:44

## 2020-01-27 RX ADMIN — DEXAMETHASONE SODIUM PHOSPHATE 8 MG: 10 INJECTION, SOLUTION INTRAMUSCULAR; INTRAVENOUS at 11:44

## 2020-01-27 RX ADMIN — ROCURONIUM BROMIDE 40 MG: 10 SOLUTION INTRAVENOUS at 11:53

## 2020-01-27 RX ADMIN — ROCURONIUM BROMIDE 10 MG: 10 SOLUTION INTRAVENOUS at 11:44

## 2020-01-27 RX ADMIN — NEOSTIGMINE METHYLSULFATE 5 MG: 1 INJECTION INTRAVENOUS at 12:26

## 2020-01-27 RX ADMIN — HYDROMORPHONE HYDROCHLORIDE 0.2 MG: 1 INJECTION, SOLUTION INTRAMUSCULAR; INTRAVENOUS; SUBCUTANEOUS at 13:28

## 2020-01-27 RX ADMIN — MIDAZOLAM HYDROCHLORIDE 2 MG: 1 INJECTION, SOLUTION INTRAMUSCULAR; INTRAVENOUS at 11:41

## 2020-01-27 NOTE — DISCHARGE INSTRUCTIONS
Nasal Fracture   WHAT YOU NEED TO KNOW:   A nasal fracture is a crack or break in your nose  You may have a break in the upper nose (bridge), the side, or in the septum  The septum is in the middle of the nose and divides your nostrils  Nasal fractures are caused by a hard hit to the nose  They may be caused by a motor vehicle crash, sports injury, fall, or a fight  DISCHARGE INSTRUCTIONS:   Return to the emergency department if:   · You feel like one or both of your nasal passages are blocked and you have trouble breathing  · Clear fluid is leaking from your nose  · Your have severe nose pain, even after you take medicine  · You have double vision or have problems moving your eyes  Contact your healthcare provider if:   · You have a fever  · You continue to have nosebleeds  · You have a headache that gets worse, even after you take pain medicine  · Your splint or packing are loose  · You have questions about your condition or care  Medicines:   · Medicine  may be given to decrease pain or help prevent a bacterial infection  Ask how to take pain medicine safely  Medicine may also be given to decrease nasal swelling and help make breathing easier  · Take your medicine as directed  Contact your healthcare provider if you think your medicine is not helping or if you have side effects  Tell him or her if you are allergic to any medicine  Keep a list of the medicines, vitamins, and herbs you take  Include the amounts, and when and why you take them  Bring the list or the pill bottles to follow-up visits  Carry your medicine list with you in case of an emergency  Wound care:  Ask your healthcare provider how to care for your wounds, splint, or packing  How to care for your nasal fracture:   · Apply ice  on your nose for 15 to 20 minutes every hour or as directed  Use an ice pack, or put crushed ice in a plastic bag  Cover it with a towel   Ice helps prevent tissue damage and decreases swelling and pain  · Elevate  your head when you lie down  This will help decrease swelling and pain  You may need to see a specialist 3 to 5 days later for tests or more treatment after swelling has decreased  · Protect your nose  to prevent bleeding, bruising, or another fracture  Try not to bump your nose on anything  You may not be able to participate in sports for up to 6 weeks  Follow up with a specialist or your healthcare provider in 2 to 5 days as directed:  Write down any questions you have so you remember to ask them during your visits  Sometimes follow-up care is needed months or even years later to correct problems  © 2017 [facility] Information is for End User's use only and may not be sold, redistributed or otherwise used for commercial purposes  All illustrations and images included in CareNotes® are the copyrighted property of A D A M , Inc  or Eleazar Patterson  The above information is an  only  It is not intended as medical advice for individual conditions or treatments  Talk to your doctor, nurse or pharmacist before following any medical regimen to see if it is safe and effective for you

## 2020-01-27 NOTE — ANESTHESIA POSTPROCEDURE EVALUATION
Post-Op Assessment Note    CV Status:  Stable    Pain management: adequate     Mental Status:  Sleepy   Hydration Status:  Euvolemic   PONV Controlled:  Controlled   Airway Patency:  Patent   Post Op Vitals Reviewed: Yes      Staff: CRNA   Comments: Pt sleepy but arousable after receiving Precedex in OR for agitation on extuabtion, maintaining own airway, VSS on RA          BP  144/93   Temp   98 2   Pulse  82   Resp   16   SpO2   96% RA

## 2020-01-27 NOTE — OP NOTE
OPERATIVE REPORT  PATIENT NAME: Gina Hewitt    :  1970  MRN: [id number]  Pt Location: [facility] OR ROOM 06    SURGERY DATE: 2020    Surgeon(s) and Role:     Kristen Vigil MD - Primary    Preop Diagnosis:  Nasal valve collapse [J34 89]    Post-Op Diagnosis Codes:     * Nasal valve collapse [J34 89]  Nasal fracture with depressed bone right side    Procedure(s) (LRB):  Open reduction of nasal fracture  (Right)    Specimen(s):  * No specimens in log *    Estimated Blood Loss:   Minimal    Drains:  * No LDAs found *    Anesthesia Type:   General    Operative Indications:  Nasal valve collapse [J34 89]  Chronic right nasal obstruction    Operative Findings:  Very narrow nasal valve right side, the collapse originates on a depressed right nasal bone that contacts septum  Medial and laterla osteotomies with open reduction of depressed nasal bone resolved the valve abnormality  Complications:   None    Procedure and Technique:  Patient was met in the holding area positively identified  Risks benefits and alternatives were reviewed, question answer as needed  The patient signed the informed consent  The patient was transferred to the operating room and placed in supine position the operating table  General anesthesia was administered in the standard fashion by anesthesiologist and patient intubated without complications, tube taped in midline  The patient was prepped and draped in the usual clean fashion, including right article as a potential cartilage donor site  Before proceeding further, a time-out was taken during which the patients identification and planned surgical procedure were confirmed  Examination with a speculum confirmed severe obstruction on the right nasal valve, the area was then examined carefully with using the suction cannula to probe the tissues and the nasal bone was noticed to be significantly depressed, with an area virtually contacting the septum    This corresponds to the depressed nasal bone on external palpation and the concave right nasal wall  At this point decision was made to proceed with the open reduction, and reassess the nose and nasal valve once the right nasal bones are repositioned  Topical oxymetazoline applied with cotton pledgets and 1% lidocaine with 1/100,000 epinephrine was injected to the dorsum, the intended pathway of the right lateral osteotomy was also injected with 1% lidocaine with epinephrine 1:100,000  One vasoconstriction and anesthesia to take effect a stab incision was done immediately lateral to the piriform aperture bony edge on the right side with a 15 blade  Shahzad Barge was used to dissect and elevate periosteum along the pathway of the lateral osteotomy  Subsequently Afrin pledgets were removed and right medial osteotomy was done trans mucosally with a 10 mm straight osteotome  The lateral osteotomy was completed with a curved osteotome with a guide  Bones were then repositioned, this resulted in a wider nasal valve angle, as the upper lateral cartilage that was anchored and the bone was also moved laterally  External digital pressure confirmed proper spring action of the cartilages and appropriate valve opening despite gentle pressure  The area of the nasal vault was then packed with Gelfoam to prevent collapse of the bones  Septal excised cartilage harvested was carved for  grafts that were inset between septal cartilage and upper lateral cartilages and fixed with 4/0 plain gut transfixion stitches,  reinforcing and widening nasal valve bilaterally  A nasal dressing was Steri-Strip was then applied to the nose and an external metal splint applied for protection and fixation of the nasal bones  The nasopharynx was then suctioned free of blood and secretions  Patient was handed to the anesthesiologist who weaned from anesthesia, and extubated patient  All counts were correct   Patient was awakened, and taken to the recovery room in stable condition  All counts were correct at the end of the case, and no complications were encountered      I was present for the entire procedure    Patient Disposition:  PACU     SIGNATURE: Belkis Hoffman MD  DATE: January 27, 2020  TIME: 12:54 PM

## 2020-01-27 NOTE — ANESTHESIA PREPROCEDURE EVALUATION
Review of Systems/Medical History  Patient summary reviewed  Chart reviewed      Cardiovascular  Negative cardio ROS Exercise tolerance (METS): >4,  Hyperlipidemia, Hypertension controlled, No angina ,    Pulmonary  Smoker cigarette smoker  , Tobacco cessation counseling given , No sleep apnea ,        GI/Hepatic    GERD well controlled, No liver disease ,        Negative  ROS        Endo/Other  Negative endo/other ROS      GYN       Hematology  Negative hematology ROS      Musculoskeletal    Comment: Cervical fusion   Mandible fx no hardware   Arthritis     Neurology  Negative neurology ROS      Psychology   Depression ,              Physical Exam    Airway    Mallampati score: II  TM Distance: <3 FB  Neck ROM: limited     Dental       Cardiovascular  Comment: Negative ROS,     Pulmonary      Other Findings        Anesthesia Plan  ASA Score- 2     Anesthesia Type- general with ASA Monitors  Additional Monitors:   Airway Plan:     Comment: Pt worry about wake up hallucination   We make sure he wake up comfortable        Plan Factors-  Patient did not smoke on day of surgery  Induction- intravenous  Postoperative Plan-     Informed Consent- Anesthetic plan and risks discussed with patient  I personally reviewed this patient with the CRNA  Discussed and agreed on the Anesthesia Plan with the CRNA  Carl Berman

## 2020-01-27 NOTE — NURSING NOTE
Pt very noncompliant  Will not keep nasal drip pad on  Pt placed guaze inside right nares  Instructed him to take it out    Pt insisting on leaving quickly

## 2020-01-27 NOTE — H&P
Kristen Cuellar 52 y o  male MRN: [id number]  Unit/Bed#: OR [place] Encounter: [id number]            History of Present Illness     Reason for Visit[de-identified] surgery  HPI: Kristen Cuellar is a 52y o  year old male who presents with chronic nasal obstruction, intially with septal deviation, large septal perforation and onn 8/24/18 I performed septoplasty + turb + FESS + septal perf repair  He had nasal trauma and perforation recurred, managed medically  A second surgery on 8/3/19 to repair residual perforation  Currently with rigjht nasal obstruction due to valve collapse  Review of Systems  Revision of Systems:    Complete review done, only positive for the symptoms described in the H&P section above      Historical Information   Past Medical History:   Diagnosis Date    Angina pectoris ([facility] )     stable-pt denies     Ascites     told he had a "wave stomach"    Bipolar 1 disorder ([facility] )     Depression     Family history of colon cancer requiring screening colonoscopy     brother    Fatty liver     History of colon polyps     Hyperlipidemia     Hypertension     Hypothalamic hypogonadism ([facility] )     Liver disease     fatty liver    Osteomyelitis ([facility] )     Spondylosis of cervical region without myelopathy or radiculopathy 11/9/2018     Past Surgical History:   Procedure Laterality Date    BACK SURGERY  02/2019    cervical     COLONOSCOPY      FRACTURE SURGERY      jaw    [place] ARTHRODESIS ANT INTERBODY INC DISCECTOMY, CERVICAL BELOW C2 N/A 1/29/2019    Procedure: ANTERIOR CERVICAL FUSION W DISCECTOMY, C4-5, C5-6;  Surgeon: Krishan Nuñez MD;  [facility];  Service: Neurosurgery    [place] COLONOSCOPY FLX DX W/COLLJ SPEC WHEN PFRMD N/A 7/27/2017    Procedure: COLONOSCOPY;  Surgeon: Benoit Lepe MD;  [facility]; Service: Gastroenterology    [place] COLONOSCOPY FLX DX W/COLLJ [facility] WHEN PFRMD N/A 8/29/2017    Procedure: COLONOSCOPY;  Surgeon: Benoit Lepe MD;  [facility];   Service: Gastroenterology   Jessica Meléndez MA COLONOSCOPY FLX DX W/COLLJ SPEC WHEN PFRMD N/A 12/1/2017    Procedure: COLONOSCOPY;  Surgeon: Viviana Person MD;  [facility];   Service: Gastroenterology    MA NASAL/SINUS ENDOSCOPY,RMV MILDRED BULL N/A 8/24/2018    Procedure: FUNCTIONAL ENDOSCOPIC SINUS SURGERY (FESS) LEFT MILDRED;  BILATERAL  GRAFTS; EXCISION OF NASAL MUCOSAL ULCER; CLOSURE WITH ENDONASAL FLAPS;  Surgeon: Geovanna Bloom MD;  [facility];  Service: ENT    [address] [place] [address] N/A 8/2/2019    Procedure: REPAIR NASAL/SEPTAL PERFORATION W AUTOGRAFT;  Surgeon: Geovanna Bloom MD;  [facility];  Service: ENT    MA REPAIR OF NASAL SEPTUM N/A 8/24/2018    Procedure: SEPTOPLASTY;  Surgeon: Geovanna Bloom MD;  [facility];  Service: ENT    MA REPAIR OF NASAL SEPTUM N/A 8/2/2019    Procedure: SEPTOPLASTY;  Surgeon: Geovanna Bloom MD;  [facility];  Service: ENT    TURBINOPLASTY N/A 8/24/2018    Procedure: TURBINOPLASTY;  Surgeon: Geovanna Bloom MD;  [facility];  Service: ENT    TURBINOPLASTY Bilateral 8/2/2019    Procedure: TURBINOPLASTY;  Surgeon: Geovanna Bloom MD;  [facility];  Service: ENT     Social History   Social History     Substance and Sexual Activity   Alcohol Use Not Currently    Frequency: Monthly or less    Comment: rare     Social History     Substance and Sexual Activity   Drug Use Not Currently    Types: Cocaine    Comment: stopped in 2007, however did use cocaine again recently at his son's wedding     Social History     Tobacco Use   Smoking Status Heavy Tobacco Smoker    Packs/day: 0 50    Types: Cigarettes   Smokeless Tobacco Never Used   Tobacco Comment    using patch cutting back     Family History:   Family History   Problem Relation Age of Onset    Breast cancer Mother     Thyroid nodules Mother     Colon cancer Father         around age 45    Heart failure Father         congestive    No Known Problems Sister     Colon cancer Brother         around age 45  No Known Problems Maternal Grandmother     No Known Problems Maternal Grandfather     No Known Problems Paternal Grandmother     No Known Problems Paternal Grandfather        Meds/Allergies   Current Facility-Administered Medications   Medication Dose Route Frequency    lactated ringers infusion  125 mL/hr Intravenous Continuous         Allergies   Allergen Reactions    Clarithromycin Swelling    Morphine Other (See Comments)     Pt can"t recall his reaction, swelling per patient    Penicillins Swelling       Objective       Physical Exam   Blood pressure (!) 148/104, pulse 81, temperature 97 6 °F (36 4 °C), temperature source Temporal, resp  rate 18, height 6' 4" (1 93 m), weight 94 8 kg (209 lb), SpO2 99 %  Constitutional: Oriented to person, place, and time  Well-developed and well-nourished, no apparent distress, non-toxic appearance  Cooperative, able to hear and answer questions without difficulty  Voice: Normal voice quality  Head: Normocephalic, atraumatic  No scars, masses or lesions  Face: Symmetric, no edema, no sinus tenderness  Eyes: Vision grossly intact, extra-ocular movement intact  Right Ear: External ear normal   Auditory canal clear  Tympanic membrane well-appearing, without retraction or scarring  No fluid present  No post-auricular erythema or tenderness  Left Ear: External ear normal   Auditory canal clear  Tympanic membrane well-appearing, without retraction or scarring  No fluid present  No post-auricular erythema or tenderness  External exam: asymmetry on nasal pyramid basically right cartilaginous lateral wall is concave, currently collapsing on inspiration  Septum Midline, area of septal perforation repair very well healed  No crusting   Turbinates well appearing     Oral cavity: partially edentulous, Mucosa moist, lips normal   Tongue mobile  Oropharynx: Uvula is midline, sot palate normal   Unremarkable oropharyngeal inlet  Tonsils atrophic   Posterior pharyngeal wall clear  No masses or lesions  Salivary glands:  Parotid glands and submandibular glands symmetric, no enlargement or tenderness  Neck: Normal laryngeal elevation with swallow  Trachea midline  No masses or lesions  No palpable adenopathy  Thyroid: normal in size, and consistency, unremarkable without tenderness or palpable nodules  Pulmonary/Chest: Normal effort and rate  No respiratory distress  Lungs: CTAB  No stertor or stridor  CV: RRR  Good distal perfusion  Musculoskeletal: Normal range of motion  Neurological: Cranial nerves 2-12 intact  Skin: Skin is warm and dry  Psychiatric: Normal mood and affect  Assessment/Plan: s/p revision septoplasty and septal perforation repair  -excellent result of the septal perforation repair, septum is well aligned, currently having obstruction due to collapse of ala  This was not as evident when he had the septal deviation and septal perforation with crusting causing minimal flow on right vestibule      This will require reinforcement of upper lateral cartilage with a alar batten graft, also needs alar cartilage graft  May need to consider also the possibility of an osteotomy to reposition right nasal bone that also is partially depressed     Revision nasal surgery with alar and amie grafts, auricular graft, possible open reduction of nasal fracture is indicated   Risks benefits and alternatives discussed in detail   Patient decides to proceed with surgery, consent obtained, also pre op photography

## 2020-02-03 ENCOUNTER — OFFICE VISIT (OUTPATIENT)
Dept: NEUROSURGERY | Facility: CLINIC | Age: 50
End: 2020-02-03
Payer: COMMERCIAL

## 2020-02-03 VITALS
BODY MASS INDEX: 25.13 KG/M2 | TEMPERATURE: 97.8 F | HEIGHT: 76 IN | HEART RATE: 91 BPM | WEIGHT: 206.4 LBS | SYSTOLIC BLOOD PRESSURE: 140 MMHG | DIASTOLIC BLOOD PRESSURE: 100 MMHG | RESPIRATION RATE: 16 BRPM

## 2020-02-03 DIAGNOSIS — M54.12 RADICULOPATHY, CERVICAL: Primary | ICD-10-CM

## 2020-02-03 PROCEDURE — 3008F BODY MASS INDEX DOCD: CPT | Performed by: NEUROLOGICAL SURGERY

## 2020-02-03 PROCEDURE — 99213 OFFICE O/P EST LOW 20 MIN: CPT | Performed by: NEUROLOGICAL SURGERY

## 2020-02-03 PROCEDURE — 3077F SYST BP >= 140 MM HG: CPT | Performed by: NEUROLOGICAL SURGERY

## 2020-02-03 PROCEDURE — 3080F DIAST BP >= 90 MM HG: CPT | Performed by: NEUROLOGICAL SURGERY

## 2020-02-03 PROCEDURE — 4004F PT TOBACCO SCREEN RCVD TLK: CPT | Performed by: NEUROLOGICAL SURGERY

## 2020-02-03 RX ORDER — DEXAMETHASONE 2 MG/1
4 TABLET ORAL 2 TIMES DAILY WITH MEALS
Qty: 20 TABLET | Refills: 0 | Status: SHIPPED | OUTPATIENT
Start: 2020-02-03 | End: 2020-02-08
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
## 2020-02-05 PROCEDURE — 87070 CULTURE OTHR SPECIMN AEROBIC: CPT | Performed by: OTOLARYNGOLOGY

## 2020-02-05 PROCEDURE — 87205 SMEAR GRAM STAIN: CPT | Performed by: OTOLARYNGOLOGY

## 2020-02-07 ENCOUNTER — OFFICE VISIT (OUTPATIENT)
Dept: MULTI SPECIALTY CLINIC | Facility: CLINIC | Age: 50
End: 2020-02-07

## 2020-02-07 DIAGNOSIS — J34.89 NASAL VALVE COLLAPSE: Primary | ICD-10-CM

## 2020-02-07 DIAGNOSIS — I10 ESSENTIAL HYPERTENSION: ICD-10-CM

## 2020-02-07 PROCEDURE — T1015 CLINIC SERVICE: HCPCS | Performed by: OTOLARYNGOLOGY

## 2020-02-07 PROCEDURE — 3077F SYST BP >= 140 MM HG: CPT | Performed by: OTOLARYNGOLOGY

## 2020-02-07 PROCEDURE — 99024 POSTOP FOLLOW-UP VISIT: CPT | Performed by: OTOLARYNGOLOGY

## 2020-02-07 PROCEDURE — 3080F DIAST BP >= 90 MM HG: CPT | Performed by: OTOLARYNGOLOGY

## 2020-02-07 RX ORDER — LISINOPRIL 20 MG/1
TABLET ORAL
Qty: 30 TABLET | Refills: 1 | Status: SHIPPED | OUTPATIENT
Start: 2020-02-07 | End: 2020-04-09 | Stop reason: SDUPTHER

## 2020-02-10 NOTE — PROGRESS NOTES
[facility]  Yogesh Morton 52 y o  male MRN: [id number]    Octaviano Vergara MD  Office : [phone]    Progress Note:    Subjective: s/p nasal revision surgery for valve collapse, open reduction  Breathing well, reports that the gelfoam/surgicel pack came out  Physical Exam   There were no vitals taken for this visit  Constitutional: Oriented to person, place, and time  Well-developed and well-nourished, no apparent distress, non-toxic appearance  Right Ear: External ear normal   Auditory canal clear  Tympanic membrane well-appearing, no fluid present  Left Ear: External ear normal   Auditory canal clear  Tympanic membrane well-appearing, no fluid present  Nose: mild depression of right nasal bone, Topical anesthesia applied with pledgets and a merocell pack placed, repositioning the nasal bone  Septum midline, mucosa moist, turbinates well appearing  No crusting, polyps or discharge evident  Oral cavity: Dentition intact  Mucosa moist, lips normal   Tongue mobile, floor of mouth normal   Hard palate unremarkable  No masses or lesions  Oropharynx: Tonsils unremarkable  Posterior pharyngeal wall clear  No masses or lesions  Salivary glands:  , no enlargement or tenderness  Neck: Parotid glands and submandibular glands symmetric, No masses or lesions, Soft and flat  No palpable adenopathy  Trachea midline  Pulmonary/Chest: Normal effort and rate  No respiratory distress  Musculoskeletal: Normal range of motion  Neurological: Cranial nerves 2-12 intact  Assessment:  No diagnosis found  Assessment: nasal bones have not healed yet, repositioned  Plan:  1  Complete current antibiotic  2   F/u in 2 weeks

## 2020-02-11 ENCOUNTER — TELEPHONE (OUTPATIENT)
Dept: PAIN MEDICINE | Facility: CLINIC | Age: 50
End: 2020-02-11

## 2020-02-11 NOTE — TELEPHONE ENCOUNTER
----- Message from Rolanda Etienne MD sent at 2/11/2020  7:26 AM EST -----  Please schedule patient to see me for INJ  ----- Message -----  From: Raad Hewitt MD  Sent: 2/3/2020   8:31 PM EST  To: Rolanda Etienne MD, Marquita Andrews, DO

## 2020-02-14 NOTE — TELEPHONE ENCOUNTER
S/w pt and scheduled RUDDY for 2/27/20 230 pm arrival  Gave pre procedure instructions  Dr Antonietta Medina, pt had nasal surgery 2 weeks ago, ok to have RUDDY on 2/27/20 or need to schedule further out? Also, can I cxl the office consult with you on 2/21/20? Thanks

## 2020-02-24 ENCOUNTER — APPOINTMENT (OUTPATIENT)
Dept: LAB | Facility: CLINIC | Age: 50
End: 2020-02-24
Payer: COMMERCIAL

## 2020-02-24 ENCOUNTER — TRANSCRIBE ORDERS (OUTPATIENT)
Dept: LAB | Facility: CLINIC | Age: 50
End: 2020-02-24

## 2020-02-24 DIAGNOSIS — E78.2 ELEVATED CHOLESTEROL WITH ELEVATED TRIGLYCERIDES: ICD-10-CM

## 2020-02-24 DIAGNOSIS — B96.81 HELICOBACTER POSITIVE GASTRITIS: ICD-10-CM

## 2020-02-24 DIAGNOSIS — K29.70 HELICOBACTER POSITIVE GASTRITIS: ICD-10-CM

## 2020-02-24 DIAGNOSIS — J34.2 DEVIATED NASAL SEPTUM: ICD-10-CM

## 2020-02-24 LAB
ALBUMIN SERPL BCP-MCNC: 3.6 G/DL (ref 3.5–5)
ALP SERPL-CCNC: 77 U/L (ref 46–116)
ALT SERPL W P-5'-P-CCNC: 51 U/L (ref 12–78)
ANION GAP SERPL CALCULATED.3IONS-SCNC: 6 MMOL/L (ref 4–13)
AST SERPL W P-5'-P-CCNC: 29 U/L (ref 5–45)
BASOPHILS # BLD AUTO: 0.03 THOUSANDS/ΜL (ref 0–0.1)
BASOPHILS NFR BLD AUTO: 0 % (ref 0–1)
BILIRUB DIRECT SERPL-MCNC: 0.11 MG/DL (ref 0–0.2)
BILIRUB SERPL-MCNC: 0.24 MG/DL (ref 0.2–1)
BUN SERPL-MCNC: 11 MG/DL (ref 5–25)
CALCIUM SERPL-MCNC: 9.1 MG/DL (ref 8.3–10.1)
CHLORIDE SERPL-SCNC: 105 MMOL/L (ref 100–108)
CHOLEST SERPL-MCNC: 229 MG/DL (ref 50–200)
CO2 SERPL-SCNC: 34 MMOL/L (ref 21–32)
CREAT SERPL-MCNC: 0.9 MG/DL (ref 0.6–1.3)
EOSINOPHIL # BLD AUTO: 0.18 THOUSAND/ΜL (ref 0–0.61)
EOSINOPHIL NFR BLD AUTO: 3 % (ref 0–6)
ERYTHROCYTE [DISTWIDTH] IN BLOOD BY AUTOMATED COUNT: 12.9 % (ref 11.6–15.1)
GFR SERPL CREATININE-BSD FRML MDRD: 100 ML/MIN/1.73SQ M
GLUCOSE P FAST SERPL-MCNC: 107 MG/DL (ref 65–99)
HCT VFR BLD AUTO: 48.4 % (ref 36.5–49.3)
HDLC SERPL-MCNC: 56 MG/DL
HGB BLD-MCNC: 15.5 G/DL (ref 12–17)
IMM GRANULOCYTES # BLD AUTO: 0.03 THOUSAND/UL (ref 0–0.2)
IMM GRANULOCYTES NFR BLD AUTO: 0 % (ref 0–2)
LDLC SERPL CALC-MCNC: 143 MG/DL (ref 0–100)
LYMPHOCYTES # BLD AUTO: 1.85 THOUSANDS/ΜL (ref 0.6–4.47)
LYMPHOCYTES NFR BLD AUTO: 27 % (ref 14–44)
MCH RBC QN AUTO: 30.8 PG (ref 26.8–34.3)
MCHC RBC AUTO-ENTMCNC: 32 G/DL (ref 31.4–37.4)
MCV RBC AUTO: 96 FL (ref 82–98)
MONOCYTES # BLD AUTO: 0.58 THOUSAND/ΜL (ref 0.17–1.22)
MONOCYTES NFR BLD AUTO: 9 % (ref 4–12)
NEUTROPHILS # BLD AUTO: 4.14 THOUSANDS/ΜL (ref 1.85–7.62)
NEUTS SEG NFR BLD AUTO: 61 % (ref 43–75)
NRBC BLD AUTO-RTO: 0 /100 WBCS
PLATELET # BLD AUTO: 177 THOUSANDS/UL (ref 149–390)
PMV BLD AUTO: 9.6 FL (ref 8.9–12.7)
POTASSIUM SERPL-SCNC: 4.2 MMOL/L (ref 3.5–5.3)
PROT SERPL-MCNC: 6.9 G/DL (ref 6.4–8.2)
RBC # BLD AUTO: 5.03 MILLION/UL (ref 3.88–5.62)
SODIUM SERPL-SCNC: 145 MMOL/L (ref 136–145)
TRIGL SERPL-MCNC: 148 MG/DL
WBC # BLD AUTO: 6.81 THOUSAND/UL (ref 4.31–10.16)

## 2020-02-24 PROCEDURE — 87338 HPYLORI STOOL AG IA: CPT

## 2020-02-24 PROCEDURE — 80061 LIPID PANEL: CPT

## 2020-02-24 PROCEDURE — 80076 HEPATIC FUNCTION PANEL: CPT

## 2020-02-24 PROCEDURE — 80048 BASIC METABOLIC PNL TOTAL CA: CPT

## 2020-02-24 PROCEDURE — 36415 COLL VENOUS BLD VENIPUNCTURE: CPT

## 2020-02-24 PROCEDURE — 85025 COMPLETE CBC W/AUTO DIFF WBC: CPT

## 2020-02-25 ENCOUNTER — TELEPHONE (OUTPATIENT)
Dept: GASTROENTEROLOGY | Facility: CLINIC | Age: 50
End: 2020-02-25

## 2020-02-25 LAB — H PYLORI AG STL QL IA: NEGATIVE

## 2020-02-25 NOTE — TELEPHONE ENCOUNTER
----- Message from Zain Velasquez MD sent at 2/25/2020  4:27 PM EST -----  Please tell him that this is negative

## 2020-02-25 NOTE — TELEPHONE ENCOUNTER
Left a message advising patient of normal H pylori results  Advised if he has any questions to contact the office

## 2020-03-02 DIAGNOSIS — K21.00 GERD WITH ESOPHAGITIS: ICD-10-CM

## 2020-03-02 RX ORDER — PANTOPRAZOLE SODIUM 40 MG/1
TABLET, DELAYED RELEASE ORAL
Qty: 30 TABLET | Refills: 2 | Status: SHIPPED | OUTPATIENT
Start: 2020-03-02 | End: 2020-06-01

## 2020-03-06 NOTE — TELEPHONE ENCOUNTER
FYI    Pt cxl 3/10/20 RUDDY stating he already sees pain mgt for low back and will follow up with them for neck as well

## 2020-03-13 ENCOUNTER — TELEPHONE (OUTPATIENT)
Dept: OTHER | Facility: OTHER | Age: 50
End: 2020-03-13

## 2020-04-02 ENCOUNTER — TELEPHONE (OUTPATIENT)
Dept: FAMILY MEDICINE CLINIC | Facility: MEDICAL CENTER | Age: 50
End: 2020-04-02

## 2020-04-09 ENCOUNTER — TELEMEDICINE (OUTPATIENT)
Dept: FAMILY MEDICINE CLINIC | Facility: MEDICAL CENTER | Age: 50
End: 2020-04-09
Payer: COMMERCIAL

## 2020-04-09 ENCOUNTER — TELEPHONE (OUTPATIENT)
Dept: FAMILY MEDICINE CLINIC | Facility: MEDICAL CENTER | Age: 50
End: 2020-04-09

## 2020-04-09 DIAGNOSIS — E78.2 ELEVATED CHOLESTEROL WITH ELEVATED TRIGLYCERIDES: ICD-10-CM

## 2020-04-09 DIAGNOSIS — R41.3 MEMORY DIFFICULTIES: ICD-10-CM

## 2020-04-09 DIAGNOSIS — I10 ESSENTIAL HYPERTENSION: Primary | ICD-10-CM

## 2020-04-09 DIAGNOSIS — K21.9 GASTROESOPHAGEAL REFLUX DISEASE, ESOPHAGITIS PRESENCE NOT SPECIFIED: ICD-10-CM

## 2020-04-09 PROBLEM — M47.816 LUMBAR SPONDYLOSIS: Status: ACTIVE | Noted: 2020-03-06

## 2020-04-09 PROBLEM — M54.50 LOW BACK PAIN: Status: ACTIVE | Noted: 2020-02-12

## 2020-04-09 PROBLEM — M54.6 PAIN IN THORACIC SPINE: Status: ACTIVE | Noted: 2020-02-12

## 2020-04-09 PROCEDURE — 99214 OFFICE O/P EST MOD 30 MIN: CPT | Performed by: FAMILY MEDICINE

## 2020-04-09 RX ORDER — ATORVASTATIN CALCIUM 10 MG/1
10 TABLET, FILM COATED ORAL DAILY
Qty: 30 TABLET | Refills: 2 | Status: SHIPPED | OUTPATIENT
Start: 2020-04-09 | End: 2020-07-13 | Stop reason: SDUPTHER

## 2020-04-09 RX ORDER — LISINOPRIL 20 MG/1
20 TABLET ORAL DAILY
Qty: 30 TABLET | Refills: 2 | Status: SHIPPED | OUTPATIENT
Start: 2020-04-09 | End: 2020-07-13 | Stop reason: SDUPTHER

## 2020-04-17 ENCOUNTER — TELEPHONE (OUTPATIENT)
Dept: GASTROENTEROLOGY | Facility: CLINIC | Age: 50
End: 2020-04-17

## 2020-04-22 ENCOUNTER — TELEPHONE (OUTPATIENT)
Dept: FAMILY MEDICINE CLINIC | Facility: MEDICAL CENTER | Age: 50
End: 2020-04-22

## 2020-04-24 ENCOUNTER — TELEMEDICINE (OUTPATIENT)
Dept: FAMILY MEDICINE CLINIC | Facility: MEDICAL CENTER | Age: 50
End: 2020-04-24
Payer: COMMERCIAL

## 2020-04-24 DIAGNOSIS — R31.9 HEMATURIA, UNSPECIFIED TYPE: Primary | ICD-10-CM

## 2020-04-24 PROCEDURE — 99214 OFFICE O/P EST MOD 30 MIN: CPT | Performed by: FAMILY MEDICINE

## 2020-04-24 RX ORDER — TIZANIDINE 4 MG/1
TABLET ORAL
COMMUNITY
End: 2020-07-24

## 2020-04-24 RX ORDER — HYDROCODONE BITARTRATE AND ACETAMINOPHEN 5; 325 MG/1; MG/1
TABLET ORAL EVERY 8 HOURS
COMMUNITY
End: 2020-06-11 | Stop reason: ALTCHOICE

## 2020-04-24 RX ORDER — DEXAMETHASONE 2 MG/1
TABLET ORAL
COMMUNITY
End: 2020-05-20 | Stop reason: ALTCHOICE

## 2020-05-13 ENCOUNTER — HOSPITAL ENCOUNTER (OUTPATIENT)
Dept: ULTRASOUND IMAGING | Facility: HOSPITAL | Age: 50
Discharge: HOME | End: 2020-05-13
Payer: COMMERCIAL

## 2020-05-13 DIAGNOSIS — R31.9 HEMATURIA, UNSPECIFIED TYPE: ICD-10-CM

## 2020-05-13 PROBLEM — N20.0 KIDNEY STONES: Status: ACTIVE | Noted: 2020-05-13

## 2020-05-13 PROCEDURE — 76770 US EXAM ABDO BACK WALL COMP: CPT

## 2020-05-19 ENCOUNTER — TELEPHONE (OUTPATIENT)
Dept: NEUROLOGY | Facility: CLINIC | Age: 50
End: 2020-05-19

## 2020-05-20 ENCOUNTER — CONSULT (OUTPATIENT)
Dept: NEUROLOGY | Facility: CLINIC | Age: 50
End: 2020-05-20
Payer: COMMERCIAL

## 2020-05-20 ENCOUNTER — TELEPHONE (OUTPATIENT)
Dept: FAMILY MEDICINE CLINIC | Facility: MEDICAL CENTER | Age: 50
End: 2020-05-20

## 2020-05-20 VITALS
WEIGHT: 211 LBS | HEIGHT: 74 IN | SYSTOLIC BLOOD PRESSURE: 150 MMHG | HEART RATE: 84 BPM | BODY MASS INDEX: 27.08 KG/M2 | DIASTOLIC BLOOD PRESSURE: 98 MMHG

## 2020-05-20 DIAGNOSIS — G47.33 OSA (OBSTRUCTIVE SLEEP APNEA): ICD-10-CM

## 2020-05-20 DIAGNOSIS — R41.3 MEMORY DIFFICULTY: Primary | ICD-10-CM

## 2020-05-20 DIAGNOSIS — I10 ESSENTIAL HYPERTENSION: ICD-10-CM

## 2020-05-20 DIAGNOSIS — F31.9 BIPOLAR AFFECTIVE DISORDER, REMISSION STATUS UNSPECIFIED (HCC): ICD-10-CM

## 2020-05-20 PROCEDURE — 99244 OFF/OP CNSLTJ NEW/EST MOD 40: CPT | Performed by: PSYCHIATRY & NEUROLOGY

## 2020-05-20 RX ORDER — IBUPROFEN 600 MG/1
600 TABLET ORAL EVERY 6 HOURS PRN
COMMUNITY
End: 2020-12-05 | Stop reason: HOSPADM

## 2020-05-28 ENCOUNTER — APPOINTMENT (OUTPATIENT)
Dept: LAB | Facility: HOSPITAL | Age: 50
End: 2020-05-28
Attending: PSYCHIATRY & NEUROLOGY
Payer: COMMERCIAL

## 2020-05-28 DIAGNOSIS — R41.3 MEMORY DIFFICULTY: ICD-10-CM

## 2020-05-28 LAB
FOLATE SERPL-MCNC: 12.8 NG/ML (ref 3.1–17.5)
TSH SERPL DL<=0.05 MIU/L-ACNC: 1.04 UIU/ML (ref 0.36–3.74)
VIT B12 SERPL-MCNC: 190 PG/ML (ref 100–900)

## 2020-05-28 PROCEDURE — 86592 SYPHILIS TEST NON-TREP QUAL: CPT

## 2020-05-28 PROCEDURE — 36415 COLL VENOUS BLD VENIPUNCTURE: CPT

## 2020-05-28 PROCEDURE — 82607 VITAMIN B-12: CPT

## 2020-05-28 PROCEDURE — 86618 LYME DISEASE ANTIBODY: CPT

## 2020-05-28 PROCEDURE — 82746 ASSAY OF FOLIC ACID SERUM: CPT

## 2020-05-28 PROCEDURE — 84443 ASSAY THYROID STIM HORMONE: CPT

## 2020-05-29 LAB
B BURGDOR IGG+IGM SER-ACNC: <0.91 ISR (ref 0–0.9)
RPR SER QL: NORMAL

## 2020-05-30 DIAGNOSIS — K21.00 GERD WITH ESOPHAGITIS: ICD-10-CM

## 2020-06-01 RX ORDER — PANTOPRAZOLE SODIUM 40 MG/1
TABLET, DELAYED RELEASE ORAL
Qty: 30 TABLET | Refills: 2 | Status: SHIPPED | OUTPATIENT
Start: 2020-06-01 | End: 2020-11-30 | Stop reason: CLARIF

## 2020-06-02 DIAGNOSIS — F17.200 NICOTINE DEPENDENCE, UNCOMPLICATED, UNSPECIFIED NICOTINE PRODUCT TYPE: ICD-10-CM

## 2020-06-02 RX ORDER — NICOTINE 21 MG/24HR
1 PATCH, TRANSDERMAL 24 HOURS TRANSDERMAL EVERY 24 HOURS
Qty: 28 PATCH | Refills: 1 | OUTPATIENT
Start: 2020-06-02

## 2020-06-04 ENCOUNTER — TELEPHONE (OUTPATIENT)
Dept: NEUROLOGY | Facility: CLINIC | Age: 50
End: 2020-06-04

## 2020-06-09 ENCOUNTER — TELEPHONE (OUTPATIENT)
Dept: FAMILY MEDICINE CLINIC | Facility: MEDICAL CENTER | Age: 50
End: 2020-06-09

## 2020-06-11 ENCOUNTER — DOCUMENTATION (OUTPATIENT)
Dept: NEUROSURGERY | Facility: CLINIC | Age: 50
End: 2020-06-11

## 2020-06-11 ENCOUNTER — TELEPHONE (OUTPATIENT)
Dept: NEUROSURGERY | Facility: CLINIC | Age: 50
End: 2020-06-11

## 2020-06-11 DIAGNOSIS — M54.12 CERVICAL RADICULOPATHY: Primary | ICD-10-CM

## 2020-06-11 DIAGNOSIS — M54.12 NEUROPATHY, CERVICAL (RADICULAR): ICD-10-CM

## 2020-06-11 DIAGNOSIS — R20.2 PARESTHESIA OF FINGER: ICD-10-CM

## 2020-06-12 ENCOUNTER — TELEPHONE (OUTPATIENT)
Dept: NEUROLOGY | Facility: CLINIC | Age: 50
End: 2020-06-12

## 2020-06-15 ENCOUNTER — HOSPITAL ENCOUNTER (OUTPATIENT)
Dept: MRI IMAGING | Facility: HOSPITAL | Age: 50
Discharge: HOME | End: 2020-06-15
Attending: PSYCHIATRY & NEUROLOGY
Payer: COMMERCIAL

## 2020-06-15 ENCOUNTER — APPOINTMENT (OUTPATIENT)
Dept: NEUROLOGY | Facility: HOSPITAL | Age: 50
End: 2020-06-15
Attending: PSYCHIATRY & NEUROLOGY
Payer: COMMERCIAL

## 2020-06-15 DIAGNOSIS — R41.3 MEMORY DIFFICULTY: ICD-10-CM

## 2020-06-15 PROCEDURE — 70551 MRI BRAIN STEM W/O DYE: CPT

## 2020-06-16 ENCOUNTER — OFFICE VISIT (OUTPATIENT)
Dept: FAMILY MEDICINE CLINIC | Facility: MEDICAL CENTER | Age: 50
End: 2020-06-16
Payer: COMMERCIAL

## 2020-06-16 ENCOUNTER — TELEPHONE (OUTPATIENT)
Dept: NEUROSURGERY | Facility: CLINIC | Age: 50
End: 2020-06-16

## 2020-06-16 VITALS
BODY MASS INDEX: 28.21 KG/M2 | SYSTOLIC BLOOD PRESSURE: 152 MMHG | DIASTOLIC BLOOD PRESSURE: 94 MMHG | RESPIRATION RATE: 16 BRPM | HEIGHT: 74 IN | HEART RATE: 90 BPM | TEMPERATURE: 96.4 F | WEIGHT: 219.8 LBS

## 2020-06-16 DIAGNOSIS — Z80.0 FAMILY HISTORY OF COLON CANCER IN FATHER: ICD-10-CM

## 2020-06-16 DIAGNOSIS — Z12.11 SCREENING FOR COLON CANCER: ICD-10-CM

## 2020-06-16 DIAGNOSIS — G47.33 OSA (OBSTRUCTIVE SLEEP APNEA): ICD-10-CM

## 2020-06-16 DIAGNOSIS — Z00.00 PHYSICAL EXAM, ANNUAL: ICD-10-CM

## 2020-06-16 DIAGNOSIS — E78.5 HYPERLIPIDEMIA, UNSPECIFIED HYPERLIPIDEMIA TYPE: ICD-10-CM

## 2020-06-16 DIAGNOSIS — I10 ESSENTIAL HYPERTENSION: Primary | ICD-10-CM

## 2020-06-16 PROBLEM — M54.14 THORACIC RADICULOPATHY: Status: ACTIVE | Noted: 2020-05-22

## 2020-06-16 PROCEDURE — 3080F DIAST BP >= 90 MM HG: CPT | Performed by: FAMILY MEDICINE

## 2020-06-16 PROCEDURE — 4004F PT TOBACCO SCREEN RCVD TLK: CPT | Performed by: FAMILY MEDICINE

## 2020-06-16 PROCEDURE — 99396 PREV VISIT EST AGE 40-64: CPT | Performed by: FAMILY MEDICINE

## 2020-06-16 PROCEDURE — 99214 OFFICE O/P EST MOD 30 MIN: CPT | Performed by: FAMILY MEDICINE

## 2020-06-16 PROCEDURE — 3008F BODY MASS INDEX DOCD: CPT | Performed by: FAMILY MEDICINE

## 2020-06-16 PROCEDURE — 3077F SYST BP >= 140 MM HG: CPT | Performed by: FAMILY MEDICINE

## 2020-06-16 RX ORDER — HYDROCHLOROTHIAZIDE 25 MG/1
25 TABLET ORAL DAILY
Qty: 30 TABLET | Refills: 1 | Status: SHIPPED | OUTPATIENT
Start: 2020-06-16 | End: 2020-07-13 | Stop reason: SDUPTHER

## 2020-06-28 ENCOUNTER — TELEPHONE (OUTPATIENT)
Dept: OTHER | Facility: OTHER | Age: 50
End: 2020-06-28

## 2020-07-13 DIAGNOSIS — E78.2 ELEVATED CHOLESTEROL WITH ELEVATED TRIGLYCERIDES: ICD-10-CM

## 2020-07-13 DIAGNOSIS — I10 ESSENTIAL HYPERTENSION: ICD-10-CM

## 2020-07-13 RX ORDER — LISINOPRIL 20 MG/1
20 TABLET ORAL DAILY
Qty: 30 TABLET | Refills: 0 | Status: SHIPPED | OUTPATIENT
Start: 2020-07-13 | End: 2020-11-09

## 2020-07-13 RX ORDER — ATORVASTATIN CALCIUM 10 MG/1
10 TABLET, FILM COATED ORAL DAILY
Qty: 30 TABLET | Refills: 0 | Status: SHIPPED | OUTPATIENT
Start: 2020-07-13 | End: 2020-10-02

## 2020-07-13 RX ORDER — HYDROCHLOROTHIAZIDE 25 MG/1
25 TABLET ORAL DAILY
Qty: 30 TABLET | Refills: 0 | Status: SHIPPED | OUTPATIENT
Start: 2020-07-13 | End: 2020-09-10

## 2020-07-13 NOTE — TELEPHONE ENCOUNTER
Pt rescheduled his visit today because he has to babysit his granddaughter  He rescheduled for August, but will need refills on his lipitor, hydrodiuril and lisinopril before that visit    Please send to [facility] in [place]

## 2020-07-15 ENCOUNTER — PROCEDURE VISIT (OUTPATIENT)
Dept: NEUROLOGY | Facility: CLINIC | Age: 50
End: 2020-07-15
Payer: COMMERCIAL

## 2020-07-15 DIAGNOSIS — R52 PAIN: Primary | ICD-10-CM

## 2020-07-15 PROCEDURE — 95886 MUSC TEST DONE W/N TEST COMP: CPT | Performed by: PHYSICAL MEDICINE & REHABILITATION

## 2020-07-15 PROCEDURE — 95911 NRV CNDJ TEST 9-10 STUDIES: CPT | Performed by: PHYSICAL MEDICINE & REHABILITATION

## 2020-07-15 NOTE — PROGRESS NOTES
EMG 2 Limb Upper Extremity     Date/Time 7/15/2020 1:29 PM     Performed by  Lita Joseph MD     Authorized by TRACIE Tejeda      [place] Protocol Consent: Verbal consent obtained  Risks and benefits: risks, benefits and alternatives were discussed  Consent given by: patient  Patient understanding: patient states understanding of the procedure being performed  Patient consent: the patient's understanding of the procedure matches consent given  Procedure consent: procedure consent matches procedure scheduled  Patient identity confirmed: verbally with patient               EMG REPORT    80-year-old right-handed male with past medical history of ACDF presents with complaints of neck pain radiating down the right arm and numbness in both arms for the last several months  Patient is being evaluated for a cervical radiculopathy  On physical examination motor strength is 5/5 throughout  Sensations are diminished to light touch and pinprick in the bilateral median and ulnar nerve distribution  The left median motor terminal latency was normal with normal compound motor action potential amplitude and a slow conduction velocity of 48 m/sec across the wrist   The right median motor terminal latency was normal with normal compound motor action potential amplitude and a slow conduction velocity of 48 m/sec across the wrist   The right ulnar  motor terminal latency was normal with a low compound motor action potential amplitude of 3 1 mV and a slow conduction velocity of 44 m/sec across the wrist and 45 m/sec across the elbow  The left ulnar motor terminal latency was normal with a low compound motor action potential amplitude of 5 1 mV and a slow conduction velocity of 44 m/sec across the wrist and 48 m/sec across the elbow  The left median sensory peak latency was prolonged at 4 6 milliseconds with a low sensory action potential amplitude of 14 microvolts    The right median sensory peak latency was prolonged at 3 8 milliseconds with normal sensory action potential amplitude  The bilateral radial sensory action potentials were normal   The right median palmar evoked response was prolonged by 0 4 milliseconds as compared to the right ulnar palmar evoked response at the same distance  The left median palmar evoked response was prolonged by 0 6 milliseconds as compared to the left ulnar palmar evoked response to the same distance  The right ulnar the latency was prolonged at 7 1 milliseconds with a low sensory action potential amplitude of 4 microvolts  The left ulnar sensory peak latency was prolonged at 3 6 milliseconds with a low sensory action potential amplitude of 3 microvolts  The left median F-wave was prolonged at 33 8 milliseconds  The right median F-wave was normal   The right ulnar F wave latency was prolonged at 35 1 milliseconds  The left ulnar F-wave latency  was prolonged at 35 3 milliseconds  Concentric needle EMG was performed medius proximal and distal muscles of the bilateral upper extremities including deltoid, biceps, triceps, pronator teres, apb, FDI, FCU, FDP 4, 5 and low cervical paraspinals  There was no evidence of active denervation any other muscles tested  Moderate decreased recruitment of giant motor units was noted in the right biceps and pronator teres  Moderate decreased recruitment of giant and polyphasic motor units was noted in the bilateral abductor pollicis brevis, FDI and left FCU  Mild decreased recruitment of polyphasic motor units was noted in the  right FCU and bilateral FDP 4, 5  Early recruited motor units appear normal with recruitment patterns being full or full for effort in the remaining muscles tested  INTERPRETATION: There is electrophysiologic evidence of a:    1  Chronic C6 radiculopathy on the right as evidenced by the decreased recruitment and chronic denervation changes in the right biceps and pronator teres      2  Bilateral moderate median nerve compression neuropathy at the wrist (L>R) with demyelinative changes, consistent with a diagnosis of carpal tunnel syndrome  3  Bilateral ulnar neuropathy, localized best at the elbow with predominant axonal changes  Clinical and imaging correlation of the cervical spine is suggested       MOSHE Maria

## 2020-07-16 ENCOUNTER — TELEPHONE (OUTPATIENT)
Dept: NEUROLOGY | Facility: CLINIC | Age: 50
End: 2020-07-16

## 2020-07-16 NOTE — TELEPHONE ENCOUNTER
Spoke with patient  Discussed scheduling options  Dr Pallavi Farah schedule is full through Dec 2020  can call when 2021 schedule opens or can send list of outside providers for eval   Pt chose to wait for 2021 schedule to open

## 2020-07-20 ENCOUNTER — HOSPITAL ENCOUNTER (OUTPATIENT)
Facility: AMBULARY SURGERY CENTER | Age: 50
Setting detail: OUTPATIENT SURGERY
End: 2020-07-20
Attending: OTOLARYNGOLOGY | Admitting: OTOLARYNGOLOGY
Payer: COMMERCIAL

## 2020-07-20 ENCOUNTER — OFFICE VISIT (OUTPATIENT)
Dept: NEUROLOGY | Facility: CLINIC | Age: 50
End: 2020-07-20
Payer: COMMERCIAL

## 2020-07-20 VITALS
BODY MASS INDEX: 28.67 KG/M2 | HEART RATE: 92 BPM | OXYGEN SATURATION: 98 % | SYSTOLIC BLOOD PRESSURE: 160 MMHG | WEIGHT: 223.4 LBS | TEMPERATURE: 98.4 F | HEIGHT: 74 IN | DIASTOLIC BLOOD PRESSURE: 98 MMHG | RESPIRATION RATE: 14 BRPM

## 2020-07-20 DIAGNOSIS — G47.33 OSA (OBSTRUCTIVE SLEEP APNEA): ICD-10-CM

## 2020-07-20 DIAGNOSIS — R41.3 MEMORY DIFFICULTY: Primary | ICD-10-CM

## 2020-07-20 DIAGNOSIS — E78.5 HYPERLIPIDEMIA, UNSPECIFIED HYPERLIPIDEMIA TYPE: ICD-10-CM

## 2020-07-20 DIAGNOSIS — F31.9 BIPOLAR AFFECTIVE DISORDER, REMISSION STATUS UNSPECIFIED (HCC): ICD-10-CM

## 2020-07-20 PROCEDURE — 3077F SYST BP >= 140 MM HG: CPT | Performed by: PSYCHIATRY & NEUROLOGY

## 2020-07-20 PROCEDURE — 3008F BODY MASS INDEX DOCD: CPT | Performed by: PSYCHIATRY & NEUROLOGY

## 2020-07-20 PROCEDURE — 99214 OFFICE O/P EST MOD 30 MIN: CPT | Performed by: PSYCHIATRY & NEUROLOGY

## 2020-07-20 PROCEDURE — 4004F PT TOBACCO SCREEN RCVD TLK: CPT | Performed by: PSYCHIATRY & NEUROLOGY

## 2020-07-20 PROCEDURE — 3080F DIAST BP >= 90 MM HG: CPT | Performed by: PSYCHIATRY & NEUROLOGY
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
## 2020-07-23 ENCOUNTER — OFFICE VISIT (OUTPATIENT)
Dept: SLEEP CENTER | Facility: CLINIC | Age: 50
End: 2020-07-23
Payer: COMMERCIAL

## 2020-07-23 ENCOUNTER — TELEPHONE (OUTPATIENT)
Dept: NEUROSURGERY | Facility: CLINIC | Age: 50
End: 2020-07-23

## 2020-07-23 VITALS
DIASTOLIC BLOOD PRESSURE: 100 MMHG | SYSTOLIC BLOOD PRESSURE: 163 MMHG | TEMPERATURE: 98.5 F | BODY MASS INDEX: 29.13 KG/M2 | WEIGHT: 227 LBS | HEIGHT: 74 IN

## 2020-07-23 DIAGNOSIS — G47.33 OSA (OBSTRUCTIVE SLEEP APNEA): ICD-10-CM

## 2020-07-23 PROCEDURE — 4004F PT TOBACCO SCREEN RCVD TLK: CPT | Performed by: INTERNAL MEDICINE

## 2020-07-23 PROCEDURE — 3008F BODY MASS INDEX DOCD: CPT | Performed by: INTERNAL MEDICINE

## 2020-07-23 PROCEDURE — 3080F DIAST BP >= 90 MM HG: CPT | Performed by: INTERNAL MEDICINE

## 2020-07-23 PROCEDURE — 99214 OFFICE O/P EST MOD 30 MIN: CPT | Performed by: INTERNAL MEDICINE

## 2020-07-23 PROCEDURE — 3077F SYST BP >= 140 MM HG: CPT | Performed by: INTERNAL MEDICINE
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
## 2020-07-23 NOTE — TELEPHONE ENCOUNTER
COVID Pre-Visit Screening     1  Is this a family member screening? No  2  Have you traveled outside of your state in the past 2 weeks? No  3  Do you presently have a fever or flu-like symptoms? No  4  Do you have symptoms of an upper respiratory infection like runny nose, sore throat, or cough? No  5  Are you suffering from new headache that you have not had in the past?  No  6  Do you have/have you experienced any new shortness of breath recently? No  7  Do you have any new diarrhea, nausea or vomiting? No  8  ave you been in contact with anyone who has been sick or diagnosed with COVID-19? No  9  Do you have any new loss of taste or smell? No  10  Are you able to wear a mask without a valve for the entire visit?  Yes

## 2020-07-24 ENCOUNTER — OFFICE VISIT (OUTPATIENT)
Dept: NEUROSURGERY | Facility: CLINIC | Age: 50
End: 2020-07-24
Payer: COMMERCIAL

## 2020-07-24 VITALS
DIASTOLIC BLOOD PRESSURE: 108 MMHG | HEIGHT: 74 IN | SYSTOLIC BLOOD PRESSURE: 170 MMHG | WEIGHT: 227 LBS | TEMPERATURE: 97 F | BODY MASS INDEX: 29.13 KG/M2 | HEART RATE: 100 BPM | RESPIRATION RATE: 16 BRPM

## 2020-07-24 DIAGNOSIS — M54.12 RADICULOPATHY, CERVICAL: Primary | ICD-10-CM

## 2020-07-24 DIAGNOSIS — G56.01 CARPAL TUNNEL SYNDROME OF RIGHT WRIST: ICD-10-CM

## 2020-07-24 PROCEDURE — 3008F BODY MASS INDEX DOCD: CPT | Performed by: NEUROLOGICAL SURGERY

## 2020-07-24 PROCEDURE — 4004F PT TOBACCO SCREEN RCVD TLK: CPT | Performed by: NEUROLOGICAL SURGERY

## 2020-07-24 PROCEDURE — 3080F DIAST BP >= 90 MM HG: CPT | Performed by: NEUROLOGICAL SURGERY

## 2020-07-24 PROCEDURE — 99214 OFFICE O/P EST MOD 30 MIN: CPT | Performed by: NEUROLOGICAL SURGERY

## 2020-07-24 PROCEDURE — 3077F SYST BP >= 140 MM HG: CPT | Performed by: NEUROLOGICAL SURGERY
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
## 2020-07-27 ENCOUNTER — TELEPHONE (OUTPATIENT)
Dept: SLEEP CENTER | Facility: CLINIC | Age: 50
End: 2020-07-27

## 2020-07-28 NOTE — TELEPHONE ENCOUNTER
Jefferson Daily from [facility] left message, states sleep study was too old, it must be less than 1 year  Patient will need new sleep study, can you please order? Patient aware

## 2020-08-04 DIAGNOSIS — G47.33 OSA (OBSTRUCTIVE SLEEP APNEA): Primary | ICD-10-CM

## 2020-08-04 DIAGNOSIS — J34.89 PERFORATION OF NASAL SEPTUM: ICD-10-CM

## 2020-08-04 DIAGNOSIS — J34.2 DEVIATED NASAL SEPTUM: ICD-10-CM

## 2020-08-04 PROCEDURE — U0003 INFECTIOUS AGENT DETECTION BY NUCLEIC ACID (DNA OR RNA); SEVERE ACUTE RESPIRATORY SYNDROME CORONAVIRUS 2 (SARS-COV-2) (CORONAVIRUS DISEASE [COVID-19]), AMPLIFIED PROBE TECHNIQUE, MAKING USE OF HIGH THROUGHPUT TECHNOLOGIES AS DESCRIBED BY CMS-2020-01-R: HCPCS

## 2020-08-05 ENCOUNTER — TELEPHONE (OUTPATIENT)
Dept: SLEEP CENTER | Facility: CLINIC | Age: 50
End: 2020-08-05

## 2020-08-05 ENCOUNTER — TELEPHONE (OUTPATIENT)
Dept: NEUROLOGY | Facility: CLINIC | Age: 50
End: 2020-08-05

## 2020-08-05 LAB — SARS-COV-2 RNA SPEC QL NAA+PROBE: NOT DETECTED

## 2020-08-05 NOTE — TELEPHONE ENCOUNTER
Patients insurance doesn't cover a home sleep study, please place an order for an in lab sleep study   Thank you

## 2020-08-06 ENCOUNTER — APPOINTMENT (OUTPATIENT)
Dept: LAB | Facility: CLINIC | Age: 50
End: 2020-08-06
Payer: COMMERCIAL

## 2020-08-06 VITALS — HEIGHT: 74 IN | BODY MASS INDEX: 29.13 KG/M2 | WEIGHT: 227 LBS

## 2020-08-06 DIAGNOSIS — G47.33 OSA (OBSTRUCTIVE SLEEP APNEA): Primary | ICD-10-CM

## 2020-08-06 LAB
BILIRUB UR QL STRIP: NEGATIVE
CLARITY UR: NORMAL
COLOR UR: YELLOW
GLUCOSE UR STRIP-MCNC: NEGATIVE MG/DL
HGB UR QL STRIP.AUTO: NEGATIVE
KETONES UR STRIP-MCNC: NEGATIVE MG/DL
LEUKOCYTE ESTERASE UR QL STRIP: NEGATIVE
NITRITE UR QL STRIP: NEGATIVE
PH UR STRIP.AUTO: 7.5 [PH]
PROT UR STRIP-MCNC: NEGATIVE MG/DL
SP GR UR STRIP.AUTO: 1.02 (ref 1–1.03)
UROBILINOGEN UR QL STRIP.AUTO: 0.2 E.U./DL

## 2020-08-06 PROCEDURE — 81003 URINALYSIS AUTO W/O SCOPE: CPT | Performed by: FAMILY MEDICINE

## 2020-08-06 RX ORDER — CYCLOBENZAPRINE HCL 10 MG
10 TABLET ORAL 3 TIMES DAILY PRN
COMMUNITY
End: 2020-10-06 | Stop reason: ALTCHOICE

## 2020-08-06 RX ORDER — GABAPENTIN 300 MG/1
300 CAPSULE ORAL 3 TIMES DAILY
COMMUNITY
End: 2020-10-06 | Stop reason: ALTCHOICE

## 2020-08-06 NOTE — PRE-PROCEDURE INSTRUCTIONS
Pre-Surgery Instructions:   Medication Instructions    atorvastatin (LIPITOR) 10 mg tablet Instructed patient per Anesthesia Guidelines   cyclobenzaprine (FLEXERIL) 10 mg tablet Instructed patient per Anesthesia Guidelines   gabapentin (NEURONTIN) 300 mg capsule Instructed patient per Anesthesia Guidelines   hydrochlorothiazide (HYDRODIURIL) 25 mg tablet Instructed patient per Anesthesia Guidelines   ibuprofen (MOTRIN) 600 mg tablet Instructed patient per Anesthesia Guidelines   lisinopril (ZESTRIL) 20 mg tablet Instructed patient per Anesthesia Guidelines   pantoprazole (PROTONIX) 40 mg tablet Instructed patient per Anesthesia Guidelines      Pre-op medication, and showering instructions with antibacteral soap reviewed

## 2020-08-07 ENCOUNTER — TRANSCRIBE ORDERS (OUTPATIENT)
Dept: SLEEP CENTER | Facility: CLINIC | Age: 50
End: 2020-08-07

## 2020-08-09 RX ORDER — SODIUM CHLORIDE, SODIUM LACTATE, POTASSIUM CHLORIDE, CALCIUM CHLORIDE 600; 310; 30; 20 MG/100ML; MG/100ML; MG/100ML; MG/100ML
125 INJECTION, SOLUTION INTRAVENOUS CONTINUOUS
Status: CANCELLED | OUTPATIENT
Start: 2020-08-09

## 2020-08-11 ENCOUNTER — TELEPHONE (OUTPATIENT)
Dept: FAMILY MEDICINE CLINIC | Facility: MEDICAL CENTER | Age: 50
End: 2020-08-11

## 2020-08-11 NOTE — TELEPHONE ENCOUNTER
Virtual visit should be possible as long as patient has a blood pressure cuff to check his blood pressure

## 2020-08-11 NOTE — TELEPHONE ENCOUNTER
When calling pt to register him for his appt on Wednesday, he said he was vomiting yesterday, but not today    Should he be in office or virtual?  Please advise

## 2020-08-12 ENCOUNTER — OFFICE VISIT (OUTPATIENT)
Dept: FAMILY MEDICINE CLINIC | Facility: MEDICAL CENTER | Age: 50
End: 2020-08-12
Payer: COMMERCIAL

## 2020-08-12 ENCOUNTER — TELEPHONE (OUTPATIENT)
Dept: FAMILY MEDICINE CLINIC | Facility: MEDICAL CENTER | Age: 50
End: 2020-08-12

## 2020-08-12 VITALS
HEIGHT: 74 IN | BODY MASS INDEX: 27.21 KG/M2 | HEART RATE: 98 BPM | WEIGHT: 212 LBS | SYSTOLIC BLOOD PRESSURE: 156 MMHG | DIASTOLIC BLOOD PRESSURE: 108 MMHG

## 2020-08-12 DIAGNOSIS — I10 ESSENTIAL HYPERTENSION: Primary | ICD-10-CM

## 2020-08-12 PROCEDURE — G2012 BRIEF CHECK IN BY MD/QHP: HCPCS | Performed by: FAMILY MEDICINE

## 2020-08-12 RX ORDER — DULOXETIN HYDROCHLORIDE 30 MG/1
CAPSULE, DELAYED RELEASE ORAL
COMMUNITY
End: 2020-10-06 | Stop reason: ALTCHOICE

## 2020-08-12 RX ORDER — AMLODIPINE BESYLATE 5 MG/1
5 TABLET ORAL DAILY
Qty: 90 TABLET | Refills: 0 | Status: SHIPPED | OUTPATIENT
Start: 2020-08-12 | End: 2020-11-06
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
## 2020-08-16 ENCOUNTER — TELEPHONE (OUTPATIENT)
Dept: OTHER | Facility: OTHER | Age: 50
End: 2020-08-16

## 2020-08-16 NOTE — TELEPHONE ENCOUNTER
PT called to let office know he will not be able to make his appointment scheduled for tonight  Says he is willing to do it any other night, but will not be available tonight due to a family emergency

## 2020-08-17 NOTE — TELEPHONE ENCOUNTER
Appoinment scheduled  Patient wants you to know he started medication 4 days ago  His Blood pressure still running high, this morning it was 165/111

## 2020-08-18 ENCOUNTER — OFFICE VISIT (OUTPATIENT)
Dept: PAIN MEDICINE | Facility: CLINIC | Age: 50
End: 2020-08-18
Payer: COMMERCIAL

## 2020-08-18 VITALS
HEART RATE: 87 BPM | TEMPERATURE: 98.1 F | WEIGHT: 223 LBS | HEIGHT: 74 IN | RESPIRATION RATE: 20 BRPM | DIASTOLIC BLOOD PRESSURE: 95 MMHG | SYSTOLIC BLOOD PRESSURE: 156 MMHG | BODY MASS INDEX: 28.62 KG/M2

## 2020-08-18 DIAGNOSIS — M96.1 CERVICAL POST-LAMINECTOMY SYNDROME: ICD-10-CM

## 2020-08-18 DIAGNOSIS — G89.4 CHRONIC PAIN SYNDROME: Primary | ICD-10-CM

## 2020-08-18 DIAGNOSIS — M54.2 NECK PAIN: ICD-10-CM

## 2020-08-18 DIAGNOSIS — M54.12 RADICULOPATHY, CERVICAL: ICD-10-CM

## 2020-08-18 PROCEDURE — 3080F DIAST BP >= 90 MM HG: CPT | Performed by: ANESTHESIOLOGY

## 2020-08-18 PROCEDURE — 99214 OFFICE O/P EST MOD 30 MIN: CPT | Performed by: ANESTHESIOLOGY

## 2020-08-18 PROCEDURE — 4004F PT TOBACCO SCREEN RCVD TLK: CPT | Performed by: ANESTHESIOLOGY

## 2020-08-18 PROCEDURE — 3008F BODY MASS INDEX DOCD: CPT | Performed by: ANESTHESIOLOGY

## 2020-08-18 PROCEDURE — 3008F BODY MASS INDEX DOCD: CPT | Performed by: OTOLARYNGOLOGY

## 2020-08-18 PROCEDURE — 3077F SYST BP >= 140 MM HG: CPT | Performed by: ANESTHESIOLOGY
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
## 2020-08-24 ENCOUNTER — TELEPHONE (OUTPATIENT)
Dept: RADIOLOGY | Facility: CLINIC | Age: 50
End: 2020-08-24

## 2020-08-25 NOTE — TELEPHONE ENCOUNTER
Kiki Range from [facility] called, asking for sleep study results    I advised patient no showed for sleep study      She will discard order, if patient to get new machine will need to re fax all information

## 2020-09-05 DIAGNOSIS — I10 ESSENTIAL HYPERTENSION: ICD-10-CM

## 2020-09-10 ENCOUNTER — ANESTHESIA EVENT (OUTPATIENT)
Dept: PERIOP | Facility: AMBULARY SURGERY CENTER | Age: 50
End: 2020-09-10
Payer: COMMERCIAL

## 2020-09-10 RX ORDER — HYDROCHLOROTHIAZIDE 25 MG/1
TABLET ORAL
Qty: 30 TABLET | Refills: 0 | Status: SHIPPED | OUTPATIENT
Start: 2020-09-10 | End: 2020-10-10

## 2020-09-14 ENCOUNTER — ANESTHESIA (OUTPATIENT)
Dept: PERIOP | Facility: AMBULARY SURGERY CENTER | Age: 50
End: 2020-09-14
Payer: COMMERCIAL

## 2020-09-14 ENCOUNTER — HOSPITAL ENCOUNTER (OUTPATIENT)
Facility: AMBULARY SURGERY CENTER | Age: 50
Setting detail: OUTPATIENT SURGERY
Discharge: HOME | End: 2020-09-14
Attending: OTOLARYNGOLOGY | Admitting: OTOLARYNGOLOGY
Payer: COMMERCIAL

## 2020-09-14 VITALS
TEMPERATURE: 97.3 F | RESPIRATION RATE: 16 BRPM | SYSTOLIC BLOOD PRESSURE: 142 MMHG | OXYGEN SATURATION: 97 % | HEART RATE: 68 BPM | WEIGHT: 220 LBS | BODY MASS INDEX: 28.25 KG/M2 | DIASTOLIC BLOOD PRESSURE: 87 MMHG

## 2020-09-14 VITALS — HEART RATE: 87 BPM

## 2020-09-14 DIAGNOSIS — J34.89 NASAL VALVE COLLAPSE: Primary | ICD-10-CM

## 2020-09-14 LAB — SARS-COV-2 RNA RESP QL NAA+PROBE: NEGATIVE

## 2020-09-14 PROCEDURE — 87635 SARS-COV-2 COVID-19 AMP PRB: CPT | Performed by: OTOLARYNGOLOGY

## 2020-09-14 PROCEDURE — 21235 EAR CARTILAGE GRAFT: CPT | Performed by: OTOLARYNGOLOGY

## 2020-09-14 PROCEDURE — 30802 ABLATE INF TURBINATE SUBMUC: CPT | Performed by: OTOLARYNGOLOGY

## 2020-09-14 PROCEDURE — 30465 REPAIR NASAL STENOSIS: CPT | Performed by: OTOLARYNGOLOGY

## 2020-09-14 RX ORDER — DIPHENHYDRAMINE HYDROCHLORIDE 50 MG/ML
12.5 INJECTION INTRAMUSCULAR; INTRAVENOUS ONCE AS NEEDED
Status: DISCONTINUED | OUTPATIENT
Start: 2020-09-14 | End: 2020-09-14 | Stop reason: HOSPADM

## 2020-09-14 RX ORDER — CLINDAMYCIN HYDROCHLORIDE 150 MG/1
300 CAPSULE ORAL EVERY 6 HOURS SCHEDULED
Qty: 80 CAPSULE | Refills: 0 | Status: SHIPPED | OUTPATIENT
Start: 2020-09-14 | End: 2020-09-28 | Stop reason: HOSPADM

## 2020-09-14 RX ORDER — SODIUM CHLORIDE, SODIUM LACTATE, POTASSIUM CHLORIDE, CALCIUM CHLORIDE 600; 310; 30; 20 MG/100ML; MG/100ML; MG/100ML; MG/100ML
125 INJECTION, SOLUTION INTRAVENOUS CONTINUOUS
Status: DISCONTINUED | OUTPATIENT
Start: 2020-09-14 | End: 2020-09-14 | Stop reason: HOSPADM

## 2020-09-14 RX ORDER — MAGNESIUM HYDROXIDE 1200 MG/15ML
LIQUID ORAL AS NEEDED
Status: DISCONTINUED | OUTPATIENT
Start: 2020-09-14 | End: 2020-09-14 | Stop reason: HOSPADM

## 2020-09-14 RX ORDER — OXYMETAZOLINE HYDROCHLORIDE 0.05 G/100ML
SPRAY NASAL AS NEEDED
Status: DISCONTINUED | OUTPATIENT
Start: 2020-09-14 | End: 2020-09-14 | Stop reason: HOSPADM

## 2020-09-14 RX ORDER — GLYCOPYRROLATE 0.2 MG/ML
INJECTION INTRAMUSCULAR; INTRAVENOUS AS NEEDED
Status: DISCONTINUED | OUTPATIENT
Start: 2020-09-14 | End: 2020-09-14

## 2020-09-14 RX ORDER — ROCURONIUM BROMIDE 10 MG/ML
INJECTION, SOLUTION INTRAVENOUS AS NEEDED
Status: DISCONTINUED | OUTPATIENT
Start: 2020-09-14 | End: 2020-09-14

## 2020-09-14 RX ORDER — LIDOCAINE HYDROCHLORIDE 10 MG/ML
0.5 INJECTION, SOLUTION EPIDURAL; INFILTRATION; INTRACAUDAL; PERINEURAL ONCE AS NEEDED
Status: DISCONTINUED | OUTPATIENT
Start: 2020-09-14 | End: 2020-09-14 | Stop reason: HOSPADM

## 2020-09-14 RX ORDER — PROPOFOL 10 MG/ML
INJECTION, EMULSION INTRAVENOUS AS NEEDED
Status: DISCONTINUED | OUTPATIENT
Start: 2020-09-14 | End: 2020-09-14

## 2020-09-14 RX ORDER — CLINDAMYCIN PHOSPHATE 600 MG/50ML
600 INJECTION INTRAVENOUS EVERY 8 HOURS
Status: DISCONTINUED | OUTPATIENT
Start: 2020-09-14 | End: 2020-09-14 | Stop reason: HOSPADM

## 2020-09-14 RX ORDER — LIDOCAINE HYDROCHLORIDE AND EPINEPHRINE 10; 10 MG/ML; UG/ML
INJECTION, SOLUTION INFILTRATION; PERINEURAL AS NEEDED
Status: DISCONTINUED | OUTPATIENT
Start: 2020-09-14 | End: 2020-09-14 | Stop reason: HOSPADM

## 2020-09-14 RX ORDER — ONDANSETRON 2 MG/ML
4 INJECTION INTRAMUSCULAR; INTRAVENOUS EVERY 6 HOURS PRN
Status: DISCONTINUED | OUTPATIENT
Start: 2020-09-14 | End: 2020-09-14 | Stop reason: HOSPADM

## 2020-09-14 RX ORDER — ONDANSETRON 2 MG/ML
INJECTION INTRAMUSCULAR; INTRAVENOUS AS NEEDED
Status: DISCONTINUED | OUTPATIENT
Start: 2020-09-14 | End: 2020-09-14

## 2020-09-14 RX ORDER — LIDOCAINE HYDROCHLORIDE 20 MG/ML
INJECTION, SOLUTION EPIDURAL; INFILTRATION; INTRACAUDAL; PERINEURAL AS NEEDED
Status: DISCONTINUED | OUTPATIENT
Start: 2020-09-14 | End: 2020-09-14

## 2020-09-14 RX ORDER — GINSENG 100 MG
1 CAPSULE ORAL 2 TIMES DAILY
Qty: 15 G | Refills: 0 | Status: SHIPPED | OUTPATIENT
Start: 2020-09-14 | End: 2020-12-05 | Stop reason: HOSPADM

## 2020-09-14 RX ORDER — ALBUTEROL SULFATE 90 UG/1
AEROSOL, METERED RESPIRATORY (INHALATION) AS NEEDED
Status: DISCONTINUED | OUTPATIENT
Start: 2020-09-14 | End: 2020-09-14

## 2020-09-14 RX ORDER — NEOSTIGMINE METHYLSULFATE 1 MG/ML
INJECTION INTRAVENOUS AS NEEDED
Status: DISCONTINUED | OUTPATIENT
Start: 2020-09-14 | End: 2020-09-14

## 2020-09-14 RX ORDER — FENTANYL CITRATE 50 UG/ML
INJECTION, SOLUTION INTRAMUSCULAR; INTRAVENOUS AS NEEDED
Status: DISCONTINUED | OUTPATIENT
Start: 2020-09-14 | End: 2020-09-14

## 2020-09-14 RX ORDER — HYDROCODONE BITARTRATE AND ACETAMINOPHEN 5; 325 MG/1; MG/1
1 TABLET ORAL EVERY 4 HOURS PRN
Qty: 20 TABLET | Refills: 0 | Status: SHIPPED | OUTPATIENT
Start: 2020-09-14 | End: 2020-10-06 | Stop reason: ALTCHOICE

## 2020-09-14 RX ORDER — DEXAMETHASONE SODIUM PHOSPHATE 10 MG/ML
INJECTION, SOLUTION INTRAMUSCULAR; INTRAVENOUS AS NEEDED
Status: DISCONTINUED | OUTPATIENT
Start: 2020-09-14 | End: 2020-09-14

## 2020-09-14 RX ORDER — FENTANYL CITRATE/PF 50 MCG/ML
50 SYRINGE (ML) INJECTION
Status: DISCONTINUED | OUTPATIENT
Start: 2020-09-14 | End: 2020-09-14 | Stop reason: HOSPADM

## 2020-09-14 RX ORDER — MIDAZOLAM HYDROCHLORIDE 2 MG/2ML
INJECTION, SOLUTION INTRAMUSCULAR; INTRAVENOUS AS NEEDED
Status: DISCONTINUED | OUTPATIENT
Start: 2020-09-14 | End: 2020-09-14

## 2020-09-14 RX ORDER — ONDANSETRON 2 MG/ML
4 INJECTION INTRAMUSCULAR; INTRAVENOUS ONCE AS NEEDED
Status: DISCONTINUED | OUTPATIENT
Start: 2020-09-14 | End: 2020-09-14 | Stop reason: HOSPADM

## 2020-09-14 RX ORDER — HYDROCODONE BITARTRATE AND ACETAMINOPHEN 5; 325 MG/1; MG/1
2 TABLET ORAL EVERY 4 HOURS PRN
Status: DISCONTINUED | OUTPATIENT
Start: 2020-09-14 | End: 2020-09-14 | Stop reason: HOSPADM

## 2020-09-14 RX ADMIN — CLINDAMYCIN PHOSPHATE 600 MG: 12 INJECTION, SOLUTION INTRAMUSCULAR; INTRAVENOUS at 10:55

## 2020-09-14 RX ADMIN — ONDANSETRON 4 MG: 2 INJECTION INTRAMUSCULAR; INTRAVENOUS at 11:03

## 2020-09-14 RX ADMIN — MIDAZOLAM HYDROCHLORIDE 2 MG: 1 INJECTION, SOLUTION INTRAMUSCULAR; INTRAVENOUS at 10:55

## 2020-09-14 RX ADMIN — ROCURONIUM BROMIDE 10 MG: 10 SOLUTION INTRAVENOUS at 11:32

## 2020-09-14 RX ADMIN — LIDOCAINE HYDROCHLORIDE 100 MG: 20 INJECTION, SOLUTION EPIDURAL; INFILTRATION; INTRACAUDAL; PERINEURAL at 11:03

## 2020-09-14 RX ADMIN — FENTANYL CITRATE 50 MCG: 50 INJECTION, SOLUTION INTRAMUSCULAR; INTRAVENOUS at 11:11

## 2020-09-14 RX ADMIN — ROCURONIUM BROMIDE 40 MG: 10 SOLUTION INTRAVENOUS at 11:03

## 2020-09-14 RX ADMIN — ALBUTEROL SULFATE 6 PUFF: 90 AEROSOL, METERED RESPIRATORY (INHALATION) at 11:06

## 2020-09-14 RX ADMIN — FENTANYL CITRATE 50 MCG: 50 INJECTION INTRAMUSCULAR; INTRAVENOUS at 14:09

## 2020-09-14 RX ADMIN — FENTANYL CITRATE 50 MCG: 50 INJECTION, SOLUTION INTRAMUSCULAR; INTRAVENOUS at 11:03

## 2020-09-14 RX ADMIN — NEOSTIGMINE METHYLSULFATE 3 MG: 1 INJECTION INTRAVENOUS at 13:15

## 2020-09-14 RX ADMIN — PHENYLEPHRINE HYDROCHLORIDE 50 MCG: 10 INJECTION INTRAVENOUS at 11:56

## 2020-09-14 RX ADMIN — GLYCOPYRROLATE 0.3 MG: 0.2 INJECTION, SOLUTION INTRAMUSCULAR; INTRAVENOUS at 13:15

## 2020-09-14 RX ADMIN — FENTANYL CITRATE 50 MCG: 50 INJECTION INTRAMUSCULAR; INTRAVENOUS at 13:58

## 2020-09-14 RX ADMIN — SODIUM CHLORIDE, SODIUM LACTATE, POTASSIUM CHLORIDE, AND CALCIUM CHLORIDE: .6; .31; .03; .02 INJECTION, SOLUTION INTRAVENOUS at 13:06

## 2020-09-14 RX ADMIN — PROPOFOL 200 MG: 10 INJECTION, EMULSION INTRAVENOUS at 11:03

## 2020-09-14 RX ADMIN — DEXAMETHASONE SODIUM PHOSPHATE 10 MG: 10 INJECTION, SOLUTION INTRAMUSCULAR; INTRAVENOUS at 11:03

## 2020-09-14 RX ADMIN — FENTANYL CITRATE 50 MCG: 50 INJECTION INTRAMUSCULAR; INTRAVENOUS at 13:48

## 2020-09-14 RX ADMIN — SODIUM CHLORIDE, SODIUM LACTATE, POTASSIUM CHLORIDE, AND CALCIUM CHLORIDE: .6; .31; .03; .02 INJECTION, SOLUTION INTRAVENOUS at 10:52

## 2020-09-14 NOTE — H&P
Otolaryngology HN/FPRS Progress Note:     Subjective: Pt with with chronic nasal obstruction, a large septal perforation was noticed in initial exam, underwent septoplasty turbinoplasty and endoscopic surgery and closure of septal perforation in August 2018  In the postop period patient had a nasal trauma approximately2 or 3 weeks after the surgery  There was new ulcerationon the mucosa and eventually the perforation recurred  s/p revision septoplasty and septal perforation repair on 8/2/19  Breathing  Better, left side patent, right "feels stuffy", no crusting, no bleed,no rhinorrhea  Open reduction on 1/27/20 with partial improvement  Still collapsing from right upper cartilage collapse on inspiration  Surgery was cancelled as the [facility] staff were not able to contact him and he did't show up  He reports he was temporarily imprisoned, thus the lack of contact       Objective:   Vitals:    09/14/20 0823   BP: (!) 153/102   Pulse: 78   Resp: 18   Temp: (!) 97 2 °F (36 2 °C)   SpO2: 98%          Physical Exam      Constitutional: Oriented to person, place, and time  Well-developed and well-nourished, no apparent distress, non-toxic appearance  Cooperative, able to hear and answer questions without difficulty     Voice: Normal voice quality  Head: Normocephalic, atraumatic   No scars, masses or lesions  Face: Symmetric, no edema, no sinus tenderness  Eyes: Vision grossly intact, extra-ocular movement intact  Right Ear: External ear normal, post auricular wound healing well,  Auditory canal clear   Tympanic membrane well-appearing, without retraction or scarring   No fluid present  No post-auricular erythema or tenderness  Left Ear: External ear normal   Auditory canal clear   Tympanic membrane well-appearing, without retraction or scarring   No fluid present   No post-auricular erythema or tenderness    Nose:   External exam: asymmetry on nasal pyramid basically right cartilaginous lateral wall is concave, currently collapsing on inspiration  Good alignment of the nasal bones  Septum Midline, area of septal perforation repair very well healed  No crusting   Turbinates well appearing     Oral cavity: partially edentulous, Mucosa moist, lips normal   Tongue mobile, floor of mouth normal   Hard palate unremarkable   No masses or lesions  Hayder Carol is midline, sot palate normal   Unremarkable oropharyngeal inlet   Tonsils atrophic  Posterior pharyngeal wall clear  No masses or lesions  Salivary glands:  Parotid glands and submandibular glands symmetric, no enlargement or tenderness  Neck: Normal laryngeal elevation with swallow   Trachea midline   No masses or lesions  No palpable adenopathy  Neuro: CN 2-12 intact except as below  Lungs: Breathing easily  No stertor or stridor  CV: RRR  Good distal perfusion  Neck: Soft and flat  Abd: Soft NTND     1  Acquired nasal deformity  2  Nasal valve collapse        Assessment/Plan: s/p revision septoplasty and septal perforation repair  -excellent result of the septal perforation repair, septum is well aligned, Although overall function improved and she is to have obstruction due to collapse of Upper lateral cartilage      This will require reinforcement of upper lateral cartilage with Auricular cartilage graft, also needs alar cartilage graft, May need a right  graft as well      Revision nasal surgery with alar and batten grafts With auricular Cartilage graft, possible Right  graft is indicated   Risks benefits and alternatives discussed in detail   Patient decides to proceed with surgery, consent obtained, also pre op photography    Requested PCR Covid 19

## 2020-09-14 NOTE — OP NOTE
OPERATIVE REPORT  PATIENT NAME: Idalia Cartwright    :  1970  MRN: [id number]  Pt Location: [facility]    SURGERY DATE: 2020    Surgeon(s) and Role:     Delfina Rucker MD - Primary    Preop Diagnosis:  Acquired deformity of nose [M95 0]  Nasal valve collapse [J34 89]    Post-Op Diagnosis Codes:     * Acquired deformity of nose [M95 0]     * Nasal valve collapse [J34 89]    Procedure(s) (LRB):  NASAL REVISION SURGERY; ALAR AND TRISTAN GRAFT; AURICULAR CARTILAGE GRAFT; PSB  GRAFT; PSB TURBINOPLASTY (Right)  REPAIR VESTIBULAR STENOSIS (Bilateral)    Specimen(s):  * No specimens in log *    Estimated Blood Loss:   Minimal    Drains:  * No LDAs found *    Anesthesia Type:   General    Operative Indications:  Acquired deformity of nose [M95 0]  Nasal valve collapse [J34 89]      Operative Findings:    Very narrow right nasal valve, easy collapse from lack of support of upper lateral cartilage and alar cartilage  Very severe fibrosis from the caudal edge of the septum to the nasal valve, that precluded advancement of the dissection to the planned area of placement of the  graft from hemitransfixion incision  Cartilage harvested from the right ear and 3 grafts placed on the right side of the nose:  A narrow strip as a Batten graft reinforcing upper lateral cartilage, a  graft that required an incision immediately behind the nasal valve, and an alar graft as well  Gentle reduction of inferior turbinates was done with Coblation bilaterally  Complications:   None    Procedure and Technique:     Patient was met in holding area, positively identified and risks, benefits and alternatives discussed, Patient confirmed informed consent  The patient was transferred onto the operating table in the supine position  Appropriate monitoring devices were put in place, general anesthesia was administered by anesthesiologist and patient intubated without complications, tube taped in midline     The patient was prepped and draped in the usual clean fashion including right ear (expected donor site)  Before proceeding further, a time-out was taken during which the patients identification and planned surgical procedure were confirmed  Examination with a speculum confirmed findings described in office visit  Topical oxymetazoline applied with cotton pledgets and 1% lidocaine with 1/100,000 epinephrine was injected to the septum on  right caudal edge  Septoplasty hemitransfixion  incision along the caudal margin of the septum on the right  side was performed with the 15 blade  Mucoperichondrial flap on right side dissected with iris scissors, dissection was very laborious and fails to provide advance to the level needed for a  graft  A second incision is then made on septal mucosa right side at the level of the nasal valve and a sub perichondrial pocket for  graft is then developed   Attention was then directed to right ala, and 1% lidocaine with 1/100,000 epinephrine was injected to the intercartilaginous space  Same solution injected to the skin at the inferior rim of lateral crura alar cartilage and into the lateral wall of the nose along the rim of the pyriform aperture  Intercartilaginous incision done and a pocket for batten graft developed parallel to vnasal nalve and to the lvel of the pyriform aperture  In a simirlar fashion a pocket was developed from the rim incision into the pyriform aperture parallel to the lateral crura of alar cartilage  The head was then rotated and the right post auricular crease and post auricular skin were injected with 1% lidocaine with 1/100,000 epinephrine   Same solution injected to the skin on lateral macho auricularis  After allowing adequate time for anesthetic and vasoconstrictive effect, a post auricular incision was done with 15 blade, through skin and perichondrium   The perichondrium on the post auricular surface of auricle and macho auricularis was dissected with iris scissors  The incision was then completed through the cartilage immediately anterior the edge of the helix leaving a rim of cartilage intact, an ellipse of cartilage  was harvested and kept in sterile saline  A second cartilage from the macho proper was harvested leaving a buttress of intact cartilage from crux of helix to the anthelix to preserve auricular structure  This was also kept in sterile saline solution  Proper hemostasis was obtained with bipolar and the wound closed with 4 0 chromic stitches to approximate the perichondrial planes and 6 0 fast absorbing suture for skin closure  Wound dressed with Bacitracin ointment  Harvested conchal cartilage was carved for  graft that were inset between septal cartilage and upper lateral cartilages and fixed with 4/0 plain gut transfixion stitches,  reinforcing and widening right nasal valve  The additional cartilage was carved to a second graft graft as a  Strip 4 mm wide and 2 cm long  A third graft was carved  20 mm long and 4 mm width at most medial end and 7 mm at lateral end  The second graft was inset on the pocket over the upper lateral cartilage and fixed with 4/0 chromic stitches also used to suture the incision  In a similar fashion the third graft was inset in the alar pocket, supported laterally on the bony rim of the pyriform aperture and fixed with 4/0 chromic stitches also used to suture the incision  The septoplasty incision was closed using  4-0 chromic stitches  The mucoperichondral flaps were sutured with mattress type 4/0 plain gut with small Thiago needle  Inferior turbinates were reduced by coblation needle and outfracturing them  Bilateral nasal cavities were then re-examined, and the longest nasal speculum could be passed back along the septum on both sides without meeting any resistance   Right nasal valve and right ala were noted to have wider breathing space and increased support with spring to external digital pressure  The nasopharynx was then suctioned free of blood and secretions  All counts were correct at the end of the case, and no complications were encountered  Patient was handed to the anesthesiologist who weaned from anesthesia, and extubated patient    Patient was awakened, and taken to the recovery room in stable condition      I was present for the entire procedure    Patient Disposition:  PACU     SIGNATURE: Natalie Aden MD  DATE: September 14, 2020  TIME: 1:33 PM

## 2020-09-14 NOTE — ANESTHESIA POSTPROCEDURE EVALUATION
Post-Op Assessment Note    CV Status:  Stable  Pain Score: 0    Pain management: adequate     Mental Status:  Sleepy   Hydration Status:  Stable   PONV Controlled:  None   Airway Patency:  Patent and adequate      Post Op Vitals Reviewed: Yes      Staff: CRNA         No complications documented      BP  123/67   Temp 97   Pulse  67   Resp   16   SpO2   97%

## 2020-09-14 NOTE — ANESTHESIA PREPROCEDURE EVALUATION
Procedure:  REVISION NASAL SURGERY WITH ALAR AND TRISTAN GRAFTS, AURICULAR CARTILAGE GRAFT (Bilateral Nose)  REPAIR VESTIBULAR STENOSIS (Bilateral Nose)    Relevant Problems   CARDIO   (+) Hyperlipidemia   (+) Hypertension   (+) Pain in thoracic spine      GI/HEPATIC   (+) GERD (gastroesophageal reflux disease)   (+) Non-alcoholic fatty liver disease      /RENAL   (+) Kidney stones      MUSCULOSKELETAL   (+) Low back pain   (+) Lumbar spondylosis   (+) Pain in thoracic spine   (+) Spondylosis of cervical region without myelopathy or radiculopathy      NEURO/PSYCH   (+) Chronic pain syndrome      PULMONARY   (+) DONNA (obstructive sleep apnea)        Physical Exam    Airway    Mallampati score: II  TM Distance: >3 FB  Neck ROM: limited     Dental   No notable dental hx     Cardiovascular  Rhythm: regular, Rate: normal, Cardiovascular exam normal    Pulmonary  Pulmonary exam normal Breath sounds clear to auscultation,     Other Findings  Long neck, mildly limited neck movement, with radiculopathy  Anesthesia Plan  ASA Score- 2     Anesthesia Type- general with ASA Monitors  Additional Monitors:   Airway Plan: ETT  Comment: Risks/benefits and alternatives discussed with patient including likely possibility of PONV and sore throat, as well as the rare possibilities of aspiration, dental/oropharyngeal/ocular injuries, or grave/life threatening anesthetic and surgical emergencies          Plan Factors-Exercise tolerance (METS): >4 METS  Patient summary reviewed  Patient instructed to abstain from smoking on day of procedure  Patient did not smoke on day of surgery  Induction- intravenous  Postoperative Plan- Plan for postoperative opioid use  Planned trial extubation    Informed Consent- Anesthetic plan and risks discussed with patient  I personally reviewed this patient with the CRNA  Discussed and agreed on the Anesthesia Plan with the CRNA  Gerhardt Sep

## 2020-09-16 ENCOUNTER — HOSPITAL ENCOUNTER (EMERGENCY)
Facility: HOSPITAL | Age: 50
Discharge: HOME | End: 2020-09-16
Attending: EMERGENCY MEDICINE | Admitting: EMERGENCY MEDICINE
Payer: COMMERCIAL

## 2020-09-16 ENCOUNTER — APPOINTMENT (EMERGENCY)
Dept: RADIOLOGY | Facility: HOSPITAL | Age: 50
End: 2020-09-16
Payer: COMMERCIAL

## 2020-09-16 VITALS
OXYGEN SATURATION: 100 % | BODY MASS INDEX: 28.25 KG/M2 | WEIGHT: 220.02 LBS | RESPIRATION RATE: 20 BRPM | SYSTOLIC BLOOD PRESSURE: 185 MMHG | DIASTOLIC BLOOD PRESSURE: 110 MMHG | HEART RATE: 97 BPM | TEMPERATURE: 98.3 F

## 2020-09-16 DIAGNOSIS — M79.641 HAND PAIN, RIGHT: Primary | ICD-10-CM

## 2020-09-16 DIAGNOSIS — G89.18 POST-OP PAIN: ICD-10-CM

## 2020-09-16 PROCEDURE — 73130 X-RAY EXAM OF HAND: CPT

## 2020-09-16 PROCEDURE — 99284 EMERGENCY DEPT VISIT MOD MDM: CPT | Performed by: EMERGENCY MEDICINE

## 2020-09-16 PROCEDURE — 99283 EMERGENCY DEPT VISIT LOW MDM: CPT

## 2020-09-16 RX ORDER — OXYCODONE HYDROCHLORIDE AND ACETAMINOPHEN 5; 325 MG/1; MG/1
1 TABLET ORAL EVERY 4 HOURS PRN
Qty: 12 TABLET | Refills: 0 | Status: SHIPPED | OUTPATIENT
Start: 2020-09-16 | End: 2020-09-28 | Stop reason: HOSPADM
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
## 2020-09-21 ENCOUNTER — HOSPITAL ENCOUNTER (OUTPATIENT)
Facility: HOSPITAL | Age: 50
Setting detail: OBSERVATION
Discharge: HOME | DRG: 720 | End: 2020-09-21
Attending: EMERGENCY MEDICINE | Admitting: FAMILY MEDICINE
Payer: COMMERCIAL

## 2020-09-21 ENCOUNTER — APPOINTMENT (EMERGENCY)
Dept: CT IMAGING | Facility: HOSPITAL | Age: 50
DRG: 720 | End: 2020-09-21
Payer: COMMERCIAL

## 2020-09-21 VITALS
HEART RATE: 90 BPM | DIASTOLIC BLOOD PRESSURE: 89 MMHG | HEIGHT: 74 IN | BODY MASS INDEX: 27.78 KG/M2 | OXYGEN SATURATION: 97 % | RESPIRATION RATE: 19 BRPM | SYSTOLIC BLOOD PRESSURE: 126 MMHG | WEIGHT: 216.49 LBS | TEMPERATURE: 97.7 F

## 2020-09-21 DIAGNOSIS — L03.113 CELLULITIS OF RIGHT HAND: Primary | ICD-10-CM

## 2020-09-21 LAB
ALBUMIN SERPL BCP-MCNC: 3.3 G/DL (ref 3.5–5)
ALP SERPL-CCNC: 87 U/L (ref 46–116)
ALT SERPL W P-5'-P-CCNC: 26 U/L (ref 12–78)
ANION GAP SERPL CALCULATED.3IONS-SCNC: 9 MMOL/L (ref 4–13)
AST SERPL W P-5'-P-CCNC: 16 U/L (ref 5–45)
BASOPHILS # BLD AUTO: 0.01 THOUSANDS/ΜL (ref 0–0.1)
BASOPHILS NFR BLD AUTO: 0 % (ref 0–1)
BILIRUB SERPL-MCNC: 0.4 MG/DL (ref 0.2–1)
BUN SERPL-MCNC: 13 MG/DL (ref 5–25)
CALCIUM ALBUM COR SERPL-MCNC: 10.5 MG/DL (ref 8.3–10.1)
CALCIUM SERPL-MCNC: 9.9 MG/DL (ref 8.3–10.1)
CHLORIDE SERPL-SCNC: 101 MMOL/L (ref 100–108)
CO2 SERPL-SCNC: 29 MMOL/L (ref 21–32)
CREAT SERPL-MCNC: 0.95 MG/DL (ref 0.6–1.3)
EOSINOPHIL # BLD AUTO: 0.04 THOUSAND/ΜL (ref 0–0.61)
EOSINOPHIL NFR BLD AUTO: 0 % (ref 0–6)
ERYTHROCYTE [DISTWIDTH] IN BLOOD BY AUTOMATED COUNT: 12.7 % (ref 11.6–15.1)
GFR SERPL CREATININE-BSD FRML MDRD: 93 ML/MIN/1.73SQ M
GLUCOSE SERPL-MCNC: 126 MG/DL (ref 65–140)
HCT VFR BLD AUTO: 44.6 % (ref 36.5–49.3)
HGB BLD-MCNC: 14.2 G/DL (ref 12–17)
IMM GRANULOCYTES # BLD AUTO: 0.07 THOUSAND/UL (ref 0–0.2)
IMM GRANULOCYTES NFR BLD AUTO: 1 % (ref 0–2)
LACTATE SERPL-SCNC: 1.4 MMOL/L (ref 0.5–2)
LYMPHOCYTES # BLD AUTO: 0.76 THOUSANDS/ΜL (ref 0.6–4.47)
LYMPHOCYTES NFR BLD AUTO: 7 % (ref 14–44)
MCH RBC QN AUTO: 30.1 PG (ref 26.8–34.3)
MCHC RBC AUTO-ENTMCNC: 31.8 G/DL (ref 31.4–37.4)
MCV RBC AUTO: 95 FL (ref 82–98)
MONOCYTES # BLD AUTO: 0.54 THOUSAND/ΜL (ref 0.17–1.22)
MONOCYTES NFR BLD AUTO: 5 % (ref 4–12)
NEUTROPHILS # BLD AUTO: 9.08 THOUSANDS/ΜL (ref 1.85–7.62)
NEUTS SEG NFR BLD AUTO: 87 % (ref 43–75)
NRBC BLD AUTO-RTO: 0 /100 WBCS
PLATELET # BLD AUTO: 260 THOUSANDS/UL (ref 149–390)
PMV BLD AUTO: 9.3 FL (ref 8.9–12.7)
POTASSIUM SERPL-SCNC: 4.5 MMOL/L (ref 3.5–5.3)
PROT SERPL-MCNC: 8.1 G/DL (ref 6.4–8.2)
RBC # BLD AUTO: 4.72 MILLION/UL (ref 3.88–5.62)
SODIUM SERPL-SCNC: 139 MMOL/L (ref 136–145)
WBC # BLD AUTO: 10.5 THOUSAND/UL (ref 4.31–10.16)

## 2020-09-21 PROCEDURE — 99284 EMERGENCY DEPT VISIT MOD MDM: CPT | Performed by: PHYSICIAN ASSISTANT

## 2020-09-21 PROCEDURE — 80053 COMPREHEN METABOLIC PANEL: CPT | Performed by: PHYSICIAN ASSISTANT

## 2020-09-21 PROCEDURE — 73201 CT UPPER EXTREMITY W/DYE: CPT

## 2020-09-21 PROCEDURE — 36415 COLL VENOUS BLD VENIPUNCTURE: CPT | Performed by: PHYSICIAN ASSISTANT

## 2020-09-21 PROCEDURE — NC001 PR NO CHARGE: Performed by: NURSE PRACTITIONER

## 2020-09-21 PROCEDURE — 99285 EMERGENCY DEPT VISIT HI MDM: CPT

## 2020-09-21 PROCEDURE — 96365 THER/PROPH/DIAG IV INF INIT: CPT

## 2020-09-21 PROCEDURE — 99220 PR INITIAL OBSERVATION CARE/DAY 70 MINUTES: CPT | Performed by: NURSE PRACTITIONER

## 2020-09-21 PROCEDURE — 83605 ASSAY OF LACTIC ACID: CPT | Performed by: PHYSICIAN ASSISTANT

## 2020-09-21 PROCEDURE — 85025 COMPLETE CBC W/AUTO DIFF WBC: CPT | Performed by: PHYSICIAN ASSISTANT

## 2020-09-21 RX ORDER — HYDROMORPHONE HCL/PF 1 MG/ML
0.5 SYRINGE (ML) INJECTION ONCE
Status: COMPLETED | OUTPATIENT
Start: 2020-09-21 | End: 2020-09-21

## 2020-09-21 RX ORDER — CYCLOBENZAPRINE HCL 10 MG
10 TABLET ORAL 3 TIMES DAILY PRN
Status: DISCONTINUED | OUTPATIENT
Start: 2020-09-21 | End: 2020-09-21 | Stop reason: HOSPADM

## 2020-09-21 RX ORDER — HYDROCODONE BITARTRATE AND ACETAMINOPHEN 5; 325 MG/1; MG/1
1 TABLET ORAL EVERY 4 HOURS PRN
Status: DISCONTINUED | OUTPATIENT
Start: 2020-09-21 | End: 2020-09-21 | Stop reason: HOSPADM

## 2020-09-21 RX ORDER — CEFAZOLIN SODIUM 2 G/50ML
2000 SOLUTION INTRAVENOUS ONCE
Status: COMPLETED | OUTPATIENT
Start: 2020-09-21 | End: 2020-09-21

## 2020-09-21 RX ORDER — GABAPENTIN 300 MG/1
300 CAPSULE ORAL 3 TIMES DAILY
Status: DISCONTINUED | OUTPATIENT
Start: 2020-09-21 | End: 2020-09-21 | Stop reason: HOSPADM

## 2020-09-21 RX ORDER — DULOXETIN HYDROCHLORIDE 30 MG/1
30 CAPSULE, DELAYED RELEASE ORAL DAILY
Status: DISCONTINUED | OUTPATIENT
Start: 2020-09-21 | End: 2020-09-21 | Stop reason: HOSPADM

## 2020-09-21 RX ORDER — PANTOPRAZOLE SODIUM 40 MG/1
40 TABLET, DELAYED RELEASE ORAL DAILY
Status: DISCONTINUED | OUTPATIENT
Start: 2020-09-21 | End: 2020-09-21 | Stop reason: HOSPADM

## 2020-09-21 RX ORDER — CEPHALEXIN 500 MG/1
500 CAPSULE ORAL EVERY 6 HOURS SCHEDULED
Status: DISCONTINUED | OUTPATIENT
Start: 2020-09-21 | End: 2020-09-21 | Stop reason: HOSPADM

## 2020-09-21 RX ORDER — AMLODIPINE BESYLATE 5 MG/1
5 TABLET ORAL DAILY
Status: DISCONTINUED | OUTPATIENT
Start: 2020-09-21 | End: 2020-09-21 | Stop reason: HOSPADM

## 2020-09-21 RX ORDER — HYDROCHLOROTHIAZIDE 25 MG/1
25 TABLET ORAL DAILY
Status: DISCONTINUED | OUTPATIENT
Start: 2020-09-21 | End: 2020-09-21 | Stop reason: HOSPADM

## 2020-09-21 RX ORDER — LISINOPRIL 20 MG/1
20 TABLET ORAL DAILY
Status: DISCONTINUED | OUTPATIENT
Start: 2020-09-21 | End: 2020-09-21 | Stop reason: HOSPADM

## 2020-09-21 RX ORDER — ATORVASTATIN CALCIUM 10 MG/1
10 TABLET, FILM COATED ORAL DAILY
Status: DISCONTINUED | OUTPATIENT
Start: 2020-09-21 | End: 2020-09-21 | Stop reason: HOSPADM

## 2020-09-21 RX ADMIN — AMLODIPINE BESYLATE 5 MG: 5 TABLET ORAL at 09:23

## 2020-09-21 RX ADMIN — HYDROCHLOROTHIAZIDE 25 MG: 25 TABLET ORAL at 09:23

## 2020-09-21 RX ADMIN — CEFAZOLIN SODIUM 2000 MG: 2 SOLUTION INTRAVENOUS at 03:14

## 2020-09-21 RX ADMIN — LISINOPRIL 20 MG: 20 TABLET ORAL at 09:23

## 2020-09-21 RX ADMIN — DULOXETINE HYDROCHLORIDE 30 MG: 30 CAPSULE, DELAYED RELEASE ORAL at 09:23

## 2020-09-21 RX ADMIN — IOHEXOL 100 ML: 350 INJECTION, SOLUTION INTRAVENOUS at 02:51

## 2020-09-21 RX ADMIN — GABAPENTIN 300 MG: 300 CAPSULE ORAL at 09:23

## 2020-09-21 RX ADMIN — ATORVASTATIN CALCIUM 10 MG: 10 TABLET, FILM COATED ORAL at 09:23

## 2020-09-21 RX ADMIN — HYDROMORPHONE HYDROCHLORIDE 0.5 MG: 1 INJECTION, SOLUTION INTRAMUSCULAR; INTRAVENOUS; SUBCUTANEOUS at 09:23

## 2020-09-21 RX ADMIN — PANTOPRAZOLE SODIUM 40 MG: 40 TABLET, DELAYED RELEASE ORAL at 09:23

## 2020-09-22 ENCOUNTER — TRANSITIONAL CARE MANAGEMENT (OUTPATIENT)
Dept: FAMILY MEDICINE CLINIC | Facility: MEDICAL CENTER | Age: 50
End: 2020-09-22

## 2020-09-22 RX ORDER — NAPROXEN 500 MG/1
500 TABLET ORAL 2 TIMES DAILY
COMMUNITY
Start: 2020-08-12 | End: 2020-09-28 | Stop reason: HOSPADM

## 2020-09-22 RX ORDER — DIAPER,BRIEF,INFANT-TODD,DISP
EACH MISCELLANEOUS
COMMUNITY
End: 2020-12-05 | Stop reason: HOSPADM

## 2020-09-24 ENCOUNTER — APPOINTMENT (EMERGENCY)
Dept: ULTRASOUND IMAGING | Facility: HOSPITAL | Age: 50
DRG: 720 | End: 2020-09-24
Payer: COMMERCIAL

## 2020-09-24 ENCOUNTER — APPOINTMENT (EMERGENCY)
Dept: RADIOLOGY | Facility: HOSPITAL | Age: 50
DRG: 720 | End: 2020-09-24
Payer: COMMERCIAL

## 2020-09-24 ENCOUNTER — APPOINTMENT (EMERGENCY)
Dept: CT IMAGING | Facility: HOSPITAL | Age: 50
DRG: 720 | End: 2020-09-24
Payer: COMMERCIAL

## 2020-09-24 ENCOUNTER — HOSPITAL ENCOUNTER (INPATIENT)
Facility: HOSPITAL | Age: 50
LOS: 1 days | DRG: 720 | End: 2020-09-25
Attending: EMERGENCY MEDICINE | Admitting: STUDENT IN AN ORGANIZED HEALTH CARE EDUCATION/TRAINING PROGRAM
Payer: COMMERCIAL

## 2020-09-24 DIAGNOSIS — L03.90 CELLULITIS: ICD-10-CM

## 2020-09-24 DIAGNOSIS — A41.9 SEPSIS (HCC): ICD-10-CM

## 2020-09-24 DIAGNOSIS — M65.9 FLEXOR TENOSYNOVITIS OF FINGER: Primary | ICD-10-CM

## 2020-09-24 LAB
ABO GROUP BLD: NORMAL
ALBUMIN SERPL BCP-MCNC: 3.4 G/DL (ref 3.5–5)
ALP SERPL-CCNC: 87 U/L (ref 46–116)
ALT SERPL W P-5'-P-CCNC: 42 U/L (ref 12–78)
ANION GAP SERPL CALCULATED.3IONS-SCNC: 7 MMOL/L (ref 4–13)
APTT PPP: 31 SECONDS (ref 23–37)
AST SERPL W P-5'-P-CCNC: 15 U/L (ref 5–45)
BASOPHILS # BLD AUTO: 0.04 THOUSANDS/ΜL (ref 0–0.1)
BASOPHILS NFR BLD AUTO: 0 % (ref 0–1)
BILIRUB SERPL-MCNC: 0.3 MG/DL (ref 0.2–1)
BLD GP AB SCN SERPL QL: NEGATIVE
BUN SERPL-MCNC: 20 MG/DL (ref 5–25)
CALCIUM ALBUM COR SERPL-MCNC: 10.2 MG/DL (ref 8.3–10.1)
CALCIUM SERPL-MCNC: 9.7 MG/DL (ref 8.3–10.1)
CHLORIDE SERPL-SCNC: 102 MMOL/L (ref 100–108)
CO2 SERPL-SCNC: 31 MMOL/L (ref 21–32)
CREAT SERPL-MCNC: 0.92 MG/DL (ref 0.6–1.3)
EOSINOPHIL # BLD AUTO: 0.21 THOUSAND/ΜL (ref 0–0.61)
EOSINOPHIL NFR BLD AUTO: 2 % (ref 0–6)
ERYTHROCYTE [DISTWIDTH] IN BLOOD BY AUTOMATED COUNT: 12.8 % (ref 11.6–15.1)
GFR SERPL CREATININE-BSD FRML MDRD: 97 ML/MIN/1.73SQ M
GLUCOSE SERPL-MCNC: 92 MG/DL (ref 65–140)
HCT VFR BLD AUTO: 41.8 % (ref 36.5–49.3)
HGB BLD-MCNC: 13.4 G/DL (ref 12–17)
IMM GRANULOCYTES # BLD AUTO: 0.13 THOUSAND/UL (ref 0–0.2)
IMM GRANULOCYTES NFR BLD AUTO: 1 % (ref 0–2)
INR PPP: 1.08 (ref 0.84–1.19)
LACTATE SERPL-SCNC: 0.7 MMOL/L (ref 0.5–2)
LYMPHOCYTES # BLD AUTO: 2.74 THOUSANDS/ΜL (ref 0.6–4.47)
LYMPHOCYTES NFR BLD AUTO: 19 % (ref 14–44)
MCH RBC QN AUTO: 30.8 PG (ref 26.8–34.3)
MCHC RBC AUTO-ENTMCNC: 32.1 G/DL (ref 31.4–37.4)
MCV RBC AUTO: 96 FL (ref 82–98)
MONOCYTES # BLD AUTO: 1.12 THOUSAND/ΜL (ref 0.17–1.22)
MONOCYTES NFR BLD AUTO: 8 % (ref 4–12)
NEUTROPHILS # BLD AUTO: 10.02 THOUSANDS/ΜL (ref 1.85–7.62)
NEUTS SEG NFR BLD AUTO: 70 % (ref 43–75)
NRBC BLD AUTO-RTO: 0 /100 WBCS
PLATELET # BLD AUTO: 325 THOUSANDS/UL (ref 149–390)
PMV BLD AUTO: 9.4 FL (ref 8.9–12.7)
POTASSIUM SERPL-SCNC: 3.7 MMOL/L (ref 3.5–5.3)
PROT SERPL-MCNC: 8.3 G/DL (ref 6.4–8.2)
PROTHROMBIN TIME: 13.5 SECONDS (ref 11.6–14.5)
RBC # BLD AUTO: 4.35 MILLION/UL (ref 3.88–5.62)
RH BLD: POSITIVE
SODIUM SERPL-SCNC: 140 MMOL/L (ref 136–145)
SPECIMEN EXPIRATION DATE: NORMAL
WBC # BLD AUTO: 14.26 THOUSAND/UL (ref 4.31–10.16)

## 2020-09-24 PROCEDURE — 85025 COMPLETE CBC W/AUTO DIFF WBC: CPT | Performed by: PHYSICIAN ASSISTANT

## 2020-09-24 PROCEDURE — 73090 X-RAY EXAM OF FOREARM: CPT

## 2020-09-24 PROCEDURE — 80053 COMPREHEN METABOLIC PANEL: CPT | Performed by: PHYSICIAN ASSISTANT

## 2020-09-24 PROCEDURE — 96367 TX/PROPH/DG ADDL SEQ IV INF: CPT

## 2020-09-24 PROCEDURE — 87040 BLOOD CULTURE FOR BACTERIA: CPT | Performed by: PHYSICIAN ASSISTANT

## 2020-09-24 PROCEDURE — 99285 EMERGENCY DEPT VISIT HI MDM: CPT | Performed by: PHYSICIAN ASSISTANT

## 2020-09-24 PROCEDURE — 83605 ASSAY OF LACTIC ACID: CPT | Performed by: PHYSICIAN ASSISTANT

## 2020-09-24 PROCEDURE — 96361 HYDRATE IV INFUSION ADD-ON: CPT

## 2020-09-24 PROCEDURE — 96372 THER/PROPH/DIAG INJ SC/IM: CPT

## 2020-09-24 PROCEDURE — 96375 TX/PRO/DX INJ NEW DRUG ADDON: CPT

## 2020-09-24 PROCEDURE — 93971 EXTREMITY STUDY: CPT

## 2020-09-24 PROCEDURE — 86901 BLOOD TYPING SEROLOGIC RH(D): CPT | Performed by: PHYSICIAN ASSISTANT

## 2020-09-24 PROCEDURE — 86900 BLOOD TYPING SEROLOGIC ABO: CPT | Performed by: PHYSICIAN ASSISTANT

## 2020-09-24 PROCEDURE — 99284 EMERGENCY DEPT VISIT MOD MDM: CPT

## 2020-09-24 PROCEDURE — 96365 THER/PROPH/DIAG IV INF INIT: CPT

## 2020-09-24 PROCEDURE — 73201 CT UPPER EXTREMITY W/DYE: CPT

## 2020-09-24 PROCEDURE — 36415 COLL VENOUS BLD VENIPUNCTURE: CPT | Performed by: PHYSICIAN ASSISTANT

## 2020-09-24 PROCEDURE — 85610 PROTHROMBIN TIME: CPT | Performed by: PHYSICIAN ASSISTANT

## 2020-09-24 PROCEDURE — 85730 THROMBOPLASTIN TIME PARTIAL: CPT | Performed by: PHYSICIAN ASSISTANT

## 2020-09-24 PROCEDURE — 86850 RBC ANTIBODY SCREEN: CPT | Performed by: PHYSICIAN ASSISTANT

## 2020-09-24 RX ORDER — OLANZAPINE 10 MG/1
10 INJECTION, POWDER, LYOPHILIZED, FOR SOLUTION INTRAMUSCULAR ONCE
Status: COMPLETED | OUTPATIENT
Start: 2020-09-24 | End: 2020-09-24

## 2020-09-24 RX ORDER — MORPHINE SULFATE 10 MG/ML
8 INJECTION, SOLUTION INTRAMUSCULAR; INTRAVENOUS ONCE
Status: COMPLETED | OUTPATIENT
Start: 2020-09-24 | End: 2020-09-24

## 2020-09-24 RX ORDER — HYDROMORPHONE HCL/PF 1 MG/ML
1 SYRINGE (ML) INJECTION ONCE
Status: COMPLETED | OUTPATIENT
Start: 2020-09-24 | End: 2020-09-24

## 2020-09-24 RX ORDER — KETOROLAC TROMETHAMINE 30 MG/ML
15 INJECTION, SOLUTION INTRAMUSCULAR; INTRAVENOUS ONCE
Status: COMPLETED | OUTPATIENT
Start: 2020-09-24 | End: 2020-09-24

## 2020-09-24 RX ORDER — FENTANYL CITRATE 50 UG/ML
100 INJECTION, SOLUTION INTRAMUSCULAR; INTRAVENOUS ONCE
Status: COMPLETED | OUTPATIENT
Start: 2020-09-24 | End: 2020-09-24

## 2020-09-24 RX ADMIN — VANCOMYCIN HYDROCHLORIDE 1250 MG: 5 INJECTION, POWDER, LYOPHILIZED, FOR SOLUTION INTRAVENOUS at 20:46

## 2020-09-24 RX ADMIN — OXYCODONE HYDROCHLORIDE 15 MG: 10 TABLET ORAL at 21:25

## 2020-09-24 RX ADMIN — HYDROMORPHONE HYDROCHLORIDE 1 MG: 1 INJECTION, SOLUTION INTRAMUSCULAR; INTRAVENOUS; SUBCUTANEOUS at 20:07

## 2020-09-24 RX ADMIN — SODIUM CHLORIDE 1000 ML: 0.9 INJECTION, SOLUTION INTRAVENOUS at 20:12

## 2020-09-24 RX ADMIN — MORPHINE SULFATE 8 MG: 10 INJECTION INTRAVENOUS at 21:45

## 2020-09-24 RX ADMIN — FENTANYL CITRATE 100 MCG: 50 INJECTION INTRAMUSCULAR; INTRAVENOUS at 20:31

## 2020-09-24 RX ADMIN — WATER 2.1 ML: 1 INJECTION INTRAMUSCULAR; INTRAVENOUS; SUBCUTANEOUS at 22:20

## 2020-09-24 RX ADMIN — IOHEXOL 100 ML: 350 INJECTION, SOLUTION INTRAVENOUS at 21:16

## 2020-09-24 RX ADMIN — OLANZAPINE 10 MG: 10 INJECTION, POWDER, FOR SOLUTION INTRAMUSCULAR at 22:20

## 2020-09-24 RX ADMIN — CEFEPIME HYDROCHLORIDE 2000 MG: 2 INJECTION, POWDER, FOR SOLUTION INTRAVENOUS at 20:13

## 2020-09-24 RX ADMIN — SODIUM CHLORIDE 1000 ML: 0.9 INJECTION, SOLUTION INTRAVENOUS at 21:49

## 2020-09-24 RX ADMIN — KETOROLAC TROMETHAMINE 15 MG: 30 INJECTION, SOLUTION INTRAMUSCULAR at 21:30

## 2020-09-25 ENCOUNTER — APPOINTMENT (EMERGENCY)
Dept: RADIOLOGY | Facility: HOSPITAL | Age: 50
DRG: 316 | End: 2020-09-25
Payer: COMMERCIAL

## 2020-09-25 ENCOUNTER — ANESTHESIA EVENT (INPATIENT)
Dept: PERIOP | Facility: HOSPITAL | Age: 50
DRG: 316 | End: 2020-09-25
Payer: COMMERCIAL

## 2020-09-25 ENCOUNTER — HOSPITAL ENCOUNTER (INPATIENT)
Facility: HOSPITAL | Age: 50
LOS: 3 days | Discharge: HOME | DRG: 316 | End: 2020-09-28
Attending: EMERGENCY MEDICINE | Admitting: INTERNAL MEDICINE
Payer: COMMERCIAL

## 2020-09-25 ENCOUNTER — ANESTHESIA (INPATIENT)
Dept: PERIOP | Facility: HOSPITAL | Age: 50
DRG: 316 | End: 2020-09-25
Payer: COMMERCIAL

## 2020-09-25 ENCOUNTER — APPOINTMENT (INPATIENT)
Dept: RADIOLOGY | Facility: HOSPITAL | Age: 50
DRG: 316 | End: 2020-09-25
Payer: COMMERCIAL

## 2020-09-25 VITALS
OXYGEN SATURATION: 98 % | HEIGHT: 74 IN | SYSTOLIC BLOOD PRESSURE: 126 MMHG | WEIGHT: 216 LBS | RESPIRATION RATE: 18 BRPM | TEMPERATURE: 97.9 F | DIASTOLIC BLOOD PRESSURE: 76 MMHG | BODY MASS INDEX: 27.72 KG/M2 | HEART RATE: 83 BPM

## 2020-09-25 VITALS — HEART RATE: 83 BPM

## 2020-09-25 DIAGNOSIS — M65.9 FLEXOR TENOSYNOVITIS OF FINGER: ICD-10-CM

## 2020-09-25 DIAGNOSIS — F17.200 NICOTINE DEPENDENCE, UNCOMPLICATED, UNSPECIFIED NICOTINE PRODUCT TYPE: ICD-10-CM

## 2020-09-25 DIAGNOSIS — L03.113 CELLULITIS OF RIGHT HAND: Primary | ICD-10-CM

## 2020-09-25 LAB
BASOPHILS # BLD AUTO: 0.03 THOUSANDS/ΜL (ref 0–0.1)
BASOPHILS NFR BLD AUTO: 0 % (ref 0–1)
EOSINOPHIL # BLD AUTO: 0.23 THOUSAND/ΜL (ref 0–0.61)
EOSINOPHIL NFR BLD AUTO: 2 % (ref 0–6)
ERYTHROCYTE [DISTWIDTH] IN BLOOD BY AUTOMATED COUNT: 13 % (ref 11.6–15.1)
HCT VFR BLD AUTO: 37.7 % (ref 36.5–49.3)
HGB BLD-MCNC: 12.1 G/DL (ref 12–17)
IMM GRANULOCYTES # BLD AUTO: 0.1 THOUSAND/UL (ref 0–0.2)
IMM GRANULOCYTES NFR BLD AUTO: 1 % (ref 0–2)
LYMPHOCYTES # BLD AUTO: 2.98 THOUSANDS/ΜL (ref 0.6–4.47)
LYMPHOCYTES NFR BLD AUTO: 27 % (ref 14–44)
MCH RBC QN AUTO: 30.8 PG (ref 26.8–34.3)
MCHC RBC AUTO-ENTMCNC: 32.1 G/DL (ref 31.4–37.4)
MCV RBC AUTO: 96 FL (ref 82–98)
MONOCYTES # BLD AUTO: 1.04 THOUSAND/ΜL (ref 0.17–1.22)
MONOCYTES NFR BLD AUTO: 9 % (ref 4–12)
NEUTROPHILS # BLD AUTO: 6.81 THOUSANDS/ΜL (ref 1.85–7.62)
NEUTS SEG NFR BLD AUTO: 61 % (ref 43–75)
NRBC BLD AUTO-RTO: 0 /100 WBCS
PLATELET # BLD AUTO: 292 THOUSANDS/UL (ref 149–390)
PMV BLD AUTO: 9.4 FL (ref 8.9–12.7)
RBC # BLD AUTO: 3.93 MILLION/UL (ref 3.88–5.62)
SARS-COV-2 RNA RESP QL NAA+PROBE: NEGATIVE
VANCOMYCIN TROUGH SERPL-MCNC: 10.8 UG/ML (ref 10–20)
WBC # BLD AUTO: 11.19 THOUSAND/UL (ref 4.31–10.16)

## 2020-09-25 PROCEDURE — 99285 EMERGENCY DEPT VISIT HI MDM: CPT | Performed by: EMERGENCY MEDICINE

## 2020-09-25 PROCEDURE — 99254 IP/OBS CNSLTJ NEW/EST MOD 60: CPT | Performed by: INTERNAL MEDICINE

## 2020-09-25 PROCEDURE — 71045 X-RAY EXAM CHEST 1 VIEW: CPT

## 2020-09-25 PROCEDURE — 84145 PROCALCITONIN (PCT): CPT | Performed by: INTERNAL MEDICINE

## 2020-09-25 PROCEDURE — NC001 PR NO CHARGE: Performed by: ORTHOPAEDIC SURGERY

## 2020-09-25 PROCEDURE — 36415 COLL VENOUS BLD VENIPUNCTURE: CPT | Performed by: PHYSICIAN ASSISTANT

## 2020-09-25 PROCEDURE — 01N50ZZ RELEASE MEDIAN NERVE, OPEN APPROACH: ICD-10-PCS | Performed by: ORTHOPAEDIC SURGERY

## 2020-09-25 PROCEDURE — 85025 COMPLETE CBC W/AUTO DIFF WBC: CPT | Performed by: PHYSICIAN ASSISTANT

## 2020-09-25 PROCEDURE — 64721 CARPAL TUNNEL SURGERY: CPT | Performed by: ORTHOPAEDIC SURGERY

## 2020-09-25 PROCEDURE — 3E1U38Z IRRIGATION OF JOINTS USING IRRIGATING SUBSTANCE, PERCUTANEOUS APPROACH: ICD-10-PCS | Performed by: ORTHOPAEDIC SURGERY

## 2020-09-25 PROCEDURE — 99285 EMERGENCY DEPT VISIT HI MDM: CPT

## 2020-09-25 PROCEDURE — 87075 CULTR BACTERIA EXCEPT BLOOD: CPT | Performed by: ORTHOPAEDIC SURGERY

## 2020-09-25 PROCEDURE — 93005 ELECTROCARDIOGRAM TRACING: CPT

## 2020-09-25 PROCEDURE — U0003 INFECTIOUS AGENT DETECTION BY NUCLEIC ACID (DNA OR RNA); SEVERE ACUTE RESPIRATORY SYNDROME CORONAVIRUS 2 (SARS-COV-2) (CORONAVIRUS DISEASE [COVID-19]), AMPLIFIED PROBE TECHNIQUE, MAKING USE OF HIGH THROUGHPUT TECHNOLOGIES AS DESCRIBED BY CMS-2020-01-R: HCPCS | Performed by: ORTHOPAEDIC SURGERY

## 2020-09-25 PROCEDURE — 93971 EXTREMITY STUDY: CPT | Performed by: SURGERY

## 2020-09-25 PROCEDURE — 99223 1ST HOSP IP/OBS HIGH 75: CPT | Performed by: FAMILY MEDICINE

## 2020-09-25 PROCEDURE — 99223 1ST HOSP IP/OBS HIGH 75: CPT | Performed by: INTERNAL MEDICINE

## 2020-09-25 PROCEDURE — 26020 DRAIN HAND TENDON SHEATH: CPT | Performed by: ORTHOPAEDIC SURGERY

## 2020-09-25 PROCEDURE — 0LD70ZZ EXTRACTION OF RIGHT HAND TENDON, OPEN APPROACH: ICD-10-PCS | Performed by: ORTHOPAEDIC SURGERY

## 2020-09-25 PROCEDURE — 87070 CULTURE OTHR SPECIMN AEROBIC: CPT | Performed by: ORTHOPAEDIC SURGERY

## 2020-09-25 PROCEDURE — 73090 X-RAY EXAM OF FOREARM: CPT

## 2020-09-25 PROCEDURE — 80202 ASSAY OF VANCOMYCIN: CPT | Performed by: ORTHOPAEDIC SURGERY

## 2020-09-25 PROCEDURE — 0L970ZZ DRAINAGE OF RIGHT HAND TENDON, OPEN APPROACH: ICD-10-PCS | Performed by: ORTHOPAEDIC SURGERY

## 2020-09-25 PROCEDURE — 87205 SMEAR GRAM STAIN: CPT | Performed by: ORTHOPAEDIC SURGERY

## 2020-09-25 RX ORDER — ONDANSETRON 2 MG/ML
4 INJECTION INTRAMUSCULAR; INTRAVENOUS EVERY 6 HOURS PRN
Status: DISCONTINUED | OUTPATIENT
Start: 2020-09-25 | End: 2020-09-28 | Stop reason: HOSPADM

## 2020-09-25 RX ORDER — MORPHINE SULFATE 4 MG/ML
4 INJECTION, SOLUTION INTRAMUSCULAR; INTRAVENOUS EVERY 4 HOURS PRN
Status: CANCELLED | OUTPATIENT
Start: 2020-09-25

## 2020-09-25 RX ORDER — LISINOPRIL 20 MG/1
20 TABLET ORAL DAILY
Status: DISCONTINUED | OUTPATIENT
Start: 2020-09-26 | End: 2020-09-28 | Stop reason: HOSPADM

## 2020-09-25 RX ORDER — ONDANSETRON 2 MG/ML
4 INJECTION INTRAMUSCULAR; INTRAVENOUS EVERY 6 HOURS PRN
Status: DISCONTINUED | OUTPATIENT
Start: 2020-09-25 | End: 2020-09-25 | Stop reason: HOSPADM

## 2020-09-25 RX ORDER — HYDROMORPHONE HCL/PF 1 MG/ML
0.5 SYRINGE (ML) INJECTION EVERY 4 HOURS PRN
Status: DISCONTINUED | OUTPATIENT
Start: 2020-09-25 | End: 2020-09-28 | Stop reason: HOSPADM

## 2020-09-25 RX ORDER — ONDANSETRON 2 MG/ML
4 INJECTION INTRAMUSCULAR; INTRAVENOUS EVERY 6 HOURS PRN
Status: CANCELLED | OUTPATIENT
Start: 2020-09-25

## 2020-09-25 RX ORDER — FENTANYL CITRATE 50 UG/ML
INJECTION, SOLUTION INTRAMUSCULAR; INTRAVENOUS AS NEEDED
Status: DISCONTINUED | OUTPATIENT
Start: 2020-09-25 | End: 2020-09-25

## 2020-09-25 RX ORDER — GABAPENTIN 300 MG/1
300 CAPSULE ORAL 3 TIMES DAILY
Status: DISCONTINUED | OUTPATIENT
Start: 2020-09-25 | End: 2020-09-28 | Stop reason: HOSPADM

## 2020-09-25 RX ORDER — KETOROLAC TROMETHAMINE 30 MG/ML
15 INJECTION, SOLUTION INTRAMUSCULAR; INTRAVENOUS EVERY 6 HOURS PRN
Status: CANCELLED | OUTPATIENT
Start: 2020-09-25 | End: 2020-09-27

## 2020-09-25 RX ORDER — PROPOFOL 10 MG/ML
INJECTION, EMULSION INTRAVENOUS AS NEEDED
Status: DISCONTINUED | OUTPATIENT
Start: 2020-09-25 | End: 2020-09-25

## 2020-09-25 RX ORDER — DULOXETIN HYDROCHLORIDE 30 MG/1
30 CAPSULE, DELAYED RELEASE ORAL DAILY
Status: DISCONTINUED | OUTPATIENT
Start: 2020-09-25 | End: 2020-09-25 | Stop reason: HOSPADM

## 2020-09-25 RX ORDER — ACETAMINOPHEN 325 MG/1
650 TABLET ORAL EVERY 6 HOURS PRN
Status: DISCONTINUED | OUTPATIENT
Start: 2020-09-25 | End: 2020-09-25 | Stop reason: HOSPADM

## 2020-09-25 RX ORDER — VANCOMYCIN HYDROCHLORIDE 1 G/200ML
10 INJECTION, SOLUTION INTRAVENOUS EVERY 8 HOURS
Status: DISCONTINUED | OUTPATIENT
Start: 2020-09-25 | End: 2020-09-28 | Stop reason: HOSPADM

## 2020-09-25 RX ORDER — NICOTINE 21 MG/24HR
1 PATCH, TRANSDERMAL 24 HOURS TRANSDERMAL DAILY
Status: CANCELLED | OUTPATIENT
Start: 2020-09-25

## 2020-09-25 RX ORDER — DULOXETIN HYDROCHLORIDE 30 MG/1
30 CAPSULE, DELAYED RELEASE ORAL DAILY
Status: CANCELLED | OUTPATIENT
Start: 2020-09-25

## 2020-09-25 RX ORDER — HYDROMORPHONE HCL/PF 1 MG/ML
0.4 SYRINGE (ML) INJECTION
Status: DISCONTINUED | OUTPATIENT
Start: 2020-09-25 | End: 2020-09-25 | Stop reason: HOSPADM

## 2020-09-25 RX ORDER — DOCUSATE SODIUM 100 MG/1
100 CAPSULE, LIQUID FILLED ORAL 2 TIMES DAILY
Status: DISCONTINUED | OUTPATIENT
Start: 2020-09-25 | End: 2020-09-28 | Stop reason: HOSPADM

## 2020-09-25 RX ORDER — LIDOCAINE HYDROCHLORIDE 10 MG/ML
INJECTION, SOLUTION EPIDURAL; INFILTRATION; INTRACAUDAL; PERINEURAL AS NEEDED
Status: DISCONTINUED | OUTPATIENT
Start: 2020-09-25 | End: 2020-09-25

## 2020-09-25 RX ORDER — ONDANSETRON 2 MG/ML
4 INJECTION INTRAMUSCULAR; INTRAVENOUS ONCE AS NEEDED
Status: DISCONTINUED | OUTPATIENT
Start: 2020-09-25 | End: 2020-09-25 | Stop reason: HOSPADM

## 2020-09-25 RX ORDER — ECHINACEA PURPUREA EXTRACT 125 MG
2 TABLET ORAL EVERY 2 HOUR PRN
Status: DISCONTINUED | OUTPATIENT
Start: 2020-09-25 | End: 2020-09-28 | Stop reason: HOSPADM

## 2020-09-25 RX ORDER — MORPHINE SULFATE 4 MG/ML
4 INJECTION, SOLUTION INTRAMUSCULAR; INTRAVENOUS EVERY 4 HOURS PRN
Status: DISCONTINUED | OUTPATIENT
Start: 2020-09-25 | End: 2020-09-25 | Stop reason: HOSPADM

## 2020-09-25 RX ORDER — OXYCODONE HYDROCHLORIDE 10 MG/1
10 TABLET ORAL EVERY 4 HOURS PRN
Status: DISCONTINUED | OUTPATIENT
Start: 2020-09-25 | End: 2020-09-25 | Stop reason: HOSPADM

## 2020-09-25 RX ORDER — DULOXETIN HYDROCHLORIDE 30 MG/1
30 CAPSULE, DELAYED RELEASE ORAL DAILY
Status: DISCONTINUED | OUTPATIENT
Start: 2020-09-26 | End: 2020-09-28 | Stop reason: HOSPADM

## 2020-09-25 RX ORDER — ALBUTEROL SULFATE 2.5 MG/3ML
2.5 SOLUTION RESPIRATORY (INHALATION) ONCE AS NEEDED
Status: DISCONTINUED | OUTPATIENT
Start: 2020-09-25 | End: 2020-09-25 | Stop reason: HOSPADM

## 2020-09-25 RX ORDER — GABAPENTIN 300 MG/1
300 CAPSULE ORAL 3 TIMES DAILY
Status: DISCONTINUED | OUTPATIENT
Start: 2020-09-25 | End: 2020-09-25 | Stop reason: HOSPADM

## 2020-09-25 RX ORDER — KETOROLAC TROMETHAMINE 30 MG/ML
15 INJECTION, SOLUTION INTRAMUSCULAR; INTRAVENOUS EVERY 6 HOURS PRN
Status: DISCONTINUED | OUTPATIENT
Start: 2020-09-25 | End: 2020-09-25 | Stop reason: HOSPADM

## 2020-09-25 RX ORDER — IBUPROFEN 600 MG/1
600 TABLET ORAL EVERY 6 HOURS PRN
Status: DISCONTINUED | OUTPATIENT
Start: 2020-09-25 | End: 2020-09-28 | Stop reason: HOSPADM

## 2020-09-25 RX ORDER — DOCUSATE SODIUM 100 MG/1
100 CAPSULE, LIQUID FILLED ORAL 2 TIMES DAILY
Status: DISCONTINUED | OUTPATIENT
Start: 2020-09-25 | End: 2020-09-25 | Stop reason: HOSPADM

## 2020-09-25 RX ORDER — HYDROCODONE BITARTRATE AND ACETAMINOPHEN 5; 325 MG/1; MG/1
1 TABLET ORAL EVERY 4 HOURS PRN
Status: DISCONTINUED | OUTPATIENT
Start: 2020-09-25 | End: 2020-09-28 | Stop reason: HOSPADM

## 2020-09-25 RX ORDER — ATORVASTATIN CALCIUM 10 MG/1
10 TABLET, FILM COATED ORAL DAILY
Status: DISCONTINUED | OUTPATIENT
Start: 2020-09-26 | End: 2020-09-28 | Stop reason: HOSPADM

## 2020-09-25 RX ORDER — ATORVASTATIN CALCIUM 10 MG/1
10 TABLET, FILM COATED ORAL DAILY
Status: CANCELLED | OUTPATIENT
Start: 2020-09-25

## 2020-09-25 RX ORDER — ACETAMINOPHEN 325 MG/1
650 TABLET ORAL EVERY 6 HOURS PRN
Status: DISCONTINUED | OUTPATIENT
Start: 2020-09-25 | End: 2020-09-28 | Stop reason: HOSPADM

## 2020-09-25 RX ORDER — IBUPROFEN 600 MG/1
600 TABLET ORAL EVERY 6 HOURS PRN
Status: DISCONTINUED | OUTPATIENT
Start: 2020-09-25 | End: 2020-09-25

## 2020-09-25 RX ORDER — PANTOPRAZOLE SODIUM 40 MG/1
40 TABLET, DELAYED RELEASE ORAL
Status: DISCONTINUED | OUTPATIENT
Start: 2020-09-25 | End: 2020-09-25 | Stop reason: HOSPADM

## 2020-09-25 RX ORDER — DIPHENHYDRAMINE HCL 25 MG
25 TABLET ORAL ONCE AS NEEDED
Status: DISCONTINUED | OUTPATIENT
Start: 2020-09-25 | End: 2020-09-25 | Stop reason: HOSPADM

## 2020-09-25 RX ORDER — HYDROMORPHONE HCL/PF 1 MG/ML
0.5 SYRINGE (ML) INJECTION ONCE
Status: COMPLETED | OUTPATIENT
Start: 2020-09-25 | End: 2020-09-25

## 2020-09-25 RX ORDER — MEPERIDINE HYDROCHLORIDE 25 MG/ML
12.5 INJECTION INTRAMUSCULAR; INTRAVENOUS; SUBCUTANEOUS
Status: DISCONTINUED | OUTPATIENT
Start: 2020-09-25 | End: 2020-09-25 | Stop reason: HOSPADM

## 2020-09-25 RX ORDER — PANTOPRAZOLE SODIUM 40 MG/1
40 TABLET, DELAYED RELEASE ORAL
Status: CANCELLED | OUTPATIENT
Start: 2020-09-26

## 2020-09-25 RX ORDER — MIDAZOLAM HYDROCHLORIDE 2 MG/2ML
INJECTION, SOLUTION INTRAMUSCULAR; INTRAVENOUS AS NEEDED
Status: DISCONTINUED | OUTPATIENT
Start: 2020-09-25 | End: 2020-09-25

## 2020-09-25 RX ORDER — ONDANSETRON 2 MG/ML
INJECTION INTRAMUSCULAR; INTRAVENOUS AS NEEDED
Status: DISCONTINUED | OUTPATIENT
Start: 2020-09-25 | End: 2020-09-25

## 2020-09-25 RX ORDER — DEXAMETHASONE SODIUM PHOSPHATE 10 MG/ML
INJECTION, SOLUTION INTRAMUSCULAR; INTRAVENOUS AS NEEDED
Status: DISCONTINUED | OUTPATIENT
Start: 2020-09-25 | End: 2020-09-25

## 2020-09-25 RX ORDER — DOCUSATE SODIUM 100 MG/1
100 CAPSULE, LIQUID FILLED ORAL 2 TIMES DAILY
Status: CANCELLED | OUTPATIENT
Start: 2020-09-25

## 2020-09-25 RX ORDER — LISINOPRIL 20 MG/1
20 TABLET ORAL DAILY
Status: CANCELLED | OUTPATIENT
Start: 2020-09-25

## 2020-09-25 RX ORDER — ATORVASTATIN CALCIUM 10 MG/1
10 TABLET, FILM COATED ORAL DAILY
Status: DISCONTINUED | OUTPATIENT
Start: 2020-09-25 | End: 2020-09-25 | Stop reason: HOSPADM

## 2020-09-25 RX ORDER — ECHINACEA PURPUREA EXTRACT 125 MG
2 TABLET ORAL EVERY 2 HOUR PRN
Status: CANCELLED | OUTPATIENT
Start: 2020-09-25

## 2020-09-25 RX ORDER — CYCLOBENZAPRINE HCL 10 MG
10 TABLET ORAL 3 TIMES DAILY PRN
Status: DISCONTINUED | OUTPATIENT
Start: 2020-09-25 | End: 2020-09-28 | Stop reason: HOSPADM

## 2020-09-25 RX ORDER — FENTANYL CITRATE/PF 50 MCG/ML
25 SYRINGE (ML) INJECTION
Status: COMPLETED | OUTPATIENT
Start: 2020-09-25 | End: 2020-09-25

## 2020-09-25 RX ORDER — ECHINACEA PURPUREA EXTRACT 125 MG
2 TABLET ORAL EVERY 2 HOUR PRN
Status: DISCONTINUED | OUTPATIENT
Start: 2020-09-25 | End: 2020-09-25 | Stop reason: HOSPADM

## 2020-09-25 RX ORDER — HYDROCHLOROTHIAZIDE 25 MG/1
25 TABLET ORAL DAILY
Status: DISCONTINUED | OUTPATIENT
Start: 2020-09-25 | End: 2020-09-25 | Stop reason: HOSPADM

## 2020-09-25 RX ORDER — PANTOPRAZOLE SODIUM 40 MG/1
40 TABLET, DELAYED RELEASE ORAL
Status: DISCONTINUED | OUTPATIENT
Start: 2020-09-26 | End: 2020-09-28 | Stop reason: HOSPADM

## 2020-09-25 RX ORDER — AMLODIPINE BESYLATE 5 MG/1
5 TABLET ORAL DAILY
Status: DISCONTINUED | OUTPATIENT
Start: 2020-09-26 | End: 2020-09-28 | Stop reason: HOSPADM

## 2020-09-25 RX ORDER — OXYCODONE HYDROCHLORIDE 10 MG/1
10 TABLET ORAL EVERY 4 HOURS PRN
Status: CANCELLED | OUTPATIENT
Start: 2020-09-25

## 2020-09-25 RX ORDER — KETOROLAC TROMETHAMINE 30 MG/ML
15 INJECTION, SOLUTION INTRAMUSCULAR; INTRAVENOUS EVERY 6 HOURS PRN
Status: DISCONTINUED | OUTPATIENT
Start: 2020-09-25 | End: 2020-09-26

## 2020-09-25 RX ORDER — NICOTINE 21 MG/24HR
1 PATCH, TRANSDERMAL 24 HOURS TRANSDERMAL DAILY
Status: DISCONTINUED | OUTPATIENT
Start: 2020-09-25 | End: 2020-09-25 | Stop reason: HOSPADM

## 2020-09-25 RX ORDER — GABAPENTIN 300 MG/1
300 CAPSULE ORAL 3 TIMES DAILY
Status: CANCELLED | OUTPATIENT
Start: 2020-09-25

## 2020-09-25 RX ORDER — LISINOPRIL 20 MG/1
20 TABLET ORAL DAILY
Status: DISCONTINUED | OUTPATIENT
Start: 2020-09-25 | End: 2020-09-25 | Stop reason: HOSPADM

## 2020-09-25 RX ORDER — AMLODIPINE BESYLATE 5 MG/1
5 TABLET ORAL DAILY
Status: DISCONTINUED | OUTPATIENT
Start: 2020-09-25 | End: 2020-09-25 | Stop reason: HOSPADM

## 2020-09-25 RX ORDER — AMLODIPINE BESYLATE 5 MG/1
5 TABLET ORAL DAILY
Status: CANCELLED | OUTPATIENT
Start: 2020-09-25

## 2020-09-25 RX ORDER — HYDROCHLOROTHIAZIDE 25 MG/1
25 TABLET ORAL DAILY
Status: CANCELLED | OUTPATIENT
Start: 2020-09-25

## 2020-09-25 RX ORDER — DIPHENHYDRAMINE HCL 25 MG
25 TABLET ORAL ONCE AS NEEDED
Status: CANCELLED | OUTPATIENT
Start: 2020-09-25

## 2020-09-25 RX ORDER — NICOTINE 21 MG/24HR
1 PATCH, TRANSDERMAL 24 HOURS TRANSDERMAL DAILY
Status: DISCONTINUED | OUTPATIENT
Start: 2020-09-26 | End: 2020-09-28 | Stop reason: HOSPADM

## 2020-09-25 RX ORDER — SODIUM CHLORIDE, SODIUM LACTATE, POTASSIUM CHLORIDE, CALCIUM CHLORIDE 600; 310; 30; 20 MG/100ML; MG/100ML; MG/100ML; MG/100ML
INJECTION, SOLUTION INTRAVENOUS CONTINUOUS PRN
Status: DISCONTINUED | OUTPATIENT
Start: 2020-09-25 | End: 2020-09-25

## 2020-09-25 RX ORDER — ACETAMINOPHEN 325 MG/1
650 TABLET ORAL EVERY 6 HOURS PRN
Status: CANCELLED | OUTPATIENT
Start: 2020-09-25

## 2020-09-25 RX ADMIN — PROPOFOL 200 MG: 10 INJECTION, EMULSION INTRAVENOUS at 20:04

## 2020-09-25 RX ADMIN — GABAPENTIN 300 MG: 300 CAPSULE ORAL at 22:05

## 2020-09-25 RX ADMIN — Medication 25 MCG: at 21:38

## 2020-09-25 RX ADMIN — DULOXETINE HYDROCHLORIDE 30 MG: 30 CAPSULE, DELAYED RELEASE ORAL at 08:34

## 2020-09-25 RX ADMIN — OXYCODONE HYDROCHLORIDE 10 MG: 10 TABLET ORAL at 02:56

## 2020-09-25 RX ADMIN — FENTANYL CITRATE 50 MCG: 50 INJECTION, SOLUTION INTRAMUSCULAR; INTRAVENOUS at 20:25

## 2020-09-25 RX ADMIN — PHENYLEPHRINE HYDROCHLORIDE 100 MCG: 10 INJECTION INTRAVENOUS at 20:38

## 2020-09-25 RX ADMIN — DOCUSATE SODIUM 100 MG: 100 CAPSULE, LIQUID FILLED ORAL at 08:45

## 2020-09-25 RX ADMIN — FENTANYL CITRATE 50 MCG: 50 INJECTION, SOLUTION INTRAMUSCULAR; INTRAVENOUS at 20:14

## 2020-09-25 RX ADMIN — FENTANYL CITRATE 50 MCG: 50 INJECTION, SOLUTION INTRAMUSCULAR; INTRAVENOUS at 20:09

## 2020-09-25 RX ADMIN — GABAPENTIN 300 MG: 300 CAPSULE ORAL at 08:44

## 2020-09-25 RX ADMIN — HYDROCODONE BITARTRATE AND ACETAMINOPHEN 1 TABLET: 5; 325 TABLET ORAL at 22:05

## 2020-09-25 RX ADMIN — VANCOMYCIN HYDROCHLORIDE 1500 MG: 1 INJECTION, POWDER, LYOPHILIZED, FOR SOLUTION INTRAVENOUS at 04:57

## 2020-09-25 RX ADMIN — LISINOPRIL 20 MG: 20 TABLET ORAL at 08:35

## 2020-09-25 RX ADMIN — HYDROMORPHONE HYDROCHLORIDE 0.5 MG: 1 INJECTION, SOLUTION INTRAMUSCULAR; INTRAVENOUS; SUBCUTANEOUS at 13:31

## 2020-09-25 RX ADMIN — MORPHINE SULFATE 4 MG: 4 INJECTION INTRAVENOUS at 04:54

## 2020-09-25 RX ADMIN — VANCOMYCIN HYDROCHLORIDE 1000 MG: 1 INJECTION, SOLUTION INTRAVENOUS at 16:31

## 2020-09-25 RX ADMIN — SODIUM CHLORIDE, SODIUM LACTATE, POTASSIUM CHLORIDE, AND CALCIUM CHLORIDE: .6; .31; .03; .02 INJECTION, SOLUTION INTRAVENOUS at 19:55

## 2020-09-25 RX ADMIN — Medication 25 MCG: at 21:34

## 2020-09-25 RX ADMIN — Medication 25 MCG: at 21:30

## 2020-09-25 RX ADMIN — KETOROLAC TROMETHAMINE 15 MG: 30 INJECTION, SOLUTION INTRAMUSCULAR at 18:27

## 2020-09-25 RX ADMIN — DEXAMETHASONE SODIUM PHOSPHATE 10 MG: 10 INJECTION, SOLUTION INTRAMUSCULAR; INTRAVENOUS at 20:08

## 2020-09-25 RX ADMIN — LIDOCAINE HYDROCHLORIDE 50 MG: 10 INJECTION, SOLUTION EPIDURAL; INFILTRATION; INTRACAUDAL; PERINEURAL at 20:04

## 2020-09-25 RX ADMIN — MIDAZOLAM 2 MG: 1 INJECTION INTRAMUSCULAR; INTRAVENOUS at 19:55

## 2020-09-25 RX ADMIN — FENTANYL CITRATE 50 MCG: 50 INJECTION, SOLUTION INTRAMUSCULAR; INTRAVENOUS at 21:06

## 2020-09-25 RX ADMIN — Medication 25 MCG: at 21:24

## 2020-09-25 RX ADMIN — CYCLOBENZAPRINE HYDROCHLORIDE 10 MG: 10 TABLET, FILM COATED ORAL at 22:05

## 2020-09-25 RX ADMIN — HYDROCHLOROTHIAZIDE 25 MG: 25 TABLET ORAL at 08:36

## 2020-09-25 RX ADMIN — ATORVASTATIN CALCIUM 10 MG: 10 TABLET, FILM COATED ORAL at 08:35

## 2020-09-25 RX ADMIN — AMLODIPINE BESYLATE 5 MG: 5 TABLET ORAL at 08:44

## 2020-09-25 RX ADMIN — CEFEPIME HYDROCHLORIDE 2000 MG: 2 INJECTION, POWDER, FOR SOLUTION INTRAVENOUS at 08:28

## 2020-09-25 RX ADMIN — ONDANSETRON 4 MG: 2 INJECTION INTRAMUSCULAR; INTRAVENOUS at 20:08

## 2020-09-25 NOTE — ANESTHESIA PREPROCEDURE EVALUATION
Procedure:  DEBRIDEMENT UPPER EXTREMITY ([address] OUT) Right hand/wrist (Right Hand)  RELEASE CARPAL TUNNEL (Right Wrist)    Relevant Problems   CARDIO   (+) Hyperlipidemia   (+) Hypertension   (+) Pain in thoracic spine      GI/HEPATIC   (+) GERD (gastroesophageal reflux disease)   (+) Non-alcoholic fatty liver disease      /RENAL   (+) Kidney stones      MUSCULOSKELETAL   (+) Low back pain   (+) Lumbar spondylosis   (+) Pain in thoracic spine   (+) Spondylosis of cervical region without myelopathy or radiculopathy      NEURO/PSYCH   (+) Chronic pain syndrome      PULMONARY   (+) DONNA (obstructive sleep apnea)      2011: normal biV systolic function, mild MR       Anesthesia Plan  ASA Score- 2     Anesthesia Type- general with ASA Monitors  Additional Monitors:   Airway Plan:     Comment: General anesthesia, LMA; standard ASA monitors  Risks and benefits discussed with patient; patient consented and agrees to proceed  I saw and evaluated the patient  If seen with CRNA, we have discussed the anesthetic plan and I am in agreement that the plan is appropriate for the patient  Prior easy ETT placement with MAC3    Plan Factors-    Induction- intravenous  Postoperative Plan- Plan for postoperative opioid use  Planned trial extubation    Informed Consent- Anesthetic plan and risks discussed with patient  I personally reviewed this patient with the CRNA  Discussed and agreed on the Anesthesia Plan with the CRNA  Moises Nuñez
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
## 2020-09-25 NOTE — ASSESSMENT & PLAN NOTE
Tenosynovitis/rule out cellulitis right handContinue with IV antibiotics  May need to consider CT if symptoms worsen? ?  Orthopedic surgery to evaluate patient  Consult infectious disease  Patient initially was evaluated at [address]  Plan was initially transferred here for orthopedic evaluation  However patient left AMA and came directly to the emergency room [facility]    Orthopedic surgery evaluating patient in ED

## 2020-09-25 NOTE — ED ATTENDING ATTESTATION
9/25/2020  I, Bharti Olmstead MD, saw and evaluated the patient  I have discussed the patient with the resident/non-physician practitioner and agree with the resident's/non-physician practitioner's findings, Plan of Care, and MDM as documented in the resident's/non-physician practitioner's note, except where noted  All available labs and Radiology studies were reviewed  I was present for key portions of any procedure(s) performed by the resident/non-physician practitioner and I was immediately available to provide assistance  At this point I agree with the current assessment done in the Emergency Department    I have conducted an independent evaluation of this patient a history and physical is as follows:  Pt has had redness swelling in r hand and arm that has been progressing Pt was treated with oral clindamycin and then admitted to the hospital Today there was concern for flexor tenosynovitis and was to be transferred here Pt got tired of waiting left and drove himself to this ed PE: alert + swelling 5th finger with signs of flexor tenosynovitis Cellulitis of hand to inner arm MDM: will call hand  ED Course         Critical Care Time  Procedures
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
## 2020-09-25 NOTE — PLAN OF CARE
Problem: Potential for Falls  Goal: Patient will remain free of falls  Description: INTERVENTIONS:  - Assess patient frequently for physical needs  -  Identify cognitive and physical deficits and behaviors that affect risk of falls    -  [place] fall precautions as indicated by assessment   - Educate patient/family on patient safety including physical limitations  - Instruct patient to call for assistance with activity based on assessment  - Modify environment to reduce risk of injury  - Consider OT/PT consult to assist with strengthening/mobility  Outcome: Progressing     Problem: PAIN - ADULT  Goal: Verbalizes/displays adequate comfort level or baseline comfort level  Description: Interventions:  - Encourage patient to monitor pain and request assistance  - Assess pain using appropriate pain scale  - Administer analgesics based on type and severity of pain and evaluate response  - Implement non-pharmacological measures as appropriate and evaluate response  - Consider cultural and social influences on pain and pain management  - Notify physician/advanced practitioner if interventions unsuccessful or patient reports new pain  Outcome: Progressing     Problem: INFECTION - ADULT  Goal: Absence or prevention of progression during hospitalization  Description: INTERVENTIONS:  - Assess and monitor for signs and symptoms of infection  - Monitor lab/diagnostic results  - Monitor all insertion sites, i e  indwelling lines, tubes, and drains  - Monitor endotracheal if appropriate and nasal secretions for changes in amount and color  - [place] appropriate cooling/warming therapies per order  - Administer medications as ordered  - Instruct and encourage patient and family to use good hand hygiene technique  - Identify and instruct in appropriate isolation precautions for identified infection/condition  Outcome: Progressing     Problem: DISCHARGE PLANNING  Goal: Discharge to home or other facility with appropriate resources  Description: INTERVENTIONS:  - Identify barriers to discharge w/patient and caregiver  - Arrange for needed discharge resources and transportation as appropriate  - Identify discharge learning needs (meds, wound care, etc )  - Arrange for interpretive services to assist at discharge as needed  - Refer to Case Management Department for coordinating discharge planning if the patient needs post-hospital services based on physician/advanced practitioner order or complex needs related to functional status, cognitive ability, or social support system  Outcome: Progressing     Problem: Knowledge Deficit  Goal: Patient/family/caregiver demonstrates understanding of disease process, treatment plan, medications, and discharge instructions  Description: Complete learning assessment and assess knowledge base    Interventions:  - Provide teaching at level of understanding  - Provide teaching via preferred learning methods  Outcome: Progressing

## 2020-09-26 LAB
EST. AVERAGE GLUCOSE BLD GHB EST-MCNC: 117 MG/DL
HBA1C MFR BLD: 5.7 %
HBV CORE AB SER QL: NORMAL
HBV CORE IGM SER QL: NORMAL
HBV SURFACE AG SER QL: NORMAL
HCV AB SER QL: NORMAL
PROCALCITONIN SERPL-MCNC: <0.05 NG/ML
PROCALCITONIN SERPL-MCNC: <0.05 NG/ML
VANCOMYCIN TROUGH SERPL-MCNC: 15 UG/ML (ref 10–20)

## 2020-09-26 PROCEDURE — 87389 HIV-1 AG W/HIV-1&-2 AB AG IA: CPT | Performed by: ORTHOPAEDIC SURGERY

## 2020-09-26 PROCEDURE — 87340 HEPATITIS B SURFACE AG IA: CPT | Performed by: ORTHOPAEDIC SURGERY

## 2020-09-26 PROCEDURE — 99232 SBSQ HOSP IP/OBS MODERATE 35: CPT | Performed by: PHYSICIAN ASSISTANT

## 2020-09-26 PROCEDURE — 80202 ASSAY OF VANCOMYCIN: CPT | Performed by: ORTHOPAEDIC SURGERY

## 2020-09-26 PROCEDURE — 86704 HEP B CORE ANTIBODY TOTAL: CPT | Performed by: ORTHOPAEDIC SURGERY

## 2020-09-26 PROCEDURE — 99024 POSTOP FOLLOW-UP VISIT: CPT | Performed by: PHYSICIAN ASSISTANT

## 2020-09-26 PROCEDURE — 84145 PROCALCITONIN (PCT): CPT | Performed by: INTERNAL MEDICINE

## 2020-09-26 PROCEDURE — 83036 HEMOGLOBIN GLYCOSYLATED A1C: CPT | Performed by: ORTHOPAEDIC SURGERY

## 2020-09-26 PROCEDURE — 86705 HEP B CORE ANTIBODY IGM: CPT | Performed by: ORTHOPAEDIC SURGERY

## 2020-09-26 PROCEDURE — 86803 HEPATITIS C AB TEST: CPT | Performed by: ORTHOPAEDIC SURGERY

## 2020-09-26 RX ORDER — KETOROLAC TROMETHAMINE 30 MG/ML
15 INJECTION, SOLUTION INTRAMUSCULAR; INTRAVENOUS EVERY 6 HOURS PRN
Status: DISPENSED | OUTPATIENT
Start: 2020-09-26 | End: 2020-09-27

## 2020-09-26 RX ADMIN — HYDROMORPHONE HYDROCHLORIDE 0.5 MG: 1 INJECTION, SOLUTION INTRAMUSCULAR; INTRAVENOUS; SUBCUTANEOUS at 00:00

## 2020-09-26 RX ADMIN — VANCOMYCIN HYDROCHLORIDE 1000 MG: 1 INJECTION, SOLUTION INTRAVENOUS at 17:15

## 2020-09-26 RX ADMIN — GABAPENTIN 300 MG: 300 CAPSULE ORAL at 17:15

## 2020-09-26 RX ADMIN — VANCOMYCIN HYDROCHLORIDE 1000 MG: 1 INJECTION, SOLUTION INTRAVENOUS at 10:07

## 2020-09-26 RX ADMIN — VANCOMYCIN HYDROCHLORIDE 1000 MG: 1 INJECTION, SOLUTION INTRAVENOUS at 00:00

## 2020-09-26 RX ADMIN — LISINOPRIL 20 MG: 20 TABLET ORAL at 10:00

## 2020-09-26 RX ADMIN — HYDROCODONE BITARTRATE AND ACETAMINOPHEN 1 TABLET: 5; 325 TABLET ORAL at 10:05

## 2020-09-26 RX ADMIN — ATORVASTATIN CALCIUM 10 MG: 10 TABLET, FILM COATED ORAL at 10:00

## 2020-09-26 RX ADMIN — NICOTINE 1 PATCH: 14 PATCH TRANSDERMAL at 10:04

## 2020-09-26 RX ADMIN — GABAPENTIN 300 MG: 300 CAPSULE ORAL at 21:14

## 2020-09-26 RX ADMIN — ENOXAPARIN SODIUM 40 MG: 40 INJECTION SUBCUTANEOUS at 13:41

## 2020-09-26 RX ADMIN — HYDROCODONE BITARTRATE AND ACETAMINOPHEN 1 TABLET: 5; 325 TABLET ORAL at 17:25

## 2020-09-26 RX ADMIN — HYDROCODONE BITARTRATE AND ACETAMINOPHEN 1 TABLET: 5; 325 TABLET ORAL at 05:24

## 2020-09-26 RX ADMIN — DOCUSATE SODIUM 100 MG: 100 CAPSULE, LIQUID FILLED ORAL at 10:00

## 2020-09-26 RX ADMIN — DOCUSATE SODIUM 100 MG: 100 CAPSULE, LIQUID FILLED ORAL at 17:15

## 2020-09-26 RX ADMIN — PANTOPRAZOLE SODIUM 40 MG: 40 TABLET, DELAYED RELEASE ORAL at 05:24

## 2020-09-26 RX ADMIN — AMLODIPINE BESYLATE 5 MG: 5 TABLET ORAL at 10:00

## 2020-09-26 RX ADMIN — DULOXETINE HYDROCHLORIDE 30 MG: 30 CAPSULE, DELAYED RELEASE ORAL at 10:00

## 2020-09-26 RX ADMIN — GABAPENTIN 300 MG: 300 CAPSULE ORAL at 10:00

## 2020-09-26 NOTE — PROGRESS NOTES
Progress Note - Annabella Escalera 1970, 48 y o  male MRN: [id number]    Unit/Bed#: -01 Encounter: [id number]    Primary Care Provider: Elio Mejia DO   Date and time admitted to hospital: 9/25/2020 12:07 PM        * Cellulitis of right hand  Assessment & Plan  · Hand Surgery and ID following, appreciate their recommendations  · Patient underwent right upper extremity irrigation and debridement right hand (small and ring flexor tendon sheaths) and wrist, carpal tunnel release  · Patient reports having recent ENT surgery  Per operative flowsheet from day of ENT surgery (9/14/2020) patient had IV placed in right forearm  · XR of the right forearm revealed no acute osseous abnormality per the results report  · On IV vancomycin  Pharmacy consulted for dosing  · Follow up operative cultures    Tenosynovitis of finger  Assessment & Plan  · Plan as per above    Nasal valve collapse  Assessment & Plan  · S/p ENT surgery on 9/14/2020 with Dr Zac Peck    Hypertension  Assessment & Plan  · Continue Lisinopril, hydrochlorothiazide and Norvasc    Hyperlipidemia  Assessment & Plan  · Continue statin    GERD (gastroesophageal reflux disease)  Assessment & Plan  · Continue PPI    Cervical radiculopathy  Assessment & Plan  · Outpatient follow up with his outpatient Pain Management specialist       VTE Pharmacologic Prophylaxis:   Pharmacologic: Pharmacologic VTE Prophylaxis contraindicated due to hand surgery - defer to Ortho if/when can begin - TT'd them in regard to this  12:58pm UPDATE: Ortho cleared to begin DVT ppx  Mechanical VTE Prophylaxis in Place: Yes    Patient Centered Rounds: I have performed bedside rounds with nursing staff today  Ilir Cassidy    Discussions with Specialists or Other Care Team Provider: ID attending and Ortho AP    Education and Discussions with Family / Patient: patient and his rock Gutierrez over the phone at the patient's request     Time Spent for Care: 30 minutes    More than 50% of total time spent on counseling and coordination of care as described above  Current Length of Stay: 1 day(s)    Current Patient Status: Inpatient   Certification Statement: The patient will continue to require additional inpatient hospital stay due to continued IV abx, pending cultures    Discharge Plan: pending cultures and ability to transition to PO abx    Code Status: Level 1 - Full Code      Subjective:   Mr Mc Amin reports his pain is controlled     Objective:     Vitals:   Temp (24hrs), Av 8 °F (36 6 °C), Min:97 7 °F (36 5 °C), Max:98 °F (36 7 °C)    Temp:  [97 7 °F (36 5 °C)-98 °F (36 7 °C)] 98 °F (36 7 °C)  HR:  [] 93  Resp:  [9-20] 20  BP: ()/() 132/83  SpO2:  [94 %-98 %] 95 %  Body mass index is 27 28 kg/m²  Input and Output Summary (last 24 hours): Intake/Output Summary (Last 24 hours) at 2020 1250  Last data filed at 2020 0851  Gross per 24 hour   Intake 1600 ml   Output --   Net 1600 ml       Physical Exam:     Physical Exam  Vitals signs and nursing note reviewed  Exam conducted with a chaperone present  Constitutional:       General: He is not in acute distress  Appearance: He is not toxic-appearing or diaphoretic  Comments: Patient seen sitting upright in bedside chair comfortably resting  Pleasant and cooperative   Cardiovascular:      Rate and Rhythm: Normal rate and regular rhythm  Pulmonary:      Effort: Pulmonary effort is normal  No respiratory distress  Breath sounds: Normal breath sounds  No wheezing  Abdominal:      General: Bowel sounds are normal       Palpations: Abdomen is soft  Tenderness: There is no abdominal tenderness  Musculoskeletal:      Comments: Right hand and wrist wrapped   Skin:     General: Skin is warm  Neurological:      Mental Status: He is alert and oriented to person, place, and time     Psychiatric:         Mood and Affect: Mood normal          Behavior: Behavior normal          Additional Data: Labs:    Results from last 7 days   Lab Units 09/25/20  0449   WBC Thousand/uL 11 19*   HEMOGLOBIN g/dL 12 1   HEMATOCRIT % 37 7   PLATELETS Thousands/uL 292   NEUTROS PCT % 61   LYMPHS PCT % 27   MONOS PCT % 9   EOS PCT % 2     Results from last 7 days   Lab Units 09/24/20 1957   SODIUM mmol/L 140   POTASSIUM mmol/L 3 7   CHLORIDE mmol/L 102   CO2 mmol/L 31   BUN mg/dL 20   CREATININE mg/dL 0 92   ANION GAP mmol/L 7   CALCIUM mg/dL 9 7   ALBUMIN g/dL 3 4*   TOTAL BILIRUBIN mg/dL 0 30   ALK PHOS U/L 87   ALT U/L 42   AST U/L 15   GLUCOSE RANDOM mg/dL 92     Results from last 7 days   Lab Units 09/24/20  2015   INR  1 08         Results from last 7 days   Lab Units 09/26/20  0526   HEMOGLOBIN A1C % 5 7*     Results from last 7 days   Lab Units 09/26/20 0526 09/25/20 2327 09/24/20 1957 09/21/20  0206   LACTIC ACID mmol/L  --   --  0 7 1 4   PROCALCITONIN ng/ml <0 05 <0 05  --   --            * I Have Reviewed All Lab Data Listed Above  * Additional Pertinent Lab Tests Reviewed: All Labs Within Last 24 Hours Reviewed    Imaging:    Imaging Reports Reviewed Today Include: as above  Imaging Personally Reviewed by Myself Includes:  none    Recent Cultures (last 7 days):     Results from last 7 days   Lab Units 09/24/20 1957   BLOOD CULTURE  No Growth at 24 hrs  No Growth at 24 hrs         Last 24 Hours Medication List:   Current Facility-Administered Medications   Medication Dose Route Frequency Provider Last Rate    acetaminophen  650 mg Oral Q6H PRN Melodie Henao PA-C      amLODIPine  5 mg Oral Daily Hetul Wilde, DO      atorvastatin  10 mg Oral Daily Hetul Wilde, DO      cyclobenzaprine  10 mg Oral TID PRN Hetul Wilde, DO      docusate sodium  100 mg Oral BID Melodie Henao PA-C      DULoxetine  30 mg Oral Daily Hetul Wilde, DO      gabapentin  300 mg Oral TID Hetul Wilde, DO      HYDROcodone-acetaminophen  1 tablet Oral Q4H PRN Hetul Wilde, DO      HYDROmorphone  0 5 mg Intravenous Q4H PRN Hetul Tg Vu, DO      ibuprofen  600 mg Oral Q6H PRN Wayneul Chani, DO      ketorolac  15 mg Intravenous Q6H PRN Amberly Ingram MD      lisinopril  20 mg Oral Daily Hetul Wilde, DO      nicotine  1 patch Transdermal Daily Melodie Henao PA-C      ondansetron  4 mg Intravenous Q6H PRN Melodie Henao PA-C      pantoprazole  40 mg Oral Early Morning Sajan Gautma MD      sodium chloride  2 spray Each Nare Q2H PRN Sajan Gautam MD      vancomycin  10 mg/kg Intravenous Q8H Jodi Garcia MD 1,000 mg (09/26/20 1007)        Today, Patient Was Seen By: Regino Ketn PA-C    ** Please Note: Dictation voice to text software may have been used in the creation of this document   **

## 2020-09-26 NOTE — PROGRESS NOTES
Orthopedics   Modesto Lott 48 y o  male MRN: [id number]  Unit/Bed#: BE -01      Subjective:  50 y o male post operative day 1 right upper extremity irrigation and debridement Right hand (small and ring flexor tendon sheaths) and wrist, carpal tunnel release  Pt doing well  Pain controlled    Pt states he doesn't think he can move small finger    Labs:  0   Lab Value Date/Time    HCT 37 7 09/25/2020 0449    HCT 41 8 09/24/2020 1957    HCT 44 6 09/21/2020 0205    HGB 12 1 09/25/2020 0449    HGB 13 4 09/24/2020 1957    HGB 14 2 09/21/2020 0205    INR 1 08 09/24/2020 2015    WBC 11 19 (H) 09/25/2020 0449    WBC 14 26 (H) 09/24/2020 1957    WBC 10 50 (H) 09/21/2020 0205    ESR 6 10/25/2018 1148       Meds:    Current Facility-Administered Medications:     acetaminophen (TYLENOL) tablet 650 mg, 650 mg, Oral, Q6H PRN, Melodie Henao PA-C    amLODIPine (NORVASC) tablet 5 mg, 5 mg, Oral, Daily, Hetul Wilde, DO    atorvastatin (LIPITOR) tablet 10 mg, 10 mg, Oral, Daily, Hetul Wilde, DO    cyclobenzaprine (FLEXERIL) tablet 10 mg, 10 mg, Oral, TID PRN, Hetul Wilde, DO, 10 mg at 09/25/20 2205    docusate sodium (COLACE) capsule 100 mg, 100 mg, Oral, BID, Melodie Henao PA-C    DULoxetine (CYMBALTA) delayed release capsule 30 mg, 30 mg, Oral, Daily, Hetul Wilde, DO    gabapentin (NEURONTIN) capsule 300 mg, 300 mg, Oral, TID, Hetul Wilde, DO, 300 mg at 09/25/20 2205    HYDROcodone-acetaminophen (NORCO) 5-325 mg per tablet 1 tablet, 1 tablet, Oral, Q4H PRN, Hetul Wilde, DO, 1 tablet at 09/26/20 0524    HYDROmorphone (DILAUDID) injection 0 5 mg, 0 5 mg, Intravenous, Q4H PRN, Hetul Wilde, DO, 0 5 mg at 09/26/20 0000    ibuprofen (MOTRIN) tablet 600 mg, 600 mg, Oral, Q6H PRN, Tejas Wilde DO    ketorolac (TORADOL) injection 15 mg, 15 mg, Intravenous, Q6H PRN, Kranthi Lopez MD    lisinopril (ZESTRIL) tablet 20 mg, 20 mg, Oral, Daily, Tejas Wilde DO    nicotine (NICODERM CQ) 14 mg/24hr TD 24 hr patch 1 patch, 1 patch, Transdermal, Daily, Melodie Henao PA-C    ondansetron (ZOFRAN) injection 4 mg, 4 mg, Intravenous, Q6H PRN, Melodie Henao PA-C    pantoprazole (PROTONIX) EC tablet 40 mg, 40 mg, Oral, Early Morning, Sajan Gautam MD, 40 mg at 09/26/20 0524    sodium chloride (OCEAN) 0 65 % nasal spray 2 spray, 2 spray, Each Nare, Q2H PRN, Sajan Gautam MD    vancomycin (VANCOCIN) IVPB (premix in dextrose) 1,000 mg 200 mL, 10 mg/kg, Intravenous, Q8H, Sajan Gautam MD, Last Rate: 200 mL/hr at 09/26/20 0000, 1,000 mg at 09/26/20 0000    Blood Culture:   Lab Results   Component Value Date    BLOODCX No Growth at 24 hrs  09/24/2020    BLOODCX No Growth at 24 hrs  09/24/2020       Wound Culture:   Lab Results   Component Value Date    WOUNDCULT Few Colonies of Staphylococcus coagulase negative (A) 02/05/2020       Ins and Outs:  I/O last 24 hours:   In: 1180 [P O :480; I V :500; IV Piggyback:200]  Out: -     Physical Exam:  Vitals:    09/26/20 0500   BP: 124/78   Pulse: 88   Resp:    Temp:    SpO2: 95%     Gen: AAOx3; NAD  Skin: warm and dry  EENT: EOMI, hearing intact, trachea midline  Lungs: no audible wheeze or stridor  right upper extremity  · Soft dressing in place  · Sensation intact Axillary, Musculocutaneous, Radial, Ulna, Median nerves  · Motor intact to Axillary, Musculocutaneous, Radial, Ulna, Median nerves - patient is able to move DIP of small finger - dressing is preventing full motion  · 2+ radial pulse    Assessment: 50 y o male post operative day 1 right upper hand and wrist I&D - Doing well    Plan:   · Up and out of bed   · Non-weight bearing to right upper extremity in soft dressing  · PT/OT  · DVT prophylaxis - mechanical  · Analgesics  · D/C planning - pending primary team and cultures   · Will continue to assess for acute blood loss anemia        Jessica Fontenot PA-C

## 2020-09-26 NOTE — ASSESSMENT & PLAN NOTE
· Hand Surgery and ID following, appreciate their recommendations  · Patient underwent right upper extremity irrigation and debridement right hand (small and ring flexor tendon sheaths) and wrist, carpal tunnel release  · Patient reports having recent ENT surgery  Per operative flowsheet from day of ENT surgery (9/14/2020) patient had IV placed in right forearm  · XR of the right forearm revealed no acute osseous abnormality per the results report  · On IV vancomycin   Pharmacy consulted for dosing  · Follow up operative cultures
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
## 2020-09-26 NOTE — PROGRESS NOTES
Vancomycin IV Pharmacy-to-Dose Consultation    Kody Lacey is a 48 y o  male who is currently receiving Vancomycin IV with management by the Pharmacy Consult service  Assessment/Plan:  The patient was reviewed  Renal function is stable and no signs or symptoms of nephrotoxicity and/or infusion reactions were documented in the chart  Based on todays assessment, with trough of 15 ug/mL continue current vancomycin (day # 3) dosing of 1,000mg q8hrs, with a plan for trough to be drawn at 1330 on Sat 10/3  We will continue to follow the patients culture results and clinical progress daily      Mustapha Gonzalez, PharmD, BCPP, Clinical Pharmacist

## 2020-09-26 NOTE — ANESTHESIA POSTPROCEDURE EVALUATION
Post-Op Assessment Note    CV Status:  Stable           Staff: Anesthesiologist   Comments: uneventful pacu stay        No complications documented      BP      Temp      Pulse     Resp      SpO2

## 2020-09-26 NOTE — OP NOTE
OPERATIVE REPORT  PATIENT NAME: Ruben Turcios    :  1970  MRN: [id number]  Pt Location: BE OR ROOM 04    SURGERY DATE: 2020    Surgeon(s) and Role:     * Jens Dickson MD - Primary     * Raoul Talley MD - Assisting    Preop Diagnosis:  Flexor tenosynovitis of finger [M65 9]    Post-Op Diagnosis Codes:     * Flexor tenosynovitis of finger [M65 9]    Procedure(s) (LRB):  DEBRIDEMENT UPPER EXTREMITY ([address] OUT) Right hand/wrist (Right)  RELEASE CARPAL TUNNEL (Right)    Specimen(s):  ID Type Source Tests Collected by Time Destination   A : Carpal Tunnel Wound Wound ANAEROBIC CULTURE AND GRAM STAIN, STAT GRAM STAIN, WOUND CULTURE Jens Dickson MD 2020    B : Flexor Sheath Wound Wound ANAEROBIC CULTURE AND GRAM STAIN, STAT GRAM STAIN, WOUND CULTURE Jens Dickson MD 2020        Estimated Blood Loss:   Minimal    Drains:  * No LDAs found *    Anesthesia Type:   Choice    Operative Indications:   Concern for flexor tenosynovitis of small and ring finger  Carpal tunnel syndrome    Operative Findings:  Minimal fluid in the carpal tunnel, mild cloudy fluid found in the small finger flexor tendon sheath, trace amount of fluid in the ring finger flexor tendon sheath    Complications:   None    Procedure and Technique:  Patient was marked and identified in the preoperative holding area prior to be transported back to the operating room  Hand table was utilized, operative site was prepped and draped in the usual sterile fashion  Tourniquet was utilized  Preoperative antibiotics were held in anticipation of cultures being taken  Time out was performed to ensure all individuals, patient laterality and equipment were all correct prior to beginning  We first directed our attention to the carpal tunnel, longitudinal incision was made in line with the ring finger from mid palm to the wrist crease    Sharp dissection was performed down to the level of the transverse carpal ligament, tenotomy scissors were utilized to complete decompression  Minimal fluid was noted, cultures were taken, full decompression of the median nerve was appreciated  Transverse incision was then made over the MCP joints of the small and ring fingers  Dissection was performed down to the level of the flexor tendon sheath of both digits, some mild fluid was noted at this time and cultures were taken  We then made transverse incisions at the DIP joints of the small and ring fingers, dissection was performed down utilized tenotomy scissors to expose the flexor tendon sheath  At this time, we utilized 22 gauge Angiocath tubing to perform irrigation through the flexor tendon sheath and noted communication of the irrigation fluid through the proximal transverse incision for both the small and ring fingers  Once we were satisfied with irrigation of both flexor tendon sheaths, we then utilized 16 gauge angiocath tubing to copiously irrigate from the MCP transverse incision through the longitudinal carpal tunnel incision  Loose closure of the transverse incision over the MCPs was performed utilizing 3 0 nylon  Reapproximation of the skin edges for the carpal tunnel incision was also made utilizing 3 0 nylon  The transverse DIP volar incisions were left open  Sterile dressings then applied utilizing Xeroform, 4x4s, Kerlix and Ace wrap  Patient was subsequently awoken from anesthesia and transported to postoperative holding area in stable condition  Dr Osiris Curry was present for the entire procedure      Patient Disposition:  PACU     SIGNATURE: Mary Dye MD  DATE: September 25, 2020  TIME: 9:07 PM

## 2020-09-27 PROBLEM — R73.03 PREDIABETES: Status: ACTIVE | Noted: 2020-09-27

## 2020-09-27 LAB
ANION GAP SERPL CALCULATED.3IONS-SCNC: 7 MMOL/L (ref 4–13)
BUN SERPL-MCNC: 13 MG/DL (ref 5–25)
CALCIUM SERPL-MCNC: 8.8 MG/DL (ref 8.3–10.1)
CHLORIDE SERPL-SCNC: 107 MMOL/L (ref 100–108)
CO2 SERPL-SCNC: 27 MMOL/L (ref 21–32)
CREAT SERPL-MCNC: 0.81 MG/DL (ref 0.6–1.3)
ERYTHROCYTE [DISTWIDTH] IN BLOOD BY AUTOMATED COUNT: 12.6 % (ref 11.6–15.1)
GFR SERPL CREATININE-BSD FRML MDRD: 104 ML/MIN/1.73SQ M
GLUCOSE SERPL-MCNC: 129 MG/DL (ref 65–140)
HCT VFR BLD AUTO: 40.1 % (ref 36.5–49.3)
HGB BLD-MCNC: 13.2 G/DL (ref 12–17)
MCH RBC QN AUTO: 31.2 PG (ref 26.8–34.3)
MCHC RBC AUTO-ENTMCNC: 32.9 G/DL (ref 31.4–37.4)
MCV RBC AUTO: 95 FL (ref 82–98)
PLATELET # BLD AUTO: 352 THOUSANDS/UL (ref 149–390)
PMV BLD AUTO: 9.3 FL (ref 8.9–12.7)
POTASSIUM SERPL-SCNC: 4 MMOL/L (ref 3.5–5.3)
RBC # BLD AUTO: 4.23 MILLION/UL (ref 3.88–5.62)
SODIUM SERPL-SCNC: 141 MMOL/L (ref 136–145)
WBC # BLD AUTO: 9.94 THOUSAND/UL (ref 4.31–10.16)

## 2020-09-27 PROCEDURE — 99024 POSTOP FOLLOW-UP VISIT: CPT | Performed by: ORTHOPAEDIC SURGERY

## 2020-09-27 PROCEDURE — 85027 COMPLETE CBC AUTOMATED: CPT | Performed by: PHYSICIAN ASSISTANT

## 2020-09-27 PROCEDURE — 99232 SBSQ HOSP IP/OBS MODERATE 35: CPT | Performed by: INTERNAL MEDICINE

## 2020-09-27 PROCEDURE — 80048 BASIC METABOLIC PNL TOTAL CA: CPT | Performed by: PHYSICIAN ASSISTANT

## 2020-09-27 PROCEDURE — 99232 SBSQ HOSP IP/OBS MODERATE 35: CPT | Performed by: PHYSICIAN ASSISTANT

## 2020-09-27 RX ADMIN — ATORVASTATIN CALCIUM 10 MG: 10 TABLET, FILM COATED ORAL at 09:52

## 2020-09-27 RX ADMIN — AMLODIPINE BESYLATE 5 MG: 5 TABLET ORAL at 09:52

## 2020-09-27 RX ADMIN — GABAPENTIN 300 MG: 300 CAPSULE ORAL at 17:31

## 2020-09-27 RX ADMIN — LISINOPRIL 20 MG: 20 TABLET ORAL at 09:51

## 2020-09-27 RX ADMIN — GABAPENTIN 300 MG: 300 CAPSULE ORAL at 21:40

## 2020-09-27 RX ADMIN — KETOROLAC TROMETHAMINE 15 MG: 30 INJECTION, SOLUTION INTRAMUSCULAR at 06:35

## 2020-09-27 RX ADMIN — DOCUSATE SODIUM 100 MG: 100 CAPSULE, LIQUID FILLED ORAL at 09:52

## 2020-09-27 RX ADMIN — VANCOMYCIN HYDROCHLORIDE 1000 MG: 1 INJECTION, SOLUTION INTRAVENOUS at 00:01

## 2020-09-27 RX ADMIN — DOCUSATE SODIUM 100 MG: 100 CAPSULE, LIQUID FILLED ORAL at 17:31

## 2020-09-27 RX ADMIN — HYDROMORPHONE HYDROCHLORIDE 0.5 MG: 1 INJECTION, SOLUTION INTRAMUSCULAR; INTRAVENOUS; SUBCUTANEOUS at 21:40

## 2020-09-27 RX ADMIN — NICOTINE 1 PATCH: 14 PATCH TRANSDERMAL at 09:50

## 2020-09-27 RX ADMIN — HYDROCODONE BITARTRATE AND ACETAMINOPHEN 1 TABLET: 5; 325 TABLET ORAL at 09:54

## 2020-09-27 RX ADMIN — VANCOMYCIN HYDROCHLORIDE 1000 MG: 1 INJECTION, SOLUTION INTRAVENOUS at 17:31

## 2020-09-27 RX ADMIN — VANCOMYCIN HYDROCHLORIDE 1000 MG: 1 INJECTION, SOLUTION INTRAVENOUS at 09:56

## 2020-09-27 RX ADMIN — HYDROMORPHONE HYDROCHLORIDE 0.5 MG: 1 INJECTION, SOLUTION INTRAMUSCULAR; INTRAVENOUS; SUBCUTANEOUS at 17:32

## 2020-09-27 RX ADMIN — ENOXAPARIN SODIUM 40 MG: 40 INJECTION SUBCUTANEOUS at 09:52

## 2020-09-27 RX ADMIN — GABAPENTIN 300 MG: 300 CAPSULE ORAL at 09:52

## 2020-09-27 RX ADMIN — DULOXETINE HYDROCHLORIDE 30 MG: 30 CAPSULE, DELAYED RELEASE ORAL at 09:52

## 2020-09-27 RX ADMIN — PANTOPRAZOLE SODIUM 40 MG: 40 TABLET, DELAYED RELEASE ORAL at 06:14

## 2020-09-27 NOTE — PROGRESS NOTES
Orthopedics   Modesto Lott 48 y o  male MRN: [id number]  Unit/Bed#: [facility] 772-01      Subjective:  50 y o male post operative day 2 right upper extremity irrigation and debridement Right hand (small and ring flexor tendon sheaths) and wrist, carpal tunnel release  Pain controlled this am, feels well overall       Labs:  0   Lab Value Date/Time    HCT 40 1 09/27/2020 0615    HCT 37 7 09/25/2020 0449    HCT 41 8 09/24/2020 1957    HGB 13 2 09/27/2020 0615    HGB 12 1 09/25/2020 0449    HGB 13 4 09/24/2020 1957    INR 1 08 09/24/2020 2015    WBC 9 94 09/27/2020 0615    WBC 11 19 (H) 09/25/2020 0449    WBC 14 26 (H) 09/24/2020 1957    ESR 6 10/25/2018 1148       Meds:    Current Facility-Administered Medications:     acetaminophen (TYLENOL) tablet 650 mg, 650 mg, Oral, Q6H PRN, Melodie Henao PA-C    amLODIPine (NORVASC) tablet 5 mg, 5 mg, Oral, Daily, Hetul Wilde, DO, 5 mg at 09/26/20 1000    atorvastatin (LIPITOR) tablet 10 mg, 10 mg, Oral, Daily, Hetul Wilde, DO, 10 mg at 09/26/20 1000    cyclobenzaprine (FLEXERIL) tablet 10 mg, 10 mg, Oral, TID PRN, Hetul Wilde, DO, 10 mg at 09/25/20 2205    docusate sodium (COLACE) capsule 100 mg, 100 mg, Oral, BID, Melodie Henao PA-C, 100 mg at 09/26/20 1715    DULoxetine (CYMBALTA) delayed release capsule 30 mg, 30 mg, Oral, Daily, Hetul Wilde, DO, 30 mg at 09/26/20 1000    enoxaparin (LOVENOX) subcutaneous injection 40 mg, 40 mg, Subcutaneous, Q24H [address], Perri Arteaga PA-C, 40 mg at 09/26/20 1341    gabapentin (NEURONTIN) capsule 300 mg, 300 mg, Oral, TID, Hetul Wilde, DO, 300 mg at 09/26/20 2114    HYDROcodone-acetaminophen (NORCO) 5-325 mg per tablet 1 tablet, 1 tablet, Oral, Q4H PRN, Hetul Wilde, DO, 1 tablet at 09/26/20 1725    HYDROmorphone (DILAUDID) injection 0 5 mg, 0 5 mg, Intravenous, Q4H PRN, Hetul Wilde, DO, 0 5 mg at 09/26/20 0000    ibuprofen (MOTRIN) tablet 600 mg, 600 mg, Oral, Q6H PRN, Hetul Wilde, DO    ketorolac (TORADOL) injection 15 mg, 15 mg, Intravenous, Q6H PRN, Ronnell Garcia MD, 15 mg at 09/27/20 0635    lisinopril (ZESTRIL) tablet 20 mg, 20 mg, Oral, Daily, Tejas Wilde DO, 20 mg at 09/26/20 1000    nicotine (NICODERM CQ) 14 mg/24hr TD 24 hr patch 1 patch, 1 patch, Transdermal, Daily, Melodie Henao PA-C, 1 patch at 09/26/20 1004    ondansetron (ZOFRAN) injection 4 mg, 4 mg, Intravenous, Q6H PRN, Melodie Henao PA-C    pantoprazole (PROTONIX) EC tablet 40 mg, 40 mg, Oral, Early Morning, Sajan Gautam MD, 40 mg at 09/27/20 0614    sodium chloride (OCEAN) 0 65 % nasal spray 2 spray, 2 spray, Each Nare, Q2H PRN, Sajan Gautam MD    vancomycin (VANCOCIN) IVPB (premix in dextrose) 1,000 mg 200 mL, 10 mg/kg, Intravenous, Q8H, Sajan Gautam MD, Last Rate: 200 mL/hr at 09/27/20 0001, 1,000 mg at 09/27/20 0001    Blood Culture:   Lab Results   Component Value Date    BLOODCX No Growth at 48 hrs  09/24/2020    BLOODCX No Growth at 48 hrs  09/24/2020       Wound Culture:   Lab Results   Component Value Date    WOUNDCULT Few Colonies of Staphylococcus coagulase negative (A) 02/05/2020       Ins and Outs:  I/O last 24 hours:   In: 2360 [P O :2360]  Out: -     Physical Exam:  Vitals:    09/26/20 2204   BP: 106/72   Pulse: 92   Resp:    Temp: 98 5 °F (36 9 °C)   SpO2: 94%     Gen: AAOx3; NAD  Skin: warm and dry  EENT: EOMI, hearing intact, trachea midline  Lungs: no audible wheeze or stridor  right upper extremity  · No significant reaccumulation of swelling, able to tolerate passive stretch all digits, non tender over flexor sheaths of SF, RF  · Sensation intact Axillary, Musculocutaneous, Radial, Ulna, Median nerves  · Motor intact to Axillary, Musculocutaneous, Radial, Ulna, Median nerves   · 2+ radial pulse    Assessment: 50 y o male post operative day 2 right upper hand and wrist I&D - Doing well    Plan:    · Up and out of bed   · Non-weight bearing to right upper extremity in soft dressing  · PT/OT  · DVT prophylaxis - mechanical  · Analgesics  · D/C planning - pending primary team and cultures   · Will continue to assess for acute blood loss anemia        Raghu Salmeron MD

## 2020-09-27 NOTE — PROGRESS NOTES
Progress Note - Infectious Disease   Modesto Lott 48 y o  male MRN: [id number]  Unit/Bed#: -01 Encounter: [id number]      Impression/Plan:    26-year-old male patient admitted for right hand erythema and swelling, found to have right hand cellulitis and tenosynovitis     1-  right hand cellulitis and tenosynovitis:  CT scan reviewed, no abscess or gas in soft tissue, no bony abnormalities, but findings consistent with infectious 5th flexor tenosynovitis, associated with right hand cellulitis; Suspect symptoms secondary to minor occupational trauma to the hand;  Patient is postop day 2 for I and D; operative report reviewed, discussed with surgeon; no signs of septic arthritis intraoperatively, but signs of tenosynovitis as noted above  - continue empiric vancomycin IV  - pharmacy follow-up for vancomycin dosing  -  postoperative wound care as per Ortho  - follow-up final result of blood culture collected 2020  - also follow-up final operative culture results; once operative culture is finalized, will decide on p o   Antibiotic regimen upon discharge  - repeat CBC and BMP in a m      2- hypertension:  Management as per primary service team     3- history of incarceration:  Patient has multiple tattoos, none done professionally; chronic viral hepatitis panel is negative  Patient agreed with HIV screening;   -  follow-up result of HIV screening sent yesterday 2020    Plan mentioned above discussed in details with patient and with slim provider    Antibiotics:  Vancomycin day 4  Postop day 2    Subjective:  Patient has no fever, chills, sweats; no nausea, vomiting, diarrhea; no cough, shortness of breath;   Denies pain  Reports significant improvement in erythema that was tracking up to his forearm      Objective:  Vitals:  Temp:  [98 °F (36 7 °C)-98 6 °F (37 °C)] 98 6 °F (37 °C)  HR:  [83-92] 87  Resp:  [18] 18  BP: (106-146)/(65-80) 146/65  SpO2:  [94 %-99 %] 99 %  Temp (24hrs), Av 4 °F (36 9 °C), Min:98 °F (36 7 °C), Max:98 6 °F (37 °C)  Current: Temperature: 98 6 °F (37 °C)    Physical Exam:   General Appearance:  Alert, interactive, nontoxic, no acute distress  Throat: Oropharynx moist without lesions  Lungs:   Clear to auscultation bilaterally; no wheezes, rhonchi or rales; respirations unlabored   Heart:  RRR; no murmur, rub or gallop   Abdomen:   Soft, non-tender, non-distended, positive bowel sounds  Extremities: No clubbing, cyanosis or edema  Dressing over right hand is dry and clean, no breakthrough bleeding or drainage  Erythema that was noted on admission tracking up to the forearm has currently resolved   Skin: No new rashes or lesions  No draining wounds noted  Labs, Imaging, & Other studies:   All pertinent labs and imaging studies were personally reviewed  Results from last 7 days   Lab Units 09/27/20  0615 09/25/20 0449 09/24/20 1957   WBC Thousand/uL 9 94 11 19* 14 26*   HEMOGLOBIN g/dL 13 2 12 1 13 4   PLATELETS Thousands/uL 352 292 325     Results from last 7 days   Lab Units 09/27/20  0616 09/24/20 1957 09/21/20  0206   SODIUM mmol/L 141 140 139   POTASSIUM mmol/L 4 0 3 7 4 5   CHLORIDE mmol/L 107 102 101   CO2 mmol/L 27 31 29   BUN mg/dL 13 20 13   CREATININE mg/dL 0 81 0 92 0 95   EGFR ml/min/1 73sq m 104 97 93   CALCIUM mg/dL 8 8 9 7 9 9   AST U/L  --  15 16   ALT U/L  --  42 26   ALK PHOS U/L  --  87 87     Results from last 7 days   Lab Units 09/25/20 2026 09/25/20 2026 09/24/20 1957   BLOOD CULTURE   --   --  No Growth at 48 hrs  No Growth at 48 hrs     GRAM STAIN RESULT  No Polys or Bacteria seen No Polys or Bacteria seen  --    WOUND CULTURE  No growth No growth  --      Results from last 7 days   Lab Units 09/26/20  0526 09/25/20  2327   PROCALCITONIN ng/ml <0 05 <0 05

## 2020-09-27 NOTE — ASSESSMENT & PLAN NOTE
· Hand Surgery and ID following, appreciate their recommendations  · Patient underwent right upper extremity irrigation and debridement right hand (small and ring flexor tendon sheaths) and wrist, carpal tunnel release  · Patient had recent ENT surgery  Per operative flowsheet from day of ENT surgery (9/14/2020) patient had IV placed in right forearm  · XR of the right forearm revealed no acute osseous abnormality per the results report  · On IV vancomycin   Pharmacy following for dosing  · Final operative cultures pending

## 2020-09-27 NOTE — PLAN OF CARE
Problem: Potential for Falls  Goal: Patient will remain free of falls  Description: INTERVENTIONS:  - Assess patient frequently for physical needs  -  Identify cognitive and physical deficits and behaviors that affect risk of falls    -  [place] fall precautions as indicated by assessment   - Educate patient/family on patient safety including physical limitations  - Instruct patient to call for assistance with activity based on assessment  - Modify environment to reduce risk of injury  - Consider OT/PT consult to assist with strengthening/mobility  Outcome: Progressing     Problem: PAIN - ADULT  Goal: Verbalizes/displays adequate comfort level or baseline comfort level  Description: Interventions:  - Encourage patient to monitor pain and request assistance  - Assess pain using appropriate pain scale  - Administer analgesics based on type and severity of pain and evaluate response  - Implement non-pharmacological measures as appropriate and evaluate response  - Consider cultural and social influences on pain and pain management  - Notify physician/advanced practitioner if interventions unsuccessful or patient reports new pain  Outcome: Progressing     Problem: INFECTION - ADULT  Goal: Absence or prevention of progression during hospitalization  Description: INTERVENTIONS:  - Assess and monitor for signs and symptoms of infection  - Monitor lab/diagnostic results  - Monitor all insertion sites, i e  indwelling lines, tubes, and drains  - Monitor endotracheal if appropriate and nasal secretions for changes in amount and color  - [place] appropriate cooling/warming therapies per order  - Administer medications as ordered  - Instruct and encourage patient and family to use good hand hygiene technique  - Identify and instruct in appropriate isolation precautions for identified infection/condition  Outcome: Progressing     Problem: DISCHARGE PLANNING  Goal: Discharge to home or other facility with appropriate resources  Description: INTERVENTIONS:  - Identify barriers to discharge w/patient and caregiver  - Arrange for needed discharge resources and transportation as appropriate  - Identify discharge learning needs (meds, wound care, etc )  - Arrange for interpretive services to assist at discharge as needed  - Refer to Case Management Department for coordinating discharge planning if the patient needs post-hospital services based on physician/advanced practitioner order or complex needs related to functional status, cognitive ability, or social support system  Outcome: Progressing     Problem: Knowledge Deficit  Goal: Patient/family/caregiver demonstrates understanding of disease process, treatment plan, medications, and discharge instructions  Description: Complete learning assessment and assess knowledge base    Interventions:  - Provide teaching at level of understanding  - Provide teaching via preferred learning methods  Outcome: Progressing

## 2020-09-27 NOTE — PROGRESS NOTES
Vancomycin IV Pharmacy-to-Dose Consultation     Bushra Bryant is a 48 y o  male who is currently receiving Vancomycin IV with management by the Pharmacy Consult service  Assessment/Plan:  The patient was reviewed  Renal function is stable and no signs or symptoms of nephrotoxicity and/or infusion reactions were documented in the chart  Based on todays assessment, continue current vancomycin (day # 4) dosing of 1,000mg q8hrs, with a plan for trough to be drawn at 1330 on Sat 10/3  We will continue to follow the patients culture results and clinical progress daily       Gerrie Dandy, PharmD, BCPP, Clinical Pharmacist

## 2020-09-27 NOTE — PROGRESS NOTES
Progress Note - Idalia Clemente 1970, 48 y o  male MRN: [id number]    Unit/Bed#: -01 Encounter: [id number]    Primary Care Provider: Sharri Vazquez DO   Date and time admitted to hospital: 9/25/2020 12:07 PM        * Cellulitis of right hand  Assessment & Plan  · Hand Surgery and ID following, appreciate their recommendations  · Patient underwent right upper extremity irrigation and debridement right hand (small and ring flexor tendon sheaths) and wrist, carpal tunnel release  · Patient had recent ENT surgery  Per operative flowsheet from day of ENT surgery (9/14/2020) patient had IV placed in right forearm  · XR of the right forearm revealed no acute osseous abnormality per the results report  · On IV vancomycin  Pharmacy following for dosing  · Final operative cultures pending     Tenosynovitis of finger  Assessment & Plan  · Plan as per above    Nasal valve collapse  Assessment & Plan  · S/p ENT surgery on 9/14/2020 with Dr Blas Bhandari    Hypertension  Assessment & Plan  · Continue Lisinopril and Norvasc    Hyperlipidemia  Assessment & Plan  · Continue statin    GERD (gastroesophageal reflux disease)  Assessment & Plan  · Continue PPI    Cervical radiculopathy  Assessment & Plan  · Outpatient follow up with his outpatient Pain Management specialist     Prediabetes  Assessment & Plan  · Hemoglobin A1c 5 7  · Encourage dietary and lifestyle modifications       VTE Pharmacologic Prophylaxis:   Pharmacologic: Enoxaparin (Lovenox)  Mechanical VTE Prophylaxis in Place: Yes    Patient Centered Rounds: I have performed bedside rounds with nursing staff today  Luis Fernando Ruiz    Discussions with Specialists or Other Care Team Provider: nurse    Education and Discussions with Family / Patient: patient; I attempted to call his Fran Laura, however no one answered    Time Spent for Care: 20 minutes  More than 50% of total time spent on counseling and coordination of care as described above      Current Length of Stay: 2 day(s)    Current Patient Status: Inpatient   Certification Statement: The patient will continue to require additional inpatient hospital stay due to continued IV abx pending op cultures    Discharge Plan: continued IV abx pending op cultures    Code Status: Level 1 - Full Code      Subjective:   Mr Day Etienne wants to go home  Denies pain    Objective:     Vitals:   Temp (24hrs), Av 4 °F (36 9 °C), Min:98 °F (36 7 °C), Max:98 6 °F (37 °C)    Temp:  [98 °F (36 7 °C)-98 6 °F (37 °C)] 98 6 °F (37 °C)  HR:  [83-92] 87  Resp:  [18] 18  BP: (106-146)/(65-80) 146/65  SpO2:  [94 %-99 %] 99 %  Body mass index is 27 28 kg/m²  Input and Output Summary (last 24 hours): Intake/Output Summary (Last 24 hours) at 2020 1009  Last data filed at 2020 1819  Gross per 24 hour   Intake 1940 ml   Output --   Net 1940 ml       Physical Exam:     Physical Exam  Vitals signs and nursing note reviewed  Exam conducted with a chaperone present  Constitutional:       General: He is not in acute distress  Appearance: He is not ill-appearing, toxic-appearing or diaphoretic  Comments: Patient seen lying in bed comfortably resting with nurse present  He is cooperative   Cardiovascular:      Rate and Rhythm: Normal rate and regular rhythm  Pulmonary:      Effort: Pulmonary effort is normal  No respiratory distress  Breath sounds: Normal breath sounds  No wheezing  Abdominal:      General: Bowel sounds are normal       Palpations: Abdomen is soft  Tenderness: There is no abdominal tenderness  Musculoskeletal:      Comments: Right hand wrapped   Skin:     General: Skin is warm  Neurological:      Mental Status: He is alert and oriented to person, place, and time     Psychiatric:         Mood and Affect: Mood normal          Behavior: Behavior normal          Additional Data:     Labs:    Results from last 7 days   Lab Units 20  0615 20  0449   WBC Thousand/uL 9 94 11 19*   HEMOGLOBIN g/dL 13 2 12 1   HEMATOCRIT % 40 1 37 7   PLATELETS Thousands/uL 352 292   NEUTROS PCT %  --  61   LYMPHS PCT %  --  27   MONOS PCT %  --  9   EOS PCT %  --  2     Results from last 7 days   Lab Units 09/27/20 0616 09/24/20 1957   SODIUM mmol/L 141 140   POTASSIUM mmol/L 4 0 3 7   CHLORIDE mmol/L 107 102   CO2 mmol/L 27 31   BUN mg/dL 13 20   CREATININE mg/dL 0 81 0 92   ANION GAP mmol/L 7 7   CALCIUM mg/dL 8 8 9 7   ALBUMIN g/dL  --  3 4*   TOTAL BILIRUBIN mg/dL  --  0 30   ALK PHOS U/L  --  87   ALT U/L  --  42   AST U/L  --  15   GLUCOSE RANDOM mg/dL 129 92     Results from last 7 days   Lab Units 09/24/20 2015   INR  1 08         Results from last 7 days   Lab Units 09/26/20 0526   HEMOGLOBIN A1C % 5 7*     Results from last 7 days   Lab Units 09/26/20 0526 09/25/20 2327 09/24/20 1957 09/21/20  0206   LACTIC ACID mmol/L  --   --  0 7 1 4   PROCALCITONIN ng/ml <0 05 <0 05  --   --            * I Have Reviewed All Lab Data Listed Above  * Additional Pertinent Lab Tests Reviewed: All Labs Within Last 24 Hours Reviewed    Imaging:    Imaging Reports Reviewed Today Include: None  Imaging Personally Reviewed by Myself Includes:  none    Recent Cultures (last 7 days):     Results from last 7 days   Lab Units 09/25/20 2026 09/25/20 2026 09/24/20 1957   BLOOD CULTURE   --   --  No Growth at 48 hrs  No Growth at 48 hrs     GRAM STAIN RESULT  No Polys or Bacteria seen No Polys or Bacteria seen  --        Last 24 Hours Medication List:   Current Facility-Administered Medications   Medication Dose Route Frequency Provider Last Rate    acetaminophen  650 mg Oral Q6H PRN Melodie Henao PA-C      amLODIPine  5 mg Oral Daily Hetul Wilde, DO      atorvastatin  10 mg Oral Daily Hetul Wilde, DO      cyclobenzaprine  10 mg Oral TID PRN Tejas Wilde, DO      docusate sodium  100 mg Oral BID Melodie Henao PA-C      DULoxetine  30 mg Oral Daily Hetul Wilde, DO      enoxaparin  40 mg Subcutaneous Q24H [address] Jesús Alberts GRADY      gabapentin  300 mg Oral TID Hetul Wilde, DO      HYDROcodone-acetaminophen  1 tablet Oral Q4H PRN Hetul Wilde, DO      HYDROmorphone  0 5 mg Intravenous Q4H PRN Hetul Wilde, DO      ibuprofen  600 mg Oral Q6H PRN Hetul Wilde, DO      ketorolac  15 mg Intravenous Q6H PRN Rajesh Rivera MD      lisinopril  20 mg Oral Daily Hetul Wilde, DO      nicotine  1 patch Transdermal Daily Melodie Henao PA-C      ondansetron  4 mg Intravenous Q6H PRN Melodie Henao PA-C      pantoprazole  40 mg Oral Early Morning Sajan Gautam MD      sodium chloride  2 spray Each Nare Q2H PRN Sajan Gautam MD      vancomycin  10 mg/kg Intravenous Q8H Joslyn Cook MD 1,000 mg (09/27/20 0956)        Today, Patient Was Seen By: Kathia Ball PA-C    ** Please Note: Dictation voice to text software may have been used in the creation of this document   **

## 2020-09-28 VITALS
BODY MASS INDEX: 27.14 KG/M2 | OXYGEN SATURATION: 98 % | RESPIRATION RATE: 18 BRPM | DIASTOLIC BLOOD PRESSURE: 89 MMHG | TEMPERATURE: 97.8 F | SYSTOLIC BLOOD PRESSURE: 154 MMHG | HEIGHT: 75 IN | WEIGHT: 218.26 LBS | HEART RATE: 85 BPM

## 2020-09-28 LAB
ATRIAL RATE: 89 BPM
HIV 1+2 AB+HIV1 P24 AG SERPL QL IA: NORMAL
P AXIS: 42 DEGREES
PR INTERVAL: 140 MS
QRS AXIS: 31 DEGREES
QRSD INTERVAL: 96 MS
QT INTERVAL: 330 MS
QTC INTERVAL: 401 MS
T WAVE AXIS: 56 DEGREES
VENTRICULAR RATE: 89 BPM

## 2020-09-28 PROCEDURE — NC001 PR NO CHARGE: Performed by: ORTHOPAEDIC SURGERY

## 2020-09-28 PROCEDURE — 93010 ELECTROCARDIOGRAM REPORT: CPT | Performed by: INTERNAL MEDICINE

## 2020-09-28 PROCEDURE — 99239 HOSP IP/OBS DSCHRG MGMT >30: CPT | Performed by: PHYSICIAN ASSISTANT

## 2020-09-28 PROCEDURE — 99232 SBSQ HOSP IP/OBS MODERATE 35: CPT | Performed by: INTERNAL MEDICINE

## 2020-09-28 RX ORDER — DOXYCYCLINE HYCLATE 100 MG/1
100 CAPSULE ORAL EVERY 12 HOURS SCHEDULED
Qty: 20 CAPSULE | Refills: 0 | Status: SHIPPED | OUTPATIENT
Start: 2020-09-28 | End: 2020-10-08

## 2020-09-28 RX ORDER — NICOTINE 21 MG/24HR
1 PATCH, TRANSDERMAL 24 HOURS TRANSDERMAL DAILY
Qty: 28 PATCH | Refills: 0 | Status: SHIPPED | OUTPATIENT
Start: 2020-09-29 | End: 2021-01-26

## 2020-09-28 RX ADMIN — ENOXAPARIN SODIUM 40 MG: 40 INJECTION SUBCUTANEOUS at 09:48

## 2020-09-28 RX ADMIN — AMLODIPINE BESYLATE 5 MG: 5 TABLET ORAL at 09:46

## 2020-09-28 RX ADMIN — DOCUSATE SODIUM 100 MG: 100 CAPSULE, LIQUID FILLED ORAL at 09:46

## 2020-09-28 RX ADMIN — VANCOMYCIN HYDROCHLORIDE 1000 MG: 1 INJECTION, SOLUTION INTRAVENOUS at 07:45

## 2020-09-28 RX ADMIN — PANTOPRAZOLE SODIUM 40 MG: 40 TABLET, DELAYED RELEASE ORAL at 05:28

## 2020-09-28 RX ADMIN — VANCOMYCIN HYDROCHLORIDE 1000 MG: 1 INJECTION, SOLUTION INTRAVENOUS at 00:17

## 2020-09-28 RX ADMIN — GABAPENTIN 300 MG: 300 CAPSULE ORAL at 09:46

## 2020-09-28 RX ADMIN — NICOTINE 1 PATCH: 14 PATCH TRANSDERMAL at 09:48

## 2020-09-28 RX ADMIN — IBUPROFEN 600 MG: 600 TABLET, FILM COATED ORAL at 06:10

## 2020-09-28 RX ADMIN — HYDROCODONE BITARTRATE AND ACETAMINOPHEN 1 TABLET: 5; 325 TABLET ORAL at 06:19

## 2020-09-28 RX ADMIN — LISINOPRIL 20 MG: 20 TABLET ORAL at 09:46

## 2020-09-28 RX ADMIN — DULOXETINE HYDROCHLORIDE 30 MG: 30 CAPSULE, DELAYED RELEASE ORAL at 09:46

## 2020-09-28 RX ADMIN — ATORVASTATIN CALCIUM 10 MG: 10 TABLET, FILM COATED ORAL at 09:46

## 2020-09-28 NOTE — PROGRESS NOTES
Vancomycin IV Pharmacy-to-Dose Consultation    Ilia Jaquez is a 48 y o  male who is currently receiving Vancomycin IV with management by the Pharmacy Consult service  Assessment/Plan:  The patient was reviewed  Renal function is stable and no signs or symptoms of nephrotoxicity and/or infusion reactions were documented in the chart  Based on todays assessment, continue current vancomycin (day # 5) dosing of 1000 mg IV q 8 h, with a plan for trough to be drawn at 1330 on 10/03/20  We will continue to follow the patients culture results and clinical progress daily      Arslan Richards, Pharmacist

## 2020-09-28 NOTE — PLAN OF CARE
Problem: PAIN - ADULT  Goal: Verbalizes/displays adequate comfort level or baseline comfort level  Description: Interventions:  - Encourage patient to monitor pain and request assistance  - Assess pain using appropriate pain scale  - Administer analgesics based on type and severity of pain and evaluate response  - Implement non-pharmacological measures as appropriate and evaluate response  - Consider cultural and social influences on pain and pain management  - Notify physician/advanced practitioner if interventions unsuccessful or patient reports new pain  9/28/2020 1249 by Tl Albarado RN  Outcome: Adequate for Discharge  9/28/2020 1249 by Tl Albarado RN  Outcome: Progressing  9/28/2020 0729 by Tl Albarado RN  Outcome: Progressing

## 2020-09-28 NOTE — PROGRESS NOTES
Progress Note - Infectious Disease   Modesto Lott 48 y o  male MRN: [id number]  Unit/Bed#: -01 Encounter: [id number]      Impression/Plan:    49-year-old male patient admitted for right hand erythema and swelling, found to have right hand cellulitis and tenosynovitis     1-  right hand cellulitis and tenosynovitis:  CT scan reviewed, no abscess or gas in soft tissue, no bony abnormalities, but findings consistent with infectious 5th flexor tenosynovitis, associated with right hand cellulitis; Suspect symptoms secondary to minor occupational trauma to the hand;  Patient is postop day 3 for I and D; operative report reviewed, discussed with surgeon; no signs of septic arthritis intraoperatively, but signs of tenosynovitis as noted above  - narrative culture from 09/25/2020 remains negative so far, though not finalized yet; Patient significantly improving on vancomycin IV;   - I discussed with the patient the need to remain hospitalized on vancomycin IV until operative culture is back and p o  Antibiotic plan is set; patient strongly prefers to be discharged today; understands that if operative culture shows a microorganism not covered by p o  Antibiotic, regimen will have to be changed;  Beyond this conversation, okay to discharge on doxycycline 100 mg p o  Q 12 hours to finish 2 weeks course through 10/07/2020       2- hypertension:  Management as per primary service team     3- history of incarceration:  Patient has multiple tattoos, none done professionally; chronic viral hepatitis panel is negative  Patient agreed with HIV screening;   -  follow-up result of HIV screening sent 09/26/2020     Plan mentioned above discussed in details with patient and with slim provider     Antibiotics:  Vancomycin day 5  Postop day 3    Subjective:  Patient has no fever, chills, sweats; no nausea, vomiting, diarrhea; no cough, shortness of breath; no pain  No new symptoms    Reports significant improvement and is eager to go home    Objective:  Vitals:  Temp:  [97 5 °F (36 4 °C)-97 8 °F (36 6 °C)] 97 8 °F (36 6 °C)  HR:  [84-86] 85  Resp:  [16-18] 18  BP: (123-154)/(69-89) 154/89  SpO2:  [96 %-99 %] 98 %  Temp (24hrs), Av 6 °F (36 4 °C), Min:97 5 °F (36 4 °C), Max:97 8 °F (36 6 °C)  Current: Temperature: 97 8 °F (36 6 °C)    Physical Exam:   General Appearance:  Alert, interactive, nontoxic, no acute distress  Throat: Oropharynx moist without lesions  Lungs:   Clear to auscultation bilaterally; no wheezes, rhonchi or rales; respirations unlabored   Heart:  RRR; no murmur, rub or gallop   Abdomen:   Soft, non-tender, non-distended, positive bowel sounds  Extremities: No clubbing, cyanosis or edema  Erythema over forearm has completely resolved;  Dressing over right hand is dry and clean with no breakthrough bleeding or drainage;    Skin: No new rashes or lesions  No draining wounds noted  Labs, Imaging, & Other studies:   All pertinent labs and imaging studies were personally reviewed  Results from last 7 days   Lab Units 20  0615 209 20   WBC Thousand/uL 9 94 11 19* 14 26*   HEMOGLOBIN g/dL 13 2 12 1 13 4   PLATELETS Thousands/uL 352 292 325     Results from last 7 days   Lab Units 20  0616 20   SODIUM mmol/L 141 140   POTASSIUM mmol/L 4 0 3 7   CHLORIDE mmol/L 107 102   CO2 mmol/L 27 31   BUN mg/dL 13 20   CREATININE mg/dL 0 81 0 92   EGFR ml/min/1 73sq m 104 97   CALCIUM mg/dL 8 8 9 7   AST U/L  --  15   ALT U/L  --  42   ALK PHOS U/L  --  87     Results from last 7 days   Lab Units 20   BLOOD CULTURE   --   --  No Growth at 72 hrs  No Growth at 72 hrs     GRAM STAIN RESULT  No Polys or Bacteria seen No Polys or Bacteria seen  --    WOUND CULTURE  No growth No growth  --      Results from last 7 days   Lab Units 20  0526 20  2327   PROCALCITONIN ng/ml <0 05 <0 05

## 2020-09-28 NOTE — PROGRESS NOTES
Progress Note - Orthopedics   Modesto Lott 48 y o  male MRN: [id number]  Unit/Bed#: [facility] 772-01      Subjective:    50 y o male POD 3 R hand I&D and open carpal tunnel release  Doing well this AM, hand sore but overall improved compared to preop  No fevers overnight       Labs:  0   Lab Value Date/Time    HCT 40 1 09/27/2020 0615    HCT 37 7 09/25/2020 0449    HCT 41 8 09/24/2020 1957    HGB 13 2 09/27/2020 0615    HGB 12 1 09/25/2020 0449    HGB 13 4 09/24/2020 1957    INR 1 08 09/24/2020 2015    WBC 9 94 09/27/2020 0615    WBC 11 19 (H) 09/25/2020 0449    WBC 14 26 (H) 09/24/2020 1957    ESR 6 10/25/2018 1148       Meds:    Current Facility-Administered Medications:     acetaminophen (TYLENOL) tablet 650 mg, 650 mg, Oral, Q6H PRN, Melodie Henao PA-C    amLODIPine (NORVASC) tablet 5 mg, 5 mg, Oral, Daily, Hetul Wilde, DO, 5 mg at 09/27/20 7645    atorvastatin (LIPITOR) tablet 10 mg, 10 mg, Oral, Daily, Hetul Wilde, DO, 10 mg at 09/27/20 5258    cyclobenzaprine (FLEXERIL) tablet 10 mg, 10 mg, Oral, TID PRN, Hetul Wilde, DO, 10 mg at 09/25/20 2205    docusate sodium (COLACE) capsule 100 mg, 100 mg, Oral, BID, Melodie Henao PA-C, 100 mg at 09/27/20 1731    DULoxetine (CYMBALTA) delayed release capsule 30 mg, 30 mg, Oral, Daily, Hetul Wilde, DO, 30 mg at 09/27/20 0952    enoxaparin (LOVENOX) subcutaneous injection 40 mg, 40 mg, Subcutaneous, Q24H [address], Awa Antonio PA-C, 40 mg at 09/27/20 2564    gabapentin (NEURONTIN) capsule 300 mg, 300 mg, Oral, TID, Hetul Wilde, DO, 300 mg at 09/27/20 2140    HYDROcodone-acetaminophen (NORCO) 5-325 mg per tablet 1 tablet, 1 tablet, Oral, Q4H PRN, Hetul Wilde, DO, 1 tablet at 09/28/20 3998    HYDROmorphone (DILAUDID) injection 0 5 mg, 0 5 mg, Intravenous, Q4H PRN, Hetul Wilde, DO, 0 5 mg at 09/27/20 2140    ibuprofen (MOTRIN) tablet 600 mg, 600 mg, Oral, Q6H PRN, Hetul Wilde, DO, 600 mg at 09/28/20 0610    lisinopril (ZESTRIL) tablet 20 mg, 20 mg, Oral, Daily, Tejas Wilde DO, 20 mg at 09/27/20 0951    nicotine (NICODERM CQ) 14 mg/24hr TD 24 hr patch 1 patch, 1 patch, Transdermal, Daily, Melodie Henao PA-C, 1 patch at 09/27/20 0950    ondansetron (ZOFRAN) injection 4 mg, 4 mg, Intravenous, Q6H PRN, Melodie Henao PA-C    pantoprazole (PROTONIX) EC tablet 40 mg, 40 mg, Oral, Early Morning, Sajan Gautam MD, 40 mg at 09/28/20 0528    sodium chloride (OCEAN) 0 65 % nasal spray 2 spray, 2 spray, Each Nare, Q2H PRN, Sajan Gautam MD    vancomycin (VANCOCIN) IVPB (premix in dextrose) 1,000 mg 200 mL, 10 mg/kg, Intravenous, Q8H, Sajan Gautam MD, Stopped at 09/28/20 0117    Blood Culture:   Lab Results   Component Value Date    BLOODCX No Growth at 72 hrs  09/24/2020    [place] No Growth at 72 hrs  09/24/2020       Wound Culture:   Lab Results   Component Value Date    WOUNDCULT No growth 09/25/2020       Ins and Outs:  I/O last 24 hours: In: 2660 [P O :1260; IV Piggyback:1400]  Out: -           Physical:  Vitals:    09/27/20 2203   BP: 123/73   Pulse:    Resp: 18   Temp: 97 5 °F (36 4 °C)   SpO2:      Musculoskeletal: right Upper Extremity  · Incision intact without significant erythema, drainage, or dehiscence, sutures in place  · TTP around incisions  · No significant swelling to ring or small finger, no fixed flexed posturing, no tenderness to percussion along the flexor tendon sheath, mild pain with passive extension  · Sensation intact to light touch m/r/u  · Moving all fingers  · 2+ rad pulse    Assessment:    50 y o male POD R hand I&D and open carpal tunnel release  Doing well  Await final cultures       Plan:  · NWB RUE   · Abx per ID  · Will monitor for ABLA  · PT/OT  · Pain control  · DVT PPX  · Dispo: Ortho will follow    Burnice MD Jodie

## 2020-09-28 NOTE — DISCHARGE SUMMARY
Discharge Summary - [facility]    Patient Information: Dario Sotelo 48 y o  male MRN: [id number]  Unit/Bed#: -01 Encounter: [id number]    Discharging Physician / Practitioner: Jesús Alberts PA-C  PCP: Andres Pearl DO  Admission Date: 9/25/2020  Discharge Date: 09/28/20    Reason for Admission: right hand cellulitis and tenosynovitis     Discharge Diagnoses:     Principal Problem:    Cellulitis of right hand  Active Problems:    Tenosynovitis of finger    Nasal valve collapse    Hypertension    Hyperlipidemia    GERD (gastroesophageal reflux disease)    Cervical radiculopathy    Prediabetes  Resolved Problems:    * No resolved hospital problems  *      Consultations During Hospital Stay:  · Orthopaedics  · ID  · Pharmacy     Procedures Performed:   · Chest x-ray  · Right forearm x-ray  · Debridement upper extremity washout right hand/wrist, right carpal tunnel release  · Anaerobic culture and g stain  · Wound culture and Gram stain  · BMP  · CBC  · Hemoglobin A1c  · Procalcitonin  · HIV 1-2 antigen/antibody  · Chronic hepatitis panel    Significant Findings:   · Chest x-ray:  No acute cardiopulmonary disease  · Right forearm x-ray:  No acute osseous abnormality  · Wound culture and Gram stain:  Preliminarily no growth, no polys or bacteria seen  · Hemoglobin A1c: 5 7  · Procalcitonin: <0 05 x2  · Chronic hepatitis panel:  all non reactive    Incidental Findings:   · None     Test Results Pending at Discharge (will require follow up):    · Final anaerobic culture and gram stain  · Final wound culture and gram stain  · HIV 1-2 antigen/antibody     Outpatient Tests Requested:  · Follow up with Orthopaedics and ID    Complications:  None    Hospital Course:     Dario Sotelo is a 48 y o  male with nasal valve collapse status post recent ENT surgery on 9/14/2020, HTN, HLD, GERD, and cervical radiculopathy for which he follows with pain management who originally presented to the hospital on 9/25/2020 due to right hand and forearm discomfort  Patient was recently seen in the [facility] ED on 9/16/2020 and was discharged  He was then admitted on 9/21/2020 and was given IV antibiotics and discharged the same day on p o  Antibiotics  He was readmitted on 9/25/2020  CT from that admission suspicious for cellulitis and infectious tenosynovitis  Medicine contacted Hand surgery and plan was to transfer to [facility] however patient eloped from the hospital on 9/25/2020  Patient presented to the [facility] ED later on 9/25/2020 and was admitted  Hand surgery and ID were consulted  Patient was placed on IV vancomycin  Patient underwent right upper extremity irrigation and debridement right hand (small and ring flexor tendon sheaths) and right wrist, carpal tunnel release by orthopedics on 9/25/2020  Patient was seen by ID and Ortho on day of discharge; I discussed with both services  ID recommended discharge on doxycycline 100mg PO Q12hr through 10/7/2020  Patient to follow-up with ID and Ortho regarding final cultures and ID for HIV testing which they ordered - patient aware  Condition at Discharge: stable     Discharge Day Visit / Exam:     Subjective:    Mr Regan Carvajal reports he is "ready to roll" and wants to go home  Denies pain, CP, abdominal pain, SOB    Vitals: Blood Pressure: 154/89 (09/28/20 0832)  Pulse: 85 (09/28/20 0832)  Temperature: 97 8 °F (36 6 °C) (09/28/20 0832)  Temp Source: Oral (09/28/20 0832)  Respirations: 18 (09/28/20 0832)  Height: 6' 3" (190 5 cm) (09/25/20 1732)  Weight - Scale: 99 kg (218 lb 4 1 oz) (09/25/20 1732)  SpO2: 98 % (09/28/20 0832)  Exam:   Physical Exam  Vitals signs and nursing note reviewed  Constitutional:       General: He is not in acute distress  Appearance: He is not toxic-appearing  Comments: Patient seen sitting in bed comfortably    Cardiovascular:      Rate and Rhythm: Normal rate and regular rhythm     Pulmonary:      Effort: Pulmonary effort is normal  No respiratory distress  Breath sounds: Normal breath sounds  No wheezing  Abdominal:      General: Bowel sounds are normal       Palpations: Abdomen is soft  Tenderness: There is no abdominal tenderness  Musculoskeletal:      Comments: Right hand wrapped   Skin:     General: Skin is warm  Neurological:      Mental Status: He is alert and oriented to person, place, and time  Psychiatric:         Mood and Affect: Mood normal          Behavior: Behavior normal          Discharge instructions/Information to patient and family:   See after visit summary for information provided to patient and family  Provisions for Follow-Up Care:  See after visit summary for information related to follow-up care and any pertinent home health orders  Disposition:     Home    For Discharges to   [address] [facility]:   · Not Applicable to this Patient - Not Applicable to this Patient    Planned Readmission: None     Discharge Statement:  I spent 35 minutes discharging the patient  This time was spent on the day of discharge  I had direct contact with the patient on the day of discharge  Greater than 50% of the total time was spent examining patient, answering all patient questions, arranging and discussing plan of care with patient as well as directly providing post-discharge instructions  Additional time then spent on discharge activities  Discharge Medications:  See after visit summary for reconciled discharge medications provided to patient and family  ** Please Note: Dragon 360 Dictation voice to text software may have been used in the creation of this document   **

## 2020-09-28 NOTE — DISCHARGE INSTRUCTIONS
Discharge Instructions - Orthopedics  Ilia Jaquez 48 y o  male MRN: [id number]  Unit/Bed#: [facility] 772-01    Weight Bearing Status:                                           Nonweightbearing bearing right upper extremity     Pain:  Continue analgesics as directed by primary team     Dressing Instructions:   Do not perform dressing changes until follow up     Appt Instructions: If you do not have your appointment, please call the clinic at [phone] to schedule follow up with Dr Zaria Nur in the next week  Otherwise followup as scheduled     Miscellaneous:  None          Doxycycline (By mouth)   Doxycycline ([id number]-u-SOZ-kletravis)  Treats and prevents infections  Also used to prevent malaria and treat rosacea or severe acne  This medicine is a tetracycline antibiotic  Brand Name(s): Acticlate, Adoxa, Adoxa Sam 1/150, Avidoxy, Avidoxy DK, BenzoDox 30 Kit, BenzoDox 60 Kit, Doryx, Doryx MPC, Monodox, Morgidox [id number], Morgidox [id number] Kit, Morgidox 1x50MG, Morgidox 1x50MG Kit, Morgidox [id number]   There may be other brand names for this medicine  When This Medicine Should Not Be Used: This medicine is not right for everyone  Do not use it if you had an allergic reaction to doxycycline or another tetracycline antibiotic, or if you are pregnant or breastfeeding  How to Use This Medicine:   Capsule, Delayed Release Capsule, Long Acting Capsule, Liquid, Tablet, Delayed Release Tablet  · Your doctor will tell you how much medicine to use  Do not use more than directed  · Ask your pharmacist or doctor if you need to take this medicine with or without food  Some forms can be taken with food or milk, but others must be taken on an empty stomach  · Tablets: You may take this medicine with food or milk to avoid stomach irritation  To break a tablet, hold the tablet between your thumb and index fingers close to the appropriate scored line  Then, apply enough pressure to snap the tablet segments apart   Do not use the tablet if it does not break on the scored lines  · Delayed-release tablets: You may also take this medicine by sprinkling the broken tablets onto room-temperature applesauce  Swallow this mixture right away; do not chew  Do not store the mixture for later use  · Oracea® capsules: This medicine must be taken on an empty stomach, at least 1 hour before or 2 hours after a meal   · Capsule: Swallow whole  Do not break, crush, chew, or open it  · Oral liquid: Shake the bottle well just before each use  Measure the oral liquid medicine with a marked measuring spoon, oral syringe, or medicine cup  · Take all of the medicine in your prescription to clear up your infection, even if you feel better after the first few doses  · Drink plenty of fluids to avoid throat problems, if you take the capsule or tablet form  · Malaria prevention: Start taking the medicine 1 or 2 days before you travel  Take the medicine every day during your trip  Keep taking it for 4 weeks after you return  However, do not use the medicine for longer than 4 months  · Do not use this medicine for more than 9 months if you are using it for rosacea  · Use only the brand of medicine your doctor prescribed  Other brands may not work the same way  · Read and follow the patient instructions that come with this medicine  Talk to your doctor or pharmacist if you have any questions  · Missed dose: Take a dose as soon as you remember  If it is almost time for your next dose, wait until then and take a regular dose  Do not take extra medicine to make up for a missed dose  · Store the medicine in a closed container at room temperature, away from heat, moisture, and direct light  Do not freeze the oral liquid  Drugs and Foods to Avoid:   Ask your doctor or pharmacist before using any other medicine, including over-the-counter medicines, vitamins, and herbal products  · Some foods and medicines can affect how doxycycline works   Tell your doctor if you are using any of the following:  ¨ Bismuth subsalicylate, isotretinoin or other acne medicines, acitretin or other medicine to treat psoriasis  ¨ Penicillin antibiotic, birth control pills, medicine for seizures (such as carbamazepine, phenobarbital, phenytoin), stomach medicine, a blood thinner (such as warfarin), or any medicine that contains aluminum, calcium, or iron (such an antacid or vitamin supplement)  Warnings While Using This Medicine:   · This medicine may cause birth defects if either partner is using it during conception or pregnancy  Tell your doctor right away if you or your partner becomes pregnant  Birth control pills may not work as well when used with this medicine  Use a second form of birth control to keep from getting pregnant  · Tell your doctor if you have kidney disease, liver disease, asthma, or an allergy to sulfites  Tell your doctor if you had surgery on your stomach, or if you have a history of yeast infections  · This medicine may cause the following problems:  ¨ Permanent change in tooth color (in children younger than 6years old)  ¨ Increased pressure inside the head  ¨ Yeast infection  ¨ Immune system problems  · This medicine can cause diarrhea  Call your doctor if the diarrhea becomes severe, does not stop, or is bloody  Do not take any medicine to stop diarrhea until you have talked to your doctor  Diarrhea can occur 2 months or more after you stop taking this medicine  · This medicine may make your skin more sensitive to sunlight  Wear sunscreen  Do not use sunlamps or tanning beds  · Tell any doctor or dentist who treats you that you are using this medicine  This medicine may affect certain medical test results  · Call your doctor if your symptoms do not improve or if they get worse  · Keep all medicine out of the reach of children  Never share your medicine with anyone    Possible Side Effects While Using This Medicine:   Call your doctor right away if you notice any of these side effects:  · Allergic reaction: Itching or hives, swelling in your face or hands, swelling or tingling in your mouth or throat, chest tightness, trouble breathing  · Blistering, peeling, red skin rash  · Burning, pain, or irritation in your upper stomach or throat  · Diarrhea that may contain blood  · Fever, chills, cough, runny or stuffy nose, sore throat, and body aches  · Joint pain, fever, rash, and unusual tiredness or weakness  · Severe headache, dizziness, or vision changes  If you notice these less serious side effects, talk with your doctor:   · Darkening of your skin, scars, teeth, or gums  · Sores or white patches on your lips, mouth, or throat  If you notice other side effects that you think are caused by this medicine, tell your doctor  Call your doctor for medical advice about side effects  You may report side effects to FDA at 9-160-FDA-3845  © 2017 [address] [employer] Information is for End User's use only and may not be sold, redistributed or otherwise used for commercial purposes  The above information is an  only  It is not intended as medical advice for individual conditions or treatments  Talk to your doctor, nurse or pharmacist before following any medical regimen to see if it is safe and effective for you

## 2020-09-28 NOTE — CASE MANAGEMENT
Initial interview:     LOS 2d  Not a Bundle  Not a ReAdmit  ReAdmit Risk Score - green    CM met with the patient to review the CM role and discuss possible dc needs  Pt lives alone in a trailer home in [place], [place]  4 JYOTHI  Pt is independent, uses a SPC for walking, is on disability and drives  No other DME  No hx of VNA or IP rehab  Pt denied drug or etoh abuse  Pt reported mental illness; hx multiple personality disorder, Depression, Bipolar 1 and Schizophrenia  Pt stated that he is followed at [facility]  Pt stated that he has a POA; CM request copy of document  No LW  Prescriptions are filled at [facility] in [place]  Main contact:   Friend / Paddy Steel [phone]  S  O  Zula Skiff [phone]    VZ Plan - pt denied any dc needs  Pt stated that his S O  Roselyn Samara will pick him up at discharge  CM reviewed d/c planning process including the following: identifying help at home, patient preference for d/c planning needs, Discharge Lounge, [facility] Meds to Bed program, availability of treatment team to discuss questions or concerns patient and/or family may have regarding understanding medications and recognizing signs and symptoms once discharged  CM also encouraged patient to follow up with all recommended appointments after discharge  Patient advised of importance for patient and family to participate in managing patients medical well being

## 2020-09-29 LAB
BACTERIA SPEC ANAEROBE CULT: NO GROWTH
BACTERIA SPEC ANAEROBE CULT: NO GROWTH
BACTERIA WND AEROBE CULT: NO GROWTH
BACTERIA WND AEROBE CULT: NO GROWTH
GRAM STN SPEC: NORMAL
GRAM STN SPEC: NORMAL

## 2020-09-30 ENCOUNTER — TRANSITIONAL CARE MANAGEMENT (OUTPATIENT)
Dept: FAMILY MEDICINE CLINIC | Facility: MEDICAL CENTER | Age: 50
End: 2020-09-30

## 2020-09-30 ENCOUNTER — TELEPHONE (OUTPATIENT)
Dept: FAMILY MEDICINE CLINIC | Facility: MEDICAL CENTER | Age: 50
End: 2020-09-30

## 2020-09-30 LAB
BACTERIA BLD CULT: NORMAL
BACTERIA BLD CULT: NORMAL

## 2020-09-30 NOTE — TELEPHONE ENCOUNTER
Pt called to set up KATY appt  He was discharged on Monday 9/28/20  Scheduled for 10/2/20  Routed to [place] - do you need to do a KATY call?
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
## 2020-09-30 NOTE — TELEPHONE ENCOUNTER
Called patient to the [facility] encounter  appt Friday   C/o hand pain , pain scale 7  Asking for something stronger then motrin  Was giving only 3 [place]  I suggest he call the Hand surgeon   Patient said he did but was told they do not do that , to call his PCP

## 2020-10-02 DIAGNOSIS — E78.2 ELEVATED CHOLESTEROL WITH ELEVATED TRIGLYCERIDES: ICD-10-CM

## 2020-10-02 RX ORDER — ATORVASTATIN CALCIUM 10 MG/1
TABLET, FILM COATED ORAL
Qty: 30 TABLET | Refills: 0 | Status: SHIPPED | OUTPATIENT
Start: 2020-10-02 | End: 2020-12-05 | Stop reason: HOSPADM

## 2020-10-06 ENCOUNTER — OFFICE VISIT (OUTPATIENT)
Dept: FAMILY MEDICINE CLINIC | Facility: MEDICAL CENTER | Age: 50
End: 2020-10-06
Payer: COMMERCIAL

## 2020-10-06 VITALS
BODY MASS INDEX: 27.35 KG/M2 | WEIGHT: 218.8 LBS | TEMPERATURE: 97.2 F | RESPIRATION RATE: 16 BRPM | SYSTOLIC BLOOD PRESSURE: 144 MMHG | DIASTOLIC BLOOD PRESSURE: 88 MMHG | HEART RATE: 96 BPM

## 2020-10-06 DIAGNOSIS — E78.2 MIXED HYPERLIPIDEMIA: ICD-10-CM

## 2020-10-06 DIAGNOSIS — I10 ESSENTIAL HYPERTENSION: ICD-10-CM

## 2020-10-06 DIAGNOSIS — M65.9 TENOSYNOVITIS OF FINGER: Primary | ICD-10-CM

## 2020-10-06 PROCEDURE — 1111F DSCHRG MED/CURRENT MED MERGE: CPT | Performed by: FAMILY MEDICINE

## 2020-10-06 PROCEDURE — 99495 TRANSJ CARE MGMT MOD F2F 14D: CPT | Performed by: FAMILY MEDICINE

## 2020-10-06 RX ORDER — NABUMETONE 500 MG/1
500 TABLET, FILM COATED ORAL 2 TIMES DAILY PRN
Qty: 60 TABLET | Refills: 0 | Status: SHIPPED | OUTPATIENT
Start: 2020-10-06 | End: 2021-01-26

## 2020-10-06 NOTE — UTILIZATION REVIEW
Notification of Discharge  This is a Notification of Discharge from our facility [address]  Please be advised that this patient has been discharge from our facility  Below you will find the admission and discharge date and time including the patients disposition  PRESENTATION DATE: 9/25/2020 12:07 PM    IP ADMISSION DATE: 9/25/20 1321   DISCHARGE DATE: 9/28/2020 12:00 PM  DISPOSITION: Home/Self Care Home/Self Care   Admission Orders listed below:  Admission Orders (From admission, onward)     Ordered        09/25/20 1321  Inpatient Admission  Once                   Please contact the UR Department if additional information is required to close this patient's authorization/case  Michael Lopez  [facility] Utilization Review Department  Main: [phone] x carefully listen to the prompts  All voicemails are confidential   [email or web address]  Send all requests for admission clinical reviews, approved or denied determinations and any other requests to dedicated fax number below belonging to the campus where the patient is receiving treatment   List of dedicated fax numbers:  [address] DENIALS (Administrative/Medical Necessity) [phone]   [address] (Maternity/NICU/Pediatrics) [phone]   [facility] [phone]   [facility] [phone]   Fatemeh Lal [phone]   Linnea Jarquin [facility] [address] [facility] [phone]   [facility]  [phone]   [address] [facility]  [address] [facility] [address] [facility] [phone]

## 2020-10-10 DIAGNOSIS — I10 ESSENTIAL HYPERTENSION: ICD-10-CM

## 2020-10-10 RX ORDER — HYDROCHLOROTHIAZIDE 25 MG/1
TABLET ORAL
Qty: 30 TABLET | Refills: 0 | Status: SHIPPED | OUTPATIENT
Start: 2020-10-10 | End: 2020-11-06

## 2020-10-16 ENCOUNTER — OFFICE VISIT (OUTPATIENT)
Dept: OBGYN CLINIC | Facility: CLINIC | Age: 50
End: 2020-10-16

## 2020-10-16 ENCOUNTER — TELEPHONE (OUTPATIENT)
Dept: SLEEP CENTER | Facility: CLINIC | Age: 50
End: 2020-10-16

## 2020-10-16 VITALS
SYSTOLIC BLOOD PRESSURE: 149 MMHG | TEMPERATURE: 98.1 F | HEART RATE: 91 BPM | HEIGHT: 75 IN | DIASTOLIC BLOOD PRESSURE: 92 MMHG | BODY MASS INDEX: 26.98 KG/M2 | WEIGHT: 217 LBS

## 2020-10-16 DIAGNOSIS — M79.641 PAIN OF RIGHT HAND: Primary | ICD-10-CM

## 2020-10-16 PROCEDURE — 99024 POSTOP FOLLOW-UP VISIT: CPT | Performed by: ORTHOPAEDIC SURGERY

## 2020-10-16 RX ORDER — GABAPENTIN 100 MG/1
100 CAPSULE ORAL 3 TIMES DAILY
Qty: 90 CAPSULE | Refills: 0 | Status: SHIPPED | OUTPATIENT
Start: 2020-10-16 | End: 2020-11-12

## 2020-10-20 ENCOUNTER — TELEPHONE (OUTPATIENT)
Dept: NEUROLOGY | Facility: CLINIC | Age: 50
End: 2020-10-20

## 2020-11-03 DIAGNOSIS — M65.9 TENOSYNOVITIS OF FINGER: ICD-10-CM

## 2020-11-03 RX ORDER — NABUMETONE 500 MG/1
500 TABLET, FILM COATED ORAL 2 TIMES DAILY PRN
Qty: 60 TABLET | Refills: 0 | OUTPATIENT
Start: 2020-11-03 | End: 2020-12-03

## 2020-11-04 DIAGNOSIS — I10 ESSENTIAL HYPERTENSION: ICD-10-CM

## 2020-11-06 RX ORDER — HYDROCHLOROTHIAZIDE 25 MG/1
TABLET ORAL
Qty: 30 TABLET | Refills: 0 | Status: SHIPPED | OUTPATIENT
Start: 2020-11-06 | End: 2020-12-05 | Stop reason: HOSPADM

## 2020-11-06 RX ORDER — AMLODIPINE BESYLATE 5 MG/1
TABLET ORAL
Qty: 30 TABLET | Refills: 0 | Status: SHIPPED | OUTPATIENT
Start: 2020-11-06 | End: 2020-12-22

## 2020-11-08 DIAGNOSIS — I10 ESSENTIAL HYPERTENSION: ICD-10-CM

## 2020-11-09 RX ORDER — LISINOPRIL 20 MG/1
TABLET ORAL
Qty: 30 TABLET | Refills: 0 | Status: SHIPPED | OUTPATIENT
Start: 2020-11-09 | End: 2020-12-22

## 2020-11-12 DIAGNOSIS — M79.641 PAIN OF RIGHT HAND: ICD-10-CM

## 2020-11-12 RX ORDER — GABAPENTIN 100 MG/1
CAPSULE ORAL
Qty: 90 CAPSULE | Refills: 0 | Status: SHIPPED | OUTPATIENT
Start: 2020-11-12 | End: 2021-06-30

## 2020-11-23 ENCOUNTER — DOCUMENTATION (OUTPATIENT)
Dept: GASTROENTEROLOGY | Facility: CLINIC | Age: 50
End: 2020-11-23

## 2020-11-30 ENCOUNTER — APPOINTMENT (EMERGENCY)
Dept: ULTRASOUND IMAGING | Facility: HOSPITAL | Age: 50
DRG: 465 | End: 2020-11-30
Payer: COMMERCIAL

## 2020-11-30 ENCOUNTER — APPOINTMENT (EMERGENCY)
Dept: CT IMAGING | Facility: HOSPITAL | Age: 50
DRG: 465 | End: 2020-11-30
Payer: COMMERCIAL

## 2020-11-30 ENCOUNTER — TELEPHONE (OUTPATIENT)
Dept: FAMILY MEDICINE CLINIC | Facility: MEDICAL CENTER | Age: 50
End: 2020-11-30

## 2020-11-30 ENCOUNTER — HOSPITAL ENCOUNTER (INPATIENT)
Facility: HOSPITAL | Age: 50
LOS: 1 days | Discharge: LEFT BEFORE DISCHARGE | DRG: 465 | End: 2020-11-30
Attending: EMERGENCY MEDICINE | Admitting: INTERNAL MEDICINE
Payer: COMMERCIAL

## 2020-11-30 ENCOUNTER — APPOINTMENT (EMERGENCY)
Dept: RADIOLOGY | Facility: HOSPITAL | Age: 50
DRG: 465 | End: 2020-11-30
Payer: COMMERCIAL

## 2020-11-30 VITALS
OXYGEN SATURATION: 98 % | HEART RATE: 93 BPM | HEIGHT: 75 IN | SYSTOLIC BLOOD PRESSURE: 125 MMHG | RESPIRATION RATE: 18 BRPM | DIASTOLIC BLOOD PRESSURE: 73 MMHG | BODY MASS INDEX: 26.98 KG/M2 | WEIGHT: 217 LBS | TEMPERATURE: 98 F

## 2020-11-30 DIAGNOSIS — N20.1 RIGHT URETERAL STONE: ICD-10-CM

## 2020-11-30 DIAGNOSIS — R74.8 ELEVATED LIPASE: ICD-10-CM

## 2020-11-30 DIAGNOSIS — R74.01 TRANSAMINITIS: Primary | ICD-10-CM

## 2020-11-30 DIAGNOSIS — R50.9 FEVER, UNSPECIFIED FEVER CAUSE: Primary | ICD-10-CM

## 2020-11-30 PROBLEM — N13.2 URETERAL STONE WITH HYDRONEPHROSIS: Status: ACTIVE | Noted: 2020-11-30

## 2020-11-30 PROBLEM — E87.1 HYPONATREMIA: Status: ACTIVE | Noted: 2020-11-30

## 2020-11-30 PROBLEM — Z72.0 TOBACCO ABUSE: Status: ACTIVE | Noted: 2020-11-30

## 2020-11-30 LAB
ALBUMIN SERPL BCP-MCNC: 3 G/DL (ref 3.5–5)
ALP SERPL-CCNC: 172 U/L (ref 46–116)
ALT SERPL W P-5'-P-CCNC: 3590 U/L (ref 12–78)
AMMONIA PLAS-SCNC: 32 UMOL/L (ref 11–35)
ANION GAP SERPL CALCULATED.3IONS-SCNC: 5 MMOL/L (ref 4–13)
APAP SERPL-MCNC: <2 UG/ML (ref 10–20)
APTT PPP: 30 SECONDS (ref 23–37)
AST SERPL W P-5'-P-CCNC: 1704 U/L (ref 5–45)
BACTERIA UR QL AUTO: ABNORMAL /HPF
BASOPHILS # BLD MANUAL: 0.05 THOUSAND/UL (ref 0–0.1)
BASOPHILS NFR MAR MANUAL: 1 % (ref 0–1)
BILIRUB DIRECT SERPL-MCNC: 6.09 MG/DL (ref 0–0.2)
BILIRUB SERPL-MCNC: 8.8 MG/DL (ref 0.2–1)
BILIRUB UR QL STRIP: ABNORMAL
BUN SERPL-MCNC: 11 MG/DL (ref 5–25)
CALCIUM ALBUM COR SERPL-MCNC: 9.3 MG/DL (ref 8.3–10.1)
CALCIUM SERPL-MCNC: 8.5 MG/DL (ref 8.3–10.1)
CHLORIDE SERPL-SCNC: 98 MMOL/L (ref 100–108)
CK SERPL-CCNC: 104 U/L (ref 39–308)
CLARITY UR: CLEAR
CO2 SERPL-SCNC: 29 MMOL/L (ref 21–32)
COLOR UR: ABNORMAL
CREAT SERPL-MCNC: 0.75 MG/DL (ref 0.6–1.3)
EOSINOPHIL # BLD MANUAL: 0.05 THOUSAND/UL (ref 0–0.4)
EOSINOPHIL NFR BLD MANUAL: 1 % (ref 0–6)
ERYTHROCYTE [DISTWIDTH] IN BLOOD BY AUTOMATED COUNT: 14.1 % (ref 11.6–15.1)
GFR SERPL CREATININE-BSD FRML MDRD: 107 ML/MIN/1.73SQ M
GLUCOSE SERPL-MCNC: 100 MG/DL (ref 65–140)
GLUCOSE UR STRIP-MCNC: NEGATIVE MG/DL
HCT VFR BLD AUTO: 50.6 % (ref 36.5–49.3)
HGB BLD-MCNC: 16.3 G/DL (ref 12–17)
HGB UR QL STRIP.AUTO: ABNORMAL
INR PPP: 1.44 (ref 0.84–1.19)
KETONES UR STRIP-MCNC: NEGATIVE MG/DL
LACTATE SERPL-SCNC: 1.4 MMOL/L (ref 0.5–2)
LEUKOCYTE ESTERASE UR QL STRIP: NEGATIVE
LIPASE SERPL-CCNC: 608 U/L (ref 73–393)
LYMPHOCYTES # BLD AUTO: 0.87 THOUSAND/UL (ref 0.6–4.47)
LYMPHOCYTES # BLD AUTO: 17 % (ref 14–44)
MAGNESIUM SERPL-MCNC: 2.1 MG/DL (ref 1.6–2.6)
MCH RBC QN AUTO: 30.2 PG (ref 26.8–34.3)
MCHC RBC AUTO-ENTMCNC: 32.2 G/DL (ref 31.4–37.4)
MCV RBC AUTO: 94 FL (ref 82–98)
MONOCYTES # BLD AUTO: 0.46 THOUSAND/UL (ref 0–1.22)
MONOCYTES NFR BLD: 9 % (ref 4–12)
MUCOUS THREADS UR QL AUTO: ABNORMAL
NEUTROPHILS # BLD MANUAL: 3.56 THOUSAND/UL (ref 1.85–7.62)
NEUTS BAND NFR BLD MANUAL: 1 % (ref 0–8)
NEUTS SEG NFR BLD AUTO: 69 % (ref 43–75)
NITRITE UR QL STRIP: NEGATIVE
NON-SQ EPI CELLS URNS QL MICRO: ABNORMAL /HPF
NRBC BLD AUTO-RTO: 0 /100 WBCS
PH UR STRIP.AUTO: 6.5 [PH]
PLATELET # BLD AUTO: 248 THOUSANDS/UL (ref 149–390)
PLATELET BLD QL SMEAR: ADEQUATE
PMV BLD AUTO: 10.7 FL (ref 8.9–12.7)
POTASSIUM SERPL-SCNC: 4.2 MMOL/L (ref 3.5–5.3)
PROCALCITONIN SERPL-MCNC: 0.57 NG/ML
PROT SERPL-MCNC: 8 G/DL (ref 6.4–8.2)
PROT UR STRIP-MCNC: NEGATIVE MG/DL
PROTHROMBIN TIME: 16.9 SECONDS (ref 11.6–14.5)
RBC # BLD AUTO: 5.39 MILLION/UL (ref 3.88–5.62)
RBC #/AREA URNS AUTO: ABNORMAL /HPF
SODIUM SERPL-SCNC: 132 MMOL/L (ref 136–145)
SP GR UR STRIP.AUTO: 1.01 (ref 1–1.03)
TOTAL CELLS COUNTED SPEC: 100
TROPONIN I SERPL-MCNC: <0.02 NG/ML
TROPONIN I SERPL-MCNC: <0.02 NG/ML
UROBILINOGEN UR QL STRIP.AUTO: 1 E.U./DL
VARIANT LYMPHS # BLD AUTO: 2 %
WBC # BLD AUTO: 5.09 THOUSAND/UL (ref 4.31–10.16)
WBC #/AREA URNS AUTO: ABNORMAL /HPF

## 2020-11-30 PROCEDURE — 76705 ECHO EXAM OF ABDOMEN: CPT

## 2020-11-30 PROCEDURE — 86663 EPSTEIN-BARR ANTIBODY: CPT | Performed by: PHYSICIAN ASSISTANT

## 2020-11-30 PROCEDURE — 96374 THER/PROPH/DIAG INJ IV PUSH: CPT

## 2020-11-30 PROCEDURE — 80074 ACUTE HEPATITIS PANEL: CPT | Performed by: PHYSICIAN ASSISTANT

## 2020-11-30 PROCEDURE — 80329 ANALGESICS NON-OPIOID 1 OR 2: CPT | Performed by: PHYSICIAN ASSISTANT

## 2020-11-30 PROCEDURE — 85730 THROMBOPLASTIN TIME PARTIAL: CPT | Performed by: PHYSICIAN ASSISTANT

## 2020-11-30 PROCEDURE — 85027 COMPLETE CBC AUTOMATED: CPT | Performed by: PHYSICIAN ASSISTANT

## 2020-11-30 PROCEDURE — 84484 ASSAY OF TROPONIN QUANT: CPT | Performed by: PHYSICIAN ASSISTANT

## 2020-11-30 PROCEDURE — 83735 ASSAY OF MAGNESIUM: CPT | Performed by: PHYSICIAN ASSISTANT

## 2020-11-30 PROCEDURE — 99222 1ST HOSP IP/OBS MODERATE 55: CPT | Performed by: PHYSICIAN ASSISTANT

## 2020-11-30 PROCEDURE — 85007 BL SMEAR W/DIFF WBC COUNT: CPT | Performed by: PHYSICIAN ASSISTANT

## 2020-11-30 PROCEDURE — 99285 EMERGENCY DEPT VISIT HI MDM: CPT

## 2020-11-30 PROCEDURE — 80053 COMPREHEN METABOLIC PANEL: CPT | Performed by: PHYSICIAN ASSISTANT

## 2020-11-30 PROCEDURE — 74177 CT ABD & PELVIS W/CONTRAST: CPT

## 2020-11-30 PROCEDURE — 82140 ASSAY OF AMMONIA: CPT | Performed by: PHYSICIAN ASSISTANT

## 2020-11-30 PROCEDURE — 99285 EMERGENCY DEPT VISIT HI MDM: CPT | Performed by: PHYSICIAN ASSISTANT

## 2020-11-30 PROCEDURE — 71045 X-RAY EXAM CHEST 1 VIEW: CPT

## 2020-11-30 PROCEDURE — 86645 CMV ANTIBODY IGM: CPT | Performed by: PHYSICIAN ASSISTANT

## 2020-11-30 PROCEDURE — 82248 BILIRUBIN DIRECT: CPT | Performed by: PHYSICIAN ASSISTANT

## 2020-11-30 PROCEDURE — 84145 PROCALCITONIN (PCT): CPT | Performed by: PHYSICIAN ASSISTANT

## 2020-11-30 PROCEDURE — 93005 ELECTROCARDIOGRAM TRACING: CPT

## 2020-11-30 PROCEDURE — 83690 ASSAY OF LIPASE: CPT | Performed by: PHYSICIAN ASSISTANT

## 2020-11-30 PROCEDURE — 36415 COLL VENOUS BLD VENIPUNCTURE: CPT | Performed by: PHYSICIAN ASSISTANT

## 2020-11-30 PROCEDURE — 81001 URINALYSIS AUTO W/SCOPE: CPT | Performed by: PHYSICIAN ASSISTANT

## 2020-11-30 PROCEDURE — 86644 CMV ANTIBODY: CPT | Performed by: PHYSICIAN ASSISTANT

## 2020-11-30 PROCEDURE — 86665 EPSTEIN-BARR CAPSID VCA: CPT | Performed by: PHYSICIAN ASSISTANT

## 2020-11-30 PROCEDURE — 83605 ASSAY OF LACTIC ACID: CPT | Performed by: PHYSICIAN ASSISTANT

## 2020-11-30 PROCEDURE — 85610 PROTHROMBIN TIME: CPT | Performed by: PHYSICIAN ASSISTANT

## 2020-11-30 PROCEDURE — G1004 CDSM NDSC: HCPCS

## 2020-11-30 PROCEDURE — 87040 BLOOD CULTURE FOR BACTERIA: CPT | Performed by: PHYSICIAN ASSISTANT

## 2020-11-30 PROCEDURE — 82550 ASSAY OF CK (CPK): CPT | Performed by: PHYSICIAN ASSISTANT

## 2020-11-30 PROCEDURE — 86664 EPSTEIN-BARR NUCLEAR ANTIGEN: CPT | Performed by: PHYSICIAN ASSISTANT

## 2020-11-30 PROCEDURE — 96361 HYDRATE IV INFUSION ADD-ON: CPT

## 2020-11-30 RX ORDER — PANTOPRAZOLE SODIUM 40 MG/1
40 TABLET, DELAYED RELEASE ORAL
Status: DISCONTINUED | OUTPATIENT
Start: 2020-12-01 | End: 2020-11-30 | Stop reason: HOSPADM

## 2020-11-30 RX ORDER — LISINOPRIL 10 MG/1
20 TABLET ORAL DAILY
Status: CANCELLED | OUTPATIENT
Start: 2020-12-01

## 2020-11-30 RX ORDER — TAMSULOSIN HYDROCHLORIDE 0.4 MG/1
0.4 CAPSULE ORAL
Status: DISCONTINUED | OUTPATIENT
Start: 2020-11-30 | End: 2020-11-30 | Stop reason: HOSPADM

## 2020-11-30 RX ORDER — ONDANSETRON 2 MG/ML
4 INJECTION INTRAMUSCULAR; INTRAVENOUS ONCE
Status: COMPLETED | OUTPATIENT
Start: 2020-11-30 | End: 2020-11-30

## 2020-11-30 RX ORDER — SODIUM CHLORIDE 9 MG/ML
100 INJECTION, SOLUTION INTRAVENOUS CONTINUOUS
Status: DISCONTINUED | OUTPATIENT
Start: 2020-11-30 | End: 2020-11-30 | Stop reason: HOSPADM

## 2020-11-30 RX ORDER — ATORVASTATIN CALCIUM 10 MG/1
10 TABLET, FILM COATED ORAL DAILY
Status: CANCELLED | OUTPATIENT
Start: 2020-12-01

## 2020-11-30 RX ORDER — AMLODIPINE BESYLATE 5 MG/1
5 TABLET ORAL DAILY
Status: DISCONTINUED | OUTPATIENT
Start: 2020-12-01 | End: 2020-11-30 | Stop reason: HOSPADM

## 2020-11-30 RX ORDER — DOCUSATE SODIUM 100 MG/1
100 CAPSULE, LIQUID FILLED ORAL 2 TIMES DAILY
Status: DISCONTINUED | OUTPATIENT
Start: 2020-11-30 | End: 2020-11-30 | Stop reason: HOSPADM

## 2020-11-30 RX ORDER — ONDANSETRON 2 MG/ML
4 INJECTION INTRAMUSCULAR; INTRAVENOUS EVERY 6 HOURS PRN
Status: DISCONTINUED | OUTPATIENT
Start: 2020-11-30 | End: 2020-11-30 | Stop reason: HOSPADM

## 2020-11-30 RX ORDER — GABAPENTIN 100 MG/1
100 CAPSULE ORAL 3 TIMES DAILY
Status: DISCONTINUED | OUTPATIENT
Start: 2020-11-30 | End: 2020-11-30 | Stop reason: HOSPADM

## 2020-11-30 RX ADMIN — ONDANSETRON 4 MG: 2 INJECTION INTRAMUSCULAR; INTRAVENOUS at 15:34

## 2020-11-30 RX ADMIN — DOCUSATE SODIUM 100 MG: 100 CAPSULE, LIQUID FILLED ORAL at 20:18

## 2020-11-30 RX ADMIN — IOHEXOL 100 ML: 350 INJECTION, SOLUTION INTRAVENOUS at 16:49

## 2020-11-30 RX ADMIN — TAMSULOSIN HYDROCHLORIDE 0.4 MG: 0.4 CAPSULE ORAL at 19:58

## 2020-11-30 RX ADMIN — SODIUM CHLORIDE 1000 ML: 0.9 INJECTION, SOLUTION INTRAVENOUS at 15:35

## 2020-11-30 RX ADMIN — GABAPENTIN 100 MG: 100 CAPSULE ORAL at 20:18

## 2020-11-30 RX ADMIN — SODIUM CHLORIDE 100 ML/HR: 0.9 INJECTION, SOLUTION INTRAVENOUS at 19:32

## 2020-12-01 LAB
HAV IGM SER QL: REACTIVE
HBV CORE IGM SER QL: ABNORMAL
HBV SURFACE AG SER QL: ABNORMAL
HCV AB SER QL: ABNORMAL

## 2020-12-02 ENCOUNTER — TELEPHONE (OUTPATIENT)
Dept: FAMILY MEDICINE CLINIC | Facility: MEDICAL CENTER | Age: 50
End: 2020-12-02

## 2020-12-02 LAB
CMV IGG SERPL IA-ACNC: 1.6 U/ML (ref 0–0.59)
CMV IGM SERPL IA-ACNC: <30 AU/ML (ref 0–29.9)
EBV NA IGG SER IA-ACNC: 274 U/ML (ref 0–17.9)
EBV VCA IGG SER IA-ACNC: >600 U/ML (ref 0–17.9)
EBV VCA IGM SER IA-ACNC: <36 U/ML (ref 0–35.9)
INTERPRETATION: ABNORMAL

## 2020-12-03 ENCOUNTER — APPOINTMENT (INPATIENT)
Dept: ULTRASOUND IMAGING | Facility: HOSPITAL | Age: 50
DRG: 283 | End: 2020-12-03
Payer: COMMERCIAL

## 2020-12-03 ENCOUNTER — HOSPITAL ENCOUNTER (INPATIENT)
Facility: HOSPITAL | Age: 50
LOS: 2 days | Discharge: HOME | DRG: 283 | End: 2020-12-05
Attending: EMERGENCY MEDICINE | Admitting: INTERNAL MEDICINE
Payer: COMMERCIAL

## 2020-12-03 DIAGNOSIS — R17 JAUNDICE: ICD-10-CM

## 2020-12-03 DIAGNOSIS — R74.01 TRANSAMINITIS: Primary | ICD-10-CM

## 2020-12-03 DIAGNOSIS — N13.2 URETERAL STONE WITH HYDRONEPHROSIS: ICD-10-CM

## 2020-12-03 PROBLEM — N39.0 UTI (URINARY TRACT INFECTION): Status: ACTIVE | Noted: 2020-12-03

## 2020-12-03 PROBLEM — R10.9 RIGHT SIDED ABDOMINAL PAIN: Status: ACTIVE | Noted: 2020-12-03

## 2020-12-03 LAB
ALBUMIN SERPL BCP-MCNC: 2.7 G/DL (ref 3.5–5)
ALP SERPL-CCNC: 150 U/L (ref 46–116)
ALT SERPL W P-5'-P-CCNC: 3470 U/L (ref 12–78)
ANION GAP SERPL CALCULATED.3IONS-SCNC: 7 MMOL/L (ref 4–13)
ANISOCYTOSIS BLD QL SMEAR: PRESENT
AST SERPL W P-5'-P-CCNC: 1438 U/L (ref 5–45)
ATRIAL RATE: 104 BPM
BACTERIA UR QL AUTO: ABNORMAL /HPF
BASOPHILS # BLD MANUAL: 0 THOUSAND/UL (ref 0–0.1)
BASOPHILS NFR MAR MANUAL: 0 % (ref 0–1)
BILIRUB SERPL-MCNC: 10.09 MG/DL (ref 0.2–1)
BILIRUB UR QL STRIP: ABNORMAL
BUN SERPL-MCNC: 6 MG/DL (ref 5–25)
CALCIUM ALBUM COR SERPL-MCNC: 9.6 MG/DL (ref 8.3–10.1)
CALCIUM SERPL-MCNC: 8.6 MG/DL (ref 8.3–10.1)
CHLORIDE SERPL-SCNC: 99 MMOL/L (ref 100–108)
CLARITY UR: ABNORMAL
CO2 SERPL-SCNC: 30 MMOL/L (ref 21–32)
COLOR UR: ABNORMAL
CREAT SERPL-MCNC: 0.79 MG/DL (ref 0.6–1.3)
EOSINOPHIL # BLD MANUAL: 0 THOUSAND/UL (ref 0–0.4)
EOSINOPHIL NFR BLD MANUAL: 0 % (ref 0–6)
ERYTHROCYTE [DISTWIDTH] IN BLOOD BY AUTOMATED COUNT: 14.7 % (ref 11.6–15.1)
FLUAV RNA RESP QL NAA+PROBE: NEGATIVE
FLUBV RNA RESP QL NAA+PROBE: NEGATIVE
GFR SERPL CREATININE-BSD FRML MDRD: 105 ML/MIN/1.73SQ M
GLUCOSE SERPL-MCNC: 94 MG/DL (ref 65–140)
GLUCOSE UR STRIP-MCNC: ABNORMAL MG/DL
HCT VFR BLD AUTO: 47.4 % (ref 36.5–49.3)
HGB BLD-MCNC: 14.8 G/DL (ref 12–17)
HGB UR QL STRIP.AUTO: ABNORMAL
KETONES UR STRIP-MCNC: ABNORMAL MG/DL
LEUKOCYTE ESTERASE UR QL STRIP: NEGATIVE
LYMPHOCYTES # BLD AUTO: 2.92 THOUSAND/UL (ref 0.6–4.47)
LYMPHOCYTES # BLD AUTO: 43 % (ref 14–44)
MCH RBC QN AUTO: 29.7 PG (ref 26.8–34.3)
MCHC RBC AUTO-ENTMCNC: 31.2 G/DL (ref 31.4–37.4)
MCV RBC AUTO: 95 FL (ref 82–98)
MONOCYTES # BLD AUTO: 0.88 THOUSAND/UL (ref 0–1.22)
MONOCYTES NFR BLD: 13 % (ref 4–12)
MUCOUS THREADS UR QL AUTO: ABNORMAL
NEUTROPHILS # BLD MANUAL: 2.99 THOUSAND/UL (ref 1.85–7.62)
NEUTS BAND NFR BLD MANUAL: 3 % (ref 0–8)
NEUTS SEG NFR BLD AUTO: 41 % (ref 43–75)
NITRITE UR QL STRIP: NEGATIVE
NON-SQ EPI CELLS URNS QL MICRO: ABNORMAL /HPF
NRBC BLD AUTO-RTO: 0 /100 WBCS
P AXIS: 11 DEGREES
PH UR STRIP.AUTO: 6.5 [PH] (ref 4.5–8)
PLATELET # BLD AUTO: 305 THOUSANDS/UL (ref 149–390)
PLATELET BLD QL SMEAR: ADEQUATE
PMV BLD AUTO: 10.8 FL (ref 8.9–12.7)
POTASSIUM SERPL-SCNC: 3.8 MMOL/L (ref 3.5–5.3)
PR INTERVAL: 114 MS
PROT SERPL-MCNC: 7.9 G/DL (ref 6.4–8.2)
PROT UR STRIP-MCNC: ABNORMAL MG/DL
QRS AXIS: 54 DEGREES
QRSD INTERVAL: 90 MS
QT INTERVAL: 320 MS
QTC INTERVAL: 420 MS
RBC # BLD AUTO: 4.99 MILLION/UL (ref 3.88–5.62)
RBC #/AREA URNS AUTO: ABNORMAL /HPF
RSV RNA RESP QL NAA+PROBE: NEGATIVE
SARS-COV-2 RNA RESP QL NAA+PROBE: NEGATIVE
SODIUM SERPL-SCNC: 136 MMOL/L (ref 136–145)
SP GR UR STRIP.AUTO: >=1.03 (ref 1–1.03)
T WAVE AXIS: -2 DEGREES
TOTAL CELLS COUNTED SPEC: 100
UROBILINOGEN UR QL STRIP.AUTO: 1 E.U./DL
VENTRICULAR RATE: 104 BPM
WBC # BLD AUTO: 6.8 THOUSAND/UL (ref 4.31–10.16)
WBC #/AREA URNS AUTO: ABNORMAL /HPF

## 2020-12-03 PROCEDURE — 80074 ACUTE HEPATITIS PANEL: CPT | Performed by: EMERGENCY MEDICINE

## 2020-12-03 PROCEDURE — 93010 ELECTROCARDIOGRAM REPORT: CPT | Performed by: INTERNAL MEDICINE

## 2020-12-03 PROCEDURE — 80053 COMPREHEN METABOLIC PANEL: CPT | Performed by: EMERGENCY MEDICINE

## 2020-12-03 PROCEDURE — 36415 COLL VENOUS BLD VENIPUNCTURE: CPT | Performed by: EMERGENCY MEDICINE

## 2020-12-03 PROCEDURE — 87086 URINE CULTURE/COLONY COUNT: CPT

## 2020-12-03 PROCEDURE — 99284 EMERGENCY DEPT VISIT MOD MDM: CPT

## 2020-12-03 PROCEDURE — 85027 COMPLETE CBC AUTOMATED: CPT | Performed by: EMERGENCY MEDICINE

## 2020-12-03 PROCEDURE — 99285 EMERGENCY DEPT VISIT HI MDM: CPT | Performed by: EMERGENCY MEDICINE

## 2020-12-03 PROCEDURE — 99223 1ST HOSP IP/OBS HIGH 75: CPT | Performed by: INTERNAL MEDICINE

## 2020-12-03 PROCEDURE — 76770 US EXAM ABDO BACK WALL COMP: CPT

## 2020-12-03 PROCEDURE — 85007 BL SMEAR W/DIFF WBC COUNT: CPT | Performed by: EMERGENCY MEDICINE

## 2020-12-03 PROCEDURE — 81001 URINALYSIS AUTO W/SCOPE: CPT

## 2020-12-03 PROCEDURE — 0241U HB NFCT DS VIR RESP RNA 4 TRGT: CPT | Performed by: INTERNAL MEDICINE

## 2020-12-03 RX ORDER — NICOTINE 21 MG/24HR
1 PATCH, TRANSDERMAL 24 HOURS TRANSDERMAL DAILY
Status: DISCONTINUED | OUTPATIENT
Start: 2020-12-04 | End: 2020-12-05 | Stop reason: HOSPADM

## 2020-12-03 RX ORDER — LISINOPRIL 20 MG/1
20 TABLET ORAL DAILY
Status: DISCONTINUED | OUTPATIENT
Start: 2020-12-04 | End: 2020-12-05 | Stop reason: HOSPADM

## 2020-12-03 RX ORDER — IBUPROFEN 400 MG/1
400 TABLET ORAL EVERY 8 HOURS PRN
Status: DISCONTINUED | OUTPATIENT
Start: 2020-12-03 | End: 2020-12-05 | Stop reason: HOSPADM

## 2020-12-03 RX ORDER — HYDROCHLOROTHIAZIDE 25 MG/1
25 TABLET ORAL DAILY
Status: DISCONTINUED | OUTPATIENT
Start: 2020-12-04 | End: 2020-12-05 | Stop reason: HOSPADM

## 2020-12-03 RX ORDER — AMLODIPINE BESYLATE 5 MG/1
5 TABLET ORAL DAILY
Status: DISCONTINUED | OUTPATIENT
Start: 2020-12-04 | End: 2020-12-05 | Stop reason: HOSPADM

## 2020-12-03 RX ORDER — GABAPENTIN 100 MG/1
100 CAPSULE ORAL 3 TIMES DAILY
Status: DISCONTINUED | OUTPATIENT
Start: 2020-12-03 | End: 2020-12-05 | Stop reason: HOSPADM

## 2020-12-03 RX ORDER — ONDANSETRON 2 MG/ML
4 INJECTION INTRAMUSCULAR; INTRAVENOUS EVERY 8 HOURS PRN
Status: DISCONTINUED | OUTPATIENT
Start: 2020-12-03 | End: 2020-12-05 | Stop reason: HOSPADM

## 2020-12-03 RX ORDER — AMOXICILLIN 250 MG
1 CAPSULE ORAL
Status: DISCONTINUED | OUTPATIENT
Start: 2020-12-03 | End: 2020-12-05 | Stop reason: HOSPADM

## 2020-12-03 RX ORDER — SODIUM CHLORIDE 9 MG/ML
125 INJECTION, SOLUTION INTRAVENOUS CONTINUOUS
Status: DISCONTINUED | OUTPATIENT
Start: 2020-12-03 | End: 2020-12-05 | Stop reason: HOSPADM

## 2020-12-03 RX ADMIN — SODIUM CHLORIDE 1000 ML: 0.9 INJECTION, SOLUTION INTRAVENOUS at 18:57

## 2020-12-03 RX ADMIN — ONDANSETRON 4 MG: 2 INJECTION INTRAMUSCULAR; INTRAVENOUS at 21:17

## 2020-12-03 RX ADMIN — SODIUM CHLORIDE 125 ML/HR: 0.9 INJECTION, SOLUTION INTRAVENOUS at 22:12

## 2020-12-03 RX ADMIN — GABAPENTIN 100 MG: 100 CAPSULE ORAL at 22:11

## 2020-12-03 RX ADMIN — DOCUSATE SODIUM AND SENNOSIDES 1 TABLET: 8.6; 5 TABLET ORAL at 22:11

## 2020-12-03 RX ADMIN — CEFTRIAXONE 1000 MG: 10 INJECTION, POWDER, FOR SOLUTION INTRAVENOUS at 22:11

## 2020-12-04 ENCOUNTER — APPOINTMENT (INPATIENT)
Dept: MRI IMAGING | Facility: HOSPITAL | Age: 50
DRG: 283 | End: 2020-12-04
Payer: COMMERCIAL

## 2020-12-04 LAB
ALBUMIN SERPL BCP-MCNC: 2.3 G/DL (ref 3.5–5)
ALP SERPL-CCNC: 131 U/L (ref 46–116)
ALT SERPL W P-5'-P-CCNC: 2932 U/L (ref 12–78)
ANION GAP SERPL CALCULATED.3IONS-SCNC: 5 MMOL/L (ref 4–13)
AST SERPL W P-5'-P-CCNC: 884 U/L (ref 5–45)
BACTERIA UR CULT: NORMAL
BILIRUB SERPL-MCNC: 7.97 MG/DL (ref 0.2–1)
BUN SERPL-MCNC: 6 MG/DL (ref 5–25)
CALCIUM ALBUM COR SERPL-MCNC: 9.6 MG/DL (ref 8.3–10.1)
CALCIUM SERPL-MCNC: 8.2 MG/DL (ref 8.3–10.1)
CHLORIDE SERPL-SCNC: 104 MMOL/L (ref 100–108)
CO2 SERPL-SCNC: 28 MMOL/L (ref 21–32)
CREAT SERPL-MCNC: 0.7 MG/DL (ref 0.6–1.3)
ERYTHROCYTE [DISTWIDTH] IN BLOOD BY AUTOMATED COUNT: 14.8 % (ref 11.6–15.1)
FERRITIN SERPL-MCNC: 1768 NG/ML (ref 8–388)
GFR SERPL CREATININE-BSD FRML MDRD: 110 ML/MIN/1.73SQ M
GLUCOSE SERPL-MCNC: 90 MG/DL (ref 65–140)
HAV IGM SER QL: REACTIVE
HBV CORE IGM SER QL: ABNORMAL
HBV SURFACE AG SER QL: ABNORMAL
HCT VFR BLD AUTO: 41.1 % (ref 36.5–49.3)
HCV AB SER QL: ABNORMAL
HGB BLD-MCNC: 13.1 G/DL (ref 12–17)
INR PPP: 1.21 (ref 0.84–1.19)
IRON SATN MFR SERPL: 41 %
IRON SERPL-MCNC: 99 UG/DL (ref 65–175)
MCH RBC QN AUTO: 30.3 PG (ref 26.8–34.3)
MCHC RBC AUTO-ENTMCNC: 31.9 G/DL (ref 31.4–37.4)
MCV RBC AUTO: 95 FL (ref 82–98)
PLATELET # BLD AUTO: 274 THOUSANDS/UL (ref 149–390)
PLATELET # BLD AUTO: 275 THOUSANDS/UL (ref 149–390)
PMV BLD AUTO: 10.2 FL (ref 8.9–12.7)
PMV BLD AUTO: 10.4 FL (ref 8.9–12.7)
POTASSIUM SERPL-SCNC: 3.7 MMOL/L (ref 3.5–5.3)
PROCALCITONIN SERPL-MCNC: 0.28 NG/ML
PROT SERPL-MCNC: 6.5 G/DL (ref 6.4–8.2)
PROTHROMBIN TIME: 15.4 SECONDS (ref 11.6–14.5)
RBC # BLD AUTO: 4.32 MILLION/UL (ref 3.88–5.62)
SODIUM SERPL-SCNC: 137 MMOL/L (ref 136–145)
TIBC SERPL-MCNC: 244 UG/DL (ref 250–450)
TSH SERPL DL<=0.05 MIU/L-ACNC: 1.34 UIU/ML (ref 0.36–3.74)
WBC # BLD AUTO: 6.99 THOUSAND/UL (ref 4.31–10.16)

## 2020-12-04 PROCEDURE — 86038 ANTINUCLEAR ANTIBODIES: CPT | Performed by: PHYSICIAN ASSISTANT

## 2020-12-04 PROCEDURE — 85027 COMPLETE CBC AUTOMATED: CPT | Performed by: INTERNAL MEDICINE

## 2020-12-04 PROCEDURE — 86235 NUCLEAR ANTIGEN ANTIBODY: CPT | Performed by: PHYSICIAN ASSISTANT

## 2020-12-04 PROCEDURE — 83550 IRON BINDING TEST: CPT | Performed by: PHYSICIAN ASSISTANT

## 2020-12-04 PROCEDURE — G1004 CDSM NDSC: HCPCS

## 2020-12-04 PROCEDURE — 99232 SBSQ HOSP IP/OBS MODERATE 35: CPT | Performed by: INTERNAL MEDICINE

## 2020-12-04 PROCEDURE — 85049 AUTOMATED PLATELET COUNT: CPT | Performed by: INTERNAL MEDICINE

## 2020-12-04 PROCEDURE — 84145 PROCALCITONIN (PCT): CPT | Performed by: INTERNAL MEDICINE

## 2020-12-04 PROCEDURE — 82728 ASSAY OF FERRITIN: CPT | Performed by: PHYSICIAN ASSISTANT

## 2020-12-04 PROCEDURE — 86256 FLUORESCENT ANTIBODY TITER: CPT | Performed by: PHYSICIAN ASSISTANT

## 2020-12-04 PROCEDURE — 86376 MICROSOMAL ANTIBODY EACH: CPT | Performed by: PHYSICIAN ASSISTANT

## 2020-12-04 PROCEDURE — 84443 ASSAY THYROID STIM HORMONE: CPT | Performed by: PHYSICIAN ASSISTANT

## 2020-12-04 PROCEDURE — 83540 ASSAY OF IRON: CPT | Performed by: PHYSICIAN ASSISTANT

## 2020-12-04 PROCEDURE — 85610 PROTHROMBIN TIME: CPT | Performed by: INTERNAL MEDICINE

## 2020-12-04 PROCEDURE — 82390 ASSAY OF CERULOPLASMIN: CPT | Performed by: PHYSICIAN ASSISTANT

## 2020-12-04 PROCEDURE — 74181 MRI ABDOMEN W/O CONTRAST: CPT

## 2020-12-04 PROCEDURE — 76377 3D RENDER W/INTRP POSTPROCES: CPT

## 2020-12-04 PROCEDURE — 80053 COMPREHEN METABOLIC PANEL: CPT | Performed by: INTERNAL MEDICINE

## 2020-12-04 PROCEDURE — 99255 IP/OBS CONSLTJ NEW/EST HI 80: CPT | Performed by: INTERNAL MEDICINE

## 2020-12-04 RX ORDER — MAGNESIUM HYDROXIDE 1200 MG/15ML
1000 LIQUID ORAL ONCE
Status: DISCONTINUED | OUTPATIENT
Start: 2020-12-04 | End: 2020-12-04

## 2020-12-04 RX ORDER — KETOROLAC TROMETHAMINE 30 MG/ML
15 INJECTION, SOLUTION INTRAMUSCULAR; INTRAVENOUS ONCE
Status: CANCELLED | OUTPATIENT
Start: 2020-12-04 | End: 2020-12-09

## 2020-12-04 RX ORDER — POLYETHYLENE GLYCOL 3350 17 G/17G
17 POWDER, FOR SOLUTION ORAL DAILY PRN
Status: DISCONTINUED | OUTPATIENT
Start: 2020-12-04 | End: 2020-12-05 | Stop reason: HOSPADM

## 2020-12-04 RX ADMIN — LISINOPRIL 20 MG: 20 TABLET ORAL at 09:51

## 2020-12-04 RX ADMIN — AMLODIPINE BESYLATE 5 MG: 5 TABLET ORAL at 09:52

## 2020-12-04 RX ADMIN — FAMOTIDINE 20 MG: 10 INJECTION INTRAVENOUS at 21:45

## 2020-12-04 RX ADMIN — GABAPENTIN 100 MG: 100 CAPSULE ORAL at 16:49

## 2020-12-04 RX ADMIN — ONDANSETRON 4 MG: 2 INJECTION INTRAMUSCULAR; INTRAVENOUS at 16:49

## 2020-12-04 RX ADMIN — CEFTRIAXONE 1000 MG: 10 INJECTION, POWDER, FOR SOLUTION INTRAVENOUS at 21:45

## 2020-12-04 RX ADMIN — DOCUSATE SODIUM AND SENNOSIDES 1 TABLET: 8.6; 5 TABLET ORAL at 21:45

## 2020-12-04 RX ADMIN — POLYETHYLENE GLYCOL 3350 17 G: 17 POWDER, FOR SOLUTION ORAL at 15:15

## 2020-12-04 RX ADMIN — GABAPENTIN 100 MG: 100 CAPSULE ORAL at 09:51

## 2020-12-04 RX ADMIN — HYDROCHLOROTHIAZIDE 25 MG: 25 TABLET ORAL at 09:51

## 2020-12-04 RX ADMIN — ENOXAPARIN SODIUM 40 MG: 40 INJECTION SUBCUTANEOUS at 09:52

## 2020-12-04 RX ADMIN — SODIUM CHLORIDE 125 ML/HR: 0.9 INJECTION, SOLUTION INTRAVENOUS at 11:04

## 2020-12-04 RX ADMIN — IBUPROFEN 400 MG: 400 TABLET ORAL at 16:49

## 2020-12-04 RX ADMIN — SODIUM CHLORIDE 500 ML: 0.9 INJECTION, SOLUTION INTRAVENOUS at 16:47

## 2020-12-04 RX ADMIN — FAMOTIDINE 20 MG: 10 INJECTION INTRAVENOUS at 04:06

## 2020-12-04 RX ADMIN — FAMOTIDINE 20 MG: 10 INJECTION INTRAVENOUS at 09:52

## 2020-12-05 VITALS
RESPIRATION RATE: 16 BRPM | HEIGHT: 75 IN | DIASTOLIC BLOOD PRESSURE: 57 MMHG | WEIGHT: 220 LBS | HEART RATE: 75 BPM | OXYGEN SATURATION: 97 % | BODY MASS INDEX: 27.35 KG/M2 | SYSTOLIC BLOOD PRESSURE: 99 MMHG | TEMPERATURE: 98.2 F

## 2020-12-05 PROBLEM — N39.0 UTI (URINARY TRACT INFECTION): Status: RESOLVED | Noted: 2020-12-03 | Resolved: 2020-12-05

## 2020-12-05 LAB
ACTIN IGG SERPL-ACNC: 21 UNITS (ref 0–19)
ALBUMIN SERPL BCP-MCNC: 2.1 G/DL (ref 3.5–5)
ALP SERPL-CCNC: 131 U/L (ref 46–116)
ALT SERPL W P-5'-P-CCNC: 1839 U/L (ref 12–78)
ANION GAP SERPL CALCULATED.3IONS-SCNC: 6 MMOL/L (ref 4–13)
AST SERPL W P-5'-P-CCNC: 402 U/L (ref 5–45)
BACTERIA BLD CULT: NORMAL
BACTERIA BLD CULT: NORMAL
BILIRUB SERPL-MCNC: 7.01 MG/DL (ref 0.2–1)
BUN SERPL-MCNC: 6 MG/DL (ref 5–25)
CALCIUM ALBUM COR SERPL-MCNC: 9.5 MG/DL (ref 8.3–10.1)
CALCIUM SERPL-MCNC: 8 MG/DL (ref 8.3–10.1)
CERULOPLASMIN SERPL-MCNC: 26 MG/DL (ref 16–31)
CHLORIDE SERPL-SCNC: 106 MMOL/L (ref 100–108)
CO2 SERPL-SCNC: 26 MMOL/L (ref 21–32)
CREAT SERPL-MCNC: 0.75 MG/DL (ref 0.6–1.3)
GFR SERPL CREATININE-BSD FRML MDRD: 107 ML/MIN/1.73SQ M
GLUCOSE SERPL-MCNC: 100 MG/DL (ref 65–140)
INR PPP: 1.27 (ref 0.84–1.19)
LKM-1 AB SER-ACNC: <20.1 UNITS (ref 0–20)
MITOCHONDRIA M2 IGG SER-ACNC: <20 UNITS (ref 0–20)
POTASSIUM SERPL-SCNC: 3.8 MMOL/L (ref 3.5–5.3)
PROCALCITONIN SERPL-MCNC: 0.29 NG/ML
PROT SERPL-MCNC: 6.1 G/DL (ref 6.4–8.2)
PROTHROMBIN TIME: 16 SECONDS (ref 11.6–14.5)
SODIUM SERPL-SCNC: 138 MMOL/L (ref 136–145)

## 2020-12-05 PROCEDURE — 99239 HOSP IP/OBS DSCHRG MGMT >30: CPT | Performed by: INTERNAL MEDICINE

## 2020-12-05 PROCEDURE — 80053 COMPREHEN METABOLIC PANEL: CPT | Performed by: PHYSICIAN ASSISTANT

## 2020-12-05 PROCEDURE — 85610 PROTHROMBIN TIME: CPT | Performed by: PHYSICIAN ASSISTANT

## 2020-12-05 PROCEDURE — 84145 PROCALCITONIN (PCT): CPT | Performed by: INTERNAL MEDICINE

## 2020-12-05 RX ORDER — IBUPROFEN 400 MG/1
400 TABLET ORAL EVERY 8 HOURS PRN
Qty: 20 TABLET | Refills: 0 | Status: CANCELLED | OUTPATIENT
Start: 2020-12-05

## 2020-12-07 LAB — RYE IGE QN: NEGATIVE

## 2020-12-08 ENCOUNTER — TRANSITIONAL CARE MANAGEMENT (OUTPATIENT)
Dept: FAMILY MEDICINE CLINIC | Facility: MEDICAL CENTER | Age: 50
End: 2020-12-08

## 2020-12-10 ENCOUNTER — TELEPHONE (OUTPATIENT)
Dept: GASTROENTEROLOGY | Facility: CLINIC | Age: 50
End: 2020-12-10

## 2020-12-11 ENCOUNTER — TELEPHONE (OUTPATIENT)
Dept: GASTROENTEROLOGY | Facility: AMBULARY SURGERY CENTER | Age: 50
End: 2020-12-11

## 2020-12-14 ENCOUNTER — TELEMEDICINE (OUTPATIENT)
Dept: FAMILY MEDICINE CLINIC | Facility: MEDICAL CENTER | Age: 50
End: 2020-12-14
Payer: COMMERCIAL

## 2020-12-14 VITALS — WEIGHT: 220 LBS | TEMPERATURE: 101 F | BODY MASS INDEX: 27.5 KG/M2

## 2020-12-14 DIAGNOSIS — B15.9 VIRAL HEPATITIS A WITHOUT HEPATIC COMA: ICD-10-CM

## 2020-12-14 DIAGNOSIS — I10 ESSENTIAL HYPERTENSION: ICD-10-CM

## 2020-12-14 DIAGNOSIS — R50.9 FEVER, UNSPECIFIED FEVER CAUSE: ICD-10-CM

## 2020-12-14 DIAGNOSIS — R74.01 TRANSAMINITIS: Primary | ICD-10-CM

## 2020-12-14 DIAGNOSIS — K76.0 NON-ALCOHOLIC FATTY LIVER DISEASE: ICD-10-CM

## 2020-12-14 DIAGNOSIS — N13.2 URETERAL STONE WITH HYDRONEPHROSIS: ICD-10-CM

## 2020-12-14 DIAGNOSIS — N20.0 KIDNEY STONES: ICD-10-CM

## 2020-12-14 PROBLEM — A41.9 SEPSIS (HCC): Status: RESOLVED | Noted: 2020-09-24 | Resolved: 2020-12-14

## 2020-12-14 PROBLEM — M54.16 LUMBAR RADICULOPATHY: Status: ACTIVE | Noted: 2020-11-16

## 2020-12-14 PROCEDURE — 1111F DSCHRG MED/CURRENT MED MERGE: CPT | Performed by: FAMILY MEDICINE

## 2020-12-14 PROCEDURE — 99495 TRANSJ CARE MGMT MOD F2F 14D: CPT | Performed by: FAMILY MEDICINE

## 2020-12-15 DIAGNOSIS — R50.9 FEVER, UNSPECIFIED FEVER CAUSE: ICD-10-CM

## 2020-12-15 PROCEDURE — U0003 INFECTIOUS AGENT DETECTION BY NUCLEIC ACID (DNA OR RNA); SEVERE ACUTE RESPIRATORY SYNDROME CORONAVIRUS 2 (SARS-COV-2) (CORONAVIRUS DISEASE [COVID-19]), AMPLIFIED PROBE TECHNIQUE, MAKING USE OF HIGH THROUGHPUT TECHNOLOGIES AS DESCRIBED BY CMS-2020-01-R: HCPCS | Performed by: FAMILY MEDICINE

## 2020-12-16 LAB — SARS-COV-2 RNA SPEC QL NAA+PROBE: NOT DETECTED

## 2020-12-19 DIAGNOSIS — I10 ESSENTIAL HYPERTENSION: ICD-10-CM

## 2020-12-22 RX ORDER — LISINOPRIL 20 MG/1
TABLET ORAL
Qty: 30 TABLET | Refills: 0 | Status: SHIPPED | OUTPATIENT
Start: 2020-12-22 | End: 2021-01-26 | Stop reason: SDUPTHER

## 2020-12-22 RX ORDER — AMLODIPINE BESYLATE 5 MG/1
TABLET ORAL
Qty: 30 TABLET | Refills: 0 | Status: SHIPPED | OUTPATIENT
Start: 2020-12-22 | End: 2021-01-15

## 2021-01-13 RX ORDER — DULOXETIN HYDROCHLORIDE 30 MG/1
CAPSULE, DELAYED RELEASE ORAL
Qty: 30 CAPSULE | OUTPATIENT
Start: 2021-01-13

## 2021-01-14 ENCOUNTER — TELEPHONE (OUTPATIENT)
Dept: GASTROENTEROLOGY | Facility: CLINIC | Age: 51
End: 2021-01-14

## 2021-01-15 ENCOUNTER — TELEPHONE (OUTPATIENT)
Dept: FAMILY MEDICINE CLINIC | Facility: MEDICAL CENTER | Age: 51
End: 2021-01-15

## 2021-01-15 DIAGNOSIS — I10 ESSENTIAL HYPERTENSION: ICD-10-CM

## 2021-01-15 RX ORDER — AMLODIPINE BESYLATE 5 MG/1
TABLET ORAL
Qty: 30 TABLET | Refills: 0 | Status: SHIPPED | OUTPATIENT
Start: 2021-01-15 | End: 2021-01-26 | Stop reason: SDUPTHER

## 2021-01-15 NOTE — TELEPHONE ENCOUNTER
Pt called to ask if Dr Ned Toussaint could prescribe something for his reflux  He has bad reflux right now with a lot of burping  He also has had a fever - Wed 101, Thurs 102, today 99/7 - with diarrhea and achiness    Please advise

## 2021-01-18 NOTE — TELEPHONE ENCOUNTER
S/w patient  Symptoms are all gone  No fever, feels he past a stone  Would like an appt for his chronic reflux symptoms x years, tried OTC Prilosec    appt made

## 2021-01-26 ENCOUNTER — APPOINTMENT (OUTPATIENT)
Dept: LAB | Facility: MEDICAL CENTER | Age: 51
End: 2021-01-26
Payer: COMMERCIAL

## 2021-01-26 ENCOUNTER — OFFICE VISIT (OUTPATIENT)
Dept: FAMILY MEDICINE CLINIC | Facility: MEDICAL CENTER | Age: 51
End: 2021-01-26
Payer: COMMERCIAL

## 2021-01-26 VITALS
SYSTOLIC BLOOD PRESSURE: 158 MMHG | BODY MASS INDEX: 27.85 KG/M2 | HEIGHT: 75 IN | HEART RATE: 100 BPM | DIASTOLIC BLOOD PRESSURE: 90 MMHG | TEMPERATURE: 98.2 F | RESPIRATION RATE: 16 BRPM | WEIGHT: 224 LBS

## 2021-01-26 DIAGNOSIS — I10 ESSENTIAL HYPERTENSION: ICD-10-CM

## 2021-01-26 DIAGNOSIS — I10 ESSENTIAL HYPERTENSION: Primary | ICD-10-CM

## 2021-01-26 DIAGNOSIS — R74.01 TRANSAMINITIS: ICD-10-CM

## 2021-01-26 DIAGNOSIS — Z12.11 SCREENING FOR COLON CANCER: ICD-10-CM

## 2021-01-26 DIAGNOSIS — N20.0 KIDNEY STONES: ICD-10-CM

## 2021-01-26 DIAGNOSIS — K21.9 GASTROESOPHAGEAL REFLUX DISEASE, UNSPECIFIED WHETHER ESOPHAGITIS PRESENT: ICD-10-CM

## 2021-01-26 LAB
ALBUMIN SERPL BCP-MCNC: 3.6 G/DL (ref 3.5–5)
ALP SERPL-CCNC: 97 U/L (ref 46–116)
ALT SERPL W P-5'-P-CCNC: 129 U/L (ref 12–78)
ANION GAP SERPL CALCULATED.3IONS-SCNC: 2 MMOL/L (ref 4–13)
AST SERPL W P-5'-P-CCNC: 37 U/L (ref 5–45)
BASOPHILS # BLD AUTO: 0.04 THOUSANDS/ΜL (ref 0–0.1)
BASOPHILS NFR BLD AUTO: 1 % (ref 0–1)
BILIRUB SERPL-MCNC: 1 MG/DL (ref 0.2–1)
BILIRUB UR QL STRIP: NEGATIVE
BUN SERPL-MCNC: 12 MG/DL (ref 5–25)
CALCIUM SERPL-MCNC: 10.2 MG/DL (ref 8.3–10.1)
CHLORIDE SERPL-SCNC: 108 MMOL/L (ref 100–108)
CHOLEST SERPL-MCNC: 215 MG/DL (ref 50–200)
CLARITY UR: CLEAR
CO2 SERPL-SCNC: 33 MMOL/L (ref 21–32)
COLOR UR: YELLOW
CREAT SERPL-MCNC: 0.9 MG/DL (ref 0.6–1.3)
CREAT UR-MCNC: 68.9 MG/DL
EOSINOPHIL # BLD AUTO: 0.17 THOUSAND/ΜL (ref 0–0.61)
EOSINOPHIL NFR BLD AUTO: 3 % (ref 0–6)
ERYTHROCYTE [DISTWIDTH] IN BLOOD BY AUTOMATED COUNT: 15.7 % (ref 11.6–15.1)
EST. AVERAGE GLUCOSE BLD GHB EST-MCNC: 94 MG/DL
FERRITIN SERPL-MCNC: 154 NG/ML (ref 8–388)
GFR SERPL CREATININE-BSD FRML MDRD: 99 ML/MIN/1.73SQ M
GGT SERPL-CCNC: 83 U/L (ref 5–85)
GLUCOSE P FAST SERPL-MCNC: 99 MG/DL (ref 65–99)
GLUCOSE UR STRIP-MCNC: NEGATIVE MG/DL
HBA1C MFR BLD: 4.9 %
HCT VFR BLD AUTO: 45.7 % (ref 36.5–49.3)
HDLC SERPL-MCNC: 49 MG/DL
HGB BLD-MCNC: 13.9 G/DL (ref 12–17)
HGB UR QL STRIP.AUTO: NEGATIVE
IMM GRANULOCYTES # BLD AUTO: 0.03 THOUSAND/UL (ref 0–0.2)
IMM GRANULOCYTES NFR BLD AUTO: 1 % (ref 0–2)
IRON SATN MFR SERPL: 24 %
IRON SERPL-MCNC: 110 UG/DL (ref 65–175)
KETONES UR STRIP-MCNC: NEGATIVE MG/DL
LDLC SERPL CALC-MCNC: 134 MG/DL (ref 0–100)
LDLC SERPL DIRECT ASSAY-MCNC: 145 MG/DL (ref 0–100)
LEUKOCYTE ESTERASE UR QL STRIP: NEGATIVE
LYMPHOCYTES # BLD AUTO: 2.13 THOUSANDS/ΜL (ref 0.6–4.47)
LYMPHOCYTES NFR BLD AUTO: 36 % (ref 14–44)
MCH RBC QN AUTO: 30 PG (ref 26.8–34.3)
MCHC RBC AUTO-ENTMCNC: 30.4 G/DL (ref 31.4–37.4)
MCV RBC AUTO: 99 FL (ref 82–98)
MICROALBUMIN UR-MCNC: 10.3 MG/L (ref 0–20)
MICROALBUMIN/CREAT 24H UR: 15 MG/G CREATININE (ref 0–30)
MONOCYTES # BLD AUTO: 0.7 THOUSAND/ΜL (ref 0.17–1.22)
MONOCYTES NFR BLD AUTO: 12 % (ref 4–12)
NEUTROPHILS # BLD AUTO: 2.83 THOUSANDS/ΜL (ref 1.85–7.62)
NEUTS SEG NFR BLD AUTO: 47 % (ref 43–75)
NITRITE UR QL STRIP: NEGATIVE
NONHDLC SERPL-MCNC: 166 MG/DL
NRBC BLD AUTO-RTO: 0 /100 WBCS
PH UR STRIP.AUTO: 7 [PH]
PLATELET # BLD AUTO: 305 THOUSANDS/UL (ref 149–390)
PMV BLD AUTO: 11 FL (ref 8.9–12.7)
POTASSIUM SERPL-SCNC: 3.9 MMOL/L (ref 3.5–5.3)
PROT SERPL-MCNC: 8.1 G/DL (ref 6.4–8.2)
PROT UR STRIP-MCNC: NEGATIVE MG/DL
RBC # BLD AUTO: 4.63 MILLION/UL (ref 3.88–5.62)
SODIUM SERPL-SCNC: 143 MMOL/L (ref 136–145)
SP GR UR STRIP.AUTO: 1.01 (ref 1–1.03)
TIBC SERPL-MCNC: 450 UG/DL (ref 250–450)
TRANSFERRIN SERPL-MCNC: 341 MG/DL (ref 200–400)
TRIGL SERPL-MCNC: 160 MG/DL
UROBILINOGEN UR QL STRIP.AUTO: 1 E.U./DL
WBC # BLD AUTO: 5.9 THOUSAND/UL (ref 4.31–10.16)

## 2021-01-26 PROCEDURE — 82728 ASSAY OF FERRITIN: CPT

## 2021-01-26 PROCEDURE — 82977 ASSAY OF GGT: CPT

## 2021-01-26 PROCEDURE — 81003 URINALYSIS AUTO W/O SCOPE: CPT | Performed by: FAMILY MEDICINE

## 2021-01-26 PROCEDURE — 83550 IRON BINDING TEST: CPT

## 2021-01-26 PROCEDURE — 80061 LIPID PANEL: CPT

## 2021-01-26 PROCEDURE — 84466 ASSAY OF TRANSFERRIN: CPT

## 2021-01-26 PROCEDURE — 4004F PT TOBACCO SCREEN RCVD TLK: CPT | Performed by: FAMILY MEDICINE

## 2021-01-26 PROCEDURE — 99213 OFFICE O/P EST LOW 20 MIN: CPT | Performed by: FAMILY MEDICINE

## 2021-01-26 PROCEDURE — 80053 COMPREHEN METABOLIC PANEL: CPT

## 2021-01-26 PROCEDURE — 36415 COLL VENOUS BLD VENIPUNCTURE: CPT

## 2021-01-26 PROCEDURE — 83721 ASSAY OF BLOOD LIPOPROTEIN: CPT

## 2021-01-26 PROCEDURE — 82043 UR ALBUMIN QUANTITATIVE: CPT | Performed by: FAMILY MEDICINE

## 2021-01-26 PROCEDURE — 3080F DIAST BP >= 90 MM HG: CPT | Performed by: FAMILY MEDICINE

## 2021-01-26 PROCEDURE — 82570 ASSAY OF URINE CREATININE: CPT | Performed by: FAMILY MEDICINE

## 2021-01-26 PROCEDURE — 3077F SYST BP >= 140 MM HG: CPT | Performed by: FAMILY MEDICINE

## 2021-01-26 PROCEDURE — 85025 COMPLETE CBC W/AUTO DIFF WBC: CPT

## 2021-01-26 PROCEDURE — 3008F BODY MASS INDEX DOCD: CPT | Performed by: FAMILY MEDICINE

## 2021-01-26 PROCEDURE — 83036 HEMOGLOBIN GLYCOSYLATED A1C: CPT

## 2021-01-26 PROCEDURE — 83540 ASSAY OF IRON: CPT

## 2021-01-26 RX ORDER — FAMOTIDINE 40 MG/1
40 TABLET, FILM COATED ORAL DAILY
Qty: 30 TABLET | Refills: 1 | Status: SHIPPED | OUTPATIENT
Start: 2021-01-26 | End: 2021-03-22

## 2021-01-26 RX ORDER — AMLODIPINE BESYLATE 5 MG/1
5 TABLET ORAL DAILY
Qty: 30 TABLET | Refills: 1 | Status: SHIPPED | OUTPATIENT
Start: 2021-01-26 | End: 2021-04-21

## 2021-01-26 RX ORDER — LISINOPRIL 20 MG/1
20 TABLET ORAL DAILY
Qty: 30 TABLET | Refills: 1 | Status: SHIPPED | OUTPATIENT
Start: 2021-01-26 | End: 2021-03-22
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
## 2021-01-28 ENCOUNTER — TELEPHONE (OUTPATIENT)
Dept: FAMILY MEDICINE CLINIC | Facility: MEDICAL CENTER | Age: 51
End: 2021-01-28

## 2021-01-28 DIAGNOSIS — E78.2 MIXED HYPERLIPIDEMIA: Primary | ICD-10-CM

## 2021-01-28 RX ORDER — ROSUVASTATIN CALCIUM 5 MG/1
5 TABLET, COATED ORAL DAILY
Qty: 30 TABLET | Refills: 1 | Status: SHIPPED | OUTPATIENT
Start: 2021-01-28 | End: 2021-03-22

## 2021-01-28 NOTE — TELEPHONE ENCOUNTER
----- Message from Kemal Lacy DO sent at 1/28/2021  8:37 AM EST -----  Please call patient  Liver enzymes greatly improved and rapidly reaching normal levels  Cholesterol is elevated  Patient is at elevated risk of cardiovascular disease such as heart attack and stroke  I would recommend he start statin therapy to help lower this risk  I will also discuss this with him at his upcoming appointment if he prefers  Ultimately if he stop smoking his cardiovascular risk will go down  Calcium is slightly elevated  No intervention at this time  I will discuss this with patient further at his upcoming appointment  Remainder of labs including urine testing unremarkable

## 2021-01-28 NOTE — TELEPHONE ENCOUNTER
I have gone ahead and faxed in rosuvastatin 5 mg daily  I will discuss repeat labs with him at his upcoming appointment in March

## 2021-01-28 NOTE — TELEPHONE ENCOUNTER
Pt aware-and agrees to Statin  Can you send this so he can discuss improvement with you at appt  States he cut down drastically on smoking   Was at 2 packs per day now at 1/2 (half) pack

## 2021-01-29 ENCOUNTER — TELEPHONE (OUTPATIENT)
Dept: NEUROLOGY | Facility: CLINIC | Age: 51
End: 2021-01-29

## 2021-01-29 NOTE — TELEPHONE ENCOUNTER
Prior authorization for neuropsychological evaluation and testing submitted 1/13/2021  Pending case # [id number]  Received call from [facility]  and spoke with nurse Gianna Brunson who stated that Dr Sudha Samuel is not in network with [address]  Called [place] and made aware of neuropsych approval for a provider in network with [address]  Advised [place] that he has the right to appeal this decision with [facility]  Receieved call from Yamileth Ortiz in the specialty services department who again stated that Dr Sudha Smauel is not in network and that the patient must go to a physician in network  Recommended [facility] Neuropsychology and provided me with a phone number #[phone]  Reached out to [facility] and spoke with Rafael Martinez who stated that they are in network with "[employer] and are booking out a few months but he may be placed in wait list  Rafael Martinez stated they will need a referral to [facility] Neuropsychology Department and office notes supporting the referral  Provided me with fax # [phone]  Called [facility] at [facility]  and confirmed that Dr Kacie Xie is in network [facility]  Spoke with Fulton John on 1/29/2021 @2:23pm  Ref #[id number]  Will reach out to her to see if she is able to see patient

## 2021-03-01 ENCOUNTER — TRANSCRIBE ORDERS (OUTPATIENT)
Dept: NEUROSURGERY | Facility: CLINIC | Age: 51
End: 2021-03-01

## 2021-03-01 DIAGNOSIS — M54.50 LOWER BACK PAIN: Primary | ICD-10-CM

## 2021-03-19 DIAGNOSIS — K21.9 GASTROESOPHAGEAL REFLUX DISEASE, UNSPECIFIED WHETHER ESOPHAGITIS PRESENT: ICD-10-CM

## 2021-03-19 DIAGNOSIS — E78.2 MIXED HYPERLIPIDEMIA: ICD-10-CM

## 2021-03-22 ENCOUNTER — OFFICE VISIT (OUTPATIENT)
Dept: FAMILY MEDICINE CLINIC | Facility: MEDICAL CENTER | Age: 51
End: 2021-03-22
Payer: COMMERCIAL

## 2021-03-22 VITALS
TEMPERATURE: 96.9 F | DIASTOLIC BLOOD PRESSURE: 90 MMHG | HEIGHT: 75 IN | WEIGHT: 234 LBS | RESPIRATION RATE: 16 BRPM | HEART RATE: 90 BPM | SYSTOLIC BLOOD PRESSURE: 144 MMHG | BODY MASS INDEX: 29.09 KG/M2

## 2021-03-22 DIAGNOSIS — E78.2 MIXED HYPERLIPIDEMIA: ICD-10-CM

## 2021-03-22 DIAGNOSIS — K21.9 GASTROESOPHAGEAL REFLUX DISEASE, UNSPECIFIED WHETHER ESOPHAGITIS PRESENT: ICD-10-CM

## 2021-03-22 DIAGNOSIS — I10 ESSENTIAL HYPERTENSION: Primary | ICD-10-CM

## 2021-03-22 PROCEDURE — 3077F SYST BP >= 140 MM HG: CPT | Performed by: FAMILY MEDICINE

## 2021-03-22 PROCEDURE — 3080F DIAST BP >= 90 MM HG: CPT | Performed by: FAMILY MEDICINE

## 2021-03-22 PROCEDURE — 3008F BODY MASS INDEX DOCD: CPT | Performed by: FAMILY MEDICINE

## 2021-03-22 PROCEDURE — 4004F PT TOBACCO SCREEN RCVD TLK: CPT | Performed by: FAMILY MEDICINE

## 2021-03-22 PROCEDURE — 99214 OFFICE O/P EST MOD 30 MIN: CPT | Performed by: FAMILY MEDICINE

## 2021-03-22 RX ORDER — BENAZEPRIL HYDROCHLORIDE 40 MG/1
40 TABLET, FILM COATED ORAL DAILY
Qty: 30 TABLET | Refills: 2 | Status: SHIPPED | OUTPATIENT
Start: 2021-03-22 | End: 2021-06-30 | Stop reason: SDUPTHER

## 2021-03-22 RX ORDER — RISPERIDONE 0.5 MG/1
0.5 TABLET, FILM COATED ORAL 2 TIMES DAILY
COMMUNITY
Start: 2020-12-21

## 2021-03-22 RX ORDER — FAMOTIDINE 40 MG/1
TABLET, FILM COATED ORAL
Qty: 30 TABLET | Refills: 2 | Status: SHIPPED | OUTPATIENT
Start: 2021-03-22 | End: 2021-06-30 | Stop reason: SDUPTHER

## 2021-03-22 RX ORDER — HYDROXYZINE HYDROCHLORIDE 25 MG/1
25 TABLET, FILM COATED ORAL 3 TIMES DAILY PRN
COMMUNITY
Start: 2021-02-15

## 2021-03-22 RX ORDER — GABAPENTIN 300 MG/1
CAPSULE ORAL
COMMUNITY
Start: 2021-03-15 | End: 2021-06-30

## 2021-03-22 RX ORDER — LITHIUM CARBONATE 300 MG
TABLET ORAL
COMMUNITY

## 2021-03-22 RX ORDER — ZIPRASIDONE HYDROCHLORIDE 20 MG/1
CAPSULE ORAL
COMMUNITY
Start: 2021-02-18

## 2021-03-22 RX ORDER — ROSUVASTATIN CALCIUM 5 MG/1
TABLET, COATED ORAL
Qty: 30 TABLET | Refills: 2 | Status: SHIPPED | OUTPATIENT
Start: 2021-03-22 | End: 2021-06-30 | Stop reason: SDUPTHER

## 2021-03-22 RX ORDER — SERTRALINE HYDROCHLORIDE 100 MG/1
100 TABLET, FILM COATED ORAL EVERY MORNING
COMMUNITY
Start: 2021-02-18
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
## 2021-03-26 ENCOUNTER — HOSPITAL ENCOUNTER (OUTPATIENT)
Dept: GASTROENTEROLOGY | Facility: HOSPITAL | Age: 51
Setting detail: OUTPATIENT SURGERY
Discharge: HOME | End: 2021-03-26
Attending: INTERNAL MEDICINE

## 2021-04-15 ENCOUNTER — OFFICE VISIT (OUTPATIENT)
Dept: NEUROSURGERY | Facility: CLINIC | Age: 51
End: 2021-04-15
Payer: COMMERCIAL

## 2021-04-15 VITALS
RESPIRATION RATE: 16 BRPM | WEIGHT: 234 LBS | DIASTOLIC BLOOD PRESSURE: 98 MMHG | HEART RATE: 88 BPM | HEIGHT: 75 IN | TEMPERATURE: 97.8 F | BODY MASS INDEX: 29.09 KG/M2 | SYSTOLIC BLOOD PRESSURE: 150 MMHG

## 2021-04-15 DIAGNOSIS — G89.4 CHRONIC PAIN SYNDROME: Primary | ICD-10-CM

## 2021-04-15 DIAGNOSIS — M54.16 LUMBAR RADICULOPATHY: ICD-10-CM

## 2021-04-15 DIAGNOSIS — M54.50 LOWER BACK PAIN: ICD-10-CM

## 2021-04-15 PROCEDURE — 99214 OFFICE O/P EST MOD 30 MIN: CPT | Performed by: NEUROLOGICAL SURGERY

## 2021-04-15 PROCEDURE — 3008F BODY MASS INDEX DOCD: CPT | Performed by: NEUROLOGICAL SURGERY

## 2021-04-15 PROCEDURE — 4004F PT TOBACCO SCREEN RCVD TLK: CPT | Performed by: NEUROLOGICAL SURGERY

## 2021-04-15 PROCEDURE — 3077F SYST BP >= 140 MM HG: CPT | Performed by: NEUROLOGICAL SURGERY

## 2021-04-15 PROCEDURE — 3080F DIAST BP >= 90 MM HG: CPT | Performed by: NEUROLOGICAL SURGERY
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
## 2021-04-16 ENCOUNTER — TELEPHONE (OUTPATIENT)
Dept: OTHER | Facility: OTHER | Age: 51
End: 2021-04-16

## 2021-04-20 DIAGNOSIS — I10 ESSENTIAL HYPERTENSION: ICD-10-CM

## 2021-04-21 RX ORDER — AMLODIPINE BESYLATE 5 MG/1
TABLET ORAL
Qty: 30 TABLET | Refills: 1 | Status: SHIPPED | OUTPATIENT
Start: 2021-04-21 | End: 2021-06-30 | Stop reason: SDUPTHER

## 2021-04-27 ENCOUNTER — HOSPITAL ENCOUNTER (OUTPATIENT)
Dept: RADIOLOGY | Facility: HOSPITAL | Age: 51
Discharge: HOME | End: 2021-04-27
Attending: NEUROLOGICAL SURGERY

## 2021-04-27 ENCOUNTER — HOSPITAL ENCOUNTER (OUTPATIENT)
Dept: RADIOLOGY | Facility: HOSPITAL | Age: 51
Discharge: HOME | End: 2021-04-27

## 2021-04-27 DIAGNOSIS — M54.50 LOWER BACK PAIN: ICD-10-CM

## 2021-04-27 DIAGNOSIS — G89.4 CHRONIC PAIN SYNDROME: ICD-10-CM

## 2021-04-28 ENCOUNTER — DOCUMENTATION (OUTPATIENT)
Dept: NEUROSURGERY | Facility: CLINIC | Age: 51
End: 2021-04-28

## 2021-04-28 DIAGNOSIS — F40.240 CLAUSTROPHOBIA: Primary | ICD-10-CM

## 2021-04-28 RX ORDER — ALPRAZOLAM 0.5 MG/1
TABLET ORAL
Qty: 2 TABLET | Refills: 0 | Status: SHIPPED | OUTPATIENT
Start: 2021-04-28 | End: 2022-03-14

## 2021-04-28 NOTE — TELEPHONE ENCOUNTER
Patient requested premedication to take prior to MRI scheduled 5/15  He attempted to undergo MRI today but could not get through, so it was aborted and rescheduled  PDMP reviewed and not narcotic of benzo medications prescribed since sept

## 2021-05-07 ENCOUNTER — HOSPITAL ENCOUNTER (EMERGENCY)
Facility: HOSPITAL | Age: 51
Discharge: HOME | End: 2021-05-07
Attending: EMERGENCY MEDICINE | Admitting: EMERGENCY MEDICINE
Payer: COMMERCIAL

## 2021-05-07 ENCOUNTER — APPOINTMENT (EMERGENCY)
Dept: RADIOLOGY | Facility: HOSPITAL | Age: 51
End: 2021-05-07
Payer: COMMERCIAL

## 2021-05-07 VITALS
DIASTOLIC BLOOD PRESSURE: 99 MMHG | RESPIRATION RATE: 16 BRPM | OXYGEN SATURATION: 98 % | HEART RATE: 88 BPM | HEIGHT: 75 IN | BODY MASS INDEX: 29.06 KG/M2 | WEIGHT: 233.69 LBS | TEMPERATURE: 98.6 F | SYSTOLIC BLOOD PRESSURE: 158 MMHG

## 2021-05-07 DIAGNOSIS — E86.0 DEHYDRATION: ICD-10-CM

## 2021-05-07 DIAGNOSIS — R41.82 ALTERED MENTAL STATUS: Primary | ICD-10-CM

## 2021-05-07 DIAGNOSIS — R03.0 ELEVATED BLOOD PRESSURE READING: ICD-10-CM

## 2021-05-07 LAB
ALBUMIN SERPL BCP-MCNC: 4 G/DL (ref 3.5–5)
ALP SERPL-CCNC: 92 U/L (ref 46–116)
ALT SERPL W P-5'-P-CCNC: 33 U/L (ref 12–78)
ANION GAP SERPL CALCULATED.3IONS-SCNC: 9 MMOL/L (ref 4–13)
AST SERPL W P-5'-P-CCNC: 36 U/L (ref 5–45)
BASOPHILS # BLD AUTO: 0.02 THOUSANDS/ΜL (ref 0–0.1)
BASOPHILS NFR BLD AUTO: 0 % (ref 0–1)
BILIRUB SERPL-MCNC: 0.5 MG/DL (ref 0.2–1)
BUN SERPL-MCNC: 12 MG/DL (ref 5–25)
CALCIUM SERPL-MCNC: 9.6 MG/DL (ref 8.3–10.1)
CHLORIDE SERPL-SCNC: 103 MMOL/L (ref 100–108)
CO2 SERPL-SCNC: 30 MMOL/L (ref 21–32)
CREAT SERPL-MCNC: 0.91 MG/DL (ref 0.6–1.3)
EOSINOPHIL # BLD AUTO: 0.11 THOUSAND/ΜL (ref 0–0.61)
EOSINOPHIL NFR BLD AUTO: 2 % (ref 0–6)
ERYTHROCYTE [DISTWIDTH] IN BLOOD BY AUTOMATED COUNT: 12.4 % (ref 11.6–15.1)
GFR SERPL CREATININE-BSD FRML MDRD: 98 ML/MIN/1.73SQ M
GLUCOSE SERPL-MCNC: 82 MG/DL (ref 65–140)
HCT VFR BLD AUTO: 46.7 % (ref 36.5–49.3)
HGB BLD-MCNC: 15.5 G/DL (ref 12–17)
IMM GRANULOCYTES # BLD AUTO: 0.02 THOUSAND/UL (ref 0–0.2)
IMM GRANULOCYTES NFR BLD AUTO: 0 % (ref 0–2)
LYMPHOCYTES # BLD AUTO: 1.99 THOUSANDS/ΜL (ref 0.6–4.47)
LYMPHOCYTES NFR BLD AUTO: 30 % (ref 14–44)
MCH RBC QN AUTO: 31.3 PG (ref 26.8–34.3)
MCHC RBC AUTO-ENTMCNC: 33.2 G/DL (ref 31.4–37.4)
MCV RBC AUTO: 94 FL (ref 82–98)
MONOCYTES # BLD AUTO: 0.64 THOUSAND/ΜL (ref 0.17–1.22)
MONOCYTES NFR BLD AUTO: 10 % (ref 4–12)
NEUTROPHILS # BLD AUTO: 3.88 THOUSANDS/ΜL (ref 1.85–7.62)
NEUTS SEG NFR BLD AUTO: 58 % (ref 43–75)
NRBC BLD AUTO-RTO: 0 /100 WBCS
PLATELET # BLD AUTO: 197 THOUSANDS/UL (ref 149–390)
PMV BLD AUTO: 10.1 FL (ref 8.9–12.7)
POTASSIUM SERPL-SCNC: 3.6 MMOL/L (ref 3.5–5.3)
PROT SERPL-MCNC: 8.8 G/DL (ref 6.4–8.2)
RBC # BLD AUTO: 4.96 MILLION/UL (ref 3.88–5.62)
SODIUM SERPL-SCNC: 142 MMOL/L (ref 136–145)
TROPONIN I SERPL-MCNC: <0.02 NG/ML
WBC # BLD AUTO: 6.66 THOUSAND/UL (ref 4.31–10.16)

## 2021-05-07 PROCEDURE — 96361 HYDRATE IV INFUSION ADD-ON: CPT

## 2021-05-07 PROCEDURE — 71045 X-RAY EXAM CHEST 1 VIEW: CPT

## 2021-05-07 PROCEDURE — 99285 EMERGENCY DEPT VISIT HI MDM: CPT

## 2021-05-07 PROCEDURE — 96374 THER/PROPH/DIAG INJ IV PUSH: CPT

## 2021-05-07 PROCEDURE — 80053 COMPREHEN METABOLIC PANEL: CPT | Performed by: EMERGENCY MEDICINE

## 2021-05-07 PROCEDURE — 93005 ELECTROCARDIOGRAM TRACING: CPT

## 2021-05-07 PROCEDURE — 99285 EMERGENCY DEPT VISIT HI MDM: CPT | Performed by: EMERGENCY MEDICINE

## 2021-05-07 PROCEDURE — 84484 ASSAY OF TROPONIN QUANT: CPT | Performed by: EMERGENCY MEDICINE

## 2021-05-07 PROCEDURE — 85025 COMPLETE CBC W/AUTO DIFF WBC: CPT | Performed by: EMERGENCY MEDICINE

## 2021-05-07 PROCEDURE — 36415 COLL VENOUS BLD VENIPUNCTURE: CPT | Performed by: EMERGENCY MEDICINE

## 2021-05-07 RX ORDER — ASPIRIN 81 MG/1
324 TABLET, CHEWABLE ORAL ONCE
Status: COMPLETED | OUTPATIENT
Start: 2021-05-07 | End: 2021-05-07

## 2021-05-07 RX ORDER — LORAZEPAM 2 MG/ML
1 INJECTION INTRAMUSCULAR ONCE
Status: COMPLETED | OUTPATIENT
Start: 2021-05-07 | End: 2021-05-07

## 2021-05-07 RX ADMIN — ASPIRIN 324 MG: 81 TABLET, CHEWABLE ORAL at 09:48

## 2021-05-07 RX ADMIN — SODIUM CHLORIDE 1000 ML: 0.9 INJECTION, SOLUTION INTRAVENOUS at 09:46

## 2021-05-07 RX ADMIN — LORAZEPAM 1 MG: 2 INJECTION INTRAMUSCULAR; INTRAVENOUS at 11:03
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
## 2021-05-07 NOTE — ED NOTES
Discharge instructions reviewed, patient has no new complaints or concerns at time of discharge  Patient escorted out of department by police        Lita Grissom RN  05/07/21 5084

## 2021-05-07 NOTE — DISCHARGE INSTRUCTIONS
Your blood pressure was elevated today  It is not clear if your blood pressure is always high or if it is only temporary  We recommend further workup,  including having your blood pressure rechecked  To help find out what your usual blood pressure is, we recommend taking your blood pressure twice a day for the next 5 days and recording those measurements to show to your primary care provider

## 2021-05-08 LAB
ATRIAL RATE: 110 BPM
P AXIS: 58 DEGREES
PR INTERVAL: 178 MS
QRS AXIS: 36 DEGREES
QRSD INTERVAL: 96 MS
QT INTERVAL: 322 MS
QTC INTERVAL: 435 MS
T WAVE AXIS: 63 DEGREES
VENTRICULAR RATE: 110 BPM

## 2021-05-08 PROCEDURE — 93010 ELECTROCARDIOGRAM REPORT: CPT | Performed by: INTERNAL MEDICINE

## 2021-05-17 ENCOUNTER — TELEPHONE (OUTPATIENT)
Dept: NEUROSURGERY | Facility: CLINIC | Age: 51
End: 2021-05-17

## 2021-05-18 ENCOUNTER — TELEPHONE (OUTPATIENT)
Dept: OBGYN CLINIC | Facility: HOSPITAL | Age: 51
End: 2021-05-18

## 2021-05-18 NOTE — TELEPHONE ENCOUNTER
Patient is detained & nurse called to schedule with Dr Delgado Rodgers in [address]  I gave the caller the phone number to reach Dr Chacha medina

## 2021-06-19 ENCOUNTER — NURSE TRIAGE (OUTPATIENT)
Dept: OTHER | Facility: OTHER | Age: 51
End: 2021-06-19

## 2021-06-19 NOTE — TELEPHONE ENCOUNTER
Regarding: Needs sedative for MRI  ----- Message from Madeline Bateman sent at 6/19/2021 10:02 AM EDT -----  "I am scheduled to have a MRI tomorrow, and they were supposed to prescribe a sedative to take prior    I know I won't be able to do the MRI without it "

## 2021-06-19 NOTE — TELEPHONE ENCOUNTER
Was prescribed alprazolam in April for scheduled MRI  Called [facility] in [address]  Staff reports having prescription on file and would fill this morning for noon pickup  Called patient and relayed the same  Reason for Disposition   [1] Caller has medication question about med not prescribed by PCP AND [2] triager unable to answer question (e g , compatibility with other med, storage)    Answer Assessment - Initial Assessment Questions  1  NAME of MEDICATION: "What medicine are you calling about?"      alprazolam  2  QUESTION: "What is your question?"      Need for MRI  3  PRESCRIBING HCP: "Who prescribed it?" Reason: if prescribed by specialist, call should be referred to that group        Neurologist    Protocols used: MEDICATION QUESTION CALL-ADULT-

## 2021-06-30 ENCOUNTER — OFFICE VISIT (OUTPATIENT)
Dept: FAMILY MEDICINE CLINIC | Facility: MEDICAL CENTER | Age: 51
End: 2021-06-30
Payer: COMMERCIAL

## 2021-06-30 ENCOUNTER — APPOINTMENT (OUTPATIENT)
Dept: LAB | Facility: HOSPITAL | Age: 51
End: 2021-06-30
Payer: COMMERCIAL

## 2021-06-30 VITALS
OXYGEN SATURATION: 97 % | WEIGHT: 209.4 LBS | HEIGHT: 75 IN | SYSTOLIC BLOOD PRESSURE: 136 MMHG | HEART RATE: 102 BPM | DIASTOLIC BLOOD PRESSURE: 86 MMHG | BODY MASS INDEX: 26.04 KG/M2 | TEMPERATURE: 97.9 F

## 2021-06-30 DIAGNOSIS — Z12.11 SCREENING FOR COLON CANCER: ICD-10-CM

## 2021-06-30 DIAGNOSIS — I10 ESSENTIAL HYPERTENSION: Primary | ICD-10-CM

## 2021-06-30 DIAGNOSIS — I10 ESSENTIAL HYPERTENSION: ICD-10-CM

## 2021-06-30 DIAGNOSIS — N52.9 ERECTILE DYSFUNCTION, UNSPECIFIED ERECTILE DYSFUNCTION TYPE: ICD-10-CM

## 2021-06-30 DIAGNOSIS — E78.2 MIXED HYPERLIPIDEMIA: ICD-10-CM

## 2021-06-30 DIAGNOSIS — Z12.5 SCREENING FOR PROSTATE CANCER: ICD-10-CM

## 2021-06-30 DIAGNOSIS — Z00.00 PHYSICAL EXAM, ANNUAL: ICD-10-CM

## 2021-06-30 DIAGNOSIS — K21.9 GASTROESOPHAGEAL REFLUX DISEASE, UNSPECIFIED WHETHER ESOPHAGITIS PRESENT: ICD-10-CM

## 2021-06-30 LAB
ALBUMIN SERPL BCP-MCNC: 4.2 G/DL (ref 3.5–5)
ALP SERPL-CCNC: 62 U/L (ref 46–116)
ALT SERPL W P-5'-P-CCNC: 30 U/L (ref 12–78)
ANION GAP SERPL CALCULATED.3IONS-SCNC: 6 MMOL/L (ref 4–13)
AST SERPL W P-5'-P-CCNC: 27 U/L (ref 5–45)
BILIRUB DIRECT SERPL-MCNC: 0.18 MG/DL (ref 0–0.2)
BILIRUB SERPL-MCNC: 0.77 MG/DL (ref 0.2–1)
BUN SERPL-MCNC: 13 MG/DL (ref 5–25)
CALCIUM SERPL-MCNC: 9.3 MG/DL (ref 8.3–10.1)
CHLORIDE SERPL-SCNC: 105 MMOL/L (ref 100–108)
CHOLEST SERPL-MCNC: 217 MG/DL (ref 50–200)
CO2 SERPL-SCNC: 32 MMOL/L (ref 21–32)
CREAT SERPL-MCNC: 1.07 MG/DL (ref 0.6–1.3)
GFR SERPL CREATININE-BSD FRML MDRD: 81 ML/MIN/1.73SQ M
GLUCOSE P FAST SERPL-MCNC: 87 MG/DL (ref 65–99)
HDLC SERPL-MCNC: 55 MG/DL
LDLC SERPL CALC-MCNC: 147 MG/DL (ref 0–100)
LDLC SERPL DIRECT ASSAY-MCNC: 135 MG/DL (ref 0–100)
NONHDLC SERPL-MCNC: 162 MG/DL
POTASSIUM SERPL-SCNC: 3.7 MMOL/L (ref 3.5–5.3)
PROT SERPL-MCNC: 8 G/DL (ref 6.4–8.2)
SODIUM SERPL-SCNC: 143 MMOL/L (ref 136–145)
TRIGL SERPL-MCNC: 77 MG/DL

## 2021-06-30 PROCEDURE — 3075F SYST BP GE 130 - 139MM HG: CPT | Performed by: FAMILY MEDICINE

## 2021-06-30 PROCEDURE — 83721 ASSAY OF BLOOD LIPOPROTEIN: CPT

## 2021-06-30 PROCEDURE — 80076 HEPATIC FUNCTION PANEL: CPT

## 2021-06-30 PROCEDURE — 4004F PT TOBACCO SCREEN RCVD TLK: CPT | Performed by: FAMILY MEDICINE

## 2021-06-30 PROCEDURE — 80061 LIPID PANEL: CPT

## 2021-06-30 PROCEDURE — 99396 PREV VISIT EST AGE 40-64: CPT | Performed by: FAMILY MEDICINE

## 2021-06-30 PROCEDURE — 3008F BODY MASS INDEX DOCD: CPT | Performed by: FAMILY MEDICINE

## 2021-06-30 PROCEDURE — 3079F DIAST BP 80-89 MM HG: CPT | Performed by: FAMILY MEDICINE

## 2021-06-30 PROCEDURE — 99214 OFFICE O/P EST MOD 30 MIN: CPT | Performed by: FAMILY MEDICINE

## 2021-06-30 PROCEDURE — 36415 COLL VENOUS BLD VENIPUNCTURE: CPT

## 2021-06-30 PROCEDURE — 80048 BASIC METABOLIC PNL TOTAL CA: CPT

## 2021-06-30 RX ORDER — SILDENAFIL 25 MG/1
TABLET, FILM COATED ORAL
Qty: 30 TABLET | Refills: 0 | Status: SHIPPED | OUTPATIENT
Start: 2021-06-30 | End: 2022-02-04 | Stop reason: SDUPTHER

## 2021-06-30 RX ORDER — FAMOTIDINE 40 MG/1
40 TABLET, FILM COATED ORAL DAILY
Qty: 90 TABLET | Refills: 1 | Status: SHIPPED | OUTPATIENT
Start: 2021-06-30 | End: 2021-10-19 | Stop reason: SDUPTHER

## 2021-06-30 RX ORDER — ROSUVASTATIN CALCIUM 5 MG/1
5 TABLET, COATED ORAL DAILY
Qty: 90 TABLET | Refills: 1 | Status: SHIPPED | OUTPATIENT
Start: 2021-06-30 | End: 2021-10-19 | Stop reason: SDUPTHER

## 2021-06-30 RX ORDER — BENAZEPRIL HYDROCHLORIDE 40 MG/1
40 TABLET, FILM COATED ORAL DAILY
Qty: 90 TABLET | Refills: 1 | Status: SHIPPED | OUTPATIENT
Start: 2021-06-30 | End: 2021-10-19 | Stop reason: SDUPTHER

## 2021-06-30 RX ORDER — AMLODIPINE BESYLATE 5 MG/1
5 TABLET ORAL DAILY
Qty: 90 TABLET | Refills: 1 | Status: SHIPPED | OUTPATIENT
Start: 2021-06-30 | End: 2021-10-19 | Stop reason: SDUPTHER
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
## 2021-07-07 ENCOUNTER — HOSPITAL ENCOUNTER (OUTPATIENT)
Dept: MRI IMAGING | Facility: HOSPITAL | Age: 51
Discharge: HOME | End: 2021-07-07
Attending: NEUROLOGICAL SURGERY
Payer: COMMERCIAL

## 2021-07-07 PROCEDURE — 72146 MRI CHEST SPINE W/O DYE: CPT

## 2021-07-07 PROCEDURE — G1004 CDSM NDSC: HCPCS

## 2021-07-14 ENCOUNTER — TELEPHONE (OUTPATIENT)
Dept: FAMILY MEDICINE CLINIC | Facility: MEDICAL CENTER | Age: 51
End: 2021-07-14

## 2021-07-14 DIAGNOSIS — E78.2 MIXED HYPERLIPIDEMIA: Primary | ICD-10-CM

## 2021-07-14 NOTE — TELEPHONE ENCOUNTER
----- Message from Joann Silva DO sent at 7/14/2021  9:39 AM EDT -----  Please call patient  Lipids are elevated  He was recently started on rosuvastatin  I would like to repeat labs in six weeks to assess for improvement and to monitor her liver function  If he is in agreement I will go ahead and place the order  They are also other labs pending in the system which he can have performed in six weeks as well

## 2021-07-16 NOTE — TELEPHONE ENCOUNTER
Labs ordered  Please also provide patient with a lab slip for labs that were previously ordered as well  Male all lab orders to him

## 2021-07-27 ENCOUNTER — TELEPHONE (OUTPATIENT)
Dept: NEUROSURGERY | Facility: CLINIC | Age: 51
End: 2021-07-27

## 2021-07-27 NOTE — TELEPHONE ENCOUNTER
Pt scheduled 7/28 with Dr Tessa Jordan for discussion of SCS  Called pt to confirm appt, pt states he has not been able to have psych eval done d/t insurance  He now has new insurance and psych eval is scheduled in 3 weeks  MRI is done  Message sent to provider to find out if pt can still be seen, however provider unavailable and response not received  I checked with Dr Eliceo Tavera MA and was told pt should be r/s until after he has psych eval done  Tried to call pt to r/s, VM full, unable to LM  Will try again later today

## 2021-07-27 NOTE — TELEPHONE ENCOUNTER
Pt returned call, he does not have psych eval scheduled, he was just told by their office that it would be about 3 weeks  I advised pt to get that scheduled and call us back right away to r/s appt here  He stated he will do as instructed and was appreciative of call

## 2021-07-29 ENCOUNTER — PREP FOR PROCEDURE (OUTPATIENT)
Dept: GASTROENTEROLOGY | Facility: CLINIC | Age: 51
End: 2021-07-29

## 2021-07-29 ENCOUNTER — HOSPITAL ENCOUNTER (OUTPATIENT)
Dept: RADIOLOGY | Facility: HOSPITAL | Age: 51
Discharge: HOME | End: 2021-07-29
Payer: COMMERCIAL

## 2021-07-29 DIAGNOSIS — Z86.010 HISTORY OF COLON POLYPS: Primary | ICD-10-CM

## 2021-07-29 DIAGNOSIS — M54.6 THORACIC SPINE PAIN: ICD-10-CM

## 2021-07-29 DIAGNOSIS — M54.2 NECK PAIN: ICD-10-CM

## 2021-07-29 PROCEDURE — 72070 X-RAY EXAM THORAC SPINE 2VWS: CPT

## 2021-07-29 PROCEDURE — 72050 X-RAY EXAM NECK SPINE 4/5VWS: CPT

## 2021-08-17 ENCOUNTER — TELEPHONE (OUTPATIENT)
Dept: NEUROSURGERY | Facility: CLINIC | Age: 51
End: 2021-08-17

## 2021-08-17 NOTE — TELEPHONE ENCOUNTER
Patient telephoned requesting appointment for SCS insertion surgeyr   Stated he complete and psychology evaluation and would like Dr Sim Amaro to do everything  Planning for SCS   Last visit note from Dr Sim Amaor 4/15/2021 ordered MRI thoracici and psychological evaluation  Note review no mention he was going to perform the trial or patient would return to current pain management provider  Note excerpt 'Patient with chronic low back pain and leg pain  He has no obvious pathology of MRI lumbar  He has a history of chronic pain  He has failed multiple injections and no relief for more than a year  I recommended neuromodulation options given the lack of any effective treatment options "     MRI Thoracic spine completed 7/7/2021 Limited evaluation of the thoracic spine due to patient's inability to cooperate for the examination and complete the normal imaging protocol    Minor noncompressive degenerative discogenic disease in the lower thoracic spine as detailed above  Spinal canal is capacious with a normal to low normal thoracic cord caliber  No cord signal abnormality        He is followed by psychology [facility] counseling services [phone] general psychology evaluation completed 10/27/2020 , does not address SCS  assessment or approval       Reports his pain management  practitioner is [facility] orth[facility] , he is no longer seeing that Dr  The injections were not helping  Was initially scheduled w/ Dr Prosper Galloway --patient called said he did not complete psych evaluation, NSX attempted to  rescheduled after psych evaluation  PLAN   · Explained type /content of psychological evaluation --call current psychiatric facility inform need evaluation and approval for insertion of spinal cord stimulator  · Suggest he discuss with pain management at [facility] orth[facility]ics need for SCS trial , schedule appointment this office jah fax information       Patient verbalized and understanding and that he would get right on it completing tasks

## 2021-09-03 ENCOUNTER — HOSPITAL ENCOUNTER (OUTPATIENT)
Dept: RADIOLOGY | Facility: HOSPITAL | Age: 51
Discharge: HOME | End: 2021-09-03
Payer: COMMERCIAL

## 2021-09-03 DIAGNOSIS — M54.2 NECK PAIN: ICD-10-CM

## 2021-09-03 PROCEDURE — 72072 X-RAY EXAM THORAC SPINE 3VWS: CPT

## 2021-09-03 PROCEDURE — 72040 X-RAY EXAM NECK SPINE 2-3 VW: CPT

## 2021-09-21 ENCOUNTER — TELEPHONE (OUTPATIENT)
Dept: NEUROSURGERY | Facility: CLINIC | Age: 51
End: 2021-09-21

## 2021-09-21 DIAGNOSIS — G89.4 CHRONIC PAIN SYNDROME: Primary | ICD-10-CM

## 2021-09-21 DIAGNOSIS — M54.16 LUMBAR RADICULOPATHY: ICD-10-CM

## 2021-09-21 NOTE — TELEPHONE ENCOUNTER
Call back received  Provided info regarding the referral and [facility]  He will reach out to them if he doesn't get a call in the next few days

## 2021-09-21 NOTE — TELEPHONE ENCOUNTER
Received a call from [address] [place] [address] reporting his PM provider does not offer SCS trials  He would like to have a referral placed for a provider within  to help him with this trail  Discussed with AP as Dr Nima Whittington is no longer with our practice, and was unsure where to direct this patient  Placed referral for PM with [facility] and attempted to contact patient to advise  He did not answer and VM full  Will attempt to reach him again at another time to advise

## 2021-09-23 NOTE — TELEPHONE ENCOUNTER
Attempted to contact patient at the request of TRACIE SHEEHAN to discuss his neuropsych eval and assist with getting it to PM  There was no answer and VM is full  Will make another attempt later today or tomorrow if he doesn't call based on missed call

## 2021-09-28 ENCOUNTER — TELEPHONE (OUTPATIENT)
Dept: PAIN MEDICINE | Facility: MEDICAL CENTER | Age: 51
End: 2021-09-28

## 2021-09-28 NOTE — TELEPHONE ENCOUNTER
Pt was seen once in August 2020 by Dr Alta Lerma in [facility] for neck pain  New referral in pt chart to [facility] for SCS trial for lumbar radiculopathy  Discusseed w Dr Alta Lerma, pt will need OV to discuss SCS trial/lumbar pain  Called pt, LMOM w my direct line to schedule appt- 30 min

## 2021-09-28 NOTE — TELEPHONE ENCOUNTER
Trever called to see if anything is needed to get the pt schedule for the SCS trail   Pt psych eval in  [facility]       Please advise pt on next steps

## 2021-09-28 NOTE — TELEPHONE ENCOUNTER
Neurosurgery is calling asking if pt can be scheduled for his SCS trial, there is a psych evaluation scanned into his chart, and recent imaging was done  Please review and advise if OK- also what company will be used for the SCS?

## 2021-09-28 NOTE — TELEPHONE ENCOUNTER
Received a call from [address] [place] [address] returning my call from last week  Review of chart indicated referral has not been worked yet, contacted their office and spoke with Danie, she states it is probably already being reviewed by coordinator who will reach out to the patient with next steps  In the meantime she will send a message coordinator requesting contact with patient

## 2021-09-29 NOTE — TELEPHONE ENCOUNTER
Appt has been scheduled for Tues 10/12/21 at 1pm     FYI: pt has a new work comp claim, will bring the info for the claim with him on the date of his appt  I wasn't sure if pt would need any new patient paperwork since this is a different issue than he was seen for back in August 2020, if so- please mail to him and let him know? Thank you
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
## 2021-10-01 ENCOUNTER — HOSPITAL ENCOUNTER (EMERGENCY)
Facility: HOSPITAL | Age: 51
Discharge: HOME | End: 2021-10-01
Attending: EMERGENCY MEDICINE
Payer: COMMERCIAL

## 2021-10-01 ENCOUNTER — APPOINTMENT (EMERGENCY)
Dept: RADIOLOGY | Facility: HOSPITAL | Age: 51
End: 2021-10-01
Payer: COMMERCIAL

## 2021-10-01 VITALS
WEIGHT: 240 LBS | DIASTOLIC BLOOD PRESSURE: 115 MMHG | OXYGEN SATURATION: 96 % | TEMPERATURE: 97.7 F | HEIGHT: 72 IN | RESPIRATION RATE: 13 BRPM | BODY MASS INDEX: 32.51 KG/M2 | HEART RATE: 79 BPM | SYSTOLIC BLOOD PRESSURE: 188 MMHG

## 2021-10-01 DIAGNOSIS — R07.89 ATYPICAL CHEST PAIN: Primary | ICD-10-CM

## 2021-10-01 LAB
ALBUMIN SERPL BCP-MCNC: 4 G/DL (ref 3.5–5)
ALP SERPL-CCNC: 82 U/L (ref 46–116)
ALT SERPL W P-5'-P-CCNC: 23 U/L (ref 12–78)
ANION GAP SERPL CALCULATED.3IONS-SCNC: 9 MMOL/L (ref 4–13)
AST SERPL W P-5'-P-CCNC: 20 U/L (ref 5–45)
ATRIAL RATE: 76 BPM
BASOPHILS # BLD AUTO: 0.03 THOUSANDS/ΜL (ref 0–0.1)
BASOPHILS NFR BLD AUTO: 0 % (ref 0–1)
BILIRUB SERPL-MCNC: 0.32 MG/DL (ref 0.2–1)
BUN SERPL-MCNC: 12 MG/DL (ref 5–25)
CALCIUM SERPL-MCNC: 9.2 MG/DL (ref 8.3–10.1)
CHLORIDE SERPL-SCNC: 103 MMOL/L (ref 100–108)
CO2 SERPL-SCNC: 28 MMOL/L (ref 21–32)
CREAT SERPL-MCNC: 0.8 MG/DL (ref 0.6–1.3)
EOSINOPHIL # BLD AUTO: 0.21 THOUSAND/ΜL (ref 0–0.61)
EOSINOPHIL NFR BLD AUTO: 3 % (ref 0–6)
ERYTHROCYTE [DISTWIDTH] IN BLOOD BY AUTOMATED COUNT: 12.4 % (ref 11.6–15.1)
GFR SERPL CREATININE-BSD FRML MDRD: 103 ML/MIN/1.73SQ M
GLUCOSE SERPL-MCNC: 99 MG/DL (ref 65–140)
HCT VFR BLD AUTO: 47.4 % (ref 36.5–49.3)
HGB BLD-MCNC: 15.6 G/DL (ref 12–17)
IMM GRANULOCYTES # BLD AUTO: 0.01 THOUSAND/UL (ref 0–0.2)
IMM GRANULOCYTES NFR BLD AUTO: 0 % (ref 0–2)
LYMPHOCYTES # BLD AUTO: 3.14 THOUSANDS/ΜL (ref 0.6–4.47)
LYMPHOCYTES NFR BLD AUTO: 42 % (ref 14–44)
MCH RBC QN AUTO: 30.6 PG (ref 26.8–34.3)
MCHC RBC AUTO-ENTMCNC: 32.9 G/DL (ref 31.4–37.4)
MCV RBC AUTO: 93 FL (ref 82–98)
MONOCYTES # BLD AUTO: 0.67 THOUSAND/ΜL (ref 0.17–1.22)
MONOCYTES NFR BLD AUTO: 9 % (ref 4–12)
NEUTROPHILS # BLD AUTO: 3.48 THOUSANDS/ΜL (ref 1.85–7.62)
NEUTS SEG NFR BLD AUTO: 46 % (ref 43–75)
NRBC BLD AUTO-RTO: 0 /100 WBCS
P AXIS: 12 DEGREES
PLATELET # BLD AUTO: 203 THOUSANDS/UL (ref 149–390)
PMV BLD AUTO: 9.8 FL (ref 8.9–12.7)
POTASSIUM SERPL-SCNC: 3.6 MMOL/L (ref 3.5–5.3)
PR INTERVAL: 128 MS
PROT SERPL-MCNC: 8 G/DL (ref 6.4–8.2)
QRS AXIS: 35 DEGREES
QRSD INTERVAL: 100 MS
QT INTERVAL: 366 MS
QTC INTERVAL: 411 MS
RBC # BLD AUTO: 5.1 MILLION/UL (ref 3.88–5.62)
SODIUM SERPL-SCNC: 140 MMOL/L (ref 136–145)
T WAVE AXIS: 30 DEGREES
TROPONIN I SERPL-MCNC: <0.02 NG/ML
VENTRICULAR RATE: 76 BPM
WBC # BLD AUTO: 7.54 THOUSAND/UL (ref 4.31–10.16)

## 2021-10-01 PROCEDURE — 99285 EMERGENCY DEPT VISIT HI MDM: CPT

## 2021-10-01 PROCEDURE — 36415 COLL VENOUS BLD VENIPUNCTURE: CPT | Performed by: EMERGENCY MEDICINE

## 2021-10-01 PROCEDURE — 84484 ASSAY OF TROPONIN QUANT: CPT | Performed by: EMERGENCY MEDICINE

## 2021-10-01 PROCEDURE — 80053 COMPREHEN METABOLIC PANEL: CPT | Performed by: EMERGENCY MEDICINE

## 2021-10-01 PROCEDURE — 93010 ELECTROCARDIOGRAM REPORT: CPT | Performed by: INTERNAL MEDICINE

## 2021-10-01 PROCEDURE — 85025 COMPLETE CBC W/AUTO DIFF WBC: CPT | Performed by: EMERGENCY MEDICINE

## 2021-10-01 PROCEDURE — 71045 X-RAY EXAM CHEST 1 VIEW: CPT

## 2021-10-01 PROCEDURE — 93005 ELECTROCARDIOGRAM TRACING: CPT

## 2021-10-01 PROCEDURE — 99285 EMERGENCY DEPT VISIT HI MDM: CPT | Performed by: EMERGENCY MEDICINE

## 2021-10-06 ENCOUNTER — TELEPHONE (OUTPATIENT)
Dept: GASTROENTEROLOGY | Facility: HOSPITAL | Age: 51
End: 2021-10-06

## 2021-10-09 ENCOUNTER — TELEPHONE (OUTPATIENT)
Dept: OTHER | Facility: OTHER | Age: 51
End: 2021-10-09

## 2021-10-19 DIAGNOSIS — E78.2 MIXED HYPERLIPIDEMIA: ICD-10-CM

## 2021-10-19 DIAGNOSIS — K21.9 GASTROESOPHAGEAL REFLUX DISEASE, UNSPECIFIED WHETHER ESOPHAGITIS PRESENT: ICD-10-CM

## 2021-10-19 DIAGNOSIS — I10 ESSENTIAL HYPERTENSION: ICD-10-CM

## 2021-10-19 RX ORDER — ROSUVASTATIN CALCIUM 5 MG/1
5 TABLET, COATED ORAL DAILY
Qty: 90 TABLET | Refills: 0 | Status: SHIPPED | OUTPATIENT
Start: 2021-10-19 | End: 2021-12-14 | Stop reason: SDUPTHER

## 2021-10-19 RX ORDER — AMLODIPINE BESYLATE 5 MG/1
5 TABLET ORAL DAILY
Qty: 90 TABLET | Refills: 0 | Status: SHIPPED | OUTPATIENT
Start: 2021-10-19 | End: 2021-12-01

## 2021-10-19 RX ORDER — BENAZEPRIL HYDROCHLORIDE 40 MG/1
40 TABLET, FILM COATED ORAL DAILY
Qty: 90 TABLET | Refills: 0 | Status: SHIPPED | OUTPATIENT
Start: 2021-10-19 | End: 2021-12-14 | Stop reason: SDUPTHER

## 2021-10-19 RX ORDER — FAMOTIDINE 40 MG/1
40 TABLET, FILM COATED ORAL DAILY
Qty: 90 TABLET | Refills: 0 | Status: SHIPPED | OUTPATIENT
Start: 2021-10-19 | End: 2022-02-04 | Stop reason: SDUPTHER

## 2021-10-22 ENCOUNTER — TELEPHONE (OUTPATIENT)
Dept: OBGYN CLINIC | Facility: MEDICAL CENTER | Age: 51
End: 2021-10-22

## 2021-10-27 ENCOUNTER — OFFICE VISIT (OUTPATIENT)
Dept: PAIN MEDICINE | Facility: CLINIC | Age: 51
End: 2021-10-27
Payer: COMMERCIAL

## 2021-10-27 VITALS
HEART RATE: 89 BPM | SYSTOLIC BLOOD PRESSURE: 149 MMHG | HEIGHT: 73 IN | BODY MASS INDEX: 28.1 KG/M2 | WEIGHT: 212 LBS | DIASTOLIC BLOOD PRESSURE: 89 MMHG

## 2021-10-27 DIAGNOSIS — M54.41 CHRONIC BILATERAL LOW BACK PAIN WITH BILATERAL SCIATICA: ICD-10-CM

## 2021-10-27 DIAGNOSIS — G89.4 CHRONIC PAIN SYNDROME: Primary | ICD-10-CM

## 2021-10-27 DIAGNOSIS — M54.16 LUMBAR RADICULOPATHY: ICD-10-CM

## 2021-10-27 DIAGNOSIS — G89.29 CHRONIC BILATERAL LOW BACK PAIN WITH BILATERAL SCIATICA: ICD-10-CM

## 2021-10-27 DIAGNOSIS — M51.36 LUMBAR DEGENERATIVE DISC DISEASE: ICD-10-CM

## 2021-10-27 DIAGNOSIS — M54.42 CHRONIC BILATERAL LOW BACK PAIN WITH BILATERAL SCIATICA: ICD-10-CM

## 2021-10-27 PROCEDURE — 99214 OFFICE O/P EST MOD 30 MIN: CPT | Performed by: ANESTHESIOLOGY

## 2021-11-01 ENCOUNTER — TELEPHONE (OUTPATIENT)
Dept: RADIOLOGY | Facility: CLINIC | Age: 51
End: 2021-11-01

## 2021-11-05 ENCOUNTER — TELEPHONE (OUTPATIENT)
Dept: FAMILY MEDICINE CLINIC | Facility: MEDICAL CENTER | Age: 51
End: 2021-11-05

## 2021-11-16 DIAGNOSIS — Z01.818 PRE-OP TESTING: Primary | ICD-10-CM

## 2021-11-16 DIAGNOSIS — G89.4 CHRONIC PAIN SYNDROME: ICD-10-CM

## 2021-11-16 DIAGNOSIS — Z79.899 ENCOUNTER FOR LONG-TERM (CURRENT) USE OF OTHER MEDICATIONS: ICD-10-CM

## 2021-11-16 DIAGNOSIS — G89.29 CHRONIC BILATERAL LOW BACK PAIN WITH BILATERAL SCIATICA: ICD-10-CM

## 2021-11-16 DIAGNOSIS — M54.41 CHRONIC BILATERAL LOW BACK PAIN WITH BILATERAL SCIATICA: ICD-10-CM

## 2021-11-16 DIAGNOSIS — M51.36 DEGENERATION OF LUMBAR INTERVERTEBRAL DISC: ICD-10-CM

## 2021-11-16 DIAGNOSIS — M54.16 LUMBAR RADICULOPATHY: ICD-10-CM

## 2021-11-16 DIAGNOSIS — Z79.01 CHRONIC ANTICOAGULATION: ICD-10-CM

## 2021-11-16 DIAGNOSIS — M54.42 CHRONIC BILATERAL LOW BACK PAIN WITH BILATERAL SCIATICA: ICD-10-CM

## 2021-11-17 ENCOUNTER — OFFICE VISIT (OUTPATIENT)
Dept: PAIN MEDICINE | Facility: CLINIC | Age: 51
End: 2021-11-17
Payer: COMMERCIAL

## 2021-11-17 VITALS
HEART RATE: 89 BPM | SYSTOLIC BLOOD PRESSURE: 149 MMHG | WEIGHT: 212 LBS | DIASTOLIC BLOOD PRESSURE: 92 MMHG | BODY MASS INDEX: 28.1 KG/M2 | HEIGHT: 73 IN

## 2021-11-17 DIAGNOSIS — M51.36 LUMBAR DEGENERATIVE DISC DISEASE: ICD-10-CM

## 2021-11-17 DIAGNOSIS — G89.4 CHRONIC PAIN SYNDROME: Primary | ICD-10-CM

## 2021-11-17 DIAGNOSIS — M54.41 CHRONIC BILATERAL LOW BACK PAIN WITH BILATERAL SCIATICA: ICD-10-CM

## 2021-11-17 DIAGNOSIS — G89.29 CHRONIC BILATERAL LOW BACK PAIN WITH BILATERAL SCIATICA: ICD-10-CM

## 2021-11-17 DIAGNOSIS — M54.16 LUMBAR RADICULOPATHY: ICD-10-CM

## 2021-11-17 DIAGNOSIS — M54.42 CHRONIC BILATERAL LOW BACK PAIN WITH BILATERAL SCIATICA: ICD-10-CM

## 2021-11-17 PROCEDURE — 99214 OFFICE O/P EST MOD 30 MIN: CPT | Performed by: ANESTHESIOLOGY

## 2021-11-24 ENCOUNTER — HOSPITAL ENCOUNTER (EMERGENCY)
Facility: HOSPITAL | Age: 51
Discharge: HOME | End: 2021-11-24
Attending: EMERGENCY MEDICINE
Payer: COMMERCIAL

## 2021-11-24 ENCOUNTER — APPOINTMENT (OUTPATIENT)
Dept: LAB | Facility: CLINIC | Age: 51
End: 2021-11-24
Payer: COMMERCIAL

## 2021-11-24 VITALS
HEIGHT: 73 IN | DIASTOLIC BLOOD PRESSURE: 80 MMHG | RESPIRATION RATE: 18 BRPM | TEMPERATURE: 98.2 F | OXYGEN SATURATION: 99 % | SYSTOLIC BLOOD PRESSURE: 167 MMHG | BODY MASS INDEX: 28.1 KG/M2 | WEIGHT: 212 LBS | HEART RATE: 92 BPM

## 2021-11-24 DIAGNOSIS — Z79.899 ENCOUNTER FOR LONG-TERM (CURRENT) USE OF OTHER MEDICATIONS: ICD-10-CM

## 2021-11-24 DIAGNOSIS — E78.2 MIXED HYPERLIPIDEMIA: ICD-10-CM

## 2021-11-24 DIAGNOSIS — Z79.01 CHRONIC ANTICOAGULATION: ICD-10-CM

## 2021-11-24 DIAGNOSIS — G89.29 CHRONIC BILATERAL LOW BACK PAIN WITH BILATERAL SCIATICA: ICD-10-CM

## 2021-11-24 DIAGNOSIS — M54.41 CHRONIC BILATERAL LOW BACK PAIN WITH BILATERAL SCIATICA: ICD-10-CM

## 2021-11-24 DIAGNOSIS — Z12.5 SCREENING FOR PROSTATE CANCER: ICD-10-CM

## 2021-11-24 DIAGNOSIS — M51.36 DEGENERATION OF LUMBAR INTERVERTEBRAL DISC: ICD-10-CM

## 2021-11-24 DIAGNOSIS — G89.4 CHRONIC PAIN SYNDROME: ICD-10-CM

## 2021-11-24 DIAGNOSIS — M54.42 CHRONIC BILATERAL LOW BACK PAIN WITH BILATERAL SCIATICA: ICD-10-CM

## 2021-11-24 DIAGNOSIS — M54.16 LUMBAR RADICULOPATHY: ICD-10-CM

## 2021-11-24 DIAGNOSIS — N52.9 ERECTILE DYSFUNCTION, UNSPECIFIED ERECTILE DYSFUNCTION TYPE: ICD-10-CM

## 2021-11-24 DIAGNOSIS — R74.01 TRANSAMINITIS: ICD-10-CM

## 2021-11-24 DIAGNOSIS — Z20.822 ENCOUNTER FOR LABORATORY TESTING FOR COVID-19 VIRUS: Primary | ICD-10-CM

## 2021-11-24 LAB
ALBUMIN SERPL BCP-MCNC: 4.1 G/DL (ref 3.5–5)
ALP SERPL-CCNC: 96 U/L (ref 46–116)
ALT SERPL W P-5'-P-CCNC: 23 U/L (ref 12–78)
ANION GAP SERPL CALCULATED.3IONS-SCNC: 9 MMOL/L (ref 4–13)
APTT PPP: 30 SECONDS (ref 23–37)
AST SERPL W P-5'-P-CCNC: 16 U/L (ref 5–45)
BILIRUB DIRECT SERPL-MCNC: 0.09 MG/DL (ref 0–0.2)
BILIRUB SERPL-MCNC: 0.56 MG/DL (ref 0.2–1)
BUN SERPL-MCNC: 11 MG/DL (ref 5–25)
CALCIUM SERPL-MCNC: 9.3 MG/DL (ref 8.3–10.1)
CHLORIDE SERPL-SCNC: 101 MMOL/L (ref 100–108)
CHOLEST SERPL-MCNC: 251 MG/DL
CO2 SERPL-SCNC: 29 MMOL/L (ref 21–32)
CREAT SERPL-MCNC: 1.07 MG/DL (ref 0.6–1.3)
ERYTHROCYTE [DISTWIDTH] IN BLOOD BY AUTOMATED COUNT: 13.1 % (ref 11.6–15.1)
EST. AVERAGE GLUCOSE BLD GHB EST-MCNC: 108 MG/DL
GFR SERPL CREATININE-BSD FRML MDRD: 80 ML/MIN/1.73SQ M
GLUCOSE P FAST SERPL-MCNC: 114 MG/DL (ref 65–99)
HBA1C MFR BLD: 5.4 %
HCT VFR BLD AUTO: 48.4 % (ref 36.5–49.3)
HDLC SERPL-MCNC: 69 MG/DL
HGB BLD-MCNC: 15.3 G/DL (ref 12–17)
INR PPP: 0.98 (ref 0.84–1.19)
LDLC SERPL CALC-MCNC: 161 MG/DL (ref 0–100)
LDLC SERPL DIRECT ASSAY-MCNC: 163 MG/DL (ref 0–100)
MCH RBC QN AUTO: 30.2 PG (ref 26.8–34.3)
MCHC RBC AUTO-ENTMCNC: 31.6 G/DL (ref 31.4–37.4)
MCV RBC AUTO: 96 FL (ref 82–98)
NONHDLC SERPL-MCNC: 182 MG/DL
PLATELET # BLD AUTO: 237 THOUSANDS/UL (ref 149–390)
PMV BLD AUTO: 10.3 FL (ref 8.9–12.7)
POTASSIUM SERPL-SCNC: 3.4 MMOL/L (ref 3.5–5.3)
PROT SERPL-MCNC: 8 G/DL (ref 6.4–8.2)
PROTHROMBIN TIME: 13 SECONDS (ref 11.6–14.5)
PSA SERPL-MCNC: 0.3 NG/ML (ref 0–4)
RBC # BLD AUTO: 5.07 MILLION/UL (ref 3.88–5.62)
SODIUM SERPL-SCNC: 139 MMOL/L (ref 136–145)
T4 FREE SERPL-MCNC: 1.05 NG/DL (ref 0.76–1.46)
TRIGL SERPL-MCNC: 103 MG/DL
TSH SERPL DL<=0.05 MIU/L-ACNC: 0.98 UIU/ML (ref 0.36–3.74)
WBC # BLD AUTO: 8.73 THOUSAND/UL (ref 4.31–10.16)

## 2021-11-24 PROCEDURE — 83036 HEMOGLOBIN GLYCOSYLATED A1C: CPT

## 2021-11-24 PROCEDURE — U0003 INFECTIOUS AGENT DETECTION BY NUCLEIC ACID (DNA OR RNA); SEVERE ACUTE RESPIRATORY SYNDROME CORONAVIRUS 2 (SARS-COV-2) (CORONAVIRUS DISEASE [COVID-19]), AMPLIFIED PROBE TECHNIQUE, MAKING USE OF HIGH THROUGHPUT TECHNOLOGIES AS DESCRIBED BY CMS-2020-01-R: HCPCS | Performed by: EMERGENCY MEDICINE

## 2021-11-24 PROCEDURE — 80053 COMPREHEN METABOLIC PANEL: CPT

## 2021-11-24 PROCEDURE — 85730 THROMBOPLASTIN TIME PARTIAL: CPT

## 2021-11-24 PROCEDURE — 85610 PROTHROMBIN TIME: CPT

## 2021-11-24 PROCEDURE — G0103 PSA SCREENING: HCPCS

## 2021-11-24 PROCEDURE — 80061 LIPID PANEL: CPT

## 2021-11-24 PROCEDURE — 36415 COLL VENOUS BLD VENIPUNCTURE: CPT

## 2021-11-24 PROCEDURE — 99283 EMERGENCY DEPT VISIT LOW MDM: CPT

## 2021-11-24 PROCEDURE — 84443 ASSAY THYROID STIM HORMONE: CPT

## 2021-11-24 PROCEDURE — 82248 BILIRUBIN DIRECT: CPT

## 2021-11-24 PROCEDURE — 85027 COMPLETE CBC AUTOMATED: CPT

## 2021-11-24 PROCEDURE — 99283 EMERGENCY DEPT VISIT LOW MDM: CPT | Performed by: EMERGENCY MEDICINE

## 2021-11-24 PROCEDURE — 83721 ASSAY OF BLOOD LIPOPROTEIN: CPT

## 2021-11-24 PROCEDURE — 84439 ASSAY OF FREE THYROXINE: CPT

## 2021-11-27 LAB — SARS-COV-2 RNA RESP QL NAA+PROBE: NEGATIVE

## 2021-12-01 ENCOUNTER — HOSPITAL ENCOUNTER (OUTPATIENT)
Dept: RADIOLOGY | Facility: CLINIC | Age: 51
Discharge: HOME | End: 2021-12-01
Attending: ANESTHESIOLOGY
Payer: COMMERCIAL

## 2021-12-01 VITALS
TEMPERATURE: 97.6 F | DIASTOLIC BLOOD PRESSURE: 104 MMHG | HEART RATE: 92 BPM | SYSTOLIC BLOOD PRESSURE: 150 MMHG | OXYGEN SATURATION: 96 % | RESPIRATION RATE: 18 BRPM

## 2021-12-01 DIAGNOSIS — M54.41 CHRONIC BILATERAL LOW BACK PAIN WITH BILATERAL SCIATICA: ICD-10-CM

## 2021-12-01 DIAGNOSIS — G89.29 CHRONIC BILATERAL LOW BACK PAIN WITH BILATERAL SCIATICA: ICD-10-CM

## 2021-12-01 DIAGNOSIS — G89.4 CHRONIC PAIN SYNDROME: ICD-10-CM

## 2021-12-01 DIAGNOSIS — I10 ESSENTIAL HYPERTENSION: ICD-10-CM

## 2021-12-01 DIAGNOSIS — M51.36 DEGENERATIVE DISC DISEASE, LUMBAR: ICD-10-CM

## 2021-12-01 DIAGNOSIS — M54.42 CHRONIC BILATERAL LOW BACK PAIN WITH BILATERAL SCIATICA: ICD-10-CM

## 2021-12-01 DIAGNOSIS — M54.16 LUMBAR RADICULOPATHY: ICD-10-CM

## 2021-12-01 PROCEDURE — 96374 THER/PROPH/DIAG INJ IV PUSH: CPT

## 2021-12-01 PROCEDURE — 63650 IMPLANT NEUROELECTRODES: CPT | Performed by: ANESTHESIOLOGY

## 2021-12-01 PROCEDURE — C1787 PATIENT PROGR, NEUROSTIM: HCPCS

## 2021-12-01 PROCEDURE — C1897 LEAD, NEUROSTIM TEST KIT: HCPCS

## 2021-12-01 RX ORDER — CEPHALEXIN 500 MG/1
500 CAPSULE ORAL EVERY 6 HOURS SCHEDULED
Qty: 28 CAPSULE | Refills: 0 | Status: SHIPPED | OUTPATIENT
Start: 2021-12-01 | End: 2021-12-08

## 2021-12-01 RX ORDER — LIDOCAINE HYDROCHLORIDE 10 MG/ML
10 INJECTION, SOLUTION EPIDURAL; INFILTRATION; INTRACAUDAL; PERINEURAL ONCE
Status: COMPLETED | OUTPATIENT
Start: 2021-12-01 | End: 2021-12-01

## 2021-12-01 RX ORDER — AMLODIPINE BESYLATE 5 MG/1
TABLET ORAL
Qty: 90 TABLET | Refills: 0 | Status: SHIPPED | OUTPATIENT
Start: 2021-12-01 | End: 2021-12-14 | Stop reason: SDUPTHER

## 2021-12-01 RX ORDER — CEFAZOLIN SODIUM 2 G/50ML
2000 SOLUTION INTRAVENOUS ONCE
Status: COMPLETED | OUTPATIENT
Start: 2021-12-01 | End: 2021-12-01

## 2021-12-01 RX ADMIN — CEFAZOLIN SODIUM 2000 MG: 2 SOLUTION INTRAVENOUS at 13:15

## 2021-12-01 RX ADMIN — LIDOCAINE HYDROCHLORIDE 5 ML: 10 INJECTION, SOLUTION EPIDURAL; INFILTRATION; INTRACAUDAL; PERINEURAL at 13:36

## 2021-12-02 ENCOUNTER — TELEPHONE (OUTPATIENT)
Dept: RADIOLOGY | Facility: CLINIC | Age: 51
End: 2021-12-02

## 2021-12-07 ENCOUNTER — OFFICE VISIT (OUTPATIENT)
Dept: PAIN MEDICINE | Facility: CLINIC | Age: 51
End: 2021-12-07

## 2021-12-07 ENCOUNTER — APPOINTMENT (OUTPATIENT)
Dept: RADIOLOGY | Facility: CLINIC | Age: 51
End: 2021-12-07
Payer: COMMERCIAL

## 2021-12-07 DIAGNOSIS — M54.41 CHRONIC BILATERAL LOW BACK PAIN WITH BILATERAL SCIATICA: ICD-10-CM

## 2021-12-07 DIAGNOSIS — Z96.89 SPINAL CORD STIMULATOR STATUS: ICD-10-CM

## 2021-12-07 DIAGNOSIS — M54.16 LUMBAR RADICULOPATHY: ICD-10-CM

## 2021-12-07 DIAGNOSIS — M54.42 CHRONIC BILATERAL LOW BACK PAIN WITH BILATERAL SCIATICA: ICD-10-CM

## 2021-12-07 DIAGNOSIS — G89.29 CHRONIC BILATERAL LOW BACK PAIN WITH BILATERAL SCIATICA: ICD-10-CM

## 2021-12-07 DIAGNOSIS — G89.4 CHRONIC PAIN SYNDROME: ICD-10-CM

## 2021-12-07 DIAGNOSIS — Z96.89 SPINAL CORD STIMULATOR STATUS: Primary | ICD-10-CM

## 2021-12-07 PROCEDURE — 99024 POSTOP FOLLOW-UP VISIT: CPT | Performed by: ANESTHESIOLOGY

## 2021-12-07 PROCEDURE — 72080 X-RAY EXAM THORACOLMB 2/> VW: CPT

## 2021-12-08 ENCOUNTER — TELEPHONE (OUTPATIENT)
Dept: NEUROSURGERY | Facility: CLINIC | Age: 51
End: 2021-12-08

## 2021-12-13 ENCOUNTER — TELEPHONE (OUTPATIENT)
Dept: FAMILY MEDICINE CLINIC | Facility: MEDICAL CENTER | Age: 51
End: 2021-12-13

## 2021-12-13 DIAGNOSIS — I10 ESSENTIAL HYPERTENSION: ICD-10-CM

## 2021-12-13 DIAGNOSIS — E78.2 MIXED HYPERLIPIDEMIA: ICD-10-CM

## 2021-12-14 RX ORDER — BENAZEPRIL HYDROCHLORIDE 40 MG/1
40 TABLET, FILM COATED ORAL DAILY
Qty: 30 TABLET | Refills: 0 | Status: SHIPPED | OUTPATIENT
Start: 2021-12-14 | End: 2022-02-04 | Stop reason: SDUPTHER

## 2021-12-14 RX ORDER — AMLODIPINE BESYLATE 5 MG/1
5 TABLET ORAL DAILY
Qty: 30 TABLET | Refills: 0 | Status: SHIPPED | OUTPATIENT
Start: 2021-12-14 | End: 2022-02-04 | Stop reason: SDUPTHER

## 2021-12-14 RX ORDER — ROSUVASTATIN CALCIUM 5 MG/1
5 TABLET, COATED ORAL DAILY
Qty: 30 TABLET | Refills: 0 | Status: SHIPPED | OUTPATIENT
Start: 2021-12-14 | End: 2022-02-04 | Stop reason: SDUPTHER

## 2022-01-10 ENCOUNTER — TELEPHONE (OUTPATIENT)
Dept: NEUROSURGERY | Facility: CLINIC | Age: 52
End: 2022-01-10

## 2022-01-10 ENCOUNTER — OFFICE VISIT (OUTPATIENT)
Dept: NEUROSURGERY | Facility: CLINIC | Age: 52
End: 2022-01-10
Payer: COMMERCIAL

## 2022-01-10 VITALS
DIASTOLIC BLOOD PRESSURE: 90 MMHG | BODY MASS INDEX: 28.1 KG/M2 | HEART RATE: 100 BPM | TEMPERATURE: 98.2 F | HEIGHT: 73 IN | WEIGHT: 212 LBS | SYSTOLIC BLOOD PRESSURE: 142 MMHG | RESPIRATION RATE: 18 BRPM

## 2022-01-10 DIAGNOSIS — Z72.0 TOBACCO ABUSE: ICD-10-CM

## 2022-01-10 DIAGNOSIS — M54.16 LUMBAR RADICULOPATHY: ICD-10-CM

## 2022-01-10 DIAGNOSIS — G89.29 CHRONIC BILATERAL LOW BACK PAIN WITH BILATERAL SCIATICA: ICD-10-CM

## 2022-01-10 DIAGNOSIS — M54.42 CHRONIC BILATERAL LOW BACK PAIN WITH BILATERAL SCIATICA: ICD-10-CM

## 2022-01-10 DIAGNOSIS — M54.41 CHRONIC BILATERAL LOW BACK PAIN WITH BILATERAL SCIATICA: ICD-10-CM

## 2022-01-10 DIAGNOSIS — G89.4 CHRONIC PAIN SYNDROME: Primary | ICD-10-CM

## 2022-01-10 PROCEDURE — 99215 OFFICE O/P EST HI 40 MIN: CPT | Performed by: NEUROLOGICAL SURGERY

## 2022-01-10 RX ORDER — CHLORHEXIDINE GLUCONATE 0.12 MG/ML
15 RINSE ORAL ONCE
Status: CANCELLED | OUTPATIENT
Start: 2022-01-10 | End: 2022-01-10

## 2022-01-10 RX ORDER — GABAPENTIN 300 MG/1
300 CAPSULE ORAL ONCE
Status: CANCELLED | OUTPATIENT
Start: 2022-01-10 | End: 2022-01-10

## 2022-01-10 RX ORDER — VANCOMYCIN HYDROCHLORIDE 1 G/200ML
1000 INJECTION, SOLUTION INTRAVENOUS ONCE
Status: CANCELLED | OUTPATIENT
Start: 2022-01-10 | End: 2022-01-10

## 2022-01-10 RX ORDER — ACETAMINOPHEN 325 MG/1
975 TABLET ORAL ONCE
Status: CANCELLED | OUTPATIENT
Start: 2022-01-10 | End: 2022-01-10

## 2022-01-10 NOTE — TELEPHONE ENCOUNTER
1/10/22  L/M FOR JANIS FROM [employer] [phone]  CLAIM # [id number]  TO CALL BACK TO CONFIRM CLAIM IS OPEN FOR LOW BACK
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
## 2022-01-17 ENCOUNTER — LAB (OUTPATIENT)
Dept: LAB | Facility: CLINIC | Age: 52
End: 2022-01-17
Payer: COMMERCIAL

## 2022-01-17 ENCOUNTER — APPOINTMENT (OUTPATIENT)
Dept: LAB | Facility: CLINIC | Age: 52
End: 2022-01-17
Payer: COMMERCIAL

## 2022-01-17 DIAGNOSIS — M54.42 CHRONIC BILATERAL LOW BACK PAIN WITH BILATERAL SCIATICA: ICD-10-CM

## 2022-01-17 DIAGNOSIS — Z01.89 ENCOUNTER FOR OTHER SPECIFIED SPECIAL EXAMINATIONS: ICD-10-CM

## 2022-01-17 DIAGNOSIS — Z72.0 TOBACCO ABUSE: ICD-10-CM

## 2022-01-17 DIAGNOSIS — M54.41 CHRONIC BILATERAL LOW BACK PAIN WITH BILATERAL SCIATICA: ICD-10-CM

## 2022-01-17 DIAGNOSIS — M54.16 LUMBAR RADICULOPATHY: ICD-10-CM

## 2022-01-17 DIAGNOSIS — G89.29 CHRONIC BILATERAL LOW BACK PAIN WITH BILATERAL SCIATICA: ICD-10-CM

## 2022-01-17 DIAGNOSIS — G89.4 CHRONIC PAIN SYNDROME: ICD-10-CM

## 2022-01-17 LAB
ALBUMIN SERPL BCP-MCNC: 3.8 G/DL (ref 3.5–5)
ALP SERPL-CCNC: 84 U/L (ref 46–116)
ALT SERPL W P-5'-P-CCNC: 20 U/L (ref 12–78)
AMORPH PHOS CRY URNS QL MICRO: ABNORMAL /HPF
ANION GAP SERPL CALCULATED.3IONS-SCNC: 7 MMOL/L (ref 4–13)
APTT PPP: 26 SECONDS (ref 23–37)
AST SERPL W P-5'-P-CCNC: 16 U/L (ref 5–45)
BACTERIA UR QL AUTO: ABNORMAL /HPF
BASOPHILS # BLD AUTO: 0.02 THOUSANDS/ΜL (ref 0–0.1)
BASOPHILS NFR BLD AUTO: 0 % (ref 0–1)
BILIRUB SERPL-MCNC: 0.38 MG/DL (ref 0.2–1)
BILIRUB UR QL STRIP: NEGATIVE
BUN SERPL-MCNC: 14 MG/DL (ref 5–25)
CALCIUM SERPL-MCNC: 9.3 MG/DL (ref 8.3–10.1)
CHLORIDE SERPL-SCNC: 102 MMOL/L (ref 100–108)
CLARITY UR: ABNORMAL
CO2 SERPL-SCNC: 29 MMOL/L (ref 21–32)
COLOR UR: YELLOW
CREAT SERPL-MCNC: 1.03 MG/DL (ref 0.6–1.3)
EOSINOPHIL # BLD AUTO: 0.14 THOUSAND/ΜL (ref 0–0.61)
EOSINOPHIL NFR BLD AUTO: 2 % (ref 0–6)
ERYTHROCYTE [DISTWIDTH] IN BLOOD BY AUTOMATED COUNT: 12.8 % (ref 11.6–15.1)
EST. AVERAGE GLUCOSE BLD GHB EST-MCNC: 114 MG/DL
GFR SERPL CREATININE-BSD FRML MDRD: 83 ML/MIN/1.73SQ M
GLUCOSE SERPL-MCNC: 92 MG/DL (ref 65–140)
GLUCOSE UR STRIP-MCNC: NEGATIVE MG/DL
HBA1C MFR BLD: 5.6 %
HCT VFR BLD AUTO: 47.5 % (ref 36.5–49.3)
HGB BLD-MCNC: 15.2 G/DL (ref 12–17)
HGB UR QL STRIP.AUTO: ABNORMAL
IMM GRANULOCYTES # BLD AUTO: 0.02 THOUSAND/UL (ref 0–0.2)
IMM GRANULOCYTES NFR BLD AUTO: 0 % (ref 0–2)
INR PPP: 0.93 (ref 0.84–1.19)
KETONES UR STRIP-MCNC: NEGATIVE MG/DL
LEUKOCYTE ESTERASE UR QL STRIP: NEGATIVE
LYMPHOCYTES # BLD AUTO: 2.4 THOUSANDS/ΜL (ref 0.6–4.47)
LYMPHOCYTES NFR BLD AUTO: 38 % (ref 14–44)
MCH RBC QN AUTO: 30.6 PG (ref 26.8–34.3)
MCHC RBC AUTO-ENTMCNC: 32 G/DL (ref 31.4–37.4)
MCV RBC AUTO: 96 FL (ref 82–98)
MONOCYTES # BLD AUTO: 0.47 THOUSAND/ΜL (ref 0.17–1.22)
MONOCYTES NFR BLD AUTO: 8 % (ref 4–12)
NEUTROPHILS # BLD AUTO: 3.21 THOUSANDS/ΜL (ref 1.85–7.62)
NEUTS SEG NFR BLD AUTO: 52 % (ref 43–75)
NITRITE UR QL STRIP: NEGATIVE
NON-SQ EPI CELLS URNS QL MICRO: ABNORMAL /HPF
NRBC BLD AUTO-RTO: 0 /100 WBCS
PH UR STRIP.AUTO: 7.5 [PH]
PLATELET # BLD AUTO: 232 THOUSANDS/UL (ref 149–390)
PMV BLD AUTO: 10.1 FL (ref 8.9–12.7)
POTASSIUM SERPL-SCNC: 4.2 MMOL/L (ref 3.5–5.3)
PROT SERPL-MCNC: 7.6 G/DL (ref 6.4–8.2)
PROT UR STRIP-MCNC: NEGATIVE MG/DL
PROTHROMBIN TIME: 12.5 SECONDS (ref 11.6–14.5)
RBC # BLD AUTO: 4.96 MILLION/UL (ref 3.88–5.62)
RBC #/AREA URNS AUTO: ABNORMAL /HPF
SODIUM SERPL-SCNC: 138 MMOL/L (ref 136–145)
SP GR UR STRIP.AUTO: 1.01 (ref 1–1.03)
UROBILINOGEN UR QL STRIP.AUTO: 0.2 E.U./DL
WBC # BLD AUTO: 6.26 THOUSAND/UL (ref 4.31–10.16)
WBC #/AREA URNS AUTO: ABNORMAL /HPF

## 2022-01-17 PROCEDURE — 36415 COLL VENOUS BLD VENIPUNCTURE: CPT

## 2022-01-17 PROCEDURE — 81001 URINALYSIS AUTO W/SCOPE: CPT | Performed by: NEUROLOGICAL SURGERY

## 2022-01-17 PROCEDURE — 80053 COMPREHEN METABOLIC PANEL: CPT

## 2022-01-17 PROCEDURE — 85610 PROTHROMBIN TIME: CPT

## 2022-01-17 PROCEDURE — 93005 ELECTROCARDIOGRAM TRACING: CPT

## 2022-01-17 PROCEDURE — 83036 HEMOGLOBIN GLYCOSYLATED A1C: CPT

## 2022-01-17 PROCEDURE — 85025 COMPLETE CBC W/AUTO DIFF WBC: CPT

## 2022-01-17 PROCEDURE — 85730 THROMBOPLASTIN TIME PARTIAL: CPT

## 2022-01-18 LAB
ATRIAL RATE: 55 BPM
QRS AXIS: 36 DEGREES
QRSD INTERVAL: 86 MS
QT INTERVAL: 338 MS
QTC INTERVAL: 422 MS
T WAVE AXIS: 58 DEGREES
VENTRICULAR RATE: 94 BPM

## 2022-01-18 PROCEDURE — 93010 ELECTROCARDIOGRAM REPORT: CPT | Performed by: INTERNAL MEDICINE

## 2022-01-21 PROCEDURE — U0005 INFEC AGEN DETEC AMPLI PROBE: HCPCS | Performed by: ANESTHESIOLOGY

## 2022-01-21 PROCEDURE — U0003 INFECTIOUS AGENT DETECTION BY NUCLEIC ACID (DNA OR RNA); SEVERE ACUTE RESPIRATORY SYNDROME CORONAVIRUS 2 (SARS-COV-2) (CORONAVIRUS DISEASE [COVID-19]), AMPLIFIED PROBE TECHNIQUE, MAKING USE OF HIGH THROUGHPUT TECHNOLOGIES AS DESCRIBED BY CMS-2020-01-R: HCPCS | Performed by: ANESTHESIOLOGY

## 2022-01-26 ENCOUNTER — TELEPHONE (OUTPATIENT)
Dept: RADIOLOGY | Facility: CLINIC | Age: 52
End: 2022-01-26

## 2022-01-26 NOTE — TELEPHONE ENCOUNTER
Lm for pt to cb  Per [facility], this was done for preop clearance with neurosurgery

## 2022-01-26 NOTE — TELEPHONE ENCOUNTER
BRIANA  S/w pt who was already aware and states neurosurgey and his orthopedic surgeon are aware as well  Pt states he is asymptomatic

## 2022-01-26 NOTE — TELEPHONE ENCOUNTER
----- Message from Saritha Donato MD sent at 1/26/2022 12:10 PM EST -----  Regarding: COVID positive  I am not sure why I am receiving the results of this COVID test because I do not think we ordered it  However, I would like someone to follow-up with that because it was positive  Please have him reach out to his primary care physician regarding next steps because he is COVID positive at this time

## 2022-02-04 ENCOUNTER — OFFICE VISIT (OUTPATIENT)
Dept: FAMILY MEDICINE CLINIC | Facility: MEDICAL CENTER | Age: 52
End: 2022-02-04
Payer: COMMERCIAL

## 2022-02-04 VITALS
BODY MASS INDEX: 27.7 KG/M2 | OXYGEN SATURATION: 99 % | WEIGHT: 209 LBS | HEIGHT: 73 IN | TEMPERATURE: 98.8 F | DIASTOLIC BLOOD PRESSURE: 88 MMHG | HEART RATE: 98 BPM | SYSTOLIC BLOOD PRESSURE: 143 MMHG

## 2022-02-04 DIAGNOSIS — Z13.0 SCREENING FOR DISORDER OF BLOOD AND BLOOD-FORMING ORGANS: ICD-10-CM

## 2022-02-04 DIAGNOSIS — I10 ESSENTIAL HYPERTENSION: ICD-10-CM

## 2022-02-04 DIAGNOSIS — I10 PRIMARY HYPERTENSION: ICD-10-CM

## 2022-02-04 DIAGNOSIS — N52.9 ERECTILE DYSFUNCTION, UNSPECIFIED ERECTILE DYSFUNCTION TYPE: ICD-10-CM

## 2022-02-04 DIAGNOSIS — E78.2 MIXED HYPERLIPIDEMIA: ICD-10-CM

## 2022-02-04 DIAGNOSIS — Z12.2 SCREENING FOR LUNG CANCER: ICD-10-CM

## 2022-02-04 DIAGNOSIS — K21.9 GASTROESOPHAGEAL REFLUX DISEASE, UNSPECIFIED WHETHER ESOPHAGITIS PRESENT: Primary | ICD-10-CM

## 2022-02-04 PROCEDURE — 99214 OFFICE O/P EST MOD 30 MIN: CPT

## 2022-02-04 RX ORDER — AMLODIPINE BESYLATE 5 MG/1
5 TABLET ORAL DAILY
Qty: 90 TABLET | Refills: 1 | Status: SHIPPED | OUTPATIENT
Start: 2022-02-04

## 2022-02-04 RX ORDER — BENAZEPRIL HYDROCHLORIDE 40 MG/1
40 TABLET, FILM COATED ORAL DAILY
Qty: 90 TABLET | Refills: 1 | Status: SHIPPED | OUTPATIENT
Start: 2022-02-04

## 2022-02-04 RX ORDER — ROSUVASTATIN CALCIUM 5 MG/1
5 TABLET, COATED ORAL DAILY
Qty: 90 TABLET | Refills: 1 | Status: SHIPPED | OUTPATIENT
Start: 2022-02-04 | End: 2022-05-26

## 2022-02-04 RX ORDER — SILDENAFIL 25 MG/1
TABLET, FILM COATED ORAL
Qty: 30 TABLET | Refills: 0 | Status: SHIPPED | OUTPATIENT
Start: 2022-02-04 | End: 2022-05-25 | Stop reason: SDUPTHER

## 2022-02-04 RX ORDER — FAMOTIDINE 40 MG/1
40 TABLET, FILM COATED ORAL DAILY
Qty: 90 TABLET | Refills: 1 | Status: SHIPPED | OUTPATIENT
Start: 2022-02-04 | End: 2022-05-05
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
## 2022-02-05 NOTE — PATIENT INSTRUCTIONS
Hypertension   WHAT YOU NEED TO KNOW:   Hypertension is high blood pressure  Your blood pressure is the force of your blood moving against the walls of your arteries  Hypertension causes your blood pressure to get so high that your heart has to work much harder than normal  This can damage your heart  The cause of hypertension may not be known  This is called essential or primary hypertension  Hypertension caused by another medical condition, such as kidney disease, is called secondary hypertension  DISCHARGE INSTRUCTIONS:   Call your local emergency number (911 in the [address] [facility]) or have someone call if:   · You have chest pain  · You have any of the following signs of a heart attack:      ? Squeezing, pressure, or pain in your chest    ? You may  also have any of the following:     § Discomfort or pain in your back, neck, jaw, stomach, or arm    § Shortness of breath    § Nausea or vomiting    § Lightheadedness or a sudden cold sweat    · You become confused or have trouble speaking  · You suddenly feel lightheaded or have trouble breathing  Return to the emergency department if:   · You have a severe headache or vision loss  · You have weakness in an arm or leg  Call your doctor or cardiologist if:   · You feel faint, dizzy, confused, or drowsy  · You have been taking your blood pressure medicine but your pressure is higher than your provider says it should be  · You have questions or concerns about your condition or care  Medicines: You may  need any of the following:  · Antihypertensives  may be used to help lower your blood pressure  Several kinds of medicines are available  Your healthcare provider will choose medicines based on the kind of hypertension you have  You may need more than one type of medicine  Take the medicine exactly as directed  · Diuretics  help decrease extra fluid that collects in your body  This will help lower your blood pressure   You may urinate more often while you take this medicine  · Cholesterol medicine  helps lower your cholesterol level  A low cholesterol level helps prevent heart disease and makes it easier to control your blood pressure  · Take your medicine as directed  Contact your healthcare provider if you think your medicine is not helping or if you have side effects  Tell him or her if you are allergic to any medicine  Keep a list of the medicines, vitamins, and herbs you take  Include the amounts, and when and why you take them  Bring the list or the pill bottles to follow-up visits  Carry your medicine list with you in case of an emergency  Follow up with your doctor or cardiologist as directed: You will need to return to have your blood pressure checked and to have other lab tests done  Write down your questions so you remember to ask them during your visits  Stages of hypertension:       · Normal blood pressure is 119/79 or lower   Your healthcare provider may only check your blood pressure each year if it stays at a normal level  · Elevated blood pressure is 120/79 to 129/79   This is sometimes called prehypertension  Your healthcare provider may suggest lifestyle changes to help lower your blood pressure to a normal level  He or she may then check it again in 3 to 6 months  · Stage 1 hypertension is 130/80  to 139/89   Your provider may recommend lifestyle changes, medication, and checks every 3 to 6 months until your blood pressure is controlled  · Stage 2 hypertension is 140/90 or higher   Your provider will recommend lifestyle changes and have you take 2 kinds of hypertension medicines  You will also need to have your blood pressure checked monthly until it is controlled  Manage hypertension:   · Check your blood pressure at home  Avoid smoking, caffeine, and exercise at least 30 minutes before checking your blood pressure  Sit and rest for 5 minutes before you take your blood pressure   Extend your arm and support it on a flat surface  Your arm should be at the same level as your heart  Follow the directions that came with your blood pressure monitor  Check your blood pressure 2 times, 1 minute apart, before you take your medicine in the morning  Also check your blood pressure before your evening meal  Keep a record of your readings and bring it to your follow-up visits  Ask your healthcare provider what your blood pressure should be  · Manage any other health conditions you have  Health conditions such as diabetes can increase your risk for hypertension  Follow your healthcare provider's instructions and take all your medicines as directed  · Ask about all medicines  Certain medicines can increase your blood pressure  Examples include oral birth control pills, decongestants, herbal supplements, and NSAIDs, such as ibuprofen  Your healthcare provider can tell you which medicines are safe for you to take  This includes prescription and over-the-counter medicines  Lifestyle changes you can make to manage hypertension:  Your healthcare provider may recommend you work with a team to manage hypertension  The team may include medical experts such as a dietitian, an exercise or physical therapist, and a behavior therapist  Your family members may be included in helping you create lifestyle changes  · Limit sodium (salt) as directed  Too much sodium can affect your fluid balance  Check labels to find low-sodium or no-salt-added foods  Some low-sodium foods use potassium salts for flavor  Too much potassium can also cause health problems  Your healthcare provider will tell you how much sodium and potassium are safe for you to have in a day  He or she may recommend that you limit sodium to 2,300 mg a day  · Follow the meal plan recommended by your healthcare provider  A dietitian or your provider can give you more information on low-sodium plans or the DASH (Dietary Approaches to Stop Hypertension) eating plan   The DASH plan is low in sodium, processed sugar, unhealthy fats, and total fat  It is high in potassium, calcium, and fiber  These can be found in vegetables, fruit, and whole-grain foods  · Be physically active throughout the day  Physical activity, such as exercise, can help control your blood pressure and your weight  Be physically active for at least 30 minutes per day, on most days of the week  Include aerobic activity, such as walking or riding a bicycle  Also include strength training at least 2 times each week  Your healthcare providers can help you create a physical activity plan  · Decrease stress  This may help lower your blood pressure  Learn ways to relax, such as deep breathing or listening to music  · Limit alcohol as directed  Alcohol can increase your blood pressure  A drink of alcohol is 12 ounces of beer, 5 ounces of wine, or 1½ ounces of liquor  · Do not smoke  Nicotine and other chemicals in cigarettes and cigars can increase your blood pressure and also cause lung damage  Ask your healthcare provider for information if you currently smoke and need help to quit  E-cigarettes or smokeless tobacco still contain nicotine  Talk to your healthcare provider before you use these products  © Copyright [employer] 2021 Information is for End User's use only and may not be sold, redistributed or otherwise used for commercial purposes  All illustrations and images included in CareNotes® are the copyrighted property of [employer]  or [facility] Kori Palafox   The above information is an  only  It is not intended as medical advice for individual conditions or treatments  Talk to your doctor, nurse or pharmacist before following any medical regimen to see if it is safe and effective for you  Hyperlipidemia   AMBULATORY CARE:   Hyperlipidemia  is a high level of lipids (fats) in your blood  These lipids include cholesterol or triglycerides   Lipids are made by your body  They also come from the foods you eat  Your body needs lipids to work properly, but high levels increase your risk for heart disease, heart attack, and stroke  Call your local emergency number [phone] in the [address]) or have someone call if:   · You have any of the following signs of a heart attack:      ? Squeezing, pressure, or pain in your chest    ? You may  also have any of the following:     § Discomfort or pain in your back, neck, jaw, stomach, or arm    § Shortness of breath    § Nausea or vomiting    § Lightheadedness or a sudden cold sweat    · You have any of the following signs of a stroke:      ? Numbness or drooping on one side of your face     ? Weakness in an arm or leg    ? Confusion or difficulty speaking    ? Dizziness, a severe headache, or vision loss    Call your doctor if:   · You have questions or concerns about your condition or care  Treatment  may first include lifestyle changes to help decrease your lipid levels  Your provider may recommend you work with a team to manage hyperlipidemia  The team may include medical experts such as a dietitian, an exercise or physical therapist, and a behavior therapist  Your family members may be included in helping you create lifestyle changes  You may also need to take medicine to lower your lipid levels  Some of the lifestyle changes you may need to make include the following:  · Maintain a healthy weight  Ask your healthcare provider what a healthy weight is for you  Ask him or her to help you create a weight loss plan if you are overweight  Weight loss can decrease your cholesterol and triglyceride levels  · Be physically active throughout the day  Physical activity, such as exercise, lowers your cholesterol levels and helps you maintain a healthy weight  Get 30 minutes or more of aerobic exercise 4 to 6 days each week  You can split your exercise into four 10-minute workouts instead of 30 minutes at one time   Examples of aerobic exercises include walking briskly, swimming, or riding a bike  Work with your healthcare provider to plan the best exercise program for you  Also include strength training at least 2 times each week  Your healthcare providers can help you create a physical activity plan  · Do not smoke  Nicotine and other chemicals in cigarettes and cigars can increase your risk for a heart attack and stroke  Ask your healthcare provider for information if you currently smoke and need help to quit  E-cigarettes or smokeless tobacco still contain nicotine  Talk to your healthcare provider before you use these products  · Eat heart-healthy foods  A dietitian or your provider can give you more information on low-sodium plans or the DASH (Dietary Approaches to Stop Hypertension) eating plan  The DASH plan is low in sodium, processed sugar, unhealthy fats, and total fat  It is high in potassium, calcium, and fiber  It is high in potassium, calcium, and fiber  These can be found in vegetables, fruit, and whole-grain foods  The following are ways to get more heart-healthy foods:         ? Decrease the total amount of fat you eat  Choose lean meats, fat-free or 1% fat milk, and low-fat dairy products, such as yogurt and cheese  Limit or do not eat red meat  Red meats are high in fat and cholesterol  ? Replace unhealthy fats with healthy fats  Unhealthy fats include saturated fat, trans fat, and cholesterol  Choose soft margarines that are low in saturated fat and have little or no trans fat  Monounsaturated fats are healthy fats  These are found in olive oil, canola oil, avocado, and nuts  Polyunsaturated fats are also healthy  These are found in fish, flaxseed, walnuts, and soybeans  ? Eat 5 or more servings of fruits and vegetables every day  They are low in calories and fat and a good source of essential vitamins  Include dark green, red, and orange vegetables   Examples include spinach, kale, broccoli, and carrots  ? Eat foods high in fiber  Fiber can help lower your cholesterol levels  Choose whole grain, high-fiber foods  Good choices include whole-wheat breads or cereals, beans, peas, fruits, and vegetables  ? Limit sodium (salt) as directed  Too much sodium can affect your fluid balance and blood pressure  Your healthcare provider will tell you how much sodium and potassium are safe for you to have in a day  He or she may recommend that you limit sodium to 2,300 mg a day  Your provider or a dietitian can help you find ways to limit sodium  For example, if you add salt while you cook, do not add more salt at the table  Check labels to find low-sodium or no-salt-added foods  Some low-sodium foods use potassium salts for flavor  Too much potassium can also cause health problems  · Ask your healthcare provider if it is okay for you to drink alcohol  Alcohol can increase your cholesterol and triglyceride levels  Your provider can tell you how many drinks are okay to have within 24 hours and within 1 week  A drink of alcohol is 12 ounces of beer, 5 ounces of wine, or 1½ ounces of liquor  Follow up with your doctor as directed: You may need to return for more tests  Your healthcare provider may refer you to a dietitian  Write down your questions so you remember to ask them during your visits  © Copyright [employer] 2021 Information is for End User's use only and may not be sold, redistributed or otherwise used for commercial purposes  All illustrations and images included in CareNotes® are the copyrighted property of [employer] , Inc  or [facility] Kori Pal[employer]   The above information is an  only  It is not intended as medical advice for individual conditions or treatments  Talk to your doctor, nurse or pharmacist before following any medical regimen to see if it is safe and effective for you      Erectile Dysfunction   WHAT YOU SHOULD KNOW:   Erectile dysfunction (ED), or impotence, is when you cannot get or keep an erection for sexual activity  INSTRUCTIONS:   Medicines:  · ED medicines  that help you have an erection may be prescribed  These medicines are taken before you have sex  Follow your healthcare provider's instructions on when and how to take these medicines  You may have a life-threatening reaction if you mix these medicines with medicines that contain nitrates  Medicines with nitrates include nitroglycerin and other heart medicines  · Testosterone  may be given to increase the levels in your blood and improve your ED  You may need to use a skin cream or wear a patch  · Take your medicine as directed  Call your healthcare provider if you think your medicine is not helping or if you have side effects  Tell him if you are allergic to any medicine  Keep a list of the medicines, vitamins, and herbs you take  Include the amounts, and when and why you take them  Bring the list or the pill bottles to follow-up visits  Carry your medicine list with you in case of an emergency  Follow up with your healthcare provider as directed:  Write down your questions so you remember to ask them during your visits  Manage your risk factors for ED:   · Do not smoke  If you smoke, it is never too late to quit  Ask for information about how to stop smoking if you need help  · Limit alcohol  Men should limit alcohol to 2 drinks a day  A drink of alcohol is 12 ounces of beer, 5 ounces of wine, or 1½ ounces of liquor  · Manage your medical conditions  Eat a variety of healthy foods and stay active  This can help control high blood pressure, diabetes, or obesity  · Reduce stress  Learn ways to relax, such as deep breathing, meditation, and listening to music  Contact your healthcare provider if:   · You have changes in your vision, headaches, or back pain after you take ED medicine  · You have a painful erection  · You have questions or concerns about your condition or care    Return to the emergency department if:   · You have chest pain, dizziness, or nausea after you take ED medicines or during or after sex  · You have an erection for more than 4 hours after you take your ED medicine  · You see blood in your urine  © 2014 [address] is for End User's use only and may not be sold, redistributed or otherwise used for commercial purposes  All illustrations and images included in CareNotes® are the copyrighted property of [employer] M , Inc  or Eleazar Patterson  The above information is an  only  It is not intended as medical advice for individual conditions or treatments  Talk to your doctor, nurse or pharmacist before following any medical regimen to see if it is safe and effective for you  -Get blood work done (lipid panel) in May, fasting      -Schedule colonoscopy  -Continue current medication regimen     -Please schedule visit with Nurse in 1-2 weeks as soon as you are able to for blood pressure check     -Schedule Lung Cancer Screening CT     -Follow up in 6 months

## 2022-02-16 ENCOUNTER — HOSPITAL ENCOUNTER (OUTPATIENT)
Dept: VASCULAR ULTRASOUND | Facility: HOSPITAL | Age: 52
Discharge: HOME | End: 2022-02-16
Payer: COMMERCIAL

## 2022-02-16 DIAGNOSIS — M79.661 PAIN IN RIGHT LOWER LEG: ICD-10-CM

## 2022-02-16 PROCEDURE — 93971 EXTREMITY STUDY: CPT

## 2022-02-16 PROCEDURE — 93971 EXTREMITY STUDY: CPT | Performed by: SURGERY

## 2022-02-23 ENCOUNTER — APPOINTMENT (EMERGENCY)
Dept: CT IMAGING | Facility: HOSPITAL | Age: 52
End: 2022-02-23
Payer: COMMERCIAL

## 2022-02-23 ENCOUNTER — HOSPITAL ENCOUNTER (EMERGENCY)
Facility: HOSPITAL | Age: 52
Discharge: HOME | End: 2022-02-23
Attending: EMERGENCY MEDICINE | Admitting: EMERGENCY MEDICINE
Payer: COMMERCIAL

## 2022-02-23 VITALS
BODY MASS INDEX: 28.23 KG/M2 | RESPIRATION RATE: 17 BRPM | HEART RATE: 99 BPM | TEMPERATURE: 98 F | SYSTOLIC BLOOD PRESSURE: 173 MMHG | WEIGHT: 214 LBS | OXYGEN SATURATION: 99 % | DIASTOLIC BLOOD PRESSURE: 89 MMHG

## 2022-02-23 DIAGNOSIS — R93.89 ABNORMAL FINDING ON CT SCAN: ICD-10-CM

## 2022-02-23 DIAGNOSIS — S29.9XXA CHEST WALL INJURY, INITIAL ENCOUNTER: ICD-10-CM
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
## 2022-02-23 LAB
ALBUMIN SERPL BCP-MCNC: 3.7 G/DL (ref 3.5–5)
ALP SERPL-CCNC: 82 U/L (ref 46–116)
ALT SERPL W P-5'-P-CCNC: 17 U/L (ref 12–78)
ANION GAP SERPL CALCULATED.3IONS-SCNC: 8 MMOL/L (ref 4–13)
APTT PPP: 27 SECONDS (ref 23–37)
AST SERPL W P-5'-P-CCNC: 13 U/L (ref 5–45)
ATRIAL RATE: 98 BPM
BASOPHILS # BLD AUTO: 0.04 THOUSANDS/ΜL (ref 0–0.1)
BASOPHILS NFR BLD AUTO: 0 % (ref 0–1)
BILIRUB SERPL-MCNC: 0.46 MG/DL (ref 0.2–1)
BUN SERPL-MCNC: 18 MG/DL (ref 5–25)
CALCIUM SERPL-MCNC: 9.4 MG/DL (ref 8.3–10.1)
CHLORIDE SERPL-SCNC: 105 MMOL/L (ref 100–108)
CO2 SERPL-SCNC: 30 MMOL/L (ref 21–32)
CREAT SERPL-MCNC: 1.18 MG/DL (ref 0.6–1.3)
EOSINOPHIL # BLD AUTO: 0.22 THOUSAND/ΜL (ref 0–0.61)
EOSINOPHIL NFR BLD AUTO: 2 % (ref 0–6)
ERYTHROCYTE [DISTWIDTH] IN BLOOD BY AUTOMATED COUNT: 12.5 % (ref 11.6–15.1)
GFR SERPL CREATININE-BSD FRML MDRD: 71 ML/MIN/1.73SQ M
GLUCOSE SERPL-MCNC: 102 MG/DL (ref 65–140)
HCT VFR BLD AUTO: 44.3 % (ref 36.5–49.3)
HGB BLD-MCNC: 15.1 G/DL (ref 12–17)
IMM GRANULOCYTES # BLD AUTO: 0.04 THOUSAND/UL (ref 0–0.2)
IMM GRANULOCYTES NFR BLD AUTO: 0 % (ref 0–2)
INR PPP: 1.05 (ref 0.84–1.19)
LYMPHOCYTES # BLD AUTO: 2.51 THOUSANDS/ΜL (ref 0.6–4.47)
LYMPHOCYTES NFR BLD AUTO: 23 % (ref 14–44)
MCH RBC QN AUTO: 30.8 PG (ref 26.8–34.3)
MCHC RBC AUTO-ENTMCNC: 34.1 G/DL (ref 31.4–37.4)
MCV RBC AUTO: 90 FL (ref 82–98)
MONOCYTES # BLD AUTO: 1.16 THOUSAND/ΜL (ref 0.17–1.22)
MONOCYTES NFR BLD AUTO: 11 % (ref 4–12)
NEUTROPHILS # BLD AUTO: 7 THOUSANDS/ΜL (ref 1.85–7.62)
NEUTS SEG NFR BLD AUTO: 64 % (ref 43–75)
NRBC BLD AUTO-RTO: 0 /100 WBCS
P AXIS: -13 DEGREES
PLATELET # BLD AUTO: 245 THOUSANDS/UL (ref 149–390)
PMV BLD AUTO: 9.6 FL (ref 8.9–12.7)
POTASSIUM SERPL-SCNC: 3.6 MMOL/L (ref 3.5–5.3)
PR INTERVAL: 114 MS
PROT SERPL-MCNC: 7.5 G/DL (ref 6.4–8.2)
PROTHROMBIN TIME: 13.3 SECONDS (ref 11.6–14.5)
QRS AXIS: 39 DEGREES
QRSD INTERVAL: 96 MS
QT INTERVAL: 348 MS
QTC INTERVAL: 444 MS
RBC # BLD AUTO: 4.9 MILLION/UL (ref 3.88–5.62)
SODIUM SERPL-SCNC: 143 MMOL/L (ref 136–145)
T WAVE AXIS: 75 DEGREES
VENTRICULAR RATE: 98 BPM
WBC # BLD AUTO: 10.97 THOUSAND/UL (ref 4.31–10.16)

## 2022-02-23 PROCEDURE — 85025 COMPLETE CBC W/AUTO DIFF WBC: CPT | Performed by: PHYSICIAN ASSISTANT

## 2022-02-23 PROCEDURE — 85610 PROTHROMBIN TIME: CPT | Performed by: PHYSICIAN ASSISTANT

## 2022-02-23 PROCEDURE — 93005 ELECTROCARDIOGRAM TRACING: CPT

## 2022-02-23 PROCEDURE — 99284 EMERGENCY DEPT VISIT MOD MDM: CPT | Performed by: PHYSICIAN ASSISTANT

## 2022-02-23 PROCEDURE — 99284 EMERGENCY DEPT VISIT MOD MDM: CPT

## 2022-02-23 PROCEDURE — 71260 CT THORAX DX C+: CPT

## 2022-02-23 PROCEDURE — 80053 COMPREHEN METABOLIC PANEL: CPT | Performed by: PHYSICIAN ASSISTANT

## 2022-02-23 PROCEDURE — 93010 ELECTROCARDIOGRAM REPORT: CPT | Performed by: INTERNAL MEDICINE

## 2022-02-23 PROCEDURE — 36415 COLL VENOUS BLD VENIPUNCTURE: CPT | Performed by: PHYSICIAN ASSISTANT

## 2022-02-23 PROCEDURE — 85730 THROMBOPLASTIN TIME PARTIAL: CPT | Performed by: PHYSICIAN ASSISTANT

## 2022-02-23 RX ORDER — ACETAMINOPHEN 325 MG/1
650 TABLET ORAL ONCE
Status: COMPLETED | OUTPATIENT
Start: 2022-02-23 | End: 2022-02-23

## 2022-02-23 RX ORDER — NAPROXEN 500 MG/1
500 TABLET ORAL 2 TIMES DAILY WITH MEALS
Qty: 30 TABLET | Refills: 0 | Status: SHIPPED | OUTPATIENT
Start: 2022-02-23 | End: 2022-04-12 | Stop reason: HOSPADM

## 2022-02-23 RX ORDER — LIDOCAINE 50 MG/G
2 PATCH TOPICAL ONCE
Status: DISCONTINUED | OUTPATIENT
Start: 2022-02-23 | End: 2022-02-23 | Stop reason: HOSPADM

## 2022-02-23 RX ORDER — METHOCARBAMOL 500 MG/1
500 TABLET, FILM COATED ORAL 2 TIMES DAILY PRN
Qty: 20 TABLET | Refills: 0 | Status: SHIPPED | OUTPATIENT
Start: 2022-02-23

## 2022-02-23 RX ADMIN — LIDOCAINE 5% 2 PATCH: 700 PATCH TOPICAL at 15:27

## 2022-02-23 RX ADMIN — ACETAMINOPHEN 650 MG: 325 TABLET, FILM COATED ORAL at 15:27

## 2022-02-23 RX ADMIN — IOHEXOL 85 ML: 350 INJECTION, SOLUTION INTRAVENOUS at 16:01

## 2022-02-23 NOTE — DISCHARGE INSTRUCTIONS
Your leaving against medical advice  Your understanding of risks in doing so including untreated sternal or rib fractures, internal injury, organ failure, which may result in death  You will need to review your pending test results through Khurram Hu  You may return immediately to the emergency department at any point for re-evaluation of symptoms

## 2022-02-23 NOTE — ED NOTES
Pt went for all imaging & awaiting results  Pt c/o length of time of diagnostic work up  Leaving AMA  PA Garrie Fothergill made aware   IV removed & pt discharged     Pia Schreiber RN  02/23/22 1927
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
## 2022-03-11 ENCOUNTER — TELEPHONE (OUTPATIENT)
Dept: FAMILY MEDICINE CLINIC | Facility: MEDICAL CENTER | Age: 52
End: 2022-03-11

## 2022-03-11 ENCOUNTER — TELEPHONE (OUTPATIENT)
Dept: NEUROSURGERY | Facility: CLINIC | Age: 52
End: 2022-03-11

## 2022-03-11 NOTE — TELEPHONE ENCOUNTER
Pt said that when he broke his ribs, he had imagine and it showed an enlarged kidney  He was told it was not concerning but to notify you of this  Thank you

## 2022-03-11 NOTE — TELEPHONE ENCOUNTER
Received VM message from pt requesting a letter stating that he is having surgery and verifying the surgery date to be sent to [facility], as he has a court date scheduled the same day as his surgery  He requested the letter be emailed to [email or web address] and to himself at [email or web address]  Letter created and emailed to both addresses

## 2022-03-11 NOTE — TELEPHONE ENCOUNTER
Please call patient back and inform him I looked at his CT scan and the "enlarged kidney" he is referring to is hydronephrosis  There are many different things that can cause this  It was suggested that he has a follow up CT scan of his abdomen/pelvis  Please advise him to schedule an office visit with me to discuss further  Thank you

## 2022-03-14 ENCOUNTER — DOCUMENTATION (OUTPATIENT)
Dept: NEUROSURGERY | Facility: CLINIC | Age: 52
End: 2022-03-14

## 2022-03-14 NOTE — PRE-PROCEDURE INSTRUCTIONS
Pre-Surgery Instructions:   Medication Instructions    amLODIPine (NORVASC) 5 mg tablet Instructed to take per normal schedule including DOS with sips water    benazepril (LOTENSIN) 40 MG tablet Instructed to take per normal schedule except DOS    DULoxetine (CYMBALTA) 30 mg delayed release capsule Instructed to take per normal schedule including DOS with sips water    famotidine (PEPCID) 40 MG tablet Instructed to take per normal schedule including DOS with sips water    hydrOXYzine HCL (ATARAX) 25 mg tablet Instructed to take per normal schedule including DOS with sips water    lithium 300 MG tablet Instructed to take per normal schedule including DOS with sips water    methocarbamol (ROBAXIN) 500 mg tablet Instructed to take as needed including DOS with sips water    naproxen (Naprosyn) 500 mg tablet Do not take DOS    risperiDONE (RisperDAL) 0 5 mg tablet Instructed to take per normal schedule including DOS with sips water    rosuvastatin (CRESTOR) 5 mg tablet Instructed to take per normal schedule including DOS with sips water    sertraline (ZOLOFT) 100 mg tablet Instructed to take per normal schedule including DOS with sips water    sildenafil (VIAGRA) 25 MG tablet Do not take DOS    ziprasidone (GEODON) 20 mg capsule Instructed to take per normal schedule including DOS with sips water     Spoke with pt via phone, COVID screening negative  Advised hospital location will call with arrival time night before surgery and NPO after midnight night before  Advised one vaccinated visitor is allowed to accompany pt to wait during the procedure  Instructed to leave contacts/jewelery/valuables at home  No smoking 24 hours prior to surgery and definitely not on the morning of  Instructed to avoid all ASA and OTC Vit/Supp  Tylenol ok to take prn  Reviewed above medication instructions and showering instructions  Patient verbalized understanding of all of the above

## 2022-03-15 ENCOUNTER — ANESTHESIA (OUTPATIENT)
Dept: PERIOP | Facility: HOSPITAL | Age: 52
End: 2022-03-15
Payer: COMMERCIAL

## 2022-03-15 ENCOUNTER — HOSPITAL ENCOUNTER (OUTPATIENT)
Facility: HOSPITAL | Age: 52
Setting detail: OUTPATIENT SURGERY
Discharge: STILL ADMITTED OR ADMINISTRATIVE | End: 2022-03-15
Attending: NEUROLOGICAL SURGERY | Admitting: NEUROLOGICAL SURGERY
Payer: COMMERCIAL

## 2022-03-15 ENCOUNTER — ANESTHESIA EVENT (OUTPATIENT)
Dept: PERIOP | Facility: HOSPITAL | Age: 52
End: 2022-03-15
Payer: COMMERCIAL

## 2022-03-15 ENCOUNTER — HOSPITAL ENCOUNTER (OUTPATIENT)
Facility: HOSPITAL | Age: 52
Setting detail: OBSERVATION
Discharge: HOME | End: 2022-03-16
Attending: EMERGENCY MEDICINE | Admitting: INTERNAL MEDICINE
Payer: COMMERCIAL

## 2022-03-15 VITALS
DIASTOLIC BLOOD PRESSURE: 62 MMHG | TEMPERATURE: 98.9 F | RESPIRATION RATE: 18 BRPM | SYSTOLIC BLOOD PRESSURE: 117 MMHG | HEART RATE: 87 BPM | HEIGHT: 75 IN | OXYGEN SATURATION: 96 % | BODY MASS INDEX: 26.75 KG/M2

## 2022-03-15 VITALS
TEMPERATURE: 97.6 F | OXYGEN SATURATION: 96 % | DIASTOLIC BLOOD PRESSURE: 64 MMHG | HEIGHT: 75 IN | RESPIRATION RATE: 18 BRPM | WEIGHT: 214 LBS | BODY MASS INDEX: 26.61 KG/M2 | SYSTOLIC BLOOD PRESSURE: 118 MMHG | HEART RATE: 75 BPM

## 2022-03-15 DIAGNOSIS — T78.2XXA ANAPHYLACTIC REACTION: Primary | ICD-10-CM

## 2022-03-15 LAB
ALBUMIN SERPL BCP-MCNC: 3.5 G/DL (ref 3.5–5)
ALP SERPL-CCNC: 91 U/L (ref 46–116)
ALT SERPL W P-5'-P-CCNC: 21 U/L (ref 12–78)
ANION GAP SERPL CALCULATED.3IONS-SCNC: 8 MMOL/L (ref 4–13)
AST SERPL W P-5'-P-CCNC: 23 U/L (ref 5–45)
BASOPHILS # BLD AUTO: 0.03 THOUSANDS/ΜL (ref 0–0.1)
BASOPHILS NFR BLD AUTO: 0 % (ref 0–1)
BILIRUB SERPL-MCNC: 0.8 MG/DL (ref 0.2–1)
BUN SERPL-MCNC: 16 MG/DL (ref 5–25)
CALCIUM SERPL-MCNC: 8.4 MG/DL (ref 8.3–10.1)
CHLORIDE SERPL-SCNC: 103 MMOL/L (ref 100–108)
CO2 SERPL-SCNC: 27 MMOL/L (ref 21–32)
CREAT SERPL-MCNC: 0.91 MG/DL (ref 0.6–1.3)
EOSINOPHIL # BLD AUTO: 0.05 THOUSAND/ΜL (ref 0–0.61)
EOSINOPHIL NFR BLD AUTO: 1 % (ref 0–6)
ERYTHROCYTE [DISTWIDTH] IN BLOOD BY AUTOMATED COUNT: 12.2 % (ref 11.6–15.1)
GFR SERPL CREATININE-BSD FRML MDRD: 97 ML/MIN/1.73SQ M
GLUCOSE P FAST SERPL-MCNC: 103 MG/DL (ref 65–99)
GLUCOSE SERPL-MCNC: 103 MG/DL (ref 65–140)
HCT VFR BLD AUTO: 40.9 % (ref 36.5–49.3)
HGB BLD-MCNC: 13.7 G/DL (ref 12–17)
IMM GRANULOCYTES # BLD AUTO: 0.05 THOUSAND/UL (ref 0–0.2)
IMM GRANULOCYTES NFR BLD AUTO: 1 % (ref 0–2)
LITHIUM SERPL-SCNC: <0.2 MMOL/L (ref 0.5–1)
LYMPHOCYTES # BLD AUTO: 0.95 THOUSANDS/ΜL (ref 0.6–4.47)
LYMPHOCYTES NFR BLD AUTO: 10 % (ref 14–44)
MCH RBC QN AUTO: 30 PG (ref 26.8–34.3)
MCHC RBC AUTO-ENTMCNC: 33.5 G/DL (ref 31.4–37.4)
MCV RBC AUTO: 90 FL (ref 82–98)
MONOCYTES # BLD AUTO: 0.56 THOUSAND/ΜL (ref 0.17–1.22)
MONOCYTES NFR BLD AUTO: 6 % (ref 4–12)
NEUTROPHILS # BLD AUTO: 7.92 THOUSANDS/ΜL (ref 1.85–7.62)
NEUTS SEG NFR BLD AUTO: 82 % (ref 43–75)
NRBC BLD AUTO-RTO: 0 /100 WBCS
PLATELET # BLD AUTO: 177 THOUSANDS/UL (ref 149–390)
PMV BLD AUTO: 9.7 FL (ref 8.9–12.7)
POTASSIUM SERPL-SCNC: 3.3 MMOL/L (ref 3.5–5.3)
PROT SERPL-MCNC: 6.8 G/DL (ref 6.4–8.2)
RBC # BLD AUTO: 4.56 MILLION/UL (ref 3.88–5.62)
SODIUM SERPL-SCNC: 138 MMOL/L (ref 136–145)
WBC # BLD AUTO: 9.56 THOUSAND/UL (ref 4.31–10.16)

## 2022-03-15 PROCEDURE — 80178 ASSAY OF LITHIUM: CPT | Performed by: PHYSICIAN ASSISTANT

## 2022-03-15 PROCEDURE — 99214 OFFICE O/P EST MOD 30 MIN: CPT | Performed by: NEUROLOGICAL SURGERY

## 2022-03-15 PROCEDURE — 99220 PR INITIAL OBSERVATION CARE/DAY 70 MINUTES: CPT | Performed by: INTERNAL MEDICINE

## 2022-03-15 PROCEDURE — 93005 ELECTROCARDIOGRAM TRACING: CPT

## 2022-03-15 PROCEDURE — 99284 EMERGENCY DEPT VISIT MOD MDM: CPT | Performed by: PHYSICIAN ASSISTANT

## 2022-03-15 PROCEDURE — 99285 EMERGENCY DEPT VISIT HI MDM: CPT

## 2022-03-15 PROCEDURE — 36415 COLL VENOUS BLD VENIPUNCTURE: CPT | Performed by: PHYSICIAN ASSISTANT

## 2022-03-15 PROCEDURE — 85025 COMPLETE CBC W/AUTO DIFF WBC: CPT | Performed by: PHYSICIAN ASSISTANT

## 2022-03-15 PROCEDURE — 80053 COMPREHEN METABOLIC PANEL: CPT | Performed by: PHYSICIAN ASSISTANT

## 2022-03-15 RX ORDER — METHYLPREDNISOLONE 4 MG/1
20 TABLET ORAL DAILY
Status: COMPLETED | OUTPATIENT
Start: 2022-03-16 | End: 2022-03-16

## 2022-03-15 RX ORDER — DIPHENHYDRAMINE HYDROCHLORIDE 50 MG/ML
25 INJECTION INTRAMUSCULAR; INTRAVENOUS EVERY 6 HOURS PRN
Status: DISCONTINUED | OUTPATIENT
Start: 2022-03-15 | End: 2022-03-16 | Stop reason: HOSPADM

## 2022-03-15 RX ORDER — ZIPRASIDONE HYDROCHLORIDE 20 MG/1
20 CAPSULE ORAL 2 TIMES DAILY WITH MEALS
Status: DISCONTINUED | OUTPATIENT
Start: 2022-03-15 | End: 2022-03-16 | Stop reason: HOSPADM

## 2022-03-15 RX ORDER — RISPERIDONE 0.5 MG/1
0.5 TABLET, FILM COATED ORAL 2 TIMES DAILY
Status: DISCONTINUED | OUTPATIENT
Start: 2022-03-15 | End: 2022-03-16 | Stop reason: HOSPADM

## 2022-03-15 RX ORDER — METHYLPREDNISOLONE 4 MG/1
16 TABLET ORAL DAILY
Status: DISCONTINUED | OUTPATIENT
Start: 2022-03-17 | End: 2022-03-16 | Stop reason: HOSPADM

## 2022-03-15 RX ORDER — EPINEPHRINE 1 MG/ML
0.3 INJECTION, SOLUTION, CONCENTRATE INTRAVENOUS ONCE AS NEEDED
Status: DISCONTINUED | OUTPATIENT
Start: 2022-03-15 | End: 2022-03-16 | Stop reason: HOSPADM

## 2022-03-15 RX ORDER — AMLODIPINE BESYLATE 5 MG/1
5 TABLET ORAL DAILY
Status: DISCONTINUED | OUTPATIENT
Start: 2022-03-15 | End: 2022-03-16 | Stop reason: HOSPADM

## 2022-03-15 RX ORDER — DIPHENHYDRAMINE HYDROCHLORIDE 50 MG/ML
25 INJECTION INTRAMUSCULAR; INTRAVENOUS ONCE
Status: COMPLETED | OUTPATIENT
Start: 2022-03-15 | End: 2022-03-15

## 2022-03-15 RX ORDER — DIPHENHYDRAMINE HYDROCHLORIDE 50 MG/ML
INJECTION INTRAMUSCULAR; INTRAVENOUS
Status: DISCONTINUED
Start: 2022-03-15 | End: 2022-03-15 | Stop reason: HOSPADM

## 2022-03-15 RX ORDER — LITHIUM CARBONATE 300 MG/1
300 TABLET, FILM COATED, EXTENDED RELEASE ORAL EVERY 12 HOURS SCHEDULED
Status: DISCONTINUED | OUTPATIENT
Start: 2022-03-15 | End: 2022-03-16 | Stop reason: HOSPADM

## 2022-03-15 RX ORDER — DULOXETIN HYDROCHLORIDE 30 MG/1
30 CAPSULE, DELAYED RELEASE ORAL DAILY
Status: DISCONTINUED | OUTPATIENT
Start: 2022-03-16 | End: 2022-03-16 | Stop reason: HOSPADM

## 2022-03-15 RX ORDER — LISINOPRIL 20 MG/1
40 TABLET ORAL DAILY
Status: DISCONTINUED | OUTPATIENT
Start: 2022-03-15 | End: 2022-03-15

## 2022-03-15 RX ORDER — SODIUM CHLORIDE 9 MG/ML
125 INJECTION, SOLUTION INTRAVENOUS CONTINUOUS
Status: DISCONTINUED | OUTPATIENT
Start: 2022-03-15 | End: 2022-03-15

## 2022-03-15 RX ORDER — ONDANSETRON 2 MG/ML
4 INJECTION INTRAMUSCULAR; INTRAVENOUS ONCE
Status: CANCELLED | OUTPATIENT
Start: 2022-03-15

## 2022-03-15 RX ORDER — PRAVASTATIN SODIUM 40 MG
40 TABLET ORAL
Status: DISCONTINUED | OUTPATIENT
Start: 2022-03-15 | End: 2022-03-16 | Stop reason: HOSPADM

## 2022-03-15 RX ORDER — METHYLPREDNISOLONE 4 MG/1
4 TABLET ORAL DAILY
Status: DISCONTINUED | OUTPATIENT
Start: 2022-03-20 | End: 2022-03-16 | Stop reason: HOSPADM

## 2022-03-15 RX ORDER — METHYLPREDNISOLONE 4 MG/1
12 TABLET ORAL DAILY
Status: DISCONTINUED | OUTPATIENT
Start: 2022-03-18 | End: 2022-03-16 | Stop reason: HOSPADM

## 2022-03-15 RX ORDER — HYDROMORPHONE HCL/PF 1 MG/ML
0.5 SYRINGE (ML) INJECTION
Status: CANCELLED | OUTPATIENT
Start: 2022-03-15

## 2022-03-15 RX ORDER — ACETAMINOPHEN 325 MG/1
975 TABLET ORAL ONCE
Status: COMPLETED | OUTPATIENT
Start: 2022-03-15 | End: 2022-03-15

## 2022-03-15 RX ORDER — METHYLPREDNISOLONE 4 MG/1
8 TABLET ORAL DAILY
Status: DISCONTINUED | OUTPATIENT
Start: 2022-03-19 | End: 2022-03-16 | Stop reason: HOSPADM

## 2022-03-15 RX ORDER — FAMOTIDINE 20 MG/1
40 TABLET, FILM COATED ORAL DAILY
Status: DISCONTINUED | OUTPATIENT
Start: 2022-03-16 | End: 2022-03-16 | Stop reason: HOSPADM

## 2022-03-15 RX ORDER — CHLORHEXIDINE GLUCONATE 0.12 MG/ML
15 RINSE ORAL ONCE
Status: COMPLETED | OUTPATIENT
Start: 2022-03-15 | End: 2022-03-15

## 2022-03-15 RX ORDER — HYDROXYZINE HYDROCHLORIDE 25 MG/1
25 TABLET, FILM COATED ORAL 3 TIMES DAILY PRN
Status: DISCONTINUED | OUTPATIENT
Start: 2022-03-15 | End: 2022-03-16 | Stop reason: HOSPADM

## 2022-03-15 RX ORDER — VANCOMYCIN HYDROCHLORIDE 1 G/200ML
1000 INJECTION, SOLUTION INTRAVENOUS ONCE
Status: COMPLETED | OUTPATIENT
Start: 2022-03-15 | End: 2022-03-15

## 2022-03-15 RX ORDER — METHYLPREDNISOLONE 4 MG/1
24 TABLET ORAL DAILY
Status: COMPLETED | OUTPATIENT
Start: 2022-03-15 | End: 2022-03-15

## 2022-03-15 RX ORDER — LIDOCAINE HYDROCHLORIDE 10 MG/ML
0.5 INJECTION, SOLUTION EPIDURAL; INFILTRATION; INTRACAUDAL; PERINEURAL ONCE AS NEEDED
Status: DISCONTINUED | OUTPATIENT
Start: 2022-03-15 | End: 2022-03-15 | Stop reason: HOSPADM

## 2022-03-15 RX ORDER — FENTANYL CITRATE/PF 50 MCG/ML
25 SYRINGE (ML) INJECTION
Status: CANCELLED | OUTPATIENT
Start: 2022-03-15

## 2022-03-15 RX ORDER — SERTRALINE HYDROCHLORIDE 100 MG/1
100 TABLET, FILM COATED ORAL EVERY MORNING
Status: DISCONTINUED | OUTPATIENT
Start: 2022-03-16 | End: 2022-03-16 | Stop reason: HOSPADM

## 2022-03-15 RX ORDER — HEPARIN SODIUM 5000 [USP'U]/ML
5000 INJECTION, SOLUTION INTRAVENOUS; SUBCUTANEOUS EVERY 8 HOURS SCHEDULED
Status: DISCONTINUED | OUTPATIENT
Start: 2022-03-15 | End: 2022-03-16 | Stop reason: HOSPADM

## 2022-03-15 RX ORDER — GABAPENTIN 300 MG/1
300 CAPSULE ORAL ONCE
Status: COMPLETED | OUTPATIENT
Start: 2022-03-15 | End: 2022-03-15

## 2022-03-15 RX ORDER — METHOCARBAMOL 500 MG/1
500 TABLET, FILM COATED ORAL 2 TIMES DAILY PRN
Status: DISCONTINUED | OUTPATIENT
Start: 2022-03-15 | End: 2022-03-16 | Stop reason: HOSPADM

## 2022-03-15 RX ORDER — SODIUM CHLORIDE, SODIUM LACTATE, POTASSIUM CHLORIDE, CALCIUM CHLORIDE 600; 310; 30; 20 MG/100ML; MG/100ML; MG/100ML; MG/100ML
125 INJECTION, SOLUTION INTRAVENOUS CONTINUOUS
Status: DISCONTINUED | OUTPATIENT
Start: 2022-03-15 | End: 2022-03-15 | Stop reason: HOSPADM

## 2022-03-15 RX ADMIN — VANCOMYCIN HYDROCHLORIDE 1000 MG: 1 INJECTION, SOLUTION INTRAVENOUS at 08:38

## 2022-03-15 RX ADMIN — DIPHENHYDRAMINE HYDROCHLORIDE 25 MG: 50 INJECTION INTRAMUSCULAR; INTRAVENOUS at 08:51

## 2022-03-15 RX ADMIN — SODIUM CHLORIDE 125 ML/HR: 0.9 INJECTION, SOLUTION INTRAVENOUS at 10:08

## 2022-03-15 RX ADMIN — CHLORHEXIDINE GLUCONATE 0.12% ORAL RINSE 15 ML: 1.2 LIQUID ORAL at 08:38

## 2022-03-15 RX ADMIN — LISINOPRIL 40 MG: 20 TABLET ORAL at 13:11

## 2022-03-15 RX ADMIN — AMLODIPINE BESYLATE 5 MG: 5 TABLET ORAL at 13:11

## 2022-03-15 RX ADMIN — RISPERIDONE 0.5 MG: 0.5 TABLET ORAL at 18:24

## 2022-03-15 RX ADMIN — ZIPRASIDONE HYDROCHLORIDE 20 MG: 20 CAPSULE ORAL at 16:09

## 2022-03-15 RX ADMIN — LITHIUM CARBONATE 300 MG: 300 TABLET, EXTENDED RELEASE ORAL at 21:20

## 2022-03-15 RX ADMIN — ACETAMINOPHEN 975 MG: 325 TABLET ORAL at 08:36

## 2022-03-15 RX ADMIN — GABAPENTIN 300 MG: 300 CAPSULE ORAL at 08:36

## 2022-03-15 RX ADMIN — SODIUM CHLORIDE, SODIUM LACTATE, POTASSIUM CHLORIDE, AND CALCIUM CHLORIDE 125 ML/HR: .6; .31; .03; .02 INJECTION, SOLUTION INTRAVENOUS at 08:42

## 2022-03-15 RX ADMIN — HEPARIN SODIUM 5000 UNITS: 5000 INJECTION INTRAVENOUS; SUBCUTANEOUS at 13:11

## 2022-03-15 RX ADMIN — PRAVASTATIN SODIUM 40 MG: 40 TABLET ORAL at 16:09

## 2022-03-15 RX ADMIN — HEPARIN SODIUM 5000 UNITS: 5000 INJECTION INTRAVENOUS; SUBCUTANEOUS at 21:20

## 2022-03-15 RX ADMIN — METHYLPREDNISOLONE 24 MG: 4 TABLET ORAL at 13:12

## 2022-03-15 NOTE — PROGRESS NOTES
Progress Note - Misha Guillen 46 y o  male MRN: [id number]    Unit/Bed#: OR POOL Encounter: [id number]      Assessment:    Pt examined in pre op for SCS  After administration of gabapentin, vanco and chlorhexidine pt developed facial swelling and intense itching  -treated with discontinuation of all meds  -IV steroids, benadryl and pepcid given  Now epi administered   -pt responding slowly, with some improvement in itching  -still with facial swelling  -will cancel case, have pt follow up with allergist and pcp  Monitor in pre-op till condition improves-will cancel the OR case and discharge pt with follow up in office  -Dr Oralia Drake examined the patient as well  Subjective:   Pt seen in the pre-op  Still with angioedema  Sleepy now  Denies any past issues with medications-no issues with van, gabapentin  Swelling started after the chlorohexidine    Objective:     Vitals: Blood pressure 117/62, pulse 87, temperature 98 9 °F (37 2 °C), temperature source Temporal, resp  rate 18, height 6' 3" (1 905 m), SpO2 96 %  ,Body mass index is 26 75 kg/m²  No intake or output data in the 24 hours ending 03/15/22 0919    Physical Exam:   Awake, alert, sleepy on exam  Facial and lip swelling consistent with angioedema  RRR  CTAB, no wheezing on my exam  Abdomen soft, nontender, nondistended  Extremities ok, hsu        Invasive Devices  Report    Peripheral Intravenous Line            Peripheral IV 03/15/22 Dorsal (posterior); Left Hand <1 day

## 2022-03-15 NOTE — ASSESSMENT & PLAN NOTE
· Patient was in [address] [place] [address] when anaphylaxis occurred  · No history of anaphylaxis, suspected source was oral chlorhexidine rinse (patient became itchy, angioedema, N/V, etc)  · Prior to admission received:  · solumedrol    · Pepcid IV 20  · Epi x3  · Benadryl 25  · Angioedema improving, vitals stable, patient sleeping   · Patient's symptoms have completely resolved  · Patient will be discharged on prednisone, Benadryl and continue Pepcid  · Patient will also be given a EpiPen as needed for any symptoms of anaphylaxis with difficulty breathing  · Patient is hemodynamically stable and will be discharged with outpatient follow-up with PCP  · Also recommend outpatient allergic testing
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
## 2022-03-15 NOTE — DISCHARGE SUMMARY
[facility]  Discharge- Enedelia Phillips 1970, 46 y o  male MRN: [id number]  Unit/Bed#: -01 Encounter: [id number]  Primary Care Provider: TRACIE Carvajal   Date and time admitted to hospital: 3/15/2022 10:04 AM    Chronic pain syndrome  Assessment & Plan  · Secondary to lumbar/cervical radiculitis   · Patient was scheduled to go to OR on 3/15 for procedure- insertion of thoracic spinal cord stimulator electrode  · Continue to follow up with outpatient providers and re-schedule procedure  · Will treat pain as needed    GERD (gastroesophageal reflux disease)  Assessment & Plan  · Continue pepcid    Hyperlipidemia  Assessment & Plan  · Continue statin     Bipolar disorder ([facility] [address] )  Assessment & Plan  · Continue home lithium, risperdol, seroquel    Hypertension  Assessment & Plan  · Continue amlodipine 5 mg  · Home Lotensin switched to lisinopril 40 while inpatient    * Anaphylaxis  Assessment & Plan  · Patient was in [address] [place] [address] [place] when anaphylaxis occurred  · No history of anaphylaxis, suspected source was oral chlorhexidine rinse (patient became itchy, angioedema, N/V, etc)  · Prior to admission received:  · solumedrol    · Pepcid IV 20  · Epi x3  · Benadryl 25  · Angioedema improving, vitals stable, patient sleeping   · Patient's symptoms have completely resolved  · Patient will be discharged on prednisone, Benadryl and continue Pepcid  · Patient will also be given a EpiPen as needed for any symptoms of anaphylaxis with difficulty breathing  · Patient is hemodynamically stable and will be discharged with outpatient follow-up with PCP  · Also recommend outpatient allergic testing    Hospital Course:     Enedelia Phillips is a 46 y o  male patient who originally presented to the hospital on   Admission Orders (From admission, onward)     Ordered        03/15/22 1015  Place in Observation  Once                     due to allergic reaction    Patient was scheduled for spinal cord stimulator placement by Neurosurgery today and patient was noticed to have allergic reaction after use of oral chlorhexidine rinse with symptoms of pruritus, facial swelling, lip swelling, nausea and vomiting  In ER patient received Pepcid, Benadryl, Solu-Medrol and 3 doses of epinephrine  Patient swelling improved significantly  Patient denies any trouble breathing or swallowing at this time  No hives noted  Patient was admitted for observation  Patient was started on prednisone, Benadryl and Pepcid  Patient's symptoms completely resolved  Patient's likely had allergic reaction to chlorhexidine  Patient is having no trouble swallowing or breathing  Facial swelling has resolved  No further signs of any rashes noted  Denies any itching  On exam  Chest-clear to auscultation  Abdomen-soft, nontender  Heart-S1-S2 regular  No signs of rashes or swelling noted on the face, tongue or lips  Neuro-alert awake oriented x3  No focal deficits    Please see above list of diagnoses and related plan for additional information  Follow-up with PCP as outpatient  Follow-up with neurosurgery as outpatient  Use EpiPen as needed in case of severe allergic reaction  Outpatient allergic testing  Return to ER with any worsening rash, difficulty swallowing, difficulty breathing, shortness of breath, chest pain or any other alarming symptoms    Condition at Discharge:  good      Discharge instructions/Information to patient and family:   See after visit summary for information provided to patient and family  Provisions for Follow-Up Care:  See after visit summary for information related to follow-up care and any pertinent home health orders  Disposition:     Home       Discharge Statement:  I spent 45 minutes discharging the patient  This time was spent on the day of discharge  I had direct contact with the patient on the day of discharge   Greater than 50% of the total time was spent examining patient, answering all patient questions, arranging and discussing plan of care with patient as well as directly providing post-discharge instructions  Additional time then spent on discharge activities  Discharge Medications:  See after visit summary for reconciled discharge medications provided to patient and family        ** Please Note: This note has been constructed using a voice recognition system **

## 2022-03-15 NOTE — ASSESSMENT & PLAN NOTE
· Secondary to lumbar/cervical radiculitis   · Patient was scheduled to go to OR on 3/15 for procedure- insertion of thoracic spinal cord stimulator electrode  · Continue to follow up with outpatient providers and re-schedule procedure  · Will treat pain as needed
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
## 2022-03-15 NOTE — ANESTHESIA PREPROCEDURE EVALUATION
Procedure: Insertion of thoracic spinal cord stimulator electrode via laminotomy and placement of left buttock implantable pulse generator (Left Spine Thoracic)    Relevant Problems   CARDIO   (+) Hyperlipidemia   (+) Hypertension   (+) Pain in thoracic spine      GI/HEPATIC   (+) GERD (gastroesophageal reflux disease)   (+) Non-alcoholic fatty liver disease      /RENAL   (+) Kidney stones   (+) Ureteral stone with hydronephrosis      MUSCULOSKELETAL   (+) Chronic bilateral low back pain with bilateral sciatica   (+) Lumbar degenerative disc disease   (+) Lumbar spondylosis   (+) Pain in thoracic spine   (+) Spondylosis of cervical region without myelopathy or radiculopathy      NEURO/PSYCH   (+) Chronic pain syndrome      PULMONARY   (+) DONNA (obstructive sleep apnea)      2011: normal biV systolic function, mild MR  Physical Exam    Airway    Mallampati score: II  TM Distance: >3 FB  Neck ROM: limited     Dental   No notable dental hx     Cardiovascular  Cardiovascular exam normal    Pulmonary  Pulmonary exam normal     Other Findings        Anesthesia Plan  ASA Score- 3     Anesthesia Type- general with ASA Monitors  Additional Monitors:   Airway Plan: ETT  Comment: Pt had severe allergic reaction in pre op, ? Vanco or Chlorhexidine mouth wash  Had severe pruritis, hypotension, angioedema and N/V  Pt had to be treated with Benadryl 25mg iv, Famotidine 20mg IV, Solumedrol 125 mg IV, and 3 doses of 8mcg of IV epinephrine  Pt BP improved, pruritis less, Lips and tongue still swollen  No issues with respiration noted, lungs clear and airway exchange clear  Case cancelled and pt sent to [facility] Plan Factors-    Chart reviewed  EKG reviewed  Existing labs reviewed  Patient is a current smoker  Induction- intravenous  Postoperative Plan- Plan for postoperative opioid use  Informed Consent- Anesthetic plan and risks discussed with patient    I personally reviewed this patient with the CRNA  Discussed and agreed on the Anesthesia Plan with the CRNA  Enzo Hitchcock

## 2022-03-15 NOTE — ASSESSMENT & PLAN NOTE
· Patient was in [address] [place] [address] when anaphylaxis occurred  · No history of anaphylaxis, suspected source was oral chlorhexidine rinse (patient became itchy, angioedema, N/V, etc)  · Prior to admission received:  · solumedrol    · Pepcid IV 20  · Epi x3  · Benadryl 25  · Angioedema improving, vitals stable, patient sleeping   · PO medrol tapered pack over 6 days, continue on D/C  · PRN benadryl   · Continue home pepcid 40 mg   · Consider D/C with epi pen

## 2022-03-16 ENCOUNTER — TELEPHONE (OUTPATIENT)
Dept: FAMILY MEDICINE CLINIC | Facility: MEDICAL CENTER | Age: 52
End: 2022-03-16

## 2022-03-16 LAB
ANION GAP SERPL CALCULATED.3IONS-SCNC: 7 MMOL/L (ref 4–13)
ATRIAL RATE: 96 BPM
BASOPHILS # BLD AUTO: 0.02 THOUSANDS/ΜL (ref 0–0.1)
BASOPHILS NFR BLD AUTO: 0 % (ref 0–1)
BUN SERPL-MCNC: 21 MG/DL (ref 5–25)
CALCIUM SERPL-MCNC: 9.1 MG/DL (ref 8.3–10.1)
CHLORIDE SERPL-SCNC: 104 MMOL/L (ref 100–108)
CO2 SERPL-SCNC: 28 MMOL/L (ref 21–32)
CREAT SERPL-MCNC: 0.94 MG/DL (ref 0.6–1.3)
EOSINOPHIL # BLD AUTO: 0.04 THOUSAND/ΜL (ref 0–0.61)
EOSINOPHIL NFR BLD AUTO: 0 % (ref 0–6)
ERYTHROCYTE [DISTWIDTH] IN BLOOD BY AUTOMATED COUNT: 12.1 % (ref 11.6–15.1)
GFR SERPL CREATININE-BSD FRML MDRD: 93 ML/MIN/1.73SQ M
GLUCOSE P FAST SERPL-MCNC: 121 MG/DL (ref 65–99)
GLUCOSE SERPL-MCNC: 121 MG/DL (ref 65–140)
HCT VFR BLD AUTO: 38.7 % (ref 36.5–49.3)
HGB BLD-MCNC: 13.1 G/DL (ref 12–17)
IMM GRANULOCYTES # BLD AUTO: 0.03 THOUSAND/UL (ref 0–0.2)
IMM GRANULOCYTES NFR BLD AUTO: 0 % (ref 0–2)
LYMPHOCYTES # BLD AUTO: 1.6 THOUSANDS/ΜL (ref 0.6–4.47)
LYMPHOCYTES NFR BLD AUTO: 16 % (ref 14–44)
MAGNESIUM SERPL-MCNC: 2.2 MG/DL (ref 1.6–2.6)
MCH RBC QN AUTO: 30.4 PG (ref 26.8–34.3)
MCHC RBC AUTO-ENTMCNC: 33.9 G/DL (ref 31.4–37.4)
MCV RBC AUTO: 90 FL (ref 82–98)
MONOCYTES # BLD AUTO: 0.99 THOUSAND/ΜL (ref 0.17–1.22)
MONOCYTES NFR BLD AUTO: 10 % (ref 4–12)
NEUTROPHILS # BLD AUTO: 7.58 THOUSANDS/ΜL (ref 1.85–7.62)
NEUTS SEG NFR BLD AUTO: 74 % (ref 43–75)
NRBC BLD AUTO-RTO: 0 /100 WBCS
P AXIS: 67 DEGREES
PLATELET # BLD AUTO: 194 THOUSANDS/UL (ref 149–390)
PMV BLD AUTO: 9.8 FL (ref 8.9–12.7)
POTASSIUM SERPL-SCNC: 3.8 MMOL/L (ref 3.5–5.3)
PR INTERVAL: 140 MS
QRS AXIS: 46 DEGREES
QRSD INTERVAL: 100 MS
QT INTERVAL: 370 MS
QTC INTERVAL: 467 MS
RBC # BLD AUTO: 4.31 MILLION/UL (ref 3.88–5.62)
SODIUM SERPL-SCNC: 139 MMOL/L (ref 136–145)
T WAVE AXIS: 71 DEGREES
VENTRICULAR RATE: 96 BPM
WBC # BLD AUTO: 10.26 THOUSAND/UL (ref 4.31–10.16)

## 2022-03-16 PROCEDURE — 85025 COMPLETE CBC W/AUTO DIFF WBC: CPT

## 2022-03-16 PROCEDURE — 80048 BASIC METABOLIC PNL TOTAL CA: CPT

## 2022-03-16 PROCEDURE — 83735 ASSAY OF MAGNESIUM: CPT | Performed by: INTERNAL MEDICINE

## 2022-03-16 PROCEDURE — 93010 ELECTROCARDIOGRAM REPORT: CPT | Performed by: INTERNAL MEDICINE

## 2022-03-16 PROCEDURE — 99217 PR OBSERVATION CARE DISCHARGE MANAGEMENT: CPT | Performed by: INTERNAL MEDICINE

## 2022-03-16 RX ORDER — DIPHENHYDRAMINE HCL 25 MG
25 TABLET ORAL EVERY 12 HOURS PRN
Qty: 10 TABLET | Refills: 0 | Status: SHIPPED | OUTPATIENT
Start: 2022-03-16 | End: 2022-05-31

## 2022-03-16 RX ORDER — EPINEPHRINE 0.3 MG/.3ML
0.3 INJECTION SUBCUTANEOUS ONCE AS NEEDED
Qty: 0.6 ML | Refills: 0 | Status: SHIPPED | OUTPATIENT
Start: 2022-03-16

## 2022-03-16 RX ORDER — PREDNISONE 20 MG/1
20 TABLET ORAL DAILY
Qty: 4 TABLET | Refills: 0 | Status: SHIPPED | OUTPATIENT
Start: 2022-03-16 | End: 2022-03-20

## 2022-03-16 RX ADMIN — LITHIUM CARBONATE 300 MG: 300 TABLET, EXTENDED RELEASE ORAL at 08:32

## 2022-03-16 RX ADMIN — DULOXETINE 30 MG: 30 CAPSULE, DELAYED RELEASE ORAL at 08:32

## 2022-03-16 RX ADMIN — RISPERIDONE 0.5 MG: 0.5 TABLET ORAL at 08:32

## 2022-03-16 RX ADMIN — SERTRALINE HYDROCHLORIDE 100 MG: 100 TABLET ORAL at 08:32

## 2022-03-16 RX ADMIN — HEPARIN SODIUM 5000 UNITS: 5000 INJECTION INTRAVENOUS; SUBCUTANEOUS at 06:22

## 2022-03-16 RX ADMIN — FAMOTIDINE 40 MG: 20 TABLET ORAL at 08:32

## 2022-03-16 RX ADMIN — METHYLPREDNISOLONE 20 MG: 4 TABLET ORAL at 08:33

## 2022-03-16 RX ADMIN — AMLODIPINE BESYLATE 5 MG: 5 TABLET ORAL at 08:32

## 2022-03-16 RX ADMIN — ZIPRASIDONE HYDROCHLORIDE 20 MG: 20 CAPSULE ORAL at 08:32
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
## 2022-03-16 NOTE — DISCHARGE INSTR - AVS FIRST PAGE
Follow-up with PCP as outpatient  Follow-up with neurosurgery as outpatient  Use EpiPen as needed in case of severe allergic reaction  Outpatient allergic testing  Return to ER with any worsening rash, difficulty swallowing, difficulty breathing, shortness of breath, chest pain or any other alarming symptoms

## 2022-03-16 NOTE — PLAN OF CARE
Problem: MOBILITY - ADULT  Goal: Maintain or return to baseline ADL function  Description: INTERVENTIONS:  -  Assess patient's ability to carry out ADLs; assess patient's baseline for ADL function and identify physical deficits which impact ability to perform ADLs (bathing, care of mouth/teeth, toileting, grooming, dressing, etc )  - Assess/evaluate cause of self-care deficits   - Assess range of motion  - Assess patient's mobility; develop plan if impaired  - Assess patient's need for assistive devices and provide as appropriate  - Encourage maximum independence but intervene and supervise when necessary  - Involve family in performance of ADLs  - Assess for home care needs following discharge   - Consider OT consult to assist with ADL evaluation and planning for discharge  - Provide patient education as appropriate  Outcome: Adequate for Discharge  Goal: Maintains/Returns to pre admission functional level  Description: INTERVENTIONS:  - Perform BMAT or MOVE assessment daily    - Set and communicate daily mobility goal to care team and patient/family/caregiver  - Collaborate with rehabilitation services on mobility goals if consulted  - Perform Range of Motion 2 times a day  - Reposition patient every 2 hours    - Dangle patient 2 times a day  - Stand patient 2 times a day  - Ambulate patient 2 times a day  - Out of bed to chair 2 times a day   - Out of bed for meals 2 times a day  - Out of bed for toileting  - Record patient progress and toleration of activity level   Outcome: Adequate for Discharge     Problem: RESPIRATORY - ADULT  Goal: Achieves optimal ventilation and oxygenation  Description: INTERVENTIONS:  - Assess for changes in respiratory status  - Assess for changes in mentation and behavior  - Position to facilitate oxygenation and minimize respiratory effort  - Oxygen administered by appropriate delivery if ordered  - Initiate smoking cessation education as indicated  - Encourage broncho-pulmonary hygiene including cough, deep breathe, Incentive Spirometry  - Assess the need for suctioning and aspirate as needed  - Assess and instruct to report SOB or any respiratory difficulty  - Respiratory Therapy support as indicated  Outcome: Adequate for Discharge     Problem: SAFETY ADULT  Goal: Maintain or return to baseline ADL function  Description: INTERVENTIONS:  -  Assess patient's ability to carry out ADLs; assess patient's baseline for ADL function and identify physical deficits which impact ability to perform ADLs (bathing, care of mouth/teeth, toileting, grooming, dressing, etc )  - Assess/evaluate cause of self-care deficits   - Assess range of motion  - Assess patient's mobility; develop plan if impaired  - Assess patient's need for assistive devices and provide as appropriate  - Encourage maximum independence but intervene and supervise when necessary  - Involve family in performance of ADLs  - Assess for home care needs following discharge   - Consider OT consult to assist with ADL evaluation and planning for discharge  - Provide patient education as appropriate  Outcome: Adequate for Discharge  Goal: Maintains/Returns to pre admission functional level  Description: INTERVENTIONS:  - Perform BMAT or MOVE assessment daily    - Set and communicate daily mobility goal to care team and patient/family/caregiver  - Collaborate with rehabilitation services on mobility goals if consulted  - Perform Range of Motion 2 times a day  - Reposition patient every 2 hours    - Dangle patient 2 times a day  - Stand patient 2 times a day  - Ambulate patient 2 times a day  - Out of bed to chair 2 times a day   - Out of bed for meals 2 times a day  - Out of bed for toileting  - Record patient progress and toleration of activity level   Outcome: Adequate for Discharge  Goal: Patient will remain free of falls  Description: INTERVENTIONS:  - Educate patient/family on patient safety including physical limitations  - Instruct patient to call for assistance with activity   - Consult OT/PT to assist with strengthening/mobility   - Keep Call bell within reach  - Keep bed low and locked with side rails adjusted as appropriate  - Keep care items and personal belongings within reach  - Initiate and maintain comfort rounds  - Make Fall Risk Sign visible to staff  - Offer Toileting every 2 Hours, in advance of need  - Initiate/Maintain bed alarm  - Obtain necessary fall risk management equipment:   - Apply yellow socks and bracelet for high fall risk patients  - Consider moving patient to room near nurses station  Outcome: Adequate for Discharge     Problem: DISCHARGE PLANNING  Goal: Discharge to home or other facility with appropriate resources  Description: INTERVENTIONS:  - Identify barriers to discharge w/patient and caregiver  - Arrange for needed discharge resources and transportation as appropriate  - Identify discharge learning needs (meds, wound care, etc )  - Arrange for interpretive services to assist at discharge as needed  - Refer to Case Management Department for coordinating discharge planning if the patient needs post-hospital services based on physician/advanced practitioner order or complex needs related to functional status, cognitive ability, or social support system  Outcome: Adequate for Discharge     Problem: Knowledge Deficit  Goal: Patient/family/caregiver demonstrates understanding of disease process, treatment plan, medications, and discharge instructions  Description: Complete learning assessment and assess knowledge base    Interventions:  - Provide teaching at level of understanding  - Provide teaching via preferred learning methods  Outcome: Adequate for Discharge     Problem: Potential for Falls  Goal: Patient will remain free of falls  Description: INTERVENTIONS:  - Educate patient/family on patient safety including physical limitations  - Instruct patient to call for assistance with activity   - Consult OT/PT to assist with strengthening/mobility   - Keep Call bell within reach  - Keep bed low and locked with side rails adjusted as appropriate  - Keep care items and personal belongings within reach  - Initiate and maintain comfort rounds  - Make Fall Risk Sign visible to staff  - Offer Toileting every 2 Hours, in advance of need  - Initiate/Maintain bed alarm  - Obtain necessary fall risk management equipment:   - Apply yellow socks and bracelet for high fall risk patients  - Consider moving patient to room near nurses station  Outcome: Adequate for Discharge

## 2022-03-16 NOTE — TELEPHONE ENCOUNTER
----- Message from Caitie Joya Annalise  sent at 3/16/2022 10:45 AM EDT -----  Please call patient and inform him to please keep his upcoming appointment with me on 3/22 so we can further discuss his recent hospitalization and allergic reaction as he will need follow up allergy testing and referral      Thank you!

## 2022-03-16 NOTE — PLAN OF CARE
Problem: MOBILITY - ADULT  Goal: Maintain or return to baseline ADL function  Description: INTERVENTIONS:  -  Assess patient's ability to carry out ADLs; assess patient's baseline for ADL function and identify physical deficits which impact ability to perform ADLs (bathing, care of mouth/teeth, toileting, grooming, dressing, etc )  - Assess/evaluate cause of self-care deficits   - Assess range of motion  - Assess patient's mobility; develop plan if impaired  - Assess patient's need for assistive devices and provide as appropriate  - Encourage maximum independence but intervene and supervise when necessary  - Involve family in performance of ADLs  - Assess for home care needs following discharge   - Consider OT consult to assist with ADL evaluation and planning for discharge  - Provide patient education as appropriate  Outcome: Progressing  Goal: Maintains/Returns to pre admission functional level  Description: INTERVENTIONS:  - Perform BMAT or MOVE assessment daily    - Set and communicate daily mobility goal to care team and patient/family/caregiver  - Collaborate with rehabilitation services on mobility goals if consulted  - Perform Range of Motion 2 times a day  - Reposition patient every 2 hours    - Dangle patient 2 times a day  - Stand patient 2 times a day  - Ambulate patient 2 times a day  - Out of bed to chair 2 times a day   - Out of bed for meals 2 times a day  - Out of bed for toileting  - Record patient progress and toleration of activity level   Outcome: Progressing     Problem: RESPIRATORY - ADULT  Goal: Achieves optimal ventilation and oxygenation  Description: INTERVENTIONS:  - Assess for changes in respiratory status  - Assess for changes in mentation and behavior  - Position to facilitate oxygenation and minimize respiratory effort  - Oxygen administered by appropriate delivery if ordered  - Initiate smoking cessation education as indicated  - Encourage broncho-pulmonary hygiene including cough, deep breathe, Incentive Spirometry  - Assess the need for suctioning and aspirate as needed  - Assess and instruct to report SOB or any respiratory difficulty  - Respiratory Therapy support as indicated  Outcome: Progressing     Problem: SAFETY ADULT  Goal: Maintain or return to baseline ADL function  Description: INTERVENTIONS:  -  Assess patient's ability to carry out ADLs; assess patient's baseline for ADL function and identify physical deficits which impact ability to perform ADLs (bathing, care of mouth/teeth, toileting, grooming, dressing, etc )  - Assess/evaluate cause of self-care deficits   - Assess range of motion  - Assess patient's mobility; develop plan if impaired  - Assess patient's need for assistive devices and provide as appropriate  - Encourage maximum independence but intervene and supervise when necessary  - Involve family in performance of ADLs  - Assess for home care needs following discharge   - Consider OT consult to assist with ADL evaluation and planning for discharge  - Provide patient education as appropriate  Outcome: Progressing  Goal: Maintains/Returns to pre admission functional level  Description: INTERVENTIONS:  - Perform BMAT or MOVE assessment daily    - Set and communicate daily mobility goal to care team and patient/family/caregiver  - Collaborate with rehabilitation services on mobility goals if consulted  - Perform Range of Motion 2 times a day  - Reposition patient every 2 hours    - Dangle patient 2 times a day  - Stand patient 2 times a day  - Ambulate patient 2 times a day  - Out of bed to chair 2 times a day   - Out of bed for meals 2 times a day  - Out of bed for toileting  - Record patient progress and toleration of activity level   Outcome: Progressing  Goal: Patient will remain free of falls  Description: INTERVENTIONS:  - Educate patient/family on patient safety including physical limitations  - Instruct patient to call for assistance with activity   - Consult OT/PT to assist with strengthening/mobility   - Keep Call bell within reach  - Keep bed low and locked with side rails adjusted as appropriate  - Keep care items and personal belongings within reach  - Initiate and maintain comfort rounds  - Make Fall Risk Sign visible to staff  - Offer Toileting every 2 Hours, in advance of need  - Initiate/Maintain bed alarm  - Obtain necessary fall risk management equipment:   - Apply yellow socks and bracelet for high fall risk patients  - Consider moving patient to room near nurses station  Outcome: Progressing     Problem: DISCHARGE PLANNING  Goal: Discharge to home or other facility with appropriate resources  Description: INTERVENTIONS:  - Identify barriers to discharge w/patient and caregiver  - Arrange for needed discharge resources and transportation as appropriate  - Identify discharge learning needs (meds, wound care, etc )  - Arrange for interpretive services to assist at discharge as needed  - Refer to Case Management Department for coordinating discharge planning if the patient needs post-hospital services based on physician/advanced practitioner order or complex needs related to functional status, cognitive ability, or social support system  Outcome: Progressing     Problem: Knowledge Deficit  Goal: Patient/family/caregiver demonstrates understanding of disease process, treatment plan, medications, and discharge instructions  Description: Complete learning assessment and assess knowledge base    Interventions:  - Provide teaching at level of understanding  - Provide teaching via preferred learning methods  Outcome: Progressing

## 2022-03-17 ENCOUNTER — TRANSITIONAL CARE MANAGEMENT (OUTPATIENT)
Dept: FAMILY MEDICINE CLINIC | Facility: MEDICAL CENTER | Age: 52
End: 2022-03-17

## 2022-03-17 NOTE — PROGRESS NOTES
That is fine however he should keep his regular follow up with me on 3/22 as well as to also discuss allergy testing which was recommended as of his recent reaction  Please let him know this  Thank you!

## 2022-03-22 ENCOUNTER — OFFICE VISIT (OUTPATIENT)
Dept: FAMILY MEDICINE CLINIC | Facility: MEDICAL CENTER | Age: 52
End: 2022-03-22
Payer: COMMERCIAL

## 2022-03-22 VITALS
HEART RATE: 100 BPM | HEIGHT: 75 IN | TEMPERATURE: 98.6 F | DIASTOLIC BLOOD PRESSURE: 80 MMHG | BODY MASS INDEX: 25.61 KG/M2 | WEIGHT: 206 LBS | SYSTOLIC BLOOD PRESSURE: 160 MMHG | RESPIRATION RATE: 16 BRPM

## 2022-03-22 DIAGNOSIS — T78.40XD ALLERGIC REACTION, SUBSEQUENT ENCOUNTER: ICD-10-CM

## 2022-03-22 DIAGNOSIS — Z11.59 NEED FOR HEPATITIS C SCREENING TEST: ICD-10-CM

## 2022-03-22 DIAGNOSIS — Z11.4 ENCOUNTER FOR SCREENING FOR HUMAN IMMUNODEFICIENCY VIRUS (HIV): ICD-10-CM

## 2022-03-22 DIAGNOSIS — N13.30 HYDRONEPHROSIS, UNSPECIFIED HYDRONEPHROSIS TYPE: Primary | ICD-10-CM

## 2022-03-22 PROCEDURE — 99214 OFFICE O/P EST MOD 30 MIN: CPT

## 2022-03-22 RX ORDER — GABAPENTIN 100 MG/1
100 CAPSULE ORAL AS NEEDED
COMMUNITY
Start: 2022-03-14

## 2022-03-22 NOTE — PROGRESS NOTES
Assessment/Plan:    No problem-specific Assessment & Plan notes found for this encounter  Keep regularly scheduled office visit with me, follow up sooner if needed  Follow-up with allergist, referral placed  CT abdomen pelvis order placed, patient to schedule  HIV/hepatitis-C testing ordered per patient request, will call with results  1  Hydronephrosis, unspecified hydronephrosis type  Left hydronephrosis found on CT chest 02/23/2022  Recommended follow-up CT abdomen pelvis  Patient in agreement  Currently asymptomatic  Denies abdominal pain, urinary symptoms    - CT abdomen pelvis w contrast; Future    2  Allergic reaction, subsequent encounter  Patient had an anaphylactic reaction in the hospital setting on 03/15/2022  Allergy testing recommended on the outpatient basis  Referral placed, patient in agreement  3  Need for hepatitis C screening test  - Hepatitis C antibody; Future    4  Encounter for screening for human immunodeficiency virus (HIV)  - HIV 1/2 Antigen/Antibody (4th Generation) w Reflex [facility]; Future      Subjective:      Patient ID: Kirsten Ramos is a 46 y o  male  68-year-old male presents for follow-up  He was recently in the hospital on 3/15 for insertion of thoracic spinal cord stimulator  However, he had an allergic reaction to possibly the chlorhexidine mouthwash, was admitted, given medications for anaphylaxis, then discharged on 3/16  He is feeling better now however was advised to follow up and have a referral placed for allergy testing  He is sure that this was due to the chlorhexidine, however is in agreement to having allergy testing done and would like the referral   Patient was also in the emergency department on 2/23 as he suffered from a fall  During this encounter he had a CT of his chest which revealed fractured ribs  He is overall healed well from this, reports feeling better  Denies chest pain, shortness a breath, rib pain    Findings on the CT revealed left hydronephrosis and recommended CT abdomen pelvis for follow-up  Patient in agreement to having CT abdomen pelvis, order placed  Patient also reports he found his wife of 3 months recently cheating on him and he would like to have testing done for HIV and hepatitis as he had exposures to her unprotected multiple times  He does not currently have any symptoms  Denies penile pain, discharge, odor, sores  Denies urinary symptoms  The following portions of the patient's history were reviewed and updated as appropriate: allergies, current medications, past family history, past medical history, past surgical history and problem list     Review of Systems   Constitutional: Negative for chills and fever  HENT: Negative for ear pain and sore throat  Eyes: Negative for pain and visual disturbance  Respiratory: Negative for cough, chest tightness and shortness of breath  Cardiovascular: Negative for chest pain, palpitations and leg swelling  Gastrointestinal: Negative for abdominal pain, constipation, diarrhea, nausea and vomiting  Endocrine: Negative  Genitourinary: Negative for decreased urine volume, difficulty urinating, dysuria, flank pain, frequency, genital sores, hematuria, penile discharge, penile pain, penile swelling, scrotal swelling, testicular pain and urgency  Musculoskeletal: Negative for arthralgias, back pain and myalgias  Skin: Negative for color change and rash  Neurological: Negative for dizziness, seizures, syncope and headaches  Hematological: Negative for adenopathy  Psychiatric/Behavioral: Negative for agitation, behavioral problems and confusion  All other systems reviewed and are negative  Objective:      /80 (BP Location: Left arm, Patient Position: Sitting, Cuff Size: Adult)   Pulse 100   Temp 98 6 °F (37 °C)   Resp 16   Ht 6' 3" (1 905 m)   Wt 93 4 kg (206 lb)   BMI 25 75 kg/m²          Physical Exam  Vitals reviewed  Constitutional:       General: He is not in acute distress  Appearance: Normal appearance  He is not ill-appearing  HENT:      Head: Normocephalic and atraumatic  Eyes:      General:         Right eye: No discharge  Left eye: No discharge  Extraocular Movements: Extraocular movements intact  Conjunctiva/sclera: Conjunctivae normal       Pupils: Pupils are equal, round, and reactive to light  Cardiovascular:      Rate and Rhythm: Normal rate and regular rhythm  Pulses: Normal pulses  Heart sounds: Normal heart sounds  No murmur heard  Pulmonary:      Effort: Pulmonary effort is normal  No respiratory distress  Breath sounds: Normal breath sounds  No stridor  No wheezing or rhonchi  Abdominal:      General: Abdomen is flat  Bowel sounds are normal  There is no distension  Palpations: Abdomen is soft  Tenderness: There is no abdominal tenderness  There is no right CVA tenderness, left CVA tenderness, guarding or rebound  Musculoskeletal:         General: Normal range of motion  Cervical back: Normal range of motion and neck supple  Skin:     General: Skin is warm and dry  Capillary Refill: Capillary refill takes less than 2 seconds  Coloration: Skin is not jaundiced or pale  Neurological:      General: No focal deficit present  Mental Status: He is alert and oriented to person, place, and time  Mental status is at baseline  Motor: No weakness  Gait: Gait normal    Psychiatric:         Mood and Affect: Mood normal          Behavior: Behavior normal          Thought Content:  Thought content normal                     [facility]

## 2022-03-22 NOTE — H&P (VIEW-ONLY)
Assessment/Plan:    No problem-specific Assessment & Plan notes found for this encounter  Keep regularly scheduled office visit with me, follow up sooner if needed  Follow-up with allergist, referral placed  CT abdomen pelvis order placed, patient to schedule  HIV/hepatitis-C testing ordered per patient request, will call with results  1  Hydronephrosis, unspecified hydronephrosis type  Left hydronephrosis found on CT chest 02/23/2022  Recommended follow-up CT abdomen pelvis  Patient in agreement  Currently asymptomatic  Denies abdominal pain, urinary symptoms    - CT abdomen pelvis w contrast; Future    2  Allergic reaction, subsequent encounter  Patient had an anaphylactic reaction in the hospital setting on 03/15/2022  Allergy testing recommended on the outpatient basis  Referral placed, patient in agreement  3  Need for hepatitis C screening test  - Hepatitis C antibody; Future    4  Encounter for screening for human immunodeficiency virus (HIV)  - HIV 1/2 Antigen/Antibody (4th Generation) w Reflex [facility]; Future      Subjective:      Patient ID: Jacqueline Henderson is a 46 y o  male  49-year-old male presents for follow-up  He was recently in the hospital on 3/15 for insertion of thoracic spinal cord stimulator  However, he had an allergic reaction to possibly the chlorhexidine mouthwash, was admitted, given medications for anaphylaxis, then discharged on 3/16  He is feeling better now however was advised to follow up and have a referral placed for allergy testing  He is sure that this was due to the chlorhexidine, however is in agreement to having allergy testing done and would like the referral   Patient was also in the emergency department on 2/23 as he suffered from a fall  During this encounter he had a CT of his chest which revealed fractured ribs  He is overall healed well from this, reports feeling better  Denies chest pain, shortness a breath, rib pain    Findings on the CT revealed left hydronephrosis and recommended CT abdomen pelvis for follow-up  Patient in agreement to having CT abdomen pelvis, order placed  Patient also reports he found his wife of 3 months recently cheating on him and he would like to have testing done for HIV and hepatitis as he had exposures to her unprotected multiple times  He does not currently have any symptoms  Denies penile pain, discharge, odor, sores  Denies urinary symptoms  The following portions of the patient's history were reviewed and updated as appropriate: allergies, current medications, past family history, past medical history, past surgical history and problem list     Review of Systems   Constitutional: Negative for chills and fever  HENT: Negative for ear pain and sore throat  Eyes: Negative for pain and visual disturbance  Respiratory: Negative for cough, chest tightness and shortness of breath  Cardiovascular: Negative for chest pain, palpitations and leg swelling  Gastrointestinal: Negative for abdominal pain, constipation, diarrhea, nausea and vomiting  Endocrine: Negative  Genitourinary: Negative for decreased urine volume, difficulty urinating, dysuria, flank pain, frequency, genital sores, hematuria, penile discharge, penile pain, penile swelling, scrotal swelling, testicular pain and urgency  Musculoskeletal: Negative for arthralgias, back pain and myalgias  Skin: Negative for color change and rash  Neurological: Negative for dizziness, seizures, syncope and headaches  Hematological: Negative for adenopathy  Psychiatric/Behavioral: Negative for agitation, behavioral problems and confusion  All other systems reviewed and are negative  Objective:      /80 (BP Location: Left arm, Patient Position: Sitting, Cuff Size: Adult)   Pulse 100   Temp 98 6 °F (37 °C)   Resp 16   Ht 6' 3" (1 905 m)   Wt 93 4 kg (206 lb)   BMI 25 75 kg/m²          Physical Exam  Vitals reviewed  Constitutional:       General: He is not in acute distress  Appearance: Normal appearance  He is not ill-appearing  HENT:      Head: Normocephalic and atraumatic  Eyes:      General:         Right eye: No discharge  Left eye: No discharge  Extraocular Movements: Extraocular movements intact  Conjunctiva/sclera: Conjunctivae normal       Pupils: Pupils are equal, round, and reactive to light  Cardiovascular:      Rate and Rhythm: Normal rate and regular rhythm  Pulses: Normal pulses  Heart sounds: Normal heart sounds  No murmur heard  Pulmonary:      Effort: Pulmonary effort is normal  No respiratory distress  Breath sounds: Normal breath sounds  No stridor  No wheezing or rhonchi  Abdominal:      General: Abdomen is flat  Bowel sounds are normal  There is no distension  Palpations: Abdomen is soft  Tenderness: There is no abdominal tenderness  There is no right CVA tenderness, left CVA tenderness, guarding or rebound  Musculoskeletal:         General: Normal range of motion  Cervical back: Normal range of motion and neck supple  Skin:     General: Skin is warm and dry  Capillary Refill: Capillary refill takes less than 2 seconds  Coloration: Skin is not jaundiced or pale  Neurological:      General: No focal deficit present  Mental Status: He is alert and oriented to person, place, and time  Mental status is at baseline  Motor: No weakness  Gait: Gait normal    Psychiatric:         Mood and Affect: Mood normal          Behavior: Behavior normal          Thought Content:  Thought content normal                     Jaison Gutierrez

## 2022-04-07 NOTE — PRE-PROCEDURE INSTRUCTIONS
Pre-Surgery Instructions:   Medication Instructions    amLODIPine (NORVASC) 5 mg tablet Instructed patient per Anesthesia Guidelines  take am of sx    benazepril (LOTENSIN) 40 MG tablet Instructed patient per Anesthesia Guidelines  hold am of sx    DULoxetine (CYMBALTA) 30 mg delayed release capsule Instructed patient per Anesthesia Guidelines  take am of sx    famotidine (PEPCID) 40 MG tablet Instructed patient per Anesthesia Guidelines  take am of sx    gabapentin (NEURONTIN) 100 mg capsule Instructed patient per Anesthesia Guidelines  prn, take     hydrOXYzine HCL (ATARAX) 25 mg tablet Instructed patient per Anesthesia Guidelines  prn,    lithium 300 MG tablet Instructed patient per Anesthesia Guidelines  take am fo sx    methocarbamol (ROBAXIN) 500 mg tablet Instructed patient per Anesthesia Guidelines   naproxen (Naprosyn) 500 mg tablet Instructed patient per Anesthesia Guidelines  holding preop    risperiDONE (RisperDAL) 0 5 mg tablet Instructed patient per Anesthesia Guidelines  takes in pm    rosuvastatin (CRESTOR) 5 mg tablet Instructed patient per Anesthesia Guidelines  takes in pm    sertraline (ZOLOFT) 100 mg tablet Instructed patient per Anesthesia Guidelines  takes in pm    sildenafil (VIAGRA) 25 MG tablet Instructed patient per Anesthesia Guidelines   ziprasidone (GEODON) 20 mg capsule Instructed patient per Anesthesia Guidelines  take am of sx    You will receive a phone call from hospital for arrival time  Please call surgeons office if any changes in your condition  Wear easy on/off clothing; consider type of surgery;  Valuables, jewelry, piercing's please keep at home  **COVID-19  education/surgical guidelines  Updated covid    Visitation policy  Please: No contact lenses or eye make up, artificial eyelashes    Please secure transportation     Follow pre surgery showering or cleaning instructions as  Reviewed by nurse or surgeons office      Questions answered and concerns addressed

## 2022-04-08 ENCOUNTER — APPOINTMENT (OUTPATIENT)
Dept: LAB | Facility: CLINIC | Age: 52
End: 2022-04-08
Payer: COMMERCIAL

## 2022-04-08 DIAGNOSIS — E78.2 MIXED HYPERLIPIDEMIA: ICD-10-CM

## 2022-04-08 DIAGNOSIS — Z11.4 ENCOUNTER FOR SCREENING FOR HUMAN IMMUNODEFICIENCY VIRUS (HIV): ICD-10-CM

## 2022-04-08 DIAGNOSIS — Z11.59 NEED FOR HEPATITIS C SCREENING TEST: ICD-10-CM

## 2022-04-08 LAB
BACTERIA UR QL AUTO: ABNORMAL /HPF
BILIRUB UR QL STRIP: NEGATIVE
CHOLEST SERPL-MCNC: 229 MG/DL
CLARITY UR: CLEAR
COLOR UR: ABNORMAL
GLUCOSE UR STRIP-MCNC: NEGATIVE MG/DL
HDLC SERPL-MCNC: 53 MG/DL
HGB UR QL STRIP.AUTO: ABNORMAL
KETONES UR STRIP-MCNC: NEGATIVE MG/DL
LDLC SERPL CALC-MCNC: 158 MG/DL (ref 0–100)
LEUKOCYTE ESTERASE UR QL STRIP: NEGATIVE
NITRITE UR QL STRIP: NEGATIVE
NON-SQ EPI CELLS URNS QL MICRO: ABNORMAL /HPF
NONHDLC SERPL-MCNC: 176 MG/DL
PH UR STRIP.AUTO: 6.5 [PH]
PROT UR STRIP-MCNC: NEGATIVE MG/DL
RBC #/AREA URNS AUTO: ABNORMAL /HPF
SP GR UR STRIP.AUTO: 1.01 (ref 1–1.03)
TRIGL SERPL-MCNC: 92 MG/DL
UROBILINOGEN UR STRIP-ACNC: <2 MG/DL
WBC #/AREA URNS AUTO: ABNORMAL /HPF

## 2022-04-08 PROCEDURE — 36415 COLL VENOUS BLD VENIPUNCTURE: CPT

## 2022-04-08 PROCEDURE — 86803 HEPATITIS C AB TEST: CPT

## 2022-04-08 PROCEDURE — 87389 HIV-1 AG W/HIV-1&-2 AB AG IA: CPT

## 2022-04-08 PROCEDURE — 81001 URINALYSIS AUTO W/SCOPE: CPT | Performed by: NEUROLOGICAL SURGERY

## 2022-04-08 PROCEDURE — 80061 LIPID PANEL: CPT

## 2022-04-09 LAB
HCV AB SER QL: ABNORMAL
HIV 1+2 AB+HIV1 P24 AG SERPL QL IA: NORMAL

## 2022-04-11 ENCOUNTER — DOCUMENTATION (OUTPATIENT)
Dept: NEUROSURGERY | Facility: CLINIC | Age: 52
End: 2022-04-11

## 2022-04-12 ENCOUNTER — ANESTHESIA EVENT (OUTPATIENT)
Dept: PERIOP | Facility: HOSPITAL | Age: 52
End: 2022-04-12
Payer: COMMERCIAL

## 2022-04-12 ENCOUNTER — TELEPHONE (OUTPATIENT)
Dept: FAMILY MEDICINE CLINIC | Facility: MEDICAL CENTER | Age: 52
End: 2022-04-12

## 2022-04-12 ENCOUNTER — APPOINTMENT (OUTPATIENT)
Dept: RADIOLOGY | Facility: HOSPITAL | Age: 52
End: 2022-04-12
Payer: COMMERCIAL

## 2022-04-12 ENCOUNTER — ANESTHESIA (OUTPATIENT)
Dept: PERIOP | Facility: HOSPITAL | Age: 52
End: 2022-04-12
Payer: COMMERCIAL

## 2022-04-12 ENCOUNTER — HOSPITAL ENCOUNTER (OUTPATIENT)
Facility: HOSPITAL | Age: 52
Setting detail: OUTPATIENT SURGERY
Discharge: HOME | End: 2022-04-12
Attending: NEUROLOGICAL SURGERY | Admitting: NEUROLOGICAL SURGERY
Payer: COMMERCIAL

## 2022-04-12 VITALS
DIASTOLIC BLOOD PRESSURE: 106 MMHG | HEART RATE: 68 BPM | RESPIRATION RATE: 15 BRPM | TEMPERATURE: 97.3 F | HEIGHT: 76 IN | WEIGHT: 203.2 LBS | BODY MASS INDEX: 24.74 KG/M2 | SYSTOLIC BLOOD PRESSURE: 173 MMHG | OXYGEN SATURATION: 98 %

## 2022-04-12 DIAGNOSIS — G89.4 CHRONIC PAIN SYNDROME: Primary | ICD-10-CM

## 2022-04-12 DIAGNOSIS — M54.16 LUMBAR RADICULOPATHY: ICD-10-CM

## 2022-04-12 PROCEDURE — C1787 PATIENT PROGR, NEUROSTIM: HCPCS | Performed by: NEUROLOGICAL SURGERY

## 2022-04-12 PROCEDURE — C1767 GENERATOR, NEURO NON-RECHARG: HCPCS | Performed by: NEUROLOGICAL SURGERY

## 2022-04-12 PROCEDURE — 63685 INS/RPLC SPI NPG/RCVR POCKET: CPT | Performed by: NEUROLOGICAL SURGERY

## 2022-04-12 PROCEDURE — NC001 PR NO CHARGE: Performed by: PHYSICIAN ASSISTANT

## 2022-04-12 PROCEDURE — 72070 X-RAY EXAM THORAC SPINE 2VWS: CPT

## 2022-04-12 PROCEDURE — 63655 IMPLANT NEUROELECTRODES: CPT | Performed by: NEUROLOGICAL SURGERY

## 2022-04-12 PROCEDURE — C1778 LEAD, NEUROSTIMULATOR: HCPCS | Performed by: NEUROLOGICAL SURGERY

## 2022-04-12 DEVICE — IMPLANTABLE PULSE GENERATOR
Type: IMPLANTABLE DEVICE | Site: BUTTOCKS | Status: FUNCTIONAL
Brand: PROCLAIM™

## 2022-04-12 DEVICE — 3MM LEAD, 60 CM
Type: IMPLANTABLE DEVICE | Site: EPIDURAL SPACE | Status: FUNCTIONAL
Brand: PENTA™

## 2022-04-12 RX ORDER — TRAMADOL HYDROCHLORIDE 50 MG/1
50 TABLET ORAL EVERY 8 HOURS PRN
Qty: 9 TABLET | Refills: 0 | Status: SHIPPED | OUTPATIENT
Start: 2022-04-12 | End: 2022-04-15

## 2022-04-12 RX ORDER — HYDROMORPHONE HCL/PF 1 MG/ML
SYRINGE (ML) INJECTION AS NEEDED
Status: DISCONTINUED | OUTPATIENT
Start: 2022-04-12 | End: 2022-04-12

## 2022-04-12 RX ORDER — LIDOCAINE HYDROCHLORIDE AND EPINEPHRINE 10; 10 MG/ML; UG/ML
INJECTION, SOLUTION INFILTRATION; PERINEURAL AS NEEDED
Status: DISCONTINUED | OUTPATIENT
Start: 2022-04-12 | End: 2022-04-12 | Stop reason: HOSPADM

## 2022-04-12 RX ORDER — CLINDAMYCIN PHOSPHATE 900 MG/50ML
INJECTION INTRAVENOUS AS NEEDED
Status: DISCONTINUED | OUTPATIENT
Start: 2022-04-12 | End: 2022-04-12

## 2022-04-12 RX ORDER — ONDANSETRON 2 MG/ML
4 INJECTION INTRAMUSCULAR; INTRAVENOUS ONCE AS NEEDED
Status: DISCONTINUED | OUTPATIENT
Start: 2022-04-12 | End: 2022-04-12 | Stop reason: HOSPADM

## 2022-04-12 RX ORDER — DEXAMETHASONE SODIUM PHOSPHATE 10 MG/ML
INJECTION, SOLUTION INTRAMUSCULAR; INTRAVENOUS AS NEEDED
Status: DISCONTINUED | OUTPATIENT
Start: 2022-04-12 | End: 2022-04-12

## 2022-04-12 RX ORDER — FENTANYL CITRATE/PF 50 MCG/ML
25 SYRINGE (ML) INJECTION
Status: DISCONTINUED | OUTPATIENT
Start: 2022-04-12 | End: 2022-04-12 | Stop reason: HOSPADM

## 2022-04-12 RX ORDER — ONDANSETRON 2 MG/ML
4 INJECTION INTRAMUSCULAR; INTRAVENOUS EVERY 6 HOURS PRN
Status: DISCONTINUED | OUTPATIENT
Start: 2022-04-12 | End: 2022-04-12 | Stop reason: HOSPADM

## 2022-04-12 RX ORDER — TRAMADOL HYDROCHLORIDE 50 MG/1
50 TABLET ORAL EVERY 6 HOURS PRN
Status: DISCONTINUED | OUTPATIENT
Start: 2022-04-12 | End: 2022-04-12 | Stop reason: HOSPADM

## 2022-04-12 RX ORDER — KETAMINE HCL IN NACL, ISO-OSM 100MG/10ML
SYRINGE (ML) INJECTION AS NEEDED
Status: DISCONTINUED | OUTPATIENT
Start: 2022-04-12 | End: 2022-04-12

## 2022-04-12 RX ORDER — PROPOFOL 10 MG/ML
INJECTION, EMULSION INTRAVENOUS AS NEEDED
Status: DISCONTINUED | OUTPATIENT
Start: 2022-04-12 | End: 2022-04-12

## 2022-04-12 RX ORDER — ROCURONIUM BROMIDE 10 MG/ML
INJECTION, SOLUTION INTRAVENOUS AS NEEDED
Status: DISCONTINUED | OUTPATIENT
Start: 2022-04-12 | End: 2022-04-12

## 2022-04-12 RX ORDER — PROPOFOL 10 MG/ML
INJECTION, EMULSION INTRAVENOUS CONTINUOUS PRN
Status: DISCONTINUED | OUTPATIENT
Start: 2022-04-12 | End: 2022-04-12

## 2022-04-12 RX ORDER — FENTANYL CITRATE 50 UG/ML
INJECTION, SOLUTION INTRAMUSCULAR; INTRAVENOUS AS NEEDED
Status: DISCONTINUED | OUTPATIENT
Start: 2022-04-12 | End: 2022-04-12

## 2022-04-12 RX ORDER — NEOSTIGMINE METHYLSULFATE 1 MG/ML
INJECTION INTRAVENOUS AS NEEDED
Status: DISCONTINUED | OUTPATIENT
Start: 2022-04-12 | End: 2022-04-12

## 2022-04-12 RX ORDER — MIDAZOLAM HYDROCHLORIDE 2 MG/2ML
INJECTION, SOLUTION INTRAMUSCULAR; INTRAVENOUS AS NEEDED
Status: DISCONTINUED | OUTPATIENT
Start: 2022-04-12 | End: 2022-04-12

## 2022-04-12 RX ORDER — ACETAMINOPHEN 325 MG/1
975 TABLET ORAL EVERY 8 HOURS PRN
Status: DISCONTINUED | OUTPATIENT
Start: 2022-04-12 | End: 2022-04-12 | Stop reason: HOSPADM

## 2022-04-12 RX ORDER — ACETAMINOPHEN 160 MG
TABLET,DISINTEGRATING ORAL AS NEEDED
Status: DISCONTINUED | OUTPATIENT
Start: 2022-04-12 | End: 2022-04-12 | Stop reason: HOSPADM

## 2022-04-12 RX ORDER — SODIUM CHLORIDE, SODIUM LACTATE, POTASSIUM CHLORIDE, CALCIUM CHLORIDE 600; 310; 30; 20 MG/100ML; MG/100ML; MG/100ML; MG/100ML
INJECTION, SOLUTION INTRAVENOUS CONTINUOUS PRN
Status: DISCONTINUED | OUTPATIENT
Start: 2022-04-12 | End: 2022-04-12

## 2022-04-12 RX ORDER — CLINDAMYCIN HYDROCHLORIDE 300 MG/1
300 CAPSULE ORAL 3 TIMES DAILY
Qty: 15 CAPSULE | Refills: 0 | Status: SHIPPED | OUTPATIENT
Start: 2022-04-12 | End: 2022-04-17

## 2022-04-12 RX ORDER — GLYCOPYRROLATE 0.2 MG/ML
INJECTION INTRAMUSCULAR; INTRAVENOUS AS NEEDED
Status: DISCONTINUED | OUTPATIENT
Start: 2022-04-12 | End: 2022-04-12

## 2022-04-12 RX ORDER — METHOCARBAMOL 500 MG/1
500 TABLET, FILM COATED ORAL 3 TIMES DAILY PRN
Qty: 9 TABLET | Refills: 0 | Status: SHIPPED | OUTPATIENT
Start: 2022-04-12 | End: 2022-04-15

## 2022-04-12 RX ORDER — HYDROMORPHONE HCL/PF 1 MG/ML
0.5 SYRINGE (ML) INJECTION
Status: DISCONTINUED | OUTPATIENT
Start: 2022-04-12 | End: 2022-04-12 | Stop reason: HOSPADM

## 2022-04-12 RX ORDER — MAGNESIUM HYDROXIDE 1200 MG/15ML
LIQUID ORAL AS NEEDED
Status: DISCONTINUED | OUTPATIENT
Start: 2022-04-12 | End: 2022-04-12 | Stop reason: HOSPADM

## 2022-04-12 RX ORDER — ONDANSETRON 2 MG/ML
INJECTION INTRAMUSCULAR; INTRAVENOUS AS NEEDED
Status: DISCONTINUED | OUTPATIENT
Start: 2022-04-12 | End: 2022-04-12

## 2022-04-12 RX ORDER — METHOCARBAMOL 500 MG/1
500 TABLET, FILM COATED ORAL EVERY 6 HOURS PRN
Status: DISCONTINUED | OUTPATIENT
Start: 2022-04-12 | End: 2022-04-12 | Stop reason: HOSPADM

## 2022-04-12 RX ORDER — CLINDAMYCIN PHOSPHATE 900 MG/50ML
900 INJECTION INTRAVENOUS ONCE
Status: DISCONTINUED | OUTPATIENT
Start: 2022-04-12 | End: 2022-04-12 | Stop reason: HOSPADM

## 2022-04-12 RX ORDER — LORAZEPAM 2 MG/ML
0.5 INJECTION INTRAMUSCULAR ONCE AS NEEDED
Status: DISCONTINUED | OUTPATIENT
Start: 2022-04-12 | End: 2022-04-12 | Stop reason: HOSPADM

## 2022-04-12 RX ORDER — SODIUM CHLORIDE 9 MG/ML
100 INJECTION, SOLUTION INTRAVENOUS CONTINUOUS
Status: DISCONTINUED | OUTPATIENT
Start: 2022-04-12 | End: 2022-04-12 | Stop reason: HOSPADM

## 2022-04-12 RX ADMIN — NEOSTIGMINE METHYLSULFATE 2 MG: 1 INJECTION INTRAVENOUS at 11:35

## 2022-04-12 RX ADMIN — PROPOFOL 100 MCG/KG/MIN: 10 INJECTION, EMULSION INTRAVENOUS at 09:57

## 2022-04-12 RX ADMIN — FENTANYL CITRATE 50 MCG: 50 INJECTION, SOLUTION INTRAMUSCULAR; INTRAVENOUS at 09:54

## 2022-04-12 RX ADMIN — HYDROMORPHONE HYDROCHLORIDE 0.5 MG: 1 INJECTION, SOLUTION INTRAMUSCULAR; INTRAVENOUS; SUBCUTANEOUS at 11:17

## 2022-04-12 RX ADMIN — SODIUM CHLORIDE, SODIUM LACTATE, POTASSIUM CHLORIDE, AND CALCIUM CHLORIDE: .6; .31; .03; .02 INJECTION, SOLUTION INTRAVENOUS at 09:50

## 2022-04-12 RX ADMIN — FENTANYL CITRATE 50 MCG: 50 INJECTION, SOLUTION INTRAMUSCULAR; INTRAVENOUS at 11:12

## 2022-04-12 RX ADMIN — MIDAZOLAM 2 MG: 1 INJECTION INTRAMUSCULAR; INTRAVENOUS at 09:52

## 2022-04-12 RX ADMIN — CLINDAMYCIN IN 5 PERCENT DEXTROSE 900 MG: 18 INJECTION, SOLUTION INTRAVENOUS at 09:48

## 2022-04-12 RX ADMIN — GLYCOPYRROLATE 0.2 MG: 0.2 INJECTION INTRAMUSCULAR; INTRAVENOUS at 11:34

## 2022-04-12 RX ADMIN — ONDANSETRON 4 MG: 2 INJECTION INTRAMUSCULAR; INTRAVENOUS at 10:26

## 2022-04-12 RX ADMIN — HYDROMORPHONE HYDROCHLORIDE 0.5 MG: 1 INJECTION, SOLUTION INTRAMUSCULAR; INTRAVENOUS; SUBCUTANEOUS at 11:32

## 2022-04-12 RX ADMIN — DEXAMETHASONE SODIUM PHOSPHATE 10 MG: 10 INJECTION, SOLUTION INTRAMUSCULAR; INTRAVENOUS at 10:27

## 2022-04-12 RX ADMIN — REMIFENTANIL HYDROCHLORIDE 0.12 MCG/KG/MIN: 1 INJECTION, POWDER, LYOPHILIZED, FOR SOLUTION INTRAVENOUS at 09:57

## 2022-04-12 RX ADMIN — Medication 20 MG: at 09:57

## 2022-04-12 RX ADMIN — PROPOFOL 150 MG: 10 INJECTION, EMULSION INTRAVENOUS at 09:57

## 2022-04-12 RX ADMIN — ROCURONIUM BROMIDE 20 MG: 10 INJECTION, SOLUTION INTRAVENOUS at 09:57

## 2022-04-12 NOTE — TELEPHONE ENCOUNTER
Left message for patient on home phone number listed to call back regarding lab result to further discuss  Additional lab test ordered for HCV RNA testing due to positive hepatitis C antibody result

## 2022-04-12 NOTE — ANESTHESIA POSTPROCEDURE EVALUATION
Post-Op Assessment Note    CV Status:  Stable  Pain Score: 0    Pain management: adequate     Mental Status:  Alert and awake   Hydration Status:  Euvolemic   PONV Controlled:  Controlled   Airway Patency:  Patent      Post Op Vitals Reviewed: Yes            No complications documented      BP (P) 160/90 (04/12/22 1157)    Temp (!) (P) 97 3 °F (36 3 °C) (04/12/22 1157)    Pulse (P) 72 (04/12/22 1157)   Resp   18   SpO2   98
98

## 2022-04-12 NOTE — DISCHARGE INSTRUCTIONS
Spinal Cord Stimulator Discharge Instructions    Activity:    1  Do not lift more than 20 pounds for 2 weeks  2  Avoid bending, lifting and twisting for 2 weeks  Surgical incision care:    1  Keep dressings in place for 3 days  2  Keep incisions dry for 3 days  3  May allow clean water to flow over incisions after 3 days  4  Do not immerse the incisions in water for 4 weeks  5  After 3 days, incisions may be left open to air, but should remain clean  6  Do not apply any creams or ointments to the incision, unless otherwise instructed by [facility]  7  Contact office if increasing redness, drainage, pain or swelling around the incisions  8  Complete upright xray of thoracic and lumbar spine prior to 6 week post operative appointment  Postoperative medication:    1  [facility] will provide pain medication for the first 2 weeks after surgery as coordinated with your pain specialist    2  If [facility] is providing pain medication, please contact office for questions regarding dosage and modifications  3  If [facility] is not providing pain medication postoperatively, then contact pain specialist for additional instructions and prescriptions  4  No antiplatelet or anticoagulation medication for 2 weeks after surgery, unless otherwise instructed  Please contact [facility] if you have any questions about the effects of any of your medications on blood clotting  5  Do not operate heavy machinery or vehicles while taking sedating medications  *Note that it may take some time for the stimulator to improve chronic pain symptoms  Adjustment of the stimulator program can begin 2 weeks after placement  Please contact the stimulator representative if you have any questions regarding programing

## 2022-04-12 NOTE — OP NOTE
OPERATIVE REPORT  PATIENT NAME: Lion Ballesteros    :  1970  MRN: [id number]  Pt Location: [facility] OR ROOM 04    SURGERY DATE: 2022    Surgeon(s) and Role:     * Ludivina Tai MD - Primary     * Sparkle Jay PA-C - Assisting    No qualified resident was available  GRADY was present for the procedure, and provided essential assistance with proper exposure, retraction, hemostasis, placement of electrode in IPG, and wound closure, which was necessary secondary to the complex nature of this case  Preop Diagnosis:  Chronic pain syndrome [G89 4]  Lumbar radiculopathy [M54 16]  Chronic bilateral low back pain with bilateral sciatica [M54 42, M54 41, G89 29]    Post-Op Diagnosis Codes:     * Chronic pain syndrome [G89 4]     * Lumbar radiculopathy [M54 16]     * Chronic bilateral low back pain with bilateral sciatica [M54 42, M54 41, G89 29]    Procedure:  1  Insertion of Abbott spinal cord stimulator electrode via T9-T10 laminotomy and proclaim XR 5 left buttock implantable pulse generator  (#[id number];#[id number])    Specimen(s):  * No specimens in log *    Estimated Blood Loss:   Minimal    Drains:  * No LDAs found *    Anesthesia Type:   General    Operative Indications:  Chronic pain syndrome [G89 4]  Lumbar radiculopathy [M54 16]  Chronic bilateral low back pain with bilateral sciatica [M54 42, M54 41, G89 29]    Operative Findings:  Stable intraoperative electrophysiological monitoring  Intraoperative stimulation produced adequate bilateral lower extremity responses  Complications:   None    Procedure and Technique:  Clinical Note:    The goals and alternatives to the procedure described above were discussed with patient  Surgery is intended to reproduce the results of spinal cord stimulator trial   Weakness, numbness and back pain are less likely to improve  The risks of surgery were described in detail  1  Risk of general anesthetic, with possible cardiac and respiratory complication   There is risk of infection and bleeding  2  Risk of neurological injury with new pain, weakness or numbness or difficulties with bowel and bladder function  The risk of CSF leak was described  Risk of paraplegia and spinal cord injury  3  Risk of system malfunction and failure of treatment  Need for revision surgery  Once all questions were answered to their satisfaction, they asked to proceed with surgery  Operating Room Note    The patient was brought to the operating room and placed under general anesthetic  Once all appropriate lines were in position, the patient was carefully turned in the prone position onto a Homer frame  Care was taken to ensure that all pressure points were padded and the neck was in a neutral position  AP fluoroscopy was used to identify midline of the thoracic spine and T9-T10 level  An incision was also planned over the left buttock  The skin was then prepped and draped in usual sterile fashion  A full surgical timeout was undertaken identifying the site, side and level of surgery  One percent lidocaine with 100,000 of epinephrine was used to infiltrate the soft tissue  The patient received preoperative antibiotics  10 blade was used to incise the skin at the thoracic incision  Monopolar cautery was used to dissect down along the spinous processes and lamina  The surgical site was irrigated with antibiotic irrigation solution  The T9-T10 level was confirmed with fluoroscopy  The interspinous ligament was removed as was the base of the spinous processes  The underlying ligamentum flavum and lamina was undermined with curved curette and removed with Kerrison punch  The underlying epidural fat was identified and a Yomi Brayan was used to dissect along the dorsal aspect of the dura  The lead was then advanced under fluoroscopy to cover the T8-T9 interspace  Electrophysiological monitoring remained stable  The lead was repositioned as appropriate to identify physiological midline    The lead was then was then secured in position to the spinous process with a silk suture, and a strain relief loop was created by securing the lead within the paraspinal musculature and fascia  The surgical site was irrigated copiously with irrigation  Surgiflo was used for hemostasis  Ten blade was used to incise the skin over the planned buttock incision  Monopolar cautery was used to dissect through the subcutaneous tissue above the fascia  Blunt and sharp dissection was used for further creation of a pocket  The electrode was then passed from the thoracic incision with a tunneler to the buttock incision  The distal portion of the electrode was connected to the generator  Excess lead and generator was then placed in the pocket and secured in position with silk suture  The system was then interrogated and was noted to be working within normal limits and impedances  Both incisions were irrigated with antibiotic irrigation  Bipolar cautery was used for further hemostasis  At the thoracic incision, Vicryl suture was used to approximate the fascia  Inverted Vicryl suture was used to approximate the subcutaneous tissues at both incisions  Running 4 0 Monocryl was then used to close both incisions  0 5% Marcaine with 1/100,000 of epinephrine was used to infiltrate the soft tissue  A Miplex was applied to both incisions  The count was correct at the end of the case and there were no complications  Electrophysiological monitoring remained stable  The patient was carefully transferred onto the operating gurney and extubated  The patient was transferred to the PACU where they were noted to be hemodynamically stable and neurologically unchanged  The results of surgery were discussed with patient and family  In the postoperative period, the patient underwent electronic analysis and complex programming of the spinal cord stimulator system with the spinal cord stimulator representatives      I was present for the entire procedure    Patient Disposition:  PACU     SIGNATURE: Angelina Saenz MD  DATE: April 12, 2022  TIME: 11:37 AM

## 2022-04-12 NOTE — INTERVAL H&P NOTE
H&P reviewed  After examining the patient I find no changes in the patients condition since the H&P had been written  Patient personally seen and examined  Neurological examination unchanged compared to last office/progress note, with the following exceptions:    /99   Pulse 89   Temp 97 8 °F (36 6 °C) (Oral)   Resp 18   Ht 6' 4" (1 93 m)   Wt 92 2 kg (203 lb 3 2 oz)   SpO2 100%   BMI 24 73 kg/m²      None  Regular cardiac rate and rhythm  No respiratory distress  Abdomen nontender  Normocephalic  Full power in lower extremities  Has stopped antiplatelet/anticoagulation medication as instructed  Post operative instructions and medications have been reviewed with the patient and family  Assessment and Plan:    All questions have been answered to the patient and family satisfaction  Plan to proceed with insertion of thoracic spinal cord stimulator showed via laminotomy and placement of left buttock implantable pulse generator  They are in agreement with proceeding

## 2022-04-12 NOTE — ANESTHESIA PREPROCEDURE EVALUATION
Procedure: Insertion of thoracic spinal cord stimulator electrode via laminotomy and placement of left buttock implantable pulse generator (Left Spine Thoracic)    Relevant Problems   CARDIO   (+) Hyperlipidemia   (+) Hypertension   (+) Pain in thoracic spine      GI/HEPATIC   (+) GERD (gastroesophageal reflux disease)   (+) Non-alcoholic fatty liver disease      /RENAL   (+) Kidney stones   (+) Ureteral stone with hydronephrosis      MUSCULOSKELETAL   (+) Chronic bilateral low back pain with bilateral sciatica   (+) Lumbar degenerative disc disease   (+) Lumbar spondylosis   (+) Pain in thoracic spine   (+) Spondylosis of cervical region without myelopathy or radiculopathy      NEURO/PSYCH   (+) Chronic bilateral low back pain with bilateral sciatica   (+) Chronic pain syndrome   Schizophrenia, anxiety, bipolar   PULMONARY   (+) DONNA (obstructive sleep apnea)   Smoker  Emergence Delirium       Physical Exam    Airway    Mallampati score: III  TM Distance: >3 FB  Neck ROM: full     Dental       Cardiovascular  Rate: normal,     Pulmonary  Breath sounds clear to auscultation,     Other Findings        Anesthesia Plan  ASA Score- 3     Anesthesia Type- general with ASA Monitors  Additional Monitors:   Airway Plan: ETT  Comment: Consider Precedex on emergence  Plan Factors-    Chart reviewed  EKG reviewed  Existing labs reviewed  Patient summary reviewed  Induction- intravenous  Postoperative Plan-     Informed Consent- Anesthetic plan and risks discussed with patient  I personally reviewed this patient with the CRNA  Discussed and agreed on the Anesthesia Plan with the CRNA  Shaina Sevilla

## 2022-04-13 ENCOUNTER — TELEPHONE (OUTPATIENT)
Dept: NEUROSURGERY | Facility: CLINIC | Age: 52
End: 2022-04-13

## 2022-04-13 NOTE — TELEPHONE ENCOUNTER
Called patient to see how he is doing after surgery  Patient reports he is doing well overall and denies any incisional issues or fevers  He does report having a lot of pain, both incision pain as well as muscle spasms  Patient is able to ambulate around the house and complete ADLs  Educated the patient about the importance of preventing blood clots and provided measures how to prevent them  Recommended that patient take his pain medication and muscle relaxer as prescribed  He may add in extra strength tylenol (if not contraindicated for any other reason) just not to exceed 3,000mg per day  He may also wish to try ice or heat to the incision areas just avoiding anything wet against the incision areas  Patient has moved his bowels since the surgery  Encouraged patient to take an over the counter stool softener, if he is taking narcotic pain medication  Encouraged fiber intake and fluids  Reviewed incision care with the patient  Advised that after three days he may take a shower and gently wash the surgical site with soap and water  Use clean wash cloth, towels, and clothing  Do not submerge in water until cleared by the surgeon  Do not apply any creams, ointments, or lotions to the site  Patient is aware to call the office if any redness, swelling, drainage, dehiscence of incision, or fever >100 F occurs  Patient is aware to call the office if any concerns or questions may arise  Reminded patient of his upcoming appointments with the date/time/location  Patient was appreciative for the call

## 2022-04-13 NOTE — TELEPHONE ENCOUNTER
Pt called to ask if his labwork results were in yet  Advised that Nima Gallegos had left a message for him on 4/12, but that she was off today  Pt asked what phone number we had on file, and it was incorrect  Corrected that home phone number; it is [phone]      Routed to Nima Gallegos

## 2022-04-14 NOTE — TELEPHONE ENCOUNTER
Called patient at number listed [phone]  No answer  Left message for patient to call office back to discuss results

## 2022-04-14 NOTE — TELEPHONE ENCOUNTER
I spoke to patient and informed him of positive Hepatitis C blood work  Informed him I added blood work on  He states he will go get additional blood work done today

## 2022-04-15 ENCOUNTER — TELEPHONE (OUTPATIENT)
Dept: NEUROSURGERY | Facility: CLINIC | Age: 52
End: 2022-04-15

## 2022-04-15 NOTE — TELEPHONE ENCOUNTER
Received updated photos  The thoracic incision does looks moist but could be related to the bandage just being removed and moisture collection underneath, will route message to Dr Giovany Malcolm for input regarding this  The IPG site is in good condition and I do not see cause for concern  He is prescribed Clindamycin for 5 days post op directed him to ensure he completed this entire regimen, he has about 2 days left  Requested that he monitor his incisions for increased, swelling, or drainage, fevers and contact us back with an update on Monday

## 2022-04-15 NOTE — TELEPHONE ENCOUNTER
Received a call from [place] reporting redness at the surgical site  Requested he send a picture to me via e-mail to review  See below:            Some mild erythema observed at IPG  Requested he remove dressings and resend  He was prescribed Clindamycin x5 days post op

## 2022-04-18 ENCOUNTER — APPOINTMENT (OUTPATIENT)
Dept: LAB | Facility: MEDICAL CENTER | Age: 52
End: 2022-04-18
Payer: COMMERCIAL

## 2022-04-18 DIAGNOSIS — R76.8 POSITIVE HEPATITIS C ANTIBODY TEST: ICD-10-CM

## 2022-04-18 DIAGNOSIS — N52.9 ERECTILE DYSFUNCTION, UNSPECIFIED ERECTILE DYSFUNCTION TYPE: ICD-10-CM

## 2022-04-18 PROCEDURE — 36415 COLL VENOUS BLD VENIPUNCTURE: CPT

## 2022-04-18 PROCEDURE — 87522 HEPATITIS C REVRS TRNSCRPJ: CPT

## 2022-04-20 ENCOUNTER — TELEPHONE (OUTPATIENT)
Dept: FAMILY MEDICINE CLINIC | Facility: MEDICAL CENTER | Age: 52
End: 2022-04-20

## 2022-04-20 LAB
HCV RNA SERPL NAA+PROBE-ACNC: NORMAL IU/ML
TEST INFORMATION: NORMAL

## 2022-04-20 NOTE — TELEPHONE ENCOUNTER
----- Message from TRACIE Augustin sent at 4/20/2022 10:10 AM EDT -----  Please call patient and inform him Hep C virus was not detected on the RNA blood test he just had done which means we do not need to treat him at this time  However, I do want to repeat the Hep C Antibody because in the past it was always non-reactive and now it is highly reactive  So I would like to repeat it for confirmation  If positive I will have him see GI specialist  Order placed in epic for Hep C antibody he can have this done now or asap

## 2022-04-26 ENCOUNTER — TELEMEDICINE (OUTPATIENT)
Dept: NEUROSURGERY | Facility: CLINIC | Age: 52
End: 2022-04-26

## 2022-04-26 DIAGNOSIS — Z96.89 S/P INSERTION OF SPINAL CORD STIMULATOR: ICD-10-CM

## 2022-04-26 DIAGNOSIS — Z98.890 STATUS POST LUMBAR SPINE SURGERY FOR DECOMPRESSION OF SPINAL CORD: ICD-10-CM

## 2022-04-26 DIAGNOSIS — Z98.890 POST-OPERATIVE STATE: Primary | ICD-10-CM

## 2022-04-26 PROCEDURE — 99024 POSTOP FOLLOW-UP VISIT: CPT

## 2022-04-26 NOTE — PROGRESS NOTES
Virtual Brief Visit    Patient is located in the following state in which I hold an active license [place]      Assessment/Plan:    Problem List Items Addressed This Visit     None          Recent Visits  No visits were found meeting these conditions  Showing recent visits within past 7 days and meeting all other requirements  Today's Visits  Date Type Provider Dept   04/26/22 Telemedicine NEUROSURGERY NURSE [address] today's visits and meeting all other requirements  Future Appointments  No visits were found meeting these conditions    Showing future appointments within next 150 days and meeting all other requirements     History of Present Illness:  Patient is 2 weeks s/p Insertion of thoracic spinal cord stimulator electrode via laminotomy and placement of left buttock implantable pulse generator (Left Spine Thoracic)    Current Outpatient Medications:     amLODIPine (NORVASC) 5 mg tablet, Take 1 tablet (5 mg total) by mouth daily, Disp: 90 tablet, Rfl: 1    benazepril (LOTENSIN) 40 MG tablet, Take 1 tablet (40 mg total) by mouth daily, Disp: 90 tablet, Rfl: 1    DULoxetine (CYMBALTA) 30 mg delayed release capsule, Take 30 mg by mouth daily, Disp: , Rfl:     EPINEPHrine (EPIPEN) 0 3 mg/0 3 mL SOAJ, Inject 0 3 mL (0 3 mg total) into a muscle once as needed for anaphylaxis (For allergic reactions and symptoms of difficulty breathing) for up to 1 dose, Disp: 0 6 mL, Rfl: 0    famotidine (PEPCID) 40 MG tablet, Take 1 tablet (40 mg total) by mouth daily for 90 doses, Disp: 90 tablet, Rfl: 1    hydrOXYzine HCL (ATARAX) 25 mg tablet, Take 25 mg by mouth 3 (three) times a day as needed, Disp: , Rfl:     lithium 300 MG tablet, lithium carbonate 300 mg tablet  TAKE 1 TABLET BY MOUTH TWICE A DAY, Disp: , Rfl:     rosuvastatin (CRESTOR) 5 mg tablet, Take 1 tablet (5 mg total) by mouth daily, Disp: 90 tablet, Rfl: 1    ziprasidone (GEODON) 20 mg capsule, TAKE 1 CAPSULE BY MOUTH TWICE A DAY TAKE WITH MEALS, Disp: , Rfl:     diphenhydrAMINE (BENADRYL) 25 mg tablet, Take 1 tablet (25 mg total) by mouth every 12 (twelve) hours as needed for itching for up to 5 days, Disp: 10 tablet, Rfl: 0    gabapentin (NEURONTIN) 100 mg capsule, Take 100 mg by mouth 2 (two) times a day (Patient not taking: Reported on 4/26/2022 ), Disp: , Rfl:     methocarbamol (ROBAXIN) 500 mg tablet, Take 1 tablet (500 mg total) by mouth 2 (two) times a day as needed for muscle spasms, Disp: 20 tablet, Rfl: 0    methocarbamol (ROBAXIN) 500 mg tablet, Take 1 tablet (500 mg total) by mouth 3 (three) times a day as needed for muscle spasms for up to 3 days, Disp: 9 tablet, Rfl: 0    risperiDONE (RisperDAL) 0 5 mg tablet, Take 0 5 mg by mouth 2 (two) times a day (Patient not taking: Reported on 4/26/2022 ), Disp: , Rfl:     sertraline (ZOLOFT) 100 mg tablet, Take 100 mg by mouth every morning   (Patient not taking: Reported on 4/26/2022 ), Disp: , Rfl:     sildenafil (VIAGRA) 25 MG tablet, Take 1-4 tabs PO daily PRN ED, Disp: 30 tablet, Rfl: 0     Allergies   Allergen Reactions    Chlorhexidine Anaphylaxis     peridex swish    Clarithromycin Anaphylaxis    Penicillins Anaphylaxis        Assessment:    There were no vitals filed for this visit  Wound Exam: Requested [place] send a picture of his incision to me directly for visual assessment  Both incisions are clean, and in tact, without visible s/s of infection  See below:              Discussion/Summary  Doing well postoperatively  Reviewed incision care with patient including daily observation for s/s infection including: increased erythema, edema, drainage, dehiscence of incision or fever >101  Should these be observed, he understands that he is to call and/or return immediately for reassessment  Advised patient to continue cleansing area with mild soap and water and pat dry   Not to apply any lotions, creams, or ointments, & not to submerge in any water for 4  more weeks  He is to maintain activity restrictions until cleared by the surgeon  Activity levels were also reviewed with the patient in detail, he is to lift no greater than 10 pounds and ambulation is encouraged as tolerated  Verified date/time/location of upcoming POV and reminded him to complete x-rays prior to his next appointment  He is to call the office with any further questions or concerns, or if any incisional issues or fevers would arise  [place] reports that he continues to experience significant muscle spasming and requesting refill of muscle relaxer  Will discuss with provider and send this to confirmed pharmacy if approved

## 2022-04-27 ENCOUNTER — APPOINTMENT (OUTPATIENT)
Dept: LAB | Facility: MEDICAL CENTER | Age: 52
End: 2022-04-27
Payer: COMMERCIAL

## 2022-04-27 DIAGNOSIS — Z11.59 NEED FOR HEPATITIS C SCREENING TEST: ICD-10-CM

## 2022-04-27 DIAGNOSIS — R76.8 POSITIVE HEPATITIS C ANTIBODY TEST: ICD-10-CM

## 2022-04-27 PROCEDURE — 36415 COLL VENOUS BLD VENIPUNCTURE: CPT

## 2022-04-27 PROCEDURE — 86803 HEPATITIS C AB TEST: CPT

## 2022-04-28 LAB — HCV AB SER QL: ABNORMAL

## 2022-05-03 ENCOUNTER — TELEPHONE (OUTPATIENT)
Dept: ADMINISTRATIVE | Facility: OTHER | Age: 52
End: 2022-05-03

## 2022-05-03 NOTE — TELEPHONE ENCOUNTER
----- Message from Liya Pozo, [address] sent at 5/2/2022  1:55 PM EDT -----  Regarding: care gap request  05/02/22 1:55 PM    Hello, our patient attached above has had CRC: Colonoscopy completed/performed  Please assist in updating the patient chart by pulling the document from the Media Tab  The date of service is 2017       Thank you,  Liya Pozo MA  [facility]

## 2022-05-03 NOTE — TELEPHONE ENCOUNTER
Upon review of the In Basket request we were able to locate, review, and update the patient chart as requested for CRC: Colonoscopy  Any additional questions or concerns should be emailed to the Practice Liaisons via [email or web address] email, please do not reply via In Basket      Thank you  Evelin Dunlap MA

## 2022-05-24 ENCOUNTER — TELEPHONE (OUTPATIENT)
Dept: FAMILY MEDICINE CLINIC | Facility: MEDICAL CENTER | Age: 52
End: 2022-05-24

## 2022-05-24 NOTE — TELEPHONE ENCOUNTER
Pt called to ask if Mauricia Snellen could send a different Rx for viagra to [facility] in [place]  She had sent a 25 mg Rx, but it was very expensive, even with the goodRx card  The pharmacist suggested he ask for either the 10 mg or the 20 mg because they are much less expensive      Routed to Mauricia Snellen, [address]

## 2022-05-24 NOTE — TELEPHONE ENCOUNTER
Viagra only comes in 25, 50, and 100 mg dosing  Not sure what dose he would like since the one I sent is lowest dose available?

## 2022-05-25 DIAGNOSIS — N52.9 ERECTILE DYSFUNCTION, UNSPECIFIED ERECTILE DYSFUNCTION TYPE: ICD-10-CM

## 2022-05-25 RX ORDER — SILDENAFIL 25 MG/1
TABLET, FILM COATED ORAL
Qty: 30 TABLET | Refills: 0 | Status: SHIPPED | OUTPATIENT
Start: 2022-05-25

## 2022-05-25 NOTE — TELEPHONE ENCOUNTER
Patient states that the pharmacist said that it definitely comes in 20mg doses  I told him we will reach out to [facility] in [place] for clarification

## 2022-05-25 NOTE — TELEPHONE ENCOUNTER
Please call patient and inform him with [facility] the Viagra is approximately $15 00-$20 00 for 30 tablets  I can prescribe 25, 50 or 100 mg dose for this price  The reason it is so expensive is because [facility]  [facility] price is >$300 for some reason  However, [facility], [facility], [facility], [facility], [facility] all offer good rx for the lower price  20 mg dose is not indicated for ED  Recommended dose is  mg  Please let me know if he would like me to send to different pharmacy

## 2022-05-26 ENCOUNTER — TELEPHONE (OUTPATIENT)
Dept: FAMILY MEDICINE CLINIC | Facility: MEDICAL CENTER | Age: 52
End: 2022-05-26

## 2022-05-26 DIAGNOSIS — E78.2 MIXED HYPERLIPIDEMIA: ICD-10-CM

## 2022-05-26 RX ORDER — ROSUVASTATIN CALCIUM 5 MG/1
5 TABLET, COATED ORAL DAILY
Qty: 90 TABLET | Refills: 1 | Status: SHIPPED | OUTPATIENT
Start: 2022-05-26

## 2022-05-26 NOTE — TELEPHONE ENCOUNTER
Pt called to request a call back from a nurse  He thinks he has an infection under his tongue  He woke up with it  His throat is affected, also, but he is able to swallow  Pt has no dentist   He also stated he lives pretty far from the office      Routed to Clinical

## 2022-05-27 ENCOUNTER — HOSPITAL ENCOUNTER (OUTPATIENT)
Dept: RADIOLOGY | Facility: HOSPITAL | Age: 52
Discharge: HOME | End: 2022-05-27
Payer: COMMERCIAL

## 2022-05-27 ENCOUNTER — OFFICE VISIT (OUTPATIENT)
Dept: URGENT CARE | Facility: CLINIC | Age: 52
End: 2022-05-27
Payer: COMMERCIAL

## 2022-05-27 VITALS
TEMPERATURE: 99.2 F | OXYGEN SATURATION: 98 % | SYSTOLIC BLOOD PRESSURE: 200 MMHG | HEART RATE: 99 BPM | RESPIRATION RATE: 20 BRPM | DIASTOLIC BLOOD PRESSURE: 120 MMHG

## 2022-05-27 DIAGNOSIS — R22.0 SWELLING OF LIP, TONGUE, AND THROAT: ICD-10-CM

## 2022-05-27 DIAGNOSIS — R22.1 SWELLING OF LIP, TONGUE, AND THROAT: ICD-10-CM

## 2022-05-27 DIAGNOSIS — I10: ICD-10-CM

## 2022-05-27 DIAGNOSIS — F31.9 BIPOLAR AFFECTIVE DISORDER, REMISSION STATUS UNSPECIFIED (HCC): ICD-10-CM

## 2022-05-27 DIAGNOSIS — Z98.890 POST-OPERATIVE STATE: ICD-10-CM

## 2022-05-27 DIAGNOSIS — B00.2 HERPES STOMATITIS: Primary | ICD-10-CM

## 2022-05-27 DIAGNOSIS — Z96.89 S/P INSERTION OF SPINAL CORD STIMULATOR: ICD-10-CM

## 2022-05-27 PROCEDURE — 72070 X-RAY EXAM THORAC SPINE 2VWS: CPT

## 2022-05-27 PROCEDURE — 99213 OFFICE O/P EST LOW 20 MIN: CPT

## 2022-05-27 RX ORDER — LIDOCAINE HYDROCHLORIDE 20 MG/ML
15 SOLUTION OROPHARYNGEAL 3 TIMES DAILY PRN
Qty: 100 ML | Refills: 0 | Status: SHIPPED | OUTPATIENT
Start: 2022-05-27

## 2022-05-27 RX ORDER — METHYLPREDNISOLONE SODIUM SUCCINATE 40 MG/ML
40 INJECTION, POWDER, LYOPHILIZED, FOR SOLUTION INTRAMUSCULAR; INTRAVENOUS ONCE
Status: DISCONTINUED | OUTPATIENT
Start: 2022-05-27 | End: 2022-05-27

## 2022-05-27 RX ORDER — METHYLPREDNISOLONE SODIUM SUCCINATE 125 MG/2ML
40 INJECTION, POWDER, LYOPHILIZED, FOR SOLUTION INTRAMUSCULAR; INTRAVENOUS ONCE
Status: COMPLETED | OUTPATIENT
Start: 2022-05-27 | End: 2022-05-27

## 2022-05-27 RX ORDER — METHYLPREDNISOLONE SODIUM SUCCINATE 125 MG/2ML
125 INJECTION, POWDER, LYOPHILIZED, FOR SOLUTION INTRAMUSCULAR; INTRAVENOUS ONCE
Status: CANCELLED | OUTPATIENT
Start: 2022-05-27

## 2022-05-27 RX ORDER — VALACYCLOVIR HYDROCHLORIDE 500 MG/1
500 TABLET, FILM COATED ORAL 2 TIMES DAILY
Qty: 14 TABLET | Refills: 0 | Status: SHIPPED | OUTPATIENT
Start: 2022-05-27 | End: 2022-06-03

## 2022-05-27 RX ADMIN — METHYLPREDNISOLONE SODIUM SUCCINATE 40 MG: 125 INJECTION, POWDER, LYOPHILIZED, FOR SOLUTION INTRAMUSCULAR; INTRAVENOUS at 16:33

## 2022-05-27 NOTE — PATIENT INSTRUCTIONS
My recommendation is to go to the ER for high blood pressure with blurry vision, this puts you at risk for stroke/heart disease and can be an emergency situation without any warning beforehand  Also, to be see for throat swelling  Take acyclovir for lesions  For swelling take dexamethasone swishes  For pain can take lidocaine swishes  Take amlodipine blood pressure medication - take this as soon as able  If blood pressure is still high go to ER  Follow up with dentist as soon as you are able  Take ibuprofen (Aleve, Motrin, etc) as needed for pain  Follow up with PCP in 3-5 days  Proceed to ER if symptoms worsen - fever, severe pain, or drainage to the area

## 2022-05-31 ENCOUNTER — TELEMEDICINE (OUTPATIENT)
Dept: NEUROSURGERY | Facility: CLINIC | Age: 52
End: 2022-05-31

## 2022-05-31 VITALS — WEIGHT: 219 LBS | BODY MASS INDEX: 26.67 KG/M2 | HEIGHT: 76 IN

## 2022-05-31 DIAGNOSIS — Z96.89 STATUS POST INSERTION OF SPINAL CORD STIMULATOR: ICD-10-CM

## 2022-05-31 DIAGNOSIS — G89.4 CHRONIC PAIN SYNDROME: Primary | ICD-10-CM

## 2022-05-31 PROCEDURE — 99024 POSTOP FOLLOW-UP VISIT: CPT | Performed by: NEUROLOGICAL SURGERY

## 2022-05-31 NOTE — LETTER
May 31, 2022     Patient: Kareem Blas  YOB: 1970  Date of Visit: 5/31/2022      To Whom it May Concern:    Kareem Blas is under my professional care  Mary Galindo was seen in my office on 5/31/2022  [place] should have easy access to his [employer] controller for his spinal cord stimulator  If you have any questions or concerns, please don't hesitate to call           Sincerely,          Fernanda Williamson MD        CC: No Recipients
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
## 2022-06-09 ENCOUNTER — TELEPHONE (OUTPATIENT)
Dept: FAMILY MEDICINE CLINIC | Facility: MEDICAL CENTER | Age: 52
End: 2022-06-09

## 2022-06-09 NOTE — TELEPHONE ENCOUNTER
Prior authorization request received via fax from [address] [place] [address] for pt's CT Abdomen Pelvis w/o contrast scheduled for 6/13/22     CPT: 99980  Dx: [id number]  Facility: [facility]   NPI: [id number]  Request scanned to pt's chart    Routed to [facility]

## 2022-06-13 ENCOUNTER — TELEPHONE (OUTPATIENT)
Dept: FAMILY MEDICINE CLINIC | Facility: MEDICAL CENTER | Age: 52
End: 2022-06-13

## 2022-06-13 NOTE — TELEPHONE ENCOUNTER
Davis Clarke from prior [facility] called to say pt's CT scan ab and pelvis needs a peer to peer  This is done through Eulogio Campuzano at [phone]  Tracking number is [id number]

## 2022-06-14 NOTE — TELEPHONE ENCOUNTER
[place] is approved until 8/9 auth # [id number]  If he does not have this test done before then, prior auth will not be done again as this is now the second one done for this same study  Please inform patient of this  He has CT scheduled for July, he should keep appointment

## 2022-07-21 ENCOUNTER — HOSPITAL ENCOUNTER (EMERGENCY)
Facility: HOSPITAL | Age: 52
Discharge: LEFT BEFORE DISCHARGE | End: 2022-07-21
Payer: COMMERCIAL

## 2022-07-21 VITALS
SYSTOLIC BLOOD PRESSURE: 190 MMHG | HEART RATE: 84 BPM | OXYGEN SATURATION: 100 % | RESPIRATION RATE: 24 BRPM | DIASTOLIC BLOOD PRESSURE: 100 MMHG | TEMPERATURE: 97.4 F

## 2022-07-21 LAB
ALBUMIN SERPL BCP-MCNC: 4 G/DL (ref 3.5–5)
ALP SERPL-CCNC: 85 U/L (ref 46–116)
ALT SERPL W P-5'-P-CCNC: 18 U/L (ref 12–78)
ANION GAP SERPL CALCULATED.3IONS-SCNC: 10 MMOL/L (ref 4–13)
AST SERPL W P-5'-P-CCNC: 19 U/L (ref 5–45)
BASOPHILS # BLD AUTO: 0.03 THOUSANDS/ΜL (ref 0–0.1)
BASOPHILS NFR BLD AUTO: 0 % (ref 0–1)
BILIRUB SERPL-MCNC: 0.41 MG/DL (ref 0.2–1)
BUN SERPL-MCNC: 12 MG/DL (ref 5–25)
CALCIUM SERPL-MCNC: 9.3 MG/DL (ref 8.3–10.1)
CHLORIDE SERPL-SCNC: 104 MMOL/L (ref 96–108)
CO2 SERPL-SCNC: 28 MMOL/L (ref 21–32)
CREAT SERPL-MCNC: 0.94 MG/DL (ref 0.6–1.3)
EOSINOPHIL # BLD AUTO: 0.09 THOUSAND/ΜL (ref 0–0.61)
EOSINOPHIL NFR BLD AUTO: 1 % (ref 0–6)
ERYTHROCYTE [DISTWIDTH] IN BLOOD BY AUTOMATED COUNT: 13 % (ref 11.6–15.1)
GFR SERPL CREATININE-BSD FRML MDRD: 92 ML/MIN/1.73SQ M
GLUCOSE SERPL-MCNC: 102 MG/DL (ref 65–140)
HCT VFR BLD AUTO: 43.7 % (ref 36.5–49.3)
HGB BLD-MCNC: 14.6 G/DL (ref 12–17)
IMM GRANULOCYTES # BLD AUTO: 0.02 THOUSAND/UL (ref 0–0.2)
IMM GRANULOCYTES NFR BLD AUTO: 0 % (ref 0–2)
LIPASE SERPL-CCNC: 78 U/L (ref 73–393)
LYMPHOCYTES # BLD AUTO: 1.51 THOUSANDS/ΜL (ref 0.6–4.47)
LYMPHOCYTES NFR BLD AUTO: 19 % (ref 14–44)
MCH RBC QN AUTO: 31.3 PG (ref 26.8–34.3)
MCHC RBC AUTO-ENTMCNC: 33.4 G/DL (ref 31.4–37.4)
MCV RBC AUTO: 94 FL (ref 82–98)
MONOCYTES # BLD AUTO: 0.48 THOUSAND/ΜL (ref 0.17–1.22)
MONOCYTES NFR BLD AUTO: 6 % (ref 4–12)
NEUTROPHILS # BLD AUTO: 5.87 THOUSANDS/ΜL (ref 1.85–7.62)
NEUTS SEG NFR BLD AUTO: 74 % (ref 43–75)
NRBC BLD AUTO-RTO: 0 /100 WBCS
PLATELET # BLD AUTO: 198 THOUSANDS/UL (ref 149–390)
PMV BLD AUTO: 9.7 FL (ref 8.9–12.7)
POTASSIUM SERPL-SCNC: 3.4 MMOL/L (ref 3.5–5.3)
PROT SERPL-MCNC: 7.7 G/DL (ref 6.4–8.4)
RBC # BLD AUTO: 4.66 MILLION/UL (ref 3.88–5.62)
SODIUM SERPL-SCNC: 142 MMOL/L (ref 135–147)
WBC # BLD AUTO: 8 THOUSAND/UL (ref 4.31–10.16)

## 2022-07-21 PROCEDURE — 83690 ASSAY OF LIPASE: CPT

## 2022-07-21 PROCEDURE — 36415 COLL VENOUS BLD VENIPUNCTURE: CPT

## 2022-07-21 PROCEDURE — 80053 COMPREHEN METABOLIC PANEL: CPT

## 2022-07-21 PROCEDURE — 85025 COMPLETE CBC W/AUTO DIFF WBC: CPT

## 2022-08-15 ENCOUNTER — VBI (OUTPATIENT)
Dept: ADMINISTRATIVE | Facility: OTHER | Age: 52
End: 2022-08-15

## 2022-09-21 ENCOUNTER — HOSPITAL ENCOUNTER (EMERGENCY)
Facility: HOSPITAL | Age: 52
Discharge: HOME | End: 2022-09-21
Attending: EMERGENCY MEDICINE
Payer: COMMERCIAL

## 2022-09-21 ENCOUNTER — APPOINTMENT (OUTPATIENT)
Dept: RADIOLOGY | Facility: HOSPITAL | Age: 52
End: 2022-09-21
Payer: COMMERCIAL

## 2022-09-21 VITALS
DIASTOLIC BLOOD PRESSURE: 98 MMHG | OXYGEN SATURATION: 100 % | TEMPERATURE: 98.5 F | SYSTOLIC BLOOD PRESSURE: 167 MMHG | RESPIRATION RATE: 18 BRPM | HEART RATE: 109 BPM

## 2022-09-21 DIAGNOSIS — M75.32 CALCIFIC TENDINITIS OF LEFT SHOULDER: Primary | ICD-10-CM

## 2022-09-21 PROCEDURE — 99283 EMERGENCY DEPT VISIT LOW MDM: CPT

## 2022-09-21 PROCEDURE — 96372 THER/PROPH/DIAG INJ SC/IM: CPT

## 2022-09-21 PROCEDURE — 73030 X-RAY EXAM OF SHOULDER: CPT

## 2022-09-21 PROCEDURE — 99284 EMERGENCY DEPT VISIT MOD MDM: CPT | Performed by: EMERGENCY MEDICINE

## 2022-09-21 RX ORDER — ONDANSETRON 4 MG/1
4 TABLET, ORALLY DISINTEGRATING ORAL ONCE
Status: COMPLETED | OUTPATIENT
Start: 2022-09-21 | End: 2022-09-21

## 2022-09-21 RX ORDER — KETOROLAC TROMETHAMINE 30 MG/ML
30 INJECTION, SOLUTION INTRAMUSCULAR; INTRAVENOUS ONCE
Status: COMPLETED | OUTPATIENT
Start: 2022-09-21 | End: 2022-09-21

## 2022-09-21 RX ORDER — NAPROXEN 500 MG/1
500 TABLET ORAL 2 TIMES DAILY WITH MEALS
Qty: 20 TABLET | Refills: 0 | Status: SHIPPED | OUTPATIENT
Start: 2022-09-21 | End: 2022-10-01

## 2022-09-21 RX ORDER — OXYCODONE HYDROCHLORIDE AND ACETAMINOPHEN 5; 325 MG/1; MG/1
1 TABLET ORAL EVERY 4 HOURS PRN
Qty: 20 TABLET | Refills: 0 | Status: SHIPPED | OUTPATIENT
Start: 2022-09-21 | End: 2022-09-25

## 2022-09-21 RX ORDER — OXYCODONE HYDROCHLORIDE AND ACETAMINOPHEN 5; 325 MG/1; MG/1
1 TABLET ORAL ONCE
Status: COMPLETED | OUTPATIENT
Start: 2022-09-21 | End: 2022-09-21

## 2022-09-21 RX ADMIN — KETOROLAC TROMETHAMINE 30 MG: 30 INJECTION, SOLUTION INTRAMUSCULAR at 04:43

## 2022-09-21 RX ADMIN — ONDANSETRON 4 MG: 4 TABLET, ORALLY DISINTEGRATING ORAL at 04:43

## 2022-09-21 RX ADMIN — OXYCODONE HYDROCHLORIDE AND ACETAMINOPHEN 1 TABLET: 5; 325 TABLET ORAL at 04:43
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
## 2022-09-21 NOTE — Clinical Note
Helder Cronin was seen and treated in our emergency department on 9/21/2022  Diagnosis:     [place]    He may return on this date:     No work for two days  If you have any questions or concerns, please don't hesitate to call        Rob Bobo MD    ______________________________           _______________          _______________  Hospital Representative                              Date                                Time
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
## 2024-03-04 ENCOUNTER — TELEPHONE (OUTPATIENT)
Dept: NEUROSURGERY | Facility: CLINIC | Age: 54
End: 2024-03-04

## 2024-03-04 NOTE — TELEPHONE ENCOUNTER
Dr. Omer, Medical director at [facility] called into the office stating patient is having issues with his spinal cord stimulator, she stated they've had the stimulator turned up but it is shocking him.They had contacted the [facility] which stated they recommend contacting us. Dr. Omer expressed concern due to their facility being 5 hours away what they should do to treat the patient. I spoke with manager Dian BOSE who recommends the patient to be seen by a Neurosurgeon closest to them in [place] and to fax us a release of information so that we can get his medical records sent to the office treating him.

## 2025-07-31 NOTE — RESULT NOTES
Discussion/Summary   Increase testosterone gel packets from 3 to 4 times daily  Schedule follow up appt to eval for injectable testosterone and instruction  Verified Results  (1) TESTOSTERONE, FREE (DIRECT) AND TOTAL [id number] 08:40AM Dylan Starr Order Number: [id number]     Test Name Result Flag Reference   FREE TESTOSTERONE, DIRECT 8 0 pg/mL  6 8 - 21 5   TESTOSTERONE (TOTAL) 250 ng/dL L 264 - 65   Adult male reference interval is based on a population of  healthy nonobese males (BMI <30) between 23and 44years old  [address] [place] [address], [address] [place] [address] [phone]  PMID: [id number]      Performed at:  [address] [employer] [address]  [id number]  : Emiliano Thrasher MD, Phone:  [phone] Patient/family given discharge instructions. Patient/family verbalize understanding of discharge instructions, all questions addressed, copy given to patient/family.

## (undated) DEVICE — SUT PROLENE 3-0 SH1 30 IN 8762H

## (undated) DEVICE — SUT CHROMIC 4-0 P-3 18 MM 1654G

## (undated) DEVICE — DISPOSABLE EQUIPMENT COVER: Brand: SMALL TOWEL DRAPE

## (undated) DEVICE — DISTRACTION SCREW 12MM DISP

## (undated) DEVICE — SURGIFOAM 7 X 12 SPONGE ABS

## (undated) DEVICE — 3M™ IOBAN™ 2 ANTIMICROBIAL INCISE DRAPE 6650EZ: Brand: IOBAN™ 2

## (undated) DEVICE — PAD GROUNDING ADULT

## (undated) DEVICE — NEEDLE SPINAL 22G X 3.5IN  QUINCKE

## (undated) DEVICE — DRAPE C-ARM X-RAY

## (undated) DEVICE — HYDROGEN PEROXIDE 3 PCT 16 OZ

## (undated) DEVICE — SUT PLAIN 4-0 SC-1 18 IN 1828H

## (undated) DEVICE — STERILE NASAL PACK: Brand: CARDINAL HEALTH

## (undated) DEVICE — BONE WAX WHITE: Brand: BONE WAX WHITE

## (undated) DEVICE — SUT PROLENE 3-0 SH 36 IN 8522H

## (undated) DEVICE — SYRINGE 3ML LL

## (undated) DEVICE — TUBING SUCTION 5MM X 12 FT

## (undated) DEVICE — DRESSING MEPILEX AG BORDER 4 X 4 IN

## (undated) DEVICE — PLUMEPEN PRO 10FT

## (undated) DEVICE — TIBURON SPLIT SHEET: Brand: CONVERTORS

## (undated) DEVICE — SPECIMEN CONTAINER STERILE PEEL PACK

## (undated) DEVICE — SKIN MARKER DUAL TIP WITH RULER CAP, FLEXIBLE RULER AND LABELS: Brand: DEVON

## (undated) DEVICE — NEURO PATTIES 1/2 X 1 1/2

## (undated) DEVICE — GLOVE SRG BIOGEL ECLIPSE 7

## (undated) DEVICE — GLOVE SRG BIOGEL ORTHOPEDIC 7

## (undated) DEVICE — KERLIX BANDAGE ROLL: Brand: KERLIX

## (undated) DEVICE — 1820 FOAM BLOCK NEEDLE COUNTER: Brand: DEVON

## (undated) DEVICE — POV-IOD SWAB STICKS

## (undated) DEVICE — GLOVE PI ULTRA TOUCH SZ.7.0

## (undated) DEVICE — ADHESIVE SKN CLSR HISTOACRYL FLEX 0.5ML LF

## (undated) DEVICE — PENCIL ELECTROSURG E-Z CLEAN -0035H

## (undated) DEVICE — ANTIBACTERIAL VIOLET BRAIDED (POLYGLACTIN 910), SYNTHETIC ABSORBABLE SUTURE: Brand: COATED VICRYL

## (undated) DEVICE — SPLINT 1528121 5PK EXTERNAL NASAL MEDIUM

## (undated) DEVICE — INTENDED FOR TISSUE SEPARATION, AND OTHER PROCEDURES THAT REQUIRE A SHARP SURGICAL BLADE TO PUNCTURE OR CUT.: Brand: BARD-PARKER ® CARBON RIB-BACK BLADES

## (undated) DEVICE — GLOVE SRG BIOGEL 6.5

## (undated) DEVICE — SYRINGE 30ML LL

## (undated) DEVICE — MAGNETIC INSTRUMENT PAD 16" X 20"; LARGE; DISPOSABLE: Brand: CARDINAL HEALTH

## (undated) DEVICE — ACE WRAP 4 IN UNSTERILE

## (undated) DEVICE — LIGHT HANDLE COVER SLEEVE DISP BLUE STELLAR

## (undated) DEVICE — SUT SILK 0 SH 30 IN K834H

## (undated) DEVICE — PROGRAMMER PATIENT PROCLAIM

## (undated) DEVICE — STOCKINETTE REGULAR

## (undated) DEVICE — SUT MONOCRYL 4-0 PS-2 27 IN Y426H

## (undated) DEVICE — NEEDLE 25G X 1 1/2

## (undated) DEVICE — SCD SEQUENTIAL COMPRESSION COMFORT SLEEVE MEDIUM KNEE LENGTH: Brand: KENDALL SCD

## (undated) DEVICE — GLOVE SRG BIOGEL 8

## (undated) DEVICE — FLOSEAL HEMOSTATIC MATRIX, 10 ML: Brand: FLOSEAL

## (undated) DEVICE — NEUROSTIM MULTILEAD TRIAL CABLE

## (undated) DEVICE — GLOVE INDICATOR PI UNDERGLOVE SZ 8 BLUE

## (undated) DEVICE — GLOVE INDICATOR PI UNDERGLOVE SZ 7.5 BLUE

## (undated) DEVICE — CUFF TOURNIQUET 18 X 4 IN QUICK CONNECT DISP 1 BLADDER

## (undated) DEVICE — GLOVE INDICATOR PI UNDERGLOVE SZ 6.5 BLUE

## (undated) DEVICE — BETHLEHEM UNIVERSAL SPINE, KIT: Brand: CARDINAL HEALTH

## (undated) DEVICE — SNAP KOVER: Brand: UNBRANDED

## (undated) DEVICE — TOOL 14MH30 LEGEND 14CM 3MM: Brand: MIDAS REX ™

## (undated) DEVICE — Device

## (undated) DEVICE — ANTI-FOG SOLUTION WITH FOAM PAD: Brand: DEVON

## (undated) DEVICE — TUNNELING TOOL 20IN

## (undated) DEVICE — SPLINT 1527005 10PK PAIR NASAL STD THICK

## (undated) DEVICE — 3000CC GUARDIAN II: Brand: GUARDIAN

## (undated) DEVICE — SPONGE 4 X 4 XRAY 16 PLY STRL LF RFD

## (undated) DEVICE — VIAL DECANTER

## (undated) DEVICE — MAYO STAND COVER: Brand: CONVERTORS

## (undated) DEVICE — ELECTRODE BLADE MOD E-Z CLEAN  2.75IN 7CM -0012AM

## (undated) DEVICE — CHLORAPREP HI-LITE 26ML ORANGE

## (undated) DEVICE — BLADE 1884004HR TRICUT 5PK M4 4MM ROTATE: Brand: TRICUT

## (undated) DEVICE — GLOVE SRG BIOGEL 7.5

## (undated) DEVICE — ADHESIVE SKIN HIGH VISCOSITY EXOFIN 1ML

## (undated) DEVICE — OCCLUSIVE GAUZE STRIP,3% BISMUTH TRIBROMOPHENATE IN PETROLATUM BLEND: Brand: XEROFORM

## (undated) DEVICE — SPONGE PVP SCRUB WING STERILE

## (undated) DEVICE — MEDI-VAC YANK SUCT HNDL W/TPRD BULBOUS TIP: Brand: CARDINAL HEALTH

## (undated) DEVICE — INTENDED FOR TISSUE SEPARATION, AND OTHER PROCEDURES THAT REQUIRE A SHARP SURGICAL BLADE TO PUNCTURE OR CUT.: Brand: BARD-PARKER SAFETY BLADES SIZE 10, STERILE

## (undated) DEVICE — WAND COBLATION REFLEX ULTRA 45

## (undated) DEVICE — SYRINGE 5ML LL

## (undated) DEVICE — 4-PORT MANIFOLD: Brand: NEPTUNE 2

## (undated) DEVICE — PREP SURGICAL PURPREP 26ML

## (undated) DEVICE — DRAPE ADOLESCENT LAPAROTOMY

## (undated) DEVICE — BULB SYRINGE,IRRIGATION WITH PROTECTIVE CAP: Brand: DOVER

## (undated) DEVICE — ELECTRODE BLADE MOD E-Z CLEAN 2.5IN 6.4CM -0012M

## (undated) DEVICE — SUT MONOCRYL PLUS 4-0 PS-2 18 IN MCP496G

## (undated) DEVICE — HEMOSTATIC MATRIX SURGIFLO 8ML W/THROMBIN

## (undated) DEVICE — GAUZE SPONGES,16 PLY: Brand: CURITY

## (undated) DEVICE — UNIVERSAL MAJOR EXTREMITY,KIT: Brand: CARDINAL HEALTH

## (undated) DEVICE — TELFA NON-ADHERENT ABSORBENT DRESSING: Brand: TELFA

## (undated) DEVICE — NEEDLE BLUNT 18 G X 1 1/2IN

## (undated) DEVICE — SUCTION BOVIE ENT